# Patient Record
Sex: FEMALE | Race: WHITE | NOT HISPANIC OR LATINO | Employment: OTHER | URBAN - METROPOLITAN AREA
[De-identification: names, ages, dates, MRNs, and addresses within clinical notes are randomized per-mention and may not be internally consistent; named-entity substitution may affect disease eponyms.]

---

## 2017-01-05 ENCOUNTER — TELEPHONE (OUTPATIENT)
Dept: INTERNAL MEDICINE | Facility: CLINIC | Age: 37
End: 2017-01-05

## 2017-01-05 DIAGNOSIS — E55.9 VITAMIN D DEFICIENCY: Primary | ICD-10-CM

## 2017-01-05 DIAGNOSIS — E78.5 HYPERLIPIDEMIA: ICD-10-CM

## 2017-01-05 DIAGNOSIS — I63.30 CEREBRAL THROMBOSIS WITH CEREBRAL INFARCTION: ICD-10-CM

## 2017-01-05 RX ORDER — ROSUVASTATIN CALCIUM 5 MG
TABLET ORAL
Qty: 90 TABLET | Refills: 1 | Status: SHIPPED | OUTPATIENT
Start: 2017-01-05 | End: 2017-08-12 | Stop reason: SDUPTHER

## 2017-01-05 NOTE — TELEPHONE ENCOUNTER
Merry, please schedule pt a RTC Appt with 1 week prior fasting lab; I've placed lab orders.  Thanks

## 2017-01-06 NOTE — TELEPHONE ENCOUNTER
Merry, please schedule pt a RTC Appt with 1 week prior fasting lab; I've placed lab orders. Thanks                  Documentation              Patient for for Ep appt and prior labs...Appt's mail out to home address on file.

## 2017-03-30 ENCOUNTER — OFFICE VISIT (OUTPATIENT)
Dept: INTERNAL MEDICINE | Facility: CLINIC | Age: 37
End: 2017-03-30
Payer: COMMERCIAL

## 2017-03-30 ENCOUNTER — TELEPHONE (OUTPATIENT)
Dept: PHYSICAL MEDICINE AND REHAB | Facility: HOSPITAL | Age: 37
End: 2017-03-30

## 2017-03-30 VITALS
HEIGHT: 64 IN | OXYGEN SATURATION: 98 % | WEIGHT: 139.63 LBS | HEART RATE: 93 BPM | BODY MASS INDEX: 23.84 KG/M2 | DIASTOLIC BLOOD PRESSURE: 60 MMHG | SYSTOLIC BLOOD PRESSURE: 108 MMHG

## 2017-03-30 DIAGNOSIS — F32.A DEPRESSION, UNSPECIFIED DEPRESSION TYPE: ICD-10-CM

## 2017-03-30 DIAGNOSIS — Z15.89 MTHFR MUTATION: ICD-10-CM

## 2017-03-30 DIAGNOSIS — I69.319 COGNITIVE DEFICIT S/P CVA (CEREBROVASCULAR ACCIDENT): Primary | ICD-10-CM

## 2017-03-30 DIAGNOSIS — M54.31 SCIATICA OF RIGHT SIDE: ICD-10-CM

## 2017-03-30 DIAGNOSIS — R53.82 CHRONIC FATIGUE: ICD-10-CM

## 2017-03-30 PROCEDURE — 99214 OFFICE O/P EST MOD 30 MIN: CPT | Mod: 25,S$GLB,, | Performed by: INTERNAL MEDICINE

## 2017-03-30 PROCEDURE — 96372 THER/PROPH/DIAG INJ SC/IM: CPT | Mod: S$GLB,,, | Performed by: INTERNAL MEDICINE

## 2017-03-30 PROCEDURE — 99999 PR PBB SHADOW E&M-EST. PATIENT-LVL IV: CPT | Mod: PBBFAC,,, | Performed by: INTERNAL MEDICINE

## 2017-03-30 PROCEDURE — 1160F RVW MEDS BY RX/DR IN RCRD: CPT | Mod: S$GLB,,, | Performed by: INTERNAL MEDICINE

## 2017-03-30 RX ORDER — KETOROLAC TROMETHAMINE 30 MG/ML
60 INJECTION, SOLUTION INTRAMUSCULAR; INTRAVENOUS
Status: COMPLETED | OUTPATIENT
Start: 2017-03-30 | End: 2017-03-30

## 2017-03-30 RX ORDER — CITALOPRAM 20 MG/1
20 TABLET, FILM COATED ORAL DAILY
Qty: 30 TABLET | Refills: 11 | Status: SHIPPED | OUTPATIENT
Start: 2017-03-30 | End: 2018-02-20 | Stop reason: SDUPTHER

## 2017-03-30 RX ADMIN — KETOROLAC TROMETHAMINE 60 MG: 30 INJECTION, SOLUTION INTRAMUSCULAR; INTRAVENOUS at 11:03

## 2017-03-30 NOTE — PROGRESS NOTES
Verified patients name and date of birth, verified patient allergies, instructed patient to remain in building for 30 min, patient acknowledged understanding of instructions, patient tolerated injection well.

## 2017-03-30 NOTE — PATIENT INSTRUCTIONS
Medication Change:   #1-Zoloft: Decrease to 1 tab x 3 days then STOP zoloft  #2- Start Celexa 20 mg (1 tab) daily therafter(OK to start next day)    I-Phone Calender Daniel:  Juliet Perdomo(Physical Medicine/Rehabilitaion) to call Hui/Maame's mom to schedule Appt

## 2017-03-31 ENCOUNTER — HOSPITAL ENCOUNTER (OUTPATIENT)
Dept: RADIOLOGY | Facility: HOSPITAL | Age: 37
Discharge: HOME | End: 2017-03-31
Attending: INTERNAL MEDICINE
Payer: COMMERCIAL

## 2017-03-31 DIAGNOSIS — M54.31 SCIATICA OF RIGHT SIDE: ICD-10-CM

## 2017-03-31 PROCEDURE — 72100 X-RAY EXAM L-S SPINE 2/3 VWS: CPT | Mod: TC,PO

## 2017-03-31 PROCEDURE — 72100 X-RAY EXAM L-S SPINE 2/3 VWS: CPT | Mod: 26,,, | Performed by: RADIOLOGY

## 2017-04-10 NOTE — TELEPHONE ENCOUNTER
Left the patient a message letting her know that I have tried to setup an appt for her to see Dr Perdomo. She has not returned my call.

## 2017-04-13 ENCOUNTER — TELEPHONE (OUTPATIENT)
Dept: INTERNAL MEDICINE | Facility: CLINIC | Age: 37
End: 2017-04-13

## 2017-04-13 DIAGNOSIS — F41.8 MIXED ANXIETY AND DEPRESSIVE DISORDER: Primary | ICD-10-CM

## 2017-04-13 NOTE — TELEPHONE ENCOUNTER
Hi Dr Perdomo/staff,   Please call pt's mother(Hui Villanueva) to schedule pt an Appt with Dr Perdomo.  The pt has short term memory loss s/p her CVA.  Thanks, Dr Bruce

## 2017-04-13 NOTE — TELEPHONE ENCOUNTER
Spoke with pt's mother, Hui, mara her test results(3/31)-showed TSH,CMP,CBC all ok; Vitamin D improved to 27; Cholesterol/LDL was 139/71; L-spine films were normal. Pt's sciatica sx have resolved.  She is a little depressed and only on Celexa now x 1 week.  I will place Psychiatry referral and they will call to start some counseling. The depression sx should improve in next few weeks; if not, pt/mom to call.

## 2017-04-21 ENCOUNTER — OFFICE VISIT (OUTPATIENT)
Dept: PHYSICAL MEDICINE AND REHAB | Facility: CLINIC | Age: 37
End: 2017-04-21
Payer: COMMERCIAL

## 2017-04-21 VITALS
BODY MASS INDEX: 22.2 KG/M2 | HEIGHT: 64 IN | WEIGHT: 130 LBS | HEART RATE: 75 BPM | SYSTOLIC BLOOD PRESSURE: 119 MMHG | DIASTOLIC BLOOD PRESSURE: 75 MMHG

## 2017-04-21 DIAGNOSIS — G81.91 RIGHT HEMIPARESIS: Primary | ICD-10-CM

## 2017-04-21 DIAGNOSIS — I69.319 COGNITIVE DEFICIT FOLLOWING CEREBROVASCULAR ACCIDENT (CVA): ICD-10-CM

## 2017-04-21 PROCEDURE — 99999 PR PBB SHADOW E&M-EST. PATIENT-LVL III: CPT | Mod: PBBFAC,,, | Performed by: PHYSICAL MEDICINE & REHABILITATION

## 2017-04-21 PROCEDURE — 99214 OFFICE O/P EST MOD 30 MIN: CPT | Mod: S$GLB,,, | Performed by: PHYSICAL MEDICINE & REHABILITATION

## 2017-04-21 PROCEDURE — 1160F RVW MEDS BY RX/DR IN RCRD: CPT | Mod: S$GLB,,, | Performed by: PHYSICAL MEDICINE & REHABILITATION

## 2017-04-21 NOTE — MR AVS SNAPSHOT
[facility]  [address]  [facility] [address]  Phone: [phone]                  Maame Cortez   2017 10:40 AM   Office Visit    Description:  Female : 1980   Provider:  Tulio Perdomo MD   Department:  [facility]           Diagnoses this Visit        Comments    Right hemiparesis    -  Primary     Cognitive deficit following cerebrovascular accident (CVA)                To Do List           Future Appointments        Provider Department Dept Phone    2017 9:30 AM [facility] [address] [phone]      Goals (5 Years of Data)     None      Follow-Up and Disposition     Return in about 6 months (around 10/21/2017) for Dr. Escamilla -- 40 mins.      [facility]     [facility] Care Line -  Assistance  Unless otherwise directed by your provider, please contact [facility], our nurse care line that is available for  assistance.     Registered nurses in the [facility] On Call Center provide: appointment scheduling, clinical advisement, health education, and other advisory services.  Call: [phone] (toll free)               Medications           Message regarding Medications     Verify the changes and/or additions to your medication regime listed below are the same as discussed with your clinician today.  If any of these changes or additions are incorrect, please notify your healthcare provider.             Verify that the below list of medications is an accurate representation of the medications you are currently taking.  If none reported, the list may be blank. If incorrect, please contact your healthcare provider. Carry this list with you in case of emergency.           Current Medications     citalopram (CELEXA) 20 MG tablet Take 1 tablet (20 mg total) by mouth once daily.    CRESTOR 5 mg tablet TAKE 1 TABLET (5 MG TOTAL) BY MOUTH ONCE DAILY. 1 TAB DAILY FOR CHOLESTEROL     "dipyridamole-aspirin 200-25 mg (AGGRENOX)  mg CM12 TAKE 1 CAPSULE BY MOUTH 2 (TWO) TIMES DAILY.    ergocalciferol (ERGOCALCIFEROL) 50,000 unit Cap Take 1 capsule (50,000 Units total) by mouth every 7 days.    levocarnitine (CARNITOR) 100 mg/mL Soln Take 10 mLs (1,000 mg total) by mouth 2 (two) times daily.           Clinical Reference Information           Your Vitals Were     BP Pulse Height Weight Last Period BMI    119/75 75 5' 4" (1.626 m) 59 kg (130 lb) 02/03/2017 (Approximate) 22.31 kg/m2      Blood Pressure          Most Recent Value    BP  119/75      Allergies as of 4/21/2017     No Known Allergies      Immunizations Administered on Date of Encounter - 4/21/2017     None      Orders Placed During Today's Visit      Normal Orders This Visit    Ambulatory Referral to Physical/Occupational Therapy     Ambulatory Referral to Speech Therapy       Instructions    1.  Go to [employer] to view the [employer] H200   2.  Consider joining a gym to work on strengthening your quadriceps and hamstrings  3.  USE your right hand for normal tasks as much as possible.    4.  Add more memory exercises to and do them daily to improve your memory       Language Assistance Services     ATTENTION: Language assistance services are available, free of charge. Please call [phone].      ATENCIÓN: Si zen shukri, tiene a armstrong disposición servicios gratuitos de asistencia lingüística. Llame al [phone].     [facility] Ý: N?u b?n nói Ti?ng Vi?t, có các d?ch v? h? tr? ngôn ng? mi?n phí dành cho b?n. G?i s? [phone].         [facility] complies with applicable Federal civil rights laws and does not discriminate on the basis of race, color, national origin, age, disability, or sex.        "

## 2017-04-21 NOTE — PATIENT INSTRUCTIONS
1.  Go to [employer] to view the [employer] H200   2.  Consider joining a gym to work on strengthening your quadriceps and hamstrings  3.  USE your right hand for normal tasks as much as possible.    4.  Add more memory exercises to and do them daily to improve your memory

## 2017-04-21 NOTE — PROGRESS NOTES
"Subjective:       Patient ID: Maame Cortez is a 36 y.o. female.    Chief Complaint: No chief complaint on file.    HPI Comments: This pleasant 35yo female is followed for:    -- problems residual to a L CVA incurred in August, 2015.  She is familiar to me from an IPR stay from 8/14/15-9/4/15 after incurring a left CVA.  The pt did well and was walking 285' with CGA using an AFO at d/c but with some hyperextension of the right knee in stance phase.  She had cognitive deficits and expressive aphasia which have improved.  Her dysarthria has improved considerably and she feels her cognitive deficits have improved as well.        Today the pt's CC is "my memory".  Dr. Gupta (PCP) called me and asked me to see Elissa again.  She is frustrated that she is still having some problems with memory.  She is also c/o inability to use the right hand.  She is present with her Mother.  Upon questioning she is instinctively using the left hand for most tasks (opening doors, etc) and is not performing any planned exercise with the RUE.  She plays scrabble on her phone daily but is not doing any other mental exercises that might improve memory.  She is c/o her right knee still "popping back" (hyperextending) but stts it is improved.                  Review of Systems   Constitutional: Negative for fatigue.   Eyes: Negative for visual disturbance.   Respiratory: Negative for shortness of breath.    Cardiovascular: Negative for chest pain and leg swelling.   Gastrointestinal: Negative for constipation.   Genitourinary: Negative for difficulty urinating, dysuria, frequency and urgency.   Musculoskeletal: Positive for arthralgias and gait problem. Negative for back pain, joint swelling, myalgias, neck pain and neck stiffness.   Neurological: Positive for weakness. Negative for tremors, speech difficulty and numbness.   Psychiatric/Behavioral: Negative for dysphoric mood.       Objective:      Physical Exam   Constitutional: She " is oriented to person, place, and time. She appears well-nourished.   Neck: Normal range of motion. Neck supple.   Cardiovascular: Normal rate.    Pulmonary/Chest: Effort normal.   Abdominal: Soft.   Musculoskeletal:   LUE/LLE AROM WNL  RUE with diminished flexion/abduction at the shoulder  RLE with mildly diminished HF   Neurological: She is oriented to person, place, and time.   LUE/LLE 5/5  RUE 3+/5 FF, 3/5 FE, 4+/5 EF/EE  RLE:  4/5 HF, 4+/5 elsewhere    On 12/4/15 her exam was:  LUE/LLE 5/5  RUE 3/5FF, 0/5FE (tone), 4/5 EF/EE  RLE:  4/5 HF, 4+/5 elsewhere   Skin: No rash noted.   Psychiatric: She has a normal mood and affect.       Assessment:       1. Right hemiparesis -- improved.  The pt shows improvement in her RUE strength and can now both flex and extend the fingers.  Her gait is reciprocal but she does have some dysmetria of the RLE and the knee still hyperextends in stance phase.  She still has some quadriceps weakness.    I talked at length with the pt about the need for her to use the right hand as much as possible even though performing tasks with it is more difficult -- and explained why.  I asked her to use it as much as possible for routine tasks like opening doors, picking up things, etc.  I suggested she devote at least 40 mins per day to using the right hand for exercise tasks such as picking up things and picking up coins from a table.      I think she is a good candidate for the [employer]00 system and have ordered another course of OT to trial this.  I will try to get it approved for her if she is a good candidate.    I asked her to join a gym and use the weight machines to strengthen her right quadriceps and hamstrings to hopefully improve the right knee hyperextension issue.  I did not order PT today but will if she wishes after she starts OT.  I also suggested swimming.     2. Cognitive deficit following cerebrovascular accident (CVA) -- mostly involving memory.  Scrabble is a good  exercise for this but I have asked her to find other challenging memory and cognitive tasks to perform daily to improve her memory.  I also ordered another course of ST today.         Plan:       RTC 6 mos to see Dr. Escamilla (40 mins) as I will be retiring.   More than 25 mins was spent with the patient with more than half that time used to discuss the pt's diagnoses, interventions and treatment plan.

## 2017-05-04 ENCOUNTER — CLINICAL SUPPORT (OUTPATIENT)
Dept: REHABILITATION | Facility: HOSPITAL | Age: 37
End: 2017-05-04
Attending: PHYSICAL MEDICINE & REHABILITATION
Payer: COMMERCIAL

## 2017-05-04 DIAGNOSIS — I69.319 COGNITIVE DEFICIT FOLLOWING CEREBROVASCULAR ACCIDENT (CVA): Primary | ICD-10-CM

## 2017-05-04 PROCEDURE — 96125 COGNITIVE TEST BY HC PRO: CPT | Mod: PO

## 2017-05-04 NOTE — PROGRESS NOTES
"Date: 05/04/2017    Start Time:  950  Stop Time:  1030      OUTPATIENT NEUROLOGICAL REHABILITATION  SPEECH THERAPY EVALUATION    Subjective/History  Onset Date:  August 8, 2015  Primary Diagnosis:  L CVA  Treatment Diagnosis:  Cognitive Linguistic Impairment   Referring Provider:  Dr. Perdomo  Orders: ST for evaluation and treat  Current Medical History:  Maame Cortez  presents to the [facility] secondary to the diagnosis of L CVA. 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 Her primary complaint is her memory. Mrs. Cortez reports that she often will forget functional tasks such as registering her son for school. She reports forgetting specific information with such frequency that it interferes with her functional living abilities.   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 She reports that she has not worked since her stroke, but stays home with her son. From initial OP evaluation dated 9/10/15 "Mrs. Cortez lives with her  and her 3 children (ages 16 to 5 yrs old). 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 She and her  used to share responsibility for financial matters, but he now manages these."  Signs of Abuse: No  Functional Deficits Leading to Referral/Nature of Injury:  Functional memory deficit  Patient Therapy Goals:  "to remember important information"     Objective "   Scales of Cognitive and Communicative Ability for Neurorehabilitation (SCCAN) was administered to assess cognitive and communicative functioning.        Raw Scores  Percentage Score  Oral Expression (OE)  18/19   94  Orientation (OR)   12/12   100  Memory (ME)    15/19   78  Speech Comprehension (SP)  13/13   100  Reading Comprehension (RD)  11/12   92  Writing (WR)    7/7   100  Attention (AT)    15/16   94  Problem Solving (PS)   22/23   96    Total Raw Score  100%ile Rank  94 SCCAN Index  WNL Degree of Severity    Auditory Comprehension: WFL for the purpose of assessment and conversation.     Reading Comprehension: WFL for the purpose of assessment and conversation.     Verbal Expression: WFL for the purpose of assessment and conversation.     Written Expression: WFL for the purpose of assessment. However, pt reports that her handwriting is not as neat as she would like.     Cognition: Mrs. Danielss cognitive skills are considered mildly impaired with a moderate memory impairment. Mrs. Cortez immediately recalls information fairly easily; however, specific information to be completed over a series of hours or days is frequently forgotten. Mrs. Danielss memory impairment has negatively affected her daily life as she forgets functional information needed to complete her daily activities such as registering her son for school or picking up prescriptions needed for her family.     Motor Speech/Fluency/Voice: pt's motor speech, fluency, and voice were assessed informally and judged to be WFL.     Oral motor exam revealed:  Slight lingual and labial weakness but functional for speech and oral intake.     Swallowing: no concerns reported. Will assess at a future date if s/s of aspiration are observed or reported.     Hearing: WFL for the purpose of evaluation.     Assessment/Impressions    Mrs. Cortez presents to [facility] s/p CV. She presents with a cognitive linguistic impairment. She  presents with moderately impaired memory as she frequently forgets functional information in her daily life.  Patient will benefit from outpatient neurological rehabilitation speech therapy to review compensatory memory strategies.     Functional Communication Measure (FCM):   Severity Modifier for Medicare G-Code:  Memory  Current status: FCM: Level 4    - CK   Projected status:  FCM: Level 5   - CJ         Rehab Potential: fair    Short Term Goals (4 weeks):   1. Pt will recall daily activities via retellings/answering questions with 2 cues for 3 minutes.   2. Pt will recall information discussed during therapy session via retelling / answering questions with 90% accuracy min verbal cues.  3. Pt will describe/ demonstrate/ initiate use of memory strategies with min cues 5 minutes after review.  4. The patietn will use external memory aids and compensatory strategies to recall routine, personal information and recent events with 90% accuracy IND'ly.   5. The patient will demonstrate recall of functional information following a long term delay with min cues in order to increase functional integration into environment.   6. The patient will attest to using memory strategies in daily life each session.     Long Term Goals (8 weeks):   1. The patient will use appropriate memory strategies to schedule and recall weekly activities, express needs and recall names to maintain safety and participate socially in functional living environment.     Education: POC was discussed with pt. Patient and family members expressed understanding.     Plan  Certification Period: 5/4/17 to 12/31/17  Plan of Care Certification Period: 5/4/17 to 6/30/17    Recommended Treatment Plan:  Patient will participate in the [facility] program for speech therapy 2 times per week to address her  Cognition deficits, to educate patient and their family, and to participate in a home exercise program.    Other  Recommendations: none at this time      Therapist's Name: MYLENE Mix, CCC-SLP     Date: 05/04/2017    I certify the need for these services furnished under this plan of treatment and while under my care.  ____________________________________ Physician/Referring Practitioner   Date of Signature

## 2017-05-05 ENCOUNTER — CLINICAL SUPPORT (OUTPATIENT)
Dept: REHABILITATION | Facility: HOSPITAL | Age: 37
End: 2017-05-05
Attending: PHYSICAL MEDICINE & REHABILITATION
Payer: COMMERCIAL

## 2017-05-05 DIAGNOSIS — Z74.09 IMPAIRED MOBILITY AND ADLS: ICD-10-CM

## 2017-05-05 DIAGNOSIS — G81.91 RIGHT HEMIPARESIS: Primary | ICD-10-CM

## 2017-05-05 DIAGNOSIS — Z78.9 IMPAIRED MOBILITY AND ADLS: ICD-10-CM

## 2017-05-05 PROCEDURE — 97165 OT EVAL LOW COMPLEX 30 MIN: CPT | Mod: PO

## 2017-05-05 NOTE — PLAN OF CARE
Name: Maame Cortez  MRN: [id number]   Physician: Tulio Perdomo MD     Diagnosis:   CVA with continued spasticity     Onset date: 8/7/15    Date of service: 5/5/17  Start time: 0900   End time: 1000    Orders: Eval and Treat Bioness trial    Subjective:  Patient goals: write and eat with right hand.   Chief Complaint:   Impaired self care   Past Medical History:   Diagnosis Date    Heart palpitations     MTHFR mutation     Stroke       Current Outpatient Prescriptions   Medication Sig    citalopram (CELEXA) 20 MG tablet Take 1 tablet (20 mg total) by mouth once daily.    CRESTOR 5 mg tablet TAKE 1 TABLET (5 MG TOTAL) BY MOUTH ONCE DAILY. 1 TAB DAILY FOR CHOLESTEROL    dipyridamole-aspirin 200-25 mg (AGGRENOX)  mg CM12 TAKE 1 CAPSULE BY MOUTH 2 (TWO) TIMES DAILY.    ergocalciferol (ERGOCALCIFEROL) 50,000 unit Cap Take 1 capsule (50,000 Units total) by mouth every 7 days.    levocarnitine (CARNITOR) 100 mg/mL Soln Take 10 mLs (1,000 mg total) by mouth 2 (two) times daily.     No current facility-administered medications for this visit.      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 Surrounding edema within the left basal ganglia extending into the left cerebral peduncle and midbrain results in local mass effect with 0.4 cm rightward   midline shift. No hemorrhagic conversion of infarction. No hydrocephalus.    Small focus of signal abnormality in the inferolateral aspect of the right putamen/external capsule may reflect sequela of remote lacunar type infarct, noting the patient's young  chronological age makes this an unusual finding. A prominent perivascular   space is felt less likely given the high FLAIR signal within the lesion.    Past treatment includes: OT  IP d/c 9/4/15, OP 9/6/17 d/c.   Precautions:  Pain: 0/10 at rest. 0/10 with movement. Pain established using the Numbers pain scale.     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  Job description includes: caregiver  Driving status: active prior to stroke    Objective  Cognitive Exam  Oriented: Person, Place, Time and Situation  Behaviors: Pleasant and cooperative  Follows Commands/attention: Follows multistep  commands  Communication: expressive aphasia, dysarthria, mild R facial droop  Memory: Mild Deficits noted  Safety awareness/insight to disability: WNL  Coping skills/emotional control: Appropriate to situation    Physical Exam:    Postural examination/scapula alignment: R scapula elevated and abd  Joint integrity: none noted this date but family reported she had one in rehab.   Skin integrity: WNL  Edema: none    Palpation: no palpable tenderness    Sensation: Rainna reports normal sensation. Pt. Intact RUE for light touch, sharp/dull and proprioception.   RUE ROM WNL except the following: IR 40A/85P, wrist extension 55/WNL, wrist flexion 60/wnl. Middle finger flexes with supination. Pt. Can oppose fifth finger. Pt. Cannot extend thumb radially. Palmar extension ok. Uses tenodesis to open hand fully. Without tenodesis, index DIP lags 25 degress from extension.   Pt. LUE with normal strength with mild coordination deficits due to non-dominance.     : 21lbs RUE  Fine motor coordination:9 hole peg takes over 2 minutes with RUE.  Gross motor coordination: bradykinetic.     Tone:  Modified Brook Scale:   1+ Slight increases in muscle tone, manifested by a catch, followed by minimal resistance throughout the remainder (less than half) of  the ROM.  Pt. Able to activate trace strength in shoulder and elbow with facilitation.   Balance:   Static Sit WNL  Dynamic sit WNL    Functional Status:                                   Functional Mobility:  Bed mobility: Mod I  Transfers to bed: Mod I  Transfers to toilet: Mod I  Car transfers: Mod I   Wheelchair mobility: N/A  ADL's:using non dominant L  Feeding: Min A to cut food  Grooming: Mod I  Hygiene: Mod I  UB Dressing: Mod I  LB Dressing: Mod I  Toileting: Mod I  Bathing: Mod I   IADL's:  Homecare: Min A  Cooking: Min A  Laundry: Min a  Yard work: n/a  Use of telephone: Mod I phone and tablet  Money management: Mod I  Medication management: Mod I      TODAY'S TREATMENT  Began trial of B[employer]ess H200 with R Medium flexor panel A in open hand mode x 15 minutes.     ASSESSMENT:  OT diagnosis: R hemiparesis affecting use of R hand as dominant    Assessment  Maame Cortez is a 36 y.o. female with a medical diagnosis of   CVA  referred to occupational therapy for ADL training and restore function. .    Maame can benefit from outpatient Occupational therapy and a home program to address the stated goals to improve impairments and functional limitations. Treatment will be directed at improve the following impairments. Rehab potential is good. No cultural or educational barriers.     LTG GOALS:  Time frame: 5 weeks  Obtain H200 for home use with [place] in sj/doff by patient or family.  Normalize muscle tone to use R hand for feeding, oral hygiene and writing.   Pt. Will demonstrate ability to open hand with wrist extension for functional grasp.   PLAN:    Patient/caregiver understands and agrees with plan of care.    Outpatient occupational therapy 2  times weekly for 5 weeks  to include: pt ed, hep, therapeutic exercises, therapeutic activity, ADLs,  neuromuscular re-education, joint mobilizations, modalities prn, Certification 5/5/17-6/23/17.    History Examination Decision Making Complexity  Score   Occupational Profile:Brief review     Medical and Therapy History: CVA with R hemiplegia                  Performance Deficits     Physical: Cannot use R hand as dominant        Cognitive: Mild memory deficits        Psychosocial:  Great family support but has autistic son to care for.       Clinical decision making of low complexity, which includes an analysis of the occupational profile, analysis of date from problem focused assessments, and the consideration of limited treatment options. Patient presents with few comorbidities that affect occupational performance. No modification(s) of tasks or assistance with assessments is necessary to enable completion of evaluation component.  low

## 2017-05-05 NOTE — PROGRESS NOTES
Name: Maame Cortez  MRN: [id number]   Physician: Tulio Perdomo MD     Diagnosis:   CVA with continued spasticity     Onset date: 8/7/15    Date of service: 5/5/17  Start time: 0900   End time: 1000    Orders: Eval and Treat Bioness trial    Subjective:  Patient goals: write and eat with right hand.   Chief Complaint:   Impaired self care   Past Medical History:   Diagnosis Date    Heart palpitations     MTHFR mutation     Stroke       Current Outpatient Prescriptions   Medication Sig    citalopram (CELEXA) 20 MG tablet Take 1 tablet (20 mg total) by mouth once daily.    CRESTOR 5 mg tablet TAKE 1 TABLET (5 MG TOTAL) BY MOUTH ONCE DAILY. 1 TAB DAILY FOR CHOLESTEROL    dipyridamole-aspirin 200-25 mg (AGGRENOX)  mg CM12 TAKE 1 CAPSULE BY MOUTH 2 (TWO) TIMES DAILY.    ergocalciferol (ERGOCALCIFEROL) 50,000 unit Cap Take 1 capsule (50,000 Units total) by mouth every 7 days.    levocarnitine (CARNITOR) 100 mg/mL Soln Take 10 mLs (1,000 mg total) by mouth 2 (two) times daily.     No current facility-administered medications for this visit.      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 Surrounding edema within the left basal ganglia extending into the left cerebral peduncle and midbrain results in local mass effect with 0.4 cm rightward   midline shift. No hemorrhagic conversion of infarction. No hydrocephalus.    Small focus of signal abnormality in the inferolateral aspect of the right putamen/external capsule may reflect sequela of remote lacunar type infarct, noting the patient's young  chronological age makes this an unusual finding. A prominent perivascular   space is felt less likely given the high FLAIR signal within the lesion.    Past treatment includes: OT  IP d/c 9/4/15, OP 9/6/17 d/c.   Precautions:  Pain: 0/10 at rest. 0/10 with movement. Pain established using the Numbers pain scale.     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  Job description includes: caregiver  Driving status: active prior to stroke    Objective  Cognitive Exam  Oriented: Person, Place, Time and Situation  Behaviors: Pleasant and cooperative  Follows Commands/attention: Follows multistep  commands  Communication: expressive aphasia, dysarthria, mild R facial droop  Memory: Mild Deficits noted  Safety awareness/insight to disability: WNL  Coping skills/emotional control: Appropriate to situation    Physical Exam:    Postural examination/scapula alignment: R scapula elevated and abd  Joint integrity: none noted this date but family reported she had one in rehab.   Skin integrity: WNL  Edema: none    Palpation: no palpable tenderness    Sensation: Rainna reports normal sensation. Pt. Intact RUE for light touch, sharp/dull and proprioception.   RUE ROM WNL except the following: IR 40A/85P, wrist extension 55/WNL, wrist flexion 60/wnl. Middle finger flexes with supination. Pt. Can oppose fifth finger. Pt. Cannot extend thumb radially. Palmar extension ok. Uses tenodesis to open hand fully. Without tenodesis, index DIP lags 25 degress from extension.   Pt. LUE with normal strength with mild coordination deficits due to non-dominance.     : 21lbs RUE  Fine motor coordination:9 hole peg takes over 2 minutes with RUE.  Gross motor coordination: bradykinetic.     Tone:  Modified Brook Scale:   1+ Slight increases in muscle tone, manifested by a catch, followed by minimal resistance throughout the remainder (less than half) of  the ROM.  Pt. Able to activate trace strength in shoulder and elbow with facilitation.   Balance:   Static Sit WNL  Dynamic sit WNL    Functional Status:                                   Functional Mobility:  Bed mobility: Mod I  Transfers to bed: Mod I  Transfers to toilet: Mod I  Car transfers: Mod I   Wheelchair mobility: N/A  ADL's:using non dominant L  Feeding: Min A to cut food  Grooming: Mod I  Hygiene: Mod I  UB Dressing: Mod I  LB Dressing: Mod I  Toileting: Mod I  Bathing: Mod I   IADL's:  Homecare: Min A  Cooking: Min A  Laundry: Min a  Yard work: n/a  Use of telephone: Mod I phone and tablet  Money management: Mod I  Medication management: Mod I  CMS Impairment/Limitation/Restriction for FOTO Cerebrovascular Disorders Survey  Status Limitation G-Code CMS Severity Modifier  Intake 60% 40% Current Status CK - At least 40 percent but less than 60 percent  Predicted 70% 30% Goal Status+ CJ - At least 20 percent but less than 40 percent        TODAY'S TREATMENT  Began trial of Bioness H200 with R Medium flexor panel A in open hand mode x 15 minutes.     ASSESSMENT:  OT diagnosis: R hemiparesis affecting use of R hand as dominant    Assessment  Maame Cortez is a 36 y.o. female with a medical diagnosis of   CVA  referred to occupational therapy for ADL training and restore function. .    Maame can benefit from outpatient Occupational therapy and a home program to address the stated goals to improve impairments and functional limitations. Treatment will be directed at improve the following impairments. Rehab potential is good. No cultural or educational barriers.     LTG GOALS:  Time frame: 5 weeks  Obtain H200 for home use with [employer] in sj/doff by patient or family.  Normalize muscle tone to use R hand for feeding, oral hygiene and writing.   Pt. Will demonstrate ability to open hand with wrist extension for functional grasp.   PLAN:    Patient/caregiver understands and agrees with plan of  care.    Outpatient occupational therapy 2  times weekly for 5 weeks  to include: pt ed, hep, therapeutic exercises, therapeutic activity, ADLs,  neuromuscular re-education, joint mobilizations, modalities prn, Certification 5/5/17-6/23/17.    History Examination Decision Making Complexity Score   Occupational Profile:Brief review     Medical and Therapy History: CVA with R hemiplegia                  Performance Deficits     Physical: Cannot use R hand as dominant        Cognitive: Mild memory deficits        Psychosocial:  Great family support but has autistic son to care for.       Clinical decision making of low complexity, which includes an analysis of the occupational profile, analysis of date from problem focused assessments, and the consideration of limited treatment options. Patient presents with few comorbidities that affect occupational performance. No modification(s) of tasks or assistance with assessments is necessary to enable completion of evaluation component.  low

## 2017-05-09 RX ORDER — SERTRALINE HYDROCHLORIDE 50 MG/1
TABLET, FILM COATED ORAL
Qty: 90 TABLET | Refills: 3 | Status: SHIPPED | OUTPATIENT
Start: 2017-05-09 | End: 2017-07-07

## 2017-05-10 ENCOUNTER — CLINICAL SUPPORT (OUTPATIENT)
Dept: REHABILITATION | Facility: HOSPITAL | Age: 37
End: 2017-05-10
Attending: PHYSICAL MEDICINE & REHABILITATION
Payer: COMMERCIAL

## 2017-05-10 ENCOUNTER — OFFICE VISIT (OUTPATIENT)
Dept: OBSTETRICS AND GYNECOLOGY | Facility: CLINIC | Age: 37
End: 2017-05-10
Payer: COMMERCIAL

## 2017-05-10 VITALS
WEIGHT: 130.75 LBS | BODY MASS INDEX: 22.32 KG/M2 | HEIGHT: 64 IN | SYSTOLIC BLOOD PRESSURE: 110 MMHG | DIASTOLIC BLOOD PRESSURE: 64 MMHG

## 2017-05-10 DIAGNOSIS — I69.319 COGNITIVE DEFICIT FOLLOWING CEREBROVASCULAR ACCIDENT (CVA): ICD-10-CM

## 2017-05-10 DIAGNOSIS — Z12.4 PAP SMEAR FOR CERVICAL CANCER SCREENING: ICD-10-CM

## 2017-05-10 DIAGNOSIS — Z01.419 WOMEN'S ANNUAL ROUTINE GYNECOLOGICAL EXAMINATION: Primary | ICD-10-CM

## 2017-05-10 DIAGNOSIS — Z11.51 SCREENING FOR HPV (HUMAN PAPILLOMAVIRUS): ICD-10-CM

## 2017-05-10 PROCEDURE — 88175 CYTOPATH C/V AUTO FLUID REDO: CPT

## 2017-05-10 PROCEDURE — 97532 *HC SP COG SKL DEV EA 15 MIN: CPT | Mod: PO

## 2017-05-10 PROCEDURE — 99999 PR PBB SHADOW E&M-EST. PATIENT-LVL III: CPT | Mod: PBBFAC,,, | Performed by: NURSE PRACTITIONER

## 2017-05-10 PROCEDURE — 87624 HPV HI-RISK TYP POOLED RSLT: CPT

## 2017-05-10 PROCEDURE — 99395 PREV VISIT EST AGE 18-39: CPT | Mod: S$GLB,,, | Performed by: NURSE PRACTITIONER

## 2017-05-10 NOTE — PROGRESS NOTES
CC: Annual  HPI: Pt is a 36 y.o.  female who presents for routine annual exam. She uses condoms for contraception. She does not want STD screening.  Krystal any GYN complaints.  Pt with history of stroke in 2015.  Interested in  getting a vasectomy.  Reports + family history of breast cancer- maternal grandmother ( diagnosis).         ROS:  GENERAL: Feeling well overall. Denies fever or chills.   SKIN: Denies rash or lesions.   HEAD: Denies head injury or headache.   NODES: Denies enlarged lymph nodes.   CHEST: Denies chest pain or shortness of breath.   CARDIOVASCULAR: Denies palpitations or left sided chest pain.   ABDOMEN: No abdominal pain, constipation, diarrhea, nausea, vomiting or rectal bleeding.   URINARY: No dysuria, hematuria, or burning on urination.  REPRODUCTIVE: See HPI.   BREASTS: Denies pain, lumps, or nipple discharge.   HEMATOLOGIC: No easy bruisability or excessive bleeding.   MUSCULOSKELETAL: Denies joint pain or swelling.   NEUROLOGIC: Denies syncope or weakness.   PSYCHIATRIC: Denies depression, anxiety or mood swings.    PE:   APPEARANCE: Well nourished, well developed, White female in no acute distress.  NODES: no cervical, supraclavicular, or inguinal lymphadenopathy  BREASTS: Symmetrical, no skin changes or visible lesions. No palpable masses, nipple discharge or adenopathy bilaterally.  ABDOMEN: Soft. No tenderness or masses. No distention. No hernias palpated. No CVA tenderness.  VULVA: No lesions. Normal external female genitalia.  URETHRAL MEATUS: Normal size and location, no lesions, no prolapse.  URETHRA: No masses, tenderness, or prolapse.  VAGINA: Moist. No lesions or lacerations noted. No abnormal discharge present. No odor present.   CERVIX: No lesions or discharge. No cervical motion tenderness.   UTERUS: Normal size, regular shape, mobile, non-tender.  ADNEXA: No tenderness. No fullness or masses palpated in the adnexal regions.   ANUS PERINEUM:  Normal.      Diagnosis:  1. Women's annual routine gynecological examination    2. Pap smear for cervical cancer screening    3. Screening for HPV (human papillomavirus)        Plan:   Pap smear  HPV co-testing   referral to Dr. Dial for vasectomy  Orders Placed This Encounter    HPV High Risk Genotypes, PCR    Liquid-based pap smear, screening       Patient was counseled today on the new ACS guidelines for cervical cytology screening as well as the current recommendations for breast cancer screening. She was counseled to follow up with her PCP for other routine health maintenance. Counseling session lasted approximately 10 minutes, and all her questions were answered.    Follow-up with me in 1 year for routine exam

## 2017-05-10 NOTE — MR AVS SNAPSHOT
[facility] [address]  [address]  [place] [facility] [address]  Phone: [phone]  Fax: [phone]                  Maame Cortez   5/10/2017 9:40 AM   Office Visit    Description:  Female : 1980   Provider:  Diann Rivas NP   Department:  [facility] [address]           Reason for Visit     Gynecologic Exam           Diagnoses this Visit        Comments    Women's annual routine gynecological examination    -  Primary     Pap smear for cervical cancer screening                To Do List           Future Appointments        Provider Department Dept Phone    5/10/2017 11:15 AM MYLENE Benavides, CCC-SLP [facility] [phone]    5/15/2017 10:30 AM MYLENE Benavides, CCC-SLP [facility] [phone]    2017 9:00 AM MYLENE Benavides, CCC-SLP [facility] [phone]    2017 1:45 PM THERON Cannon [facility] [phone]    2017 1:00 PM Светлана Bee OT [facility] [phone]      Goals (5 Years of Data)     None      [facility]     [facility] Nurse Care Line -  Assistance  Unless otherwise directed by your provider, please contact [facility], our nurse care line that is available for  assistance.     Registered nurses in the [facility] provide: appointment scheduling, clinical advisement, health education, and other advisory services.  Call: [phone] (toll free)               Medications           Message regarding Medications     Verify the changes and/or additions to your medication regime listed below are the same as discussed with your clinician today.  If any of these changes or additions are incorrect, please notify your healthcare provider.             Verify that the below list of medications is an accurate representation of the medications you are currently taking.  If none  "reported, the list may be blank. If incorrect, please contact your healthcare provider. Carry this list with you in case of emergency.           Current Medications     citalopram (CELEXA) 20 MG tablet Take 1 tablet (20 mg total) by mouth once daily.    CRESTOR 5 mg tablet TAKE 1 TABLET (5 MG TOTAL) BY MOUTH ONCE DAILY. 1 TAB DAILY FOR CHOLESTEROL    dipyridamole-aspirin 200-25 mg (AGGRENOX)  mg CM12 TAKE 1 CAPSULE BY MOUTH 2 (TWO) TIMES DAILY.    ergocalciferol (ERGOCALCIFEROL) 50,000 unit Cap Take 1 capsule (50,000 Units total) by mouth every 7 days.    levocarnitine (CARNITOR) 100 mg/mL Soln Take 10 mLs (1,000 mg total) by mouth 2 (two) times daily.    sertraline (ZOLOFT) 50 MG tablet TAKE 2 TABLETS (100 MG TOTAL) BY MOUTH ONCE DAILY.           Clinical Reference Information           Your Vitals Were     BP Height Weight Last Period BMI    110/64 5' 4" (1.626 m) 59.3 kg (130 lb 11.7 oz) 04/12/2017 22.44 kg/m2      Blood Pressure          Most Recent Value    BP  110/64      Allergies as of 5/10/2017     No Known Allergies      Immunizations Administered on Date of Encounter - 5/10/2017     None      Smoking Cessation     If you would like to quit smoking:   You may be eligible for free services if you are a [place] resident and started smoking cigarettes before September 1, 1988.  Call the Smoking Cessation Trust ([facility]) toll free at ([phone] or ([phone].   Call 1-800-QUIT-NOW if you do not meet the above criteria.   Contact us via email: [email or web address]   View our website for more information: [email or web address]        Language Assistance Services     ATTENTION: Language assistance services are available, free of charge. Please call [phone].      ATENCIÓN: Si zen shukri, tiene a armstrong disposición servicios gratuitos de asistencia lingüística. Llame al [phone].     CHÚ Ý: N?u b?n nói Ti?ng Vi?t, có các d?ch v? h? tr? ngôn ng? mi?n phí dành cho b?n. G?i s? " [phone].         [facility] [address] complies with applicable Federal civil rights laws and does not discriminate on the basis of race, color, national origin, age, disability, or sex.

## 2017-05-10 NOTE — PROGRESS NOTES
OUTPATIENT NEUROLOGICAL REHABILITATION  SPEECH THERAPY PROGRESS NOTE    Date:  05/10/2017    Start Time:  1120  Stop Time:  1200    Subjective/History  Onset Date:  August 8, 2015  Primary Diagnosis:  L CVA  Treatment Diagnosis:  Cognitive Linguistic Impairment   Referring Provider:  Dr. Perdomo  Orders: ST for evaluation and treat  Current Medical History:  Maame Cortez  presents to the [facility] secondary to the diagnosis of L CVA. 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 Her primary complaint is her memory. Mrs. Cortez reports that she often will forget functional tasks such as registering her son for school. She reports forgetting specific information with such frequency that it interferes with her functional living abilities.   Past Medical History:   Past Medical History:   Diagnosis Date    Heart palpitations     MTHFR mutation     Stroke 08/2015     Precautions:  general        TIMED  Procedure Time Min.   Cognitive Therapy Start: 1120  Stop: 1200  40    Start:  Stop:     Start:  Stop:     Start:  Stop:          UNTIMED  Procedure Time Min.    Start:    Stop:       Start:  Stop:      Total Minutes: 40  Total Timed Units: 3  Total Untimed Units: 0  Charges Billed/# of units: 3    Visit #: 2  Date of Evaluation: 5/4/17  Plan of Care Expiration:  5/4/17 to 6/30/17   Extended POC:       Progress/Current Status    Subjective:     Pain: 0 /10  Pt reports that she forgot her previous session 2/2 losing her calendar.     Objective:       Short Term Goals (4 weeks):   1. Pt will recall daily activities via retellings/answering questions with 2 cues for 3 minutes.   2. Pt will recall information discussed during therapy session via retelling / answering questions with 90% accuracy min verbal cues.  3. Pt will describe/ demonstrate/ initiate use of memory strategies with min cues 5 minutes after review.  4. The patietn will  "use external memory aids and compensatory strategies to recall routine, personal information and recent events with 90% accuracy IND'ly.   5. The patient will demonstrate recall of functional information following a long term delay with min cues in order to increase functional integration into environment.   6. The patient will attest to using memory strategies in daily life each session.     Current Progress toward Short Term Goals:  1. Pt recalled daily activities with 4 cues.   2. Pt participated in fit test for home program. Pt scored the same or better than 90% of same age peers in memory, 50% of same age peers in speed, and 68% of same age peers in attention.   3. N/a  4. Pt established the application "cozi" that was recommended to her by her MD. It integrates her calendar, to do list, and shopping list in one place.   5. N/a  6. N/a   Long Term Goals (8 weeks):   1. The patient will use appropriate memory strategies to schedule and recall weekly activities, express needs and recall names to maintain safety and participate socially in functional living environment.     Assessment:     Mrs. Cortez presents to [facility] s/p CVA. She presents with a cognitive linguistic impairment. She presents with moderately impaired memory as she frequently forgets functional information in her daily life.  Patient will benefit from outpatient neurological rehabilitation speech therapy to review compensatory memory strategies.     Functional Communication Measure (FCM):   Severity Modifier for Medicare G-Code:  Memory  Current status: FCM: Level 4    - CK   Projected status:  FCM: Level 5   - CJ     Patient Education/Response:     Memory strategies were reviewed with pt. Pt verbalized understanding.    Plans and Goals:     Certification Period: 5/4/17 to 12/31/17  Plan of Care Certification Period: 5/4/17 to 6/30/17    Recommended Treatment Plan:  Patient will participate in the [facility] " neurological rehabilitation program for speech therapy 2 times per week to address her  Cognition deficits, to educate patient and their family, and to participate in a home exercise program.    Other Recommendations: none at this time      Therapist's Name: MYLENE Mix, CCC-SLP   5/10/2017

## 2017-05-15 ENCOUNTER — CLINICAL SUPPORT (OUTPATIENT)
Dept: REHABILITATION | Facility: HOSPITAL | Age: 37
End: 2017-05-15
Attending: PHYSICAL MEDICINE & REHABILITATION
Payer: COMMERCIAL

## 2017-05-15 DIAGNOSIS — I69.319 COGNITIVE DEFICIT FOLLOWING CEREBROVASCULAR ACCIDENT (CVA): ICD-10-CM

## 2017-05-15 PROCEDURE — 97532 *HC SP COG SKL DEV EA 15 MIN: CPT | Mod: PO

## 2017-05-15 NOTE — PROGRESS NOTES
OUTPATIENT NEUROLOGICAL REHABILITATION  SPEECH THERAPY PROGRESS NOTE    Date:  05/15/2017    Start Time:  1035  Stop Time:  1115    Subjective/History  Onset Date:  August 8, 2015  Primary Diagnosis:  L CVA  Treatment Diagnosis:  Cognitive Linguistic Impairment   Referring Provider:  Dr. Perdomo  Orders: ST for evaluation and treat  Current Medical History:  Maaem Cortez  presents to the [facility] secondary to the diagnosis of L CVA. 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 Her primary complaint is her memory. Mrs. Cortez reports that she often will forget functional tasks such as registering her son for school. She reports forgetting specific information with such frequency that it interferes with her functional living abilities  Past Medical History:   Past Medical History:   Diagnosis Date    Heart palpitations     MTHFR mutation     Stroke 08/2015     Precautions:  general        TIMED  Procedure Time Min.   Cognitive Therapy Start: 1035  Stop: 1115  40    Start:  Stop:     Start:  Stop:     Start:  Stop:          UNTIMED  Procedure Time Min.    Start:    Stop:       Start:  Stop:      Total Minutes: 40  Total Timed Units: 3  Total Untimed Units: 0  Charges Billed/# of units: 3    Visit #: 3  Date of Evaluation: 5/4/17  Plan of Care Expiration:  5/4/17 to 6/30/17   Extended POC:       Progress/Current Status    Subjective:     Pain: 0 /10  Pt reports effectively using external memory strategies with min A.     Objective:     Short Term Goals (4 weeks):   1. Pt will recall daily activities via retellings/answering questions with 2 cues for 3 minutes.   2. Pt will recall information discussed during therapy session via retelling / answering questions with 90% accuracy min verbal cues.  3. Pt will describe/ demonstrate/ initiate use of memory strategies with min cues 5 minutes after review.  4. The patient will use  external memory aids and compensatory strategies to recall routine, personal information and recent events with 90% accuracy IND'ly.   5. The patient will demonstrate recall of functional information following a long term delay with min cues in order to increase functional integration into environment.   6. The patient will attest to using memory strategies in daily life each session.   New Goal: 7. The Functional Assessment of Verbal reasoning and Executive Strategies will be completed to assess subtle cognitive communication difficulties.     Current Progress toward Short Term Goals:  1. Pt recalled Mother's day activities with 1 cue for 3 min. METx1   2. n/a  3. Pt described using external memory strategies (i.e. Cozi faraz) to remember what she needed to bring to Mother's day activities.   4. Pt used external memory strategies (i.e. Cozi faraz) to recall appointments, remember what she needed for outings, and set up reminders.   5. N/a  6. Pt attested to using external memory strategies 5/5 days since last session.  7. The first two subtests of the Functional Assessment of Verbal Reasoning and Executive Strategies (FAVRES) was administered to assess subtle cognitive-communication difficulties including complex communication, verbal reasoning, and executive functioning. The results of the assessment are combined to create a Reasoning Profile and are detailed in the tables below.     Task 1 - Planning an Event Task 2 - Scheduling    Accuracy Raw: 5  Percentile: 100%  Standard Score: 108 Raw: 2  Percentile: <1%  Standard Score: 24   Rationale  Raw: 5  Percentile: 100%  Standard Score: 106 Raw: 0  Percentile: 6%  Standard Score: 58   Time  Raw: 7m 49s  Percentile: 38%  Standard Score: 96 Raw: 15m 29s  Percentile: 64%  Standard Score: 105         Long Term Goals (8 weeks):   1. The patient will use appropriate memory strategies to schedule and recall weekly activities, express needs and recall names to maintain safety  and participate socially in functional living environment.     Assessment:     Mrs. Cortez presents to [facility] s/p CVA. She presents with a cognitive linguistic impairment. She presents with moderately impaired memory as she frequently forgets functional information in her daily life.  Patient will benefit from outpatient neurological rehabilitation speech therapy to review compensatory memory strategies.     Functional Communication Measure (FCM):   Severity Modifier for Medicare G-Code:  Memory  Current status: FCM: Level 4    - CK   Projected status:  FCM: Level 5   - CJ     Patient Education/Response:     External memory strategies reviewed with pt. Pt verbalized understanding.     Plans and Goals:   Continue POC.     Certification Period: 5/4/17 to 12/31/17  Plan of Care Certification Period: 5/4/17 to 6/30/17    Recommended Treatment Plan:  Patient will participate in the [facility] neurological rehabilitation program for speech therapy 2 times per week to address her  Cognition deficits, to educate patient and their family, and to participate in a home exercise program.    Other Recommendations: none at this time     MYLENE Mix, CCC-SLP   5/15/2017

## 2017-05-16 LAB
HPV16 DNA SPEC QL NAA+PROBE: NEGATIVE
HPV16+18+H RISK 12 DNA CVX-IMP: NEGATIVE
HPV18 DNA SPEC QL NAA+PROBE: NEGATIVE

## 2017-05-17 ENCOUNTER — CLINICAL SUPPORT (OUTPATIENT)
Dept: REHABILITATION | Facility: HOSPITAL | Age: 37
End: 2017-05-17
Attending: PHYSICAL MEDICINE & REHABILITATION
Payer: COMMERCIAL

## 2017-05-17 DIAGNOSIS — I69.319 COGNITIVE DEFICIT FOLLOWING CEREBROVASCULAR ACCIDENT (CVA): ICD-10-CM

## 2017-05-17 PROCEDURE — 97532 *HC SP COG SKL DEV EA 15 MIN: CPT | Mod: PO

## 2017-05-17 NOTE — PROGRESS NOTES
OUTPATIENT NEUROLOGICAL REHABILITATION  SPEECH THERAPY PROGRESS NOTE    Date:  05/17/2017    Start Time:  900  Stop Time:  945    Subjective/History  Onset Date:  August 8, 2015  Primary Diagnosis:  L CVA  Treatment Diagnosis:  Cognitive Linguistic Impairment   Referring Provider:  Dr. Perdomo  Orders: ST for evaluation and treat  Current Medical History:  Maame Cortez  presents to the [facility] secondary to the diagnosis of L CVA. 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 Her primary complaint is her memory. Mrs. Cortez reports that she often will forget functional tasks such as registering her son for school. She reports forgetting specific information with such frequency that it interferes with her functional living abilities  Past Medical History:   Past Medical History:   Diagnosis Date    Heart palpitations     MTHFR mutation     Stroke 08/2015     Precautions:  general        TIMED  Procedure Time Min.   Cognitive therapy Start: 900  Stop: 945  45    Start:  Stop:     Start:  Stop:     Start:  Stop:          UNTIMED  Procedure Time Min.    Start:    Stop:       Start:  Stop:      Total Minutes: 45  Total Timed Units: 3  Total Untimed Units: 0  Charges Billed/# of units: 3    Visit #: 4  Date of Evaluation: 5/4/17  Plan of Care Expiration:  5/4/17 to 6/30/17   Extended POC:      Progress/Current Status    Subjective:     Pain: 0 /10  Pt was alert and communicative in therapy.     Objective:     Short Term Goals (4 weeks):   1. Pt will recall daily activities via retellings/answering questions with 2 cues for 3 minutes. Goal Met 5/17/17  2. Pt will recall information discussed during therapy session via retelling / answering questions with 90% accuracy min verbal cues.  3. Pt will describe/ demonstrate/ initiate use of memory strategies with min cues 5 minutes after review.  4. The patient will use external memory  aids and compensatory strategies to recall routine, personal information and recent events with 90% accuracy IND'ly.   5. The patient will demonstrate recall of functional information following a long term delay with min cues in order to increase functional integration into environment.   6. The patient will attest to using memory strategies in daily life each session.   New Goal: 7. The Functional Assessment of Verbal reasoning and Executive Strategies will be completed to assess subtle cognitive communication difficulties.     Current Progress toward Short Term Goals:  1. Pt recalled day's activities 0 cue for 3 min. METx2 Goal Met 5/17/17   2. n/a  3. Pt described using external memory strategies (i.e. Cozi faraz) to remember therapy appointments. METx1  4. Pt used external memory strategies (i.e. Cozi faraz) to recall appointments, remember what she needed for outings, and set up reminders. MET x1  5. N/a  6. Pt attested to using external memory strategies 3/3 days since last session.  7. The Functional Assessment of Verbal Reasoning and Executive Strategies (FAVRES) was administered to assess subtle cognitive-communication difficulties including complex communication, verbal reasoning, and executive functioning. The results of the assessment are combined to create a Reasoning Profile and are detailed in the tables below.     Task 1 - Planning an Event Task 2 - Scheduling  Task 3 - Making a Decision  Task 4 - Building a Case    Accuracy Raw: 5  Percentile: 100%  Standard Score: 108 Raw: 2  Percentile: <1  Standard Score: 24 Raw: 5  Percentile: 100  Standard Score: 107 Raw: 5  Percentile: 100  Standard Score: 106   Rationale  Raw: 5  Percentile: 100  Standard Score: 106 Raw: 0  Percentile: 6  Standard Score: 58 Raw: 5  Percentile: 100  Standard Score: 103 Raw: 3  Percentile: 3  Standard Score: 71   Time  Raw: 7m 49s  Percentile: 38  Standard Score: 96 Raw: 15min 29s  Percentile: 64  Standard Score: 105 Raw: 10m  8s  Percentile: 19  Standard Score: 90 Raw: 12m 49s  Percentile: 26  Standard Score: 91     Reasoning Subskills    Planning an Event Scheduling Making a Decision Building a Case Total Percentile  Standard Score    Getting the Facts  3 / 5  3 / 5  5 / 5  4 / 5  15 / 20     Eliminating Irrelevant  0 / 1 1 / 1 0 / 1 1 / 1  2 /4      Weighing Facts  1 / 1 1 / 1 1 / 1   2 / 2   5 / 5     Flexibility  1 /1 0 / 1 1 / 1 1 / 1  3 / 4     Generating  5   6 5 6 22     Predicting Consequences  4 / 4  3 / 4 4 / 4 2 / 2 13 / 14     Total Reasoning Subskills  14  14  16  16   60  <3%   <76       Total Test    Raw Score  Percentile Standard Score   Accuracy 17 15 81   Rationale 13 3 60   Time  46 33 94     Mrs. Cortez presents with higher level cognitive communication deficits. She demonstrated difficulty with executive functioning skills such as planning and eliminating irrelevant information. She would benefit from functional intervention aimed at improving these executive functioning skills.     Long Term Goals (8 weeks):   1. The patient will use appropriate memory strategies to schedule and recall weekly activities, express needs and recall names to maintain safety and participate socially in functional living environment.     Assessment:     Mrs. Cortez presents to [facility] s/p CVA. She presents with a cognitive linguistic impairment. She presents with moderately impaired memory as she frequently forgets functional information in her daily life. Upon further assessment, Mrs. Cortez presents with higher level cognitive communication deficits. She demonstrated difficulty with executive functioning skills such as planning and eliminating irrelevant information. She would benefit from functional intervention aimed at improving these executive functioning skills. Patient will benefit from outpatient neurological rehabilitation speech therapy to review compensatory memory strategies.     Functional  Communication Measure (FCM):   Severity Modifier for Medicare G-Code:  Memory  Current status: FCM: Level 4    - CK   Projected status:  FCM: Level 5   - CJ     Patient Education/Response:     POC discussed with pt. Pt verbalized understanding.     Plans and Goals:     Continue POC.     Certification Period: 5/4/17 to 12/31/17  Plan of Care Certification Period: 5/4/17 to 6/30/17    Recommended Treatment Plan:  Patient will participate in the [facility] program for speech therapy 2 times per week to address her  Cognition deficits, to educate patient and their family, and to participate in a home exercise program.    Other Recommendations: none at this time     MYLENE Mix, CCC-SLP   5/17/2017

## 2017-05-24 ENCOUNTER — CLINICAL SUPPORT (OUTPATIENT)
Dept: REHABILITATION | Facility: HOSPITAL | Age: 37
End: 2017-05-24
Attending: PHYSICAL MEDICINE & REHABILITATION
Payer: COMMERCIAL

## 2017-05-24 DIAGNOSIS — G81.91 RIGHT HEMIPARESIS: ICD-10-CM

## 2017-05-24 DIAGNOSIS — I69.319 COGNITIVE DEFICIT FOLLOWING CEREBROVASCULAR ACCIDENT (CVA): ICD-10-CM

## 2017-05-24 DIAGNOSIS — Z78.9 IMPAIRED MOBILITY AND ADLS: ICD-10-CM

## 2017-05-24 DIAGNOSIS — Z74.09 IMPAIRED MOBILITY AND ADLS: ICD-10-CM

## 2017-05-24 PROCEDURE — 97112 NEUROMUSCULAR REEDUCATION: CPT | Mod: PO

## 2017-05-24 PROCEDURE — 97532 *HC SP COG SKL DEV EA 15 MIN: CPT | Mod: PO

## 2017-05-24 NOTE — PROGRESS NOTES
OUTPATIENT NEUROLOGICAL REHABILITATION  SPEECH THERAPY PROGRESS NOTE    Date:  05/24/2017    Start Time:  1345  Stop Time:  1430    Subjective/History  Onset Date:  August 8, 2015  Primary Diagnosis:  L CVA  Treatment Diagnosis:  Cognitive Linguistic Impairment   Referring Provider:  Dr. Perdomo  Orders: ST for evaluation and treat  Current Medical History:  Maame Cortez  presents to the [facility] secondary to the diagnosis of L CVA. 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 Her primary complaint is her memory. Mrs. Cortez reports that she often will forget functional tasks such as registering her son for school. She reports forgetting specific information with such frequency that it interferes with her functional living abilities  Past Medical History:   Past Medical History:   Diagnosis Date    Heart palpitations     MTHFR mutation     Stroke 08/2015     Precautions:  general        TIMED  Procedure Time Min.   Cognitive therapy  Start: 1345  Stop: 1430  45    Start:  Stop:     Start:  Stop:     Start:  Stop:          UNTIMED  Procedure Time Min.    Start:    Stop:       Start:  Stop:      Total Minutes: 45  Total Timed Units: 3  Total Untimed Units: 0  Charges Billed/# of units: 3    Visit #: 5  Date of Evaluation: 5/4/17  Plan of Care Expiration:  5/4/17 to 6/30/17   Extended POC:    Progress/Current Status    Subjective:     Pain: 0 /10  Pt reports improved memory for different appointments i.e. Therapy, but not information from day to day. Currently, she is only using her organization faraz for the calendar. Discussed adding to do lists and reminders to that application as pt does not want to write a list anymore.     Objective:   Short Term Goals (4 weeks):   1. Pt will recall daily activities via retellings/answering questions with 2 cues for 3 minutes. Goal Met 5/17/17  2. Pt will recall information discussed  during therapy session via retelling / answering questions with 90% accuracy min verbal cues. Goal Met 5/24/17  3. Pt will describe/ demonstrate/ initiate use of memory strategies with min cues 5 minutes after review.  4. The patient will use external memory aids and compensatory strategies to recall routine, personal information and recent events with 90% accuracy IND'ly.   5. The patient will demonstrate recall of functional information following a long term delay with min cues in order to increase functional integration into environment.   6. The patient will attest to using memory strategies in daily life each session.   7. The Functional Assessment of Verbal reasoning and Executive Strategies will be completed to assess subtle cognitive communication difficulties. Goal Met 5/17/17  8. New Goal:  The pt will complete planning, organization, and reasoning tasks with 90% accuracy supervision.     Current Progress toward Short Term Goals:  1.Goal Met 5/17/17   2. Pt recalled information discussed in OT and previous ST session INDly. Goal Met 5/24/17  3. Pt described using external memory strategies (i.e. Cozi faraz) to remember therapy appointments. However, she continues to forget day to day tasks. Pt needed direct instruction to create a list.   Pt utilized WRAP memory strategies (i.e. Write, repeat, associate, picture) during STM task with 96% accuracy (i.e. Constant Therapy Level 2 Picture N Back).     4. Pt used external memory strategies (i.e. Cozi faraz) to recall appointments, remember what she needed for outings, and set up reminders. MET x2 Goal Met 5/24/17  5. N/a  6. Pt attested to using external memory strategies 5/5 days since last session.  7. The Functional Assessment of Verbal Reasoning and Executive Strategies (FAVRES) was administered to assess subtle cognitive-communication difficulties including complex communication, verbal reasoning, and executive functioning. The results of the assessment are  combined to create a Reasoning Profile and are detailed in the note dated 5/17/17. Mrs. Cortez presents with higher level cognitive communication deficits. She demonstrated difficulty with executive functioning skills such as planning and eliminating irrelevant information. She would benefit from functional intervention aimed at improving these executive functioning skills.   8. Pt completed organization and planning task with 100% accuracy IND'ly. When less structure was presented in the task (i.e. Given a list to do with approximate times, but no definite times), pt was able to organize information into a coherent schedule with min A.      Long Term Goals (8 weeks):   1. The patient will use appropriate memory strategies to schedule and recall weekly activities, express needs and recall names to maintain safety and participate socially in functional living environment.       Assessment:     Mrs. Cortez presents to [facility] s/p CVA. She presents with a cognitive linguistic impairment. She presents with moderately impaired memory as she frequently forgets functional information in her daily life. Upon further assessment, Mrs. Cortez presents with higher level cognitive communication deficits. She demonstrated difficulty with executive functioning skills such as planning and eliminating irrelevant information. She would benefit from functional intervention aimed at improving these executive functioning skills. Patient will benefit from outpatient neurological rehabilitation speech therapy to review compensatory memory strategies.     Functional Communication Measure (FCM):   Severity Modifier for Medicare G-Code:  Memory  Current status: FCM: Level 4    - CK   Projected status:  FCM: Level 5   - CJ     Patient Education/Response:     Memory strategies and external memory cues were reviewed. Pt verbalized understanding.     Plans and Goals:     Continue POC.     Certification  Period: 5/4/17 to 12/31/17  Plan of Care Certification Period: 5/4/17 to 6/30/17    Recommended Treatment Plan:  Patient will participate in the [facility] program for speech therapy 2 times per week to address her  Cognition deficits, to educate patient and their family, and to participate in a home exercise program.    Other Recommendations: none at this time     MYLENE Mix, CCC-SLP   5/24/2017

## 2017-05-25 NOTE — PROGRESS NOTES
Patient:  Maame Cortez  [facility] #:  [id number]   Date of Note: 05/25/2017   Referring Physician:  Tulio Perdomo MD  Diagnosis:    Encounter Diagnoses   Name Primary?    Impaired mobility and ADLs     Right hemiparesis       Start Time: 1300  End Time: 1345  Total Time: 45 min  Group Time: 0    Subjective: Nothing significant    Pain: 0 out of 10     Objective:   Patient seen by OT this session. Treatment  consist of the following:  Neuromuscular re-education Pt. Used Bioness H200 to RUE with Extension A panel 10 minutes in neuromuscular mode with WB tasks to decrease tone and 30 minutes open hand with grasp/release tasks.     Assessment: Pt. Hand fatigued quickly. Hand opening declines post strong hand closing.   Pt will continue to benefit from skilled OT intervention to trial Bioness H200 to normalize tone and determine .  Patient continues to demonstrate limitation  with  Abnormal muscle tone in RUE affecting function.       LTG GOALS:  Time frame: 5 weeks  Obtain H200 for home use with [facility] in sj/doff by patient or family.  Normalize muscle tone to use R hand for feeding, oral hygiene and writing.   Pt. Will demonstrate ability to open hand with wrist extension for functional grasp.   PLAN:    Patient/caregiver understands and agrees with plan of care.     Outpatient occupational therapy 2  times weekly for 5 weeks  to include: pt ed, hep, therapeutic exercises, therapeutic activity, ADLs,  neuromuscular re-education, joint mobilizations, modalities prn, Certification 5/5/17-6/23/17.

## 2017-05-26 ENCOUNTER — CLINICAL SUPPORT (OUTPATIENT)
Dept: REHABILITATION | Facility: HOSPITAL | Age: 37
End: 2017-05-26
Attending: PHYSICAL MEDICINE & REHABILITATION
Payer: COMMERCIAL

## 2017-05-26 DIAGNOSIS — Z78.9 IMPAIRED MOBILITY AND ADLS: ICD-10-CM

## 2017-05-26 DIAGNOSIS — I69.319 COGNITIVE DEFICIT FOLLOWING CEREBROVASCULAR ACCIDENT (CVA): Primary | ICD-10-CM

## 2017-05-26 DIAGNOSIS — Z74.09 IMPAIRED MOBILITY AND ADLS: ICD-10-CM

## 2017-05-26 DIAGNOSIS — G81.91 RIGHT HEMIPARESIS: ICD-10-CM

## 2017-05-26 PROCEDURE — 97112 NEUROMUSCULAR REEDUCATION: CPT | Mod: PO

## 2017-05-26 PROCEDURE — 97532 *HC SP COG SKL DEV EA 15 MIN: CPT | Mod: PO

## 2017-05-26 NOTE — PROGRESS NOTES
Patient:  Maame Cortez  [facility] #:  [id number]   Date of Note: 05/26/2017   Referring Physician:  Tulio Perdomo MD  Diagnosis:    Encounter Diagnoses   Name Primary?    Impaired mobility and ADLs     Right hemiparesis       Start Time: 1045  End Time: 1115  Total Time: 30 min  Group Time: 0    Subjective: Nothing significant    Pain: 0 out of 10     Objective:   Patient seen by OT this session. Treatment  consist of the following:  Neuromuscular re-education Pt. Used Bioness H200 to RUE with Extension  C panel 10 minutes in neuromuscular mode with WB tasks in sit and with gait to decrease tone and 30 minutes open hand with grasp/release tasks with shoulder at various heights. .     Assessment: Pt. Hand fatigued quickly. Hand opening declines post strong hand closing.   Pt will continue to benefit from skilled OT intervention to trial Bioness H200 to normalize tone and determine .  Patient continues to demonstrate limitation  with  Abnormal muscle tone in RUE affecting function.       LTG GOALS:  Time frame: 5 weeks  Obtain H200 for home use with [facility] in sj/doff by patient or family.  Normalize muscle tone to use R hand for feeding, oral hygiene and writing.   Pt. Will demonstrate ability to open hand with wrist extension for functional grasp.   PLAN:    Patient/caregiver understands and agrees with plan of care.     Outpatient occupational therapy 2  times weekly for 5 weeks  to include: pt ed, hep, therapeutic exercises, therapeutic activity, ADLs,  neuromuscular re-education, joint mobilizations, modalities prn, Certification 5/5/17-6/23/17.

## 2017-05-26 NOTE — PROGRESS NOTES
OUTPATIENT NEUROLOGICAL REHABILITATION  SPEECH THERAPY PROGRESS NOTE    Date:  05/26/2017    Start Time:  1115  Stop Time:  1200    Subjective/History  Onset Date:  August 8, 2015  Primary Diagnosis:  L CVA  Treatment Diagnosis:  Cognitive Linguistic Impairment   Referring Provider:  Dr. Perdomo  Orders: ST for evaluation and treat  Current Medical History:  Maame Cortez  presents to the [facility] secondary to the diagnosis of L CVA. 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 Her primary complaint is her memory. Mrs. Cortez reports that she often will forget functional tasks such as registering her son for school. She reports forgetting specific information with such frequency that it interferes with her functional living abilities  Past Medical History:   Past Medical History:   Diagnosis Date    Heart palpitations     MTHFR mutation     Stroke 08/2015     Precautions:  general        TIMED  Procedure Time Min.   Cognitive therapy  Start: 1115  Stop: 1200  45    Start:  Stop:     Start:  Stop:     Start:  Stop:          UNTIMED  Procedure Time Min.    Start:    Stop:       Start:  Stop:      Total Minutes: 45  Total Timed Units: 3  Total Untimed Units: 0  Charges Billed/# of units: 3    Visit #: 6  Date of Evaluation: 5/4/17  Plan of Care Expiration:  5/4/17 to 6/30/17   Extended POC:    Progress/Current Status    Subjective:     Pain: 0 /10    Pt stated that she has trouble remembering things from the day before not necessarily from a few minutes before.      Objective:   Short Term Goals (4 weeks):   1. Pt will recall daily activities via retellings/answering questions with 2 cues for 3 minutes. Goal Met 5/17/17  2. Pt will recall information discussed during therapy session via retelling / answering questions with 90% accuracy min verbal cues. Goal Met 5/24/17  3. Pt will describe/ demonstrate/ initiate use of memory  strategies with min cues 5 minutes after review.  4. The patient will use external memory aids and compensatory strategies to recall routine, personal information and recent events with 90% accuracy IND'ly.   5. The patient will demonstrate recall of functional information following a long term delay with min cues in order to increase functional integration into environment.   6. The patient will attest to using memory strategies in daily life each session.   7. The Functional Assessment of Verbal reasoning and Executive Strategies will be completed to assess subtle cognitive communication difficulties. Goal Met 5/17/17  8. New Goal:  The pt will complete planning, organization, and reasoning tasks with 90% accuracy supervision.     Current Progress toward Short Term Goals: 5/26/17  1.Goal Met 5/17/17   2. Pt recalled information discussed in OT and previous ST session INDly. Goal Met 5/24/17  3. Pt described using external memory strategies (i.e. Cozi faraz) to remember therapy appointments. However, she continues to forget day to day tasks. Pt needed direct instruction to create a list. Also discussed getting her  and 18 y/o to add to the Cozi faraz schedule so she knows what activities are planned for family.   - Constant Therapy: Voice Mail - Level 1 - 90% acc, Level 2 - 100% acc.     4. Pt used external memory strategies (i.e. Cozi faraz) to recall appointments, remember what she needed for outings, and set up reminders. MET x2 Goal Met 5/24/17  5. N/a  6. Pt attested to using external memory strategies 5/5 days since last session. Met x 2   7. The Functional Assessment of Verbal Reasoning and Executive Strategies (FAVRES) was administered to assess subtle cognitive-communication difficulties including complex communication, verbal reasoning, and executive functioning. The results of the assessment are combined to create a Reasoning Profile and are detailed in the note dated 5/17/17. Mrs. Cortez presents with  higher level cognitive communication deficits. She demonstrated difficulty with executive functioning skills such as planning and eliminating irrelevant information. She would benefit from functional intervention aimed at improving these executive functioning skills.   8. Pt completed organization and planning task with 100% accuracy IND'ly. Discussed using a grocery list and things to do list to save time and be more productive.   - Constant Therapy: Word Ordering - Level 1 - 99% acc     Long Term Goals (8 weeks):   1. The patient will use appropriate memory strategies to schedule and recall weekly activities, express needs and recall names to maintain safety and participate socially in functional living environment.       Assessment:     Pt is making good progress towards her goals. She was encouraged to sit with her  and older son to get family schedule on one system so she can be a part of planning, organizing, and follow through of activities.     Mrs. Cortez presents to [facility] s/p CVA. She presents with a cognitive linguistic impairment. She presents with moderately impaired memory as she frequently forgets functional information in her daily life. Upon further assessment, Mrs. Cortez presents with higher level cognitive communication deficits. She demonstrated difficulty with executive functioning skills such as planning and eliminating irrelevant information. She would benefit from functional intervention aimed at improving these executive functioning skills. Patient will benefit from outpatient neurological rehabilitation speech therapy to review compensatory memory strategies.     Functional Communication Measure (FCM):   Severity Modifier for Medicare G-Code:  Memory  Current status: FCM: Level 4    - CK   Projected status:  FCM: Level 5   - CJ     Patient Education/Response:     Memory strategies and external memory cues were reviewed. Pt verbalized  understanding.     Plans and Goals:     Continue POC.     Certification Period: 5/4/17 to 12/31/17  Plan of Care Certification Period: 5/4/17 to 6/30/17    Recommended Treatment Plan:  Patient will participate in the [facility] program for speech therapy 2 times per week to address her  Cognition deficits, to educate patient and their family, and to participate in a home exercise program.    Other Recommendations: none at this time       5/26/2017

## 2017-05-30 ENCOUNTER — CLINICAL SUPPORT (OUTPATIENT)
Dept: REHABILITATION | Facility: HOSPITAL | Age: 37
End: 2017-05-30
Attending: PHYSICAL MEDICINE & REHABILITATION
Payer: COMMERCIAL

## 2017-05-30 DIAGNOSIS — Z78.9 IMPAIRED MOBILITY AND ADLS: ICD-10-CM

## 2017-05-30 DIAGNOSIS — I69.319 COGNITIVE DEFICIT FOLLOWING CEREBROVASCULAR ACCIDENT (CVA): ICD-10-CM

## 2017-05-30 DIAGNOSIS — Z74.09 IMPAIRED MOBILITY AND ADLS: ICD-10-CM

## 2017-05-30 DIAGNOSIS — G81.91 RIGHT HEMIPARESIS: ICD-10-CM

## 2017-05-30 PROCEDURE — 97532 *HC SP COG SKL DEV EA 15 MIN: CPT | Mod: PO

## 2017-05-30 PROCEDURE — 97112 NEUROMUSCULAR REEDUCATION: CPT | Mod: PO

## 2017-05-30 NOTE — PROGRESS NOTES
Patient:  Maame Cortez  [facility] #:  [id number]   Date of Note: 05/30/2017   Referring Physician:  Tulio Perdomo MD  Diagnosis:    Encounter Diagnoses   Name Primary?    Impaired mobility and ADLs     Right hemiparesis       Start Time: 945  End Time: 1030  Total Time: 45 min  Group Time: 0    Subjective: Pt. States right hand feels less tight.     Pain: 0 out of 10     Objective:   Patient seen by OT this session. Treatment  consist of the following:  Neuromuscular re-education Pt. Used Bioness H200 to RUE with Extension  D panel 10 minutes in neuromuscular mode with WB tasks in sit to decrease tone and 30 minutes open hand with grasp/release tasks with shoulder at various heights.     Assessment: Pt. Hand fatigued quickly. Hand opening declines post strong hand closing.   Pt will continue to benefit from skilled OT intervention to trial Bioness H200 to normalize tone and determine .  Patient continues to demonstrate limitation  with  Abnormal muscle tone in RUE affecting function.       LTG GOALS:  Time frame: 5 weeks  Obtain H200 for home use with [place] in sj/doff by patient or family.  Normalize muscle tone to use R hand for feeding, oral hygiene and writing.   Pt. Will demonstrate ability to open hand with wrist extension for functional grasp.   PLAN:    Patient/caregiver understands and agrees with plan of care.     Outpatient occupational therapy 2  times weekly for 5 weeks  to include: pt ed, hep, therapeutic exercises, therapeutic activity, ADLs,  neuromuscular re-education, joint mobilizations, modalities prn, Certification 5/5/17-6/23/17.

## 2017-05-30 NOTE — PROGRESS NOTES
OUTPATIENT NEUROLOGICAL REHABILITATION  SPEECH THERAPY DISCHARGE SUMMARY    Date:  05/30/2017    Start Time:  905  Stop Time:  945    Subjective/History  Onset Date:  August 8, 2015  Primary Diagnosis:  L CVA  Treatment Diagnosis:  Cognitive Linguistic Impairment   Referring Provider:  Dr. Perdomo  Orders: ST for evaluation and treat  Current Medical History:  Maame Cortez  presents to the [facility] secondary to the diagnosis of L CVA. 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 Her primary complaint is her memory. Mrs. Cortez reports that she often will forget functional tasks such as registering her son for school. She reports forgetting specific information with such frequency that it interferes with her functional living abilities  Past Medical History:   Past Medical History:   Diagnosis Date    Heart palpitations     MTHFR mutation     Stroke 08/2015     Precautions:  general        TIMED  Procedure Time Min.   Cognitive Therapy Start: 905  Stop: 940 40     Start:  Stop:     Start:  Stop:     Start:  Stop:          UNTIMED  Procedure Time Min.    Start:    Stop:       Start:  Stop:      Total Minutes: 40  Total Timed Units: 3  Total Untimed Units: 0  Charges Billed/# of units: 3    Visit #: 7  Total # Of Visits:  8  Cancelled:  0  No Shows:  1  Date of Evaluation: 5/4/17  Plan of Care Expiration:  5/4/17 to 6/30/17   Extended POC:    Progress/Current Status    Subjective:     Pain: 0 /10  Pt reports that she feels she has the tools she needs to be successful. She feels she has made significant improvements since beginning to utilize the memory strategies presented and external memory aids.     Objective:   Physical/Functional Status:  At time of discharge, Mrs. Cortez was able to utilize external memory aids, recall memory strategies, and organize and plan tasks with min DREW.   Short Term Goals (4 weeks):   1. Pt will  recall daily activities via retellings/answering questions with 2 cues for 3 minutes. Goal Met 5/17/17 / Discontinue  2. Pt will recall information discussed during therapy session via retelling / answering questions with 90% accuracy min verbal cues. Goal Met 5/24/17 / Discontinue  3. Pt will describe/ demonstrate/ initiate use of memory strategies with min cues 5 minutes after review. Goal Met 5/30/17 / Discontinue  4. The patient will use external memory aids and compensatory strategies to recall routine, personal information and recent events with 90% accuracy IND'ly. Goal Met 5/30/17 / Discontinue   5. The patient will demonstrate recall of functional information following a long term delay with min cues in order to increase functional integration into environment. Goal Met 5/30/17 / Discontinue  6. The patient will attest to using memory strategies in daily life each session. Goal Met 5/30/17 / Discontinue  7. The Functional Assessment of Verbal reasoning and Executive Strategies will be completed to assess subtle cognitive communication difficulties. Goal Met 5/17/17  8. The pt will complete planning, organization, and reasoning tasks with 90% accuracy supervision. Goal Met with min A 5/30/17 / Discontinue     Current Progress toward Short Term Goals:   1.Goal Met 5/17/17   2. Pt recalled information discussed in OT and previous ST session INDly. Goal Met 5/24/17  3. Pt described using external memory strategies (i.e. ComActivity faraz) to remember therapy appointments. Pt reports that she used the faraz to create her shopping lists, her to do lists, and track her son's therapies and camps. She reports that she continues to have difficulties remembering her oldest son's schedule as he often tells her information as she is doing something else. When cued, she recalled that she could repeat the information back to his son and have him input it into her calendar. She plans to utilize this strategy from this point on. Goal  Met 5/30/17  4. Pt used external memory strategies (i.e. Cozi faraz) to recall appointments, remember what she needed for outings, and set up reminders. Goal Met 5/24/17  5. Pt recalled her son's therapy schedule with use of her calendar in the Cozi application to recall specific times. She was able to recall what her family did for Memorial day. Goal Met 5/30/17  6. Pt attested to using external memory strategies 5/5 days since last session. Goal Met 5/30/17  7. The Functional Assessment of Verbal Reasoning and Executive Strategies (FAVRES) was administered to assess subtle cognitive-communication difficulties including complex communication, verbal reasoning, and executive functioning. The results of the assessment are combined to create a Reasoning Profile and are detailed in the note dated 5/17/17. Mrs. Cortez presents with higher level cognitive communication deficits. She demonstrated difficulty with executive functioning skills such as planning and eliminating irrelevant information. She would benefit from functional intervention aimed at improving these executive functioning skills. Goal Met 5/17/17  8. Pt completed organization and planning task with 100% accuracy with min A. Pt reports that she has trouble creating a framework to organize tasks but is successful if she takes her time and has as much information as possible. Goal Met with cues 5/30/17 / Discontinue    Long Term Goals (8 weeks):   1. The patient will use appropriate memory strategies to schedule and recall weekly activities, express needs and recall names to maintain safety and participate socially in functional living environment. Goal Met 5/30/17    Assessment:     Mrs. Cortez presents to [facility] s/p CV. She presents with a cognitive linguistic impairment. She presents with moderately impaired memory as she frequently forgets functional information in her daily life. Upon further assessment, Mrs. Cortez presents  with higher level cognitive communication deficits. She demonstrated difficulty with executive functioning skills such as planning and eliminating irrelevant information. She would benefit from functional intervention aimed at improving these executive functioning skills. Initially, Mrs. Cortez was not using any compensatory strategies to aid in her delayed and immediate recall of functional information. Currently, she is utilizing an application on her phone that allows her to track her calendar, her 's calendar, and her sons' calendars, utilize multiple lists (i.e. Grocery and to do) in one location, and complete recipe planning. She is able to complete organizational and planning tasks with min A. Mrs. Cortez no longer warrants skilled speech and language therapy as she has generalized these compensatory strategies to her functional daily life.      Functional Communication Measure (FCM):     Memory  Current status: FCM: Level 6  - CI CI at least 1% but less than 20% impaired, limited or restricted  Projected status:  FCM: Level 5  - CJ CJ at least 20% < 40% impaired, limited or restricted  Discharge status:  FCM: Level 6  - CI CI at least 1% but less than 20% impaired, limited or restricted        Patient Education/Response:     Memory strategies were reviewed. Pt verbalized understanding.     Plans and Goals:     D/c ST.     Discharge Plan:  Mrs. Cortez is being discharged from outpatient neuro rehab speech therapy for the following reason(s):  Patient has met all of his/her goals and Patient has reached the maximum rehab potential for the present time    MYLENE Mix, CCC-SLP  5/30/2017

## 2017-06-02 ENCOUNTER — CLINICAL SUPPORT (OUTPATIENT)
Dept: REHABILITATION | Facility: HOSPITAL | Age: 37
End: 2017-06-02
Attending: PHYSICAL MEDICINE & REHABILITATION
Payer: COMMERCIAL

## 2017-06-02 DIAGNOSIS — Z78.9 IMPAIRED MOBILITY AND ADLS: ICD-10-CM

## 2017-06-02 DIAGNOSIS — Z74.09 IMPAIRED MOBILITY AND ADLS: ICD-10-CM

## 2017-06-02 DIAGNOSIS — G81.91 RIGHT HEMIPARESIS: Primary | ICD-10-CM

## 2017-06-02 NOTE — PROGRESS NOTES
Patient:  Maame Cortez  [facility] #:  [id number]   Date of Note: 06/02/2017   Referring Physician:  Tulio Perdomo MD  Diagnosis:    Encounter Diagnoses   Name Primary?    Right hemiparesis Yes    Impaired mobility and ADLs       Start Time: 955  End Time: 1040  Total Time: 45 min  Group Time: 0    Subjective: Pt. States right hand feels less tight. She states it feels tight in morning.     Pain: 0 out of 10     Objective: Pt. Told to bring in splint for next visit.   Patient seen by OT this session. Treatment  consist of the following:  Neuromuscular re-education Pt. Used Bioness H200 to RUE with Extension  D panel 10 minutes in neuromuscular mode with WB tasks in sit to decrease tone and 30 minutes open hand with grasp/release tasks with shoulder at various heights. Insurance information sent for review for home unit.     Assessment: Pt. Hand fatigued quickly. Hand opening declines post strong hand closing and difficulty opening hand with wrist extension.   Pt will continue to benefit from skilled OT intervention to trial Bioness H200 to normalize tone and determine .  Patient continues to demonstrate limitation  with  Abnormal muscle tone in RUE affecting function.       LTG GOALS:  Time frame: 5 weeks  Obtain H200 for home use with [place] in sj/doff by patient or family.  Normalize muscle tone to use R hand for feeding, oral hygiene and writing.   Pt. Will demonstrate ability to open hand with wrist extension for functional grasp.   PLAN:    Patient/caregiver understands and agrees with plan of care.     Outpatient occupational therapy 2  times weekly for 5 weeks  to include: pt ed, hep, therapeutic exercises, therapeutic activity, ADLs,  neuromuscular re-education, joint mobilizations, modalities prn, Certification 5/5/17-6/23/17.

## 2017-06-08 ENCOUNTER — CLINICAL SUPPORT (OUTPATIENT)
Dept: REHABILITATION | Facility: HOSPITAL | Age: 37
End: 2017-06-08
Attending: PHYSICAL MEDICINE & REHABILITATION
Payer: COMMERCIAL

## 2017-06-08 DIAGNOSIS — Z78.9 IMPAIRED MOBILITY AND ADLS: ICD-10-CM

## 2017-06-08 DIAGNOSIS — Z74.09 IMPAIRED MOBILITY AND ADLS: ICD-10-CM

## 2017-06-08 DIAGNOSIS — G81.91 RIGHT HEMIPARESIS: ICD-10-CM

## 2017-06-08 PROCEDURE — 97112 NEUROMUSCULAR REEDUCATION: CPT | Mod: PO

## 2017-06-08 NOTE — PROGRESS NOTES
Patient:  Maame Cortez  [facility] #:  [id number]   Date of Note: 06/08/2017   Referring Physician:  Tulio Perdomo MD  Diagnosis:    Encounter Diagnoses   Name Primary?    Impaired mobility and ADLs     Right hemiparesis       Start Time: 900  End Time: 945  Total Time: 45 min  Group Time: 0    Subjective: Pt. States [employer] called her stating they were working on insurance clearance for home unit.     Pain: 0 out of 10     Objective:   Patient seen by OT this session. Treatment  consist of the following:  Neuromuscular re-education Pt. Used Bioness H200 to RUE with Extension  D panel 15 minutes in neuromuscular mode with WB tasks in sit, quadraped and plank and reverse planks to decrease tone and 30 minutes open hand with grasp/release tasks with shoulder at various heights.  Assessment: Pt. Hand fatigued quickly. Hand opening declines post strong hand closing and difficulty opening hand with wrist extension.   Pt will continue to benefit from skilled OT intervention to trial Bioness H200 to normalize tone and determine .  Patient continues to demonstrate limitation  with  Abnormal muscle tone in RUE affecting function.       LTG GOALS:  Time frame: 5 weeks  Obtain H200 for home use with [place] in sj/doff by patient or family.  Normalize muscle tone to use R hand for feeding, oral hygiene and writing.   Pt. Will demonstrate ability to open hand with wrist extension for functional grasp.   PLAN:    Patient/caregiver understands and agrees with plan of care.     Outpatient occupational therapy 2  times weekly for 5 weeks  to include: pt ed, hep, therapeutic exercises, therapeutic activity, ADLs,  neuromuscular re-education, joint mobilizations, modalities prn, Certification 5/5/17-6/23/17.

## 2017-06-12 ENCOUNTER — CLINICAL SUPPORT (OUTPATIENT)
Dept: REHABILITATION | Facility: HOSPITAL | Age: 37
End: 2017-06-12
Attending: PHYSICAL MEDICINE & REHABILITATION
Payer: COMMERCIAL

## 2017-06-12 DIAGNOSIS — G81.91 RIGHT HEMIPARESIS: ICD-10-CM

## 2017-06-12 DIAGNOSIS — Z78.9 IMPAIRED MOBILITY AND ADLS: ICD-10-CM

## 2017-06-12 DIAGNOSIS — Z74.09 IMPAIRED MOBILITY AND ADLS: ICD-10-CM

## 2017-06-12 PROCEDURE — 97112 NEUROMUSCULAR REEDUCATION: CPT | Mod: PO

## 2017-06-12 NOTE — PROGRESS NOTES
Patient:  Maame Cortez  [facility] #:  [id number]   Date of Note: 06/12/2017   Referring Physician:  Tulio Perdomo MD  Diagnosis:    Encounter Diagnoses   Name Primary?    Impaired mobility and ADLs     Right hemiparesis       Start Time: 900  End Time: 945  Total Time: 45 min  Group Time: 0    Subjective: Nothing significant    Pain: 0 out of 10     Objective:   Patient seen by OT this session. Treatment  consist of the following:  Neuromuscular re-education Pt. Used Bioness H200 to RUE with Extension  D panel 15 minutes in neuromuscular mode with WB tasks in sit, and 30 minutes open hand with grasp/release tasks  On varied size cylinders with shoulder at various heights. Reviewed set up and don/doff with patient.   Assessment: Pt. Hand fatigued quickly. Hand opening declines post strong hand closing and difficulty opening hand with wrist extension.   Pt will continue to benefit from skilled OT intervention to trial Bioness H200 to normalize tone and determine .  Patient continues to demonstrate limitation  with  Abnormal muscle tone in RUE affecting function.       LTG GOALS:  Time frame: 5 weeks  Obtain H200 for home use with [place] in sj/doff by patient or family.  Normalize muscle tone to use R hand for feeding, oral hygiene and writing.   Pt. Will demonstrate ability to open hand with wrist extension for functional grasp.   PLAN:    Patient/caregiver understands and agrees with plan of care.     Outpatient occupational therapy 2  times weekly for 5 weeks  to include: pt ed, hep, therapeutic exercises, therapeutic activity, ADLs,  neuromuscular re-education, joint mobilizations, modalities prn, Certification 5/5/17-6/23/17. Pt. On hold until she obtains a home unit. Then will bring her back to OT to teach how to use at home.

## 2017-07-07 ENCOUNTER — OFFICE VISIT (OUTPATIENT)
Dept: INTERNAL MEDICINE | Facility: CLINIC | Age: 37
End: 2017-07-07
Payer: COMMERCIAL

## 2017-07-07 VITALS
BODY MASS INDEX: 22.06 KG/M2 | SYSTOLIC BLOOD PRESSURE: 102 MMHG | OXYGEN SATURATION: 98 % | WEIGHT: 129.19 LBS | HEART RATE: 81 BPM | HEIGHT: 64 IN | DIASTOLIC BLOOD PRESSURE: 84 MMHG

## 2017-07-07 DIAGNOSIS — I63.9 CEREBROVASCULAR ACCIDENT (CVA), UNSPECIFIED MECHANISM: Primary | ICD-10-CM

## 2017-07-07 DIAGNOSIS — E01.0 THYROMEGALY: ICD-10-CM

## 2017-07-07 DIAGNOSIS — Z29.9 PREVENTIVE MEASURE: ICD-10-CM

## 2017-07-07 DIAGNOSIS — E55.9 VITAMIN D DEFICIENCY: ICD-10-CM

## 2017-07-07 DIAGNOSIS — Z72.0 TOBACCO ABUSE: ICD-10-CM

## 2017-07-07 PROCEDURE — 99999 PR PBB SHADOW E&M-EST. PATIENT-LVL IV: CPT | Mod: PBBFAC,,, | Performed by: INTERNAL MEDICINE

## 2017-07-07 PROCEDURE — 99214 OFFICE O/P EST MOD 30 MIN: CPT | Mod: S$GLB,,, | Performed by: INTERNAL MEDICINE

## 2017-07-07 RX ORDER — ACETAMINOPHEN 500 MG
1 TABLET ORAL DAILY
Qty: 30 CAPSULE
Start: 2017-07-07 | End: 2018-10-24 | Stop reason: SDDI

## 2017-07-07 RX ORDER — EPINEPHRINE 0.22MG
100 AEROSOL WITH ADAPTER (ML) INHALATION DAILY
Qty: 30 CAPSULE
Start: 2017-07-07 | End: 2018-10-24 | Stop reason: SDDI

## 2017-07-10 NOTE — PROGRESS NOTES
Subjective:       Patient ID: Maame Cortez is a 36 y.o. female.    Chief Complaint: Follow-up (Cognitive deficit S/P CVA)    HPI Maame presents for f/u the above.  She is present with her mother. She continues the Crestor and   Aggrenox daily. She has been following in PT/OT per Dr Perdomo and using a new a neuromuscular  Stimulatory device which is helping a little. She only exercises about 1x/week.  She does smoke  On more of a social scale-about a pack/week. 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    Current Outpatient Prescriptions   Medication Sig Dispense Refill    citalopram (CELEXA) 20 MG tablet Take 1 tablet (20 mg total) by mouth once daily. 30 tablet 11    dipyridamole-aspirin 200-25 mg (AGGRENOX)  mg CM12 TAKE 1 CAPSULE BY MOUTH 2 (TWO) TIMES DAILY. 60 capsule 11    cholecalciferol, vitamin D3, (VITAMIN D3) 2,000 unit Cap Take 1 capsule (2,000 Units total) by mouth once daily. 30 capsule prn    coenzyme Q10 (COQ-10) 100 mg capsule Take 1 capsule (100 mg total) by mouth once daily. 30 capsule prn    CRESTOR 5 mg tablet TAKE 1 TABLET (5 MG TOTAL) BY MOUTH ONCE DAILY. 1 TAB DAILY FOR CHOLESTEROL 90 tablet 1     No current facility-administered medications for this visit.        Review of Systems   Respiratory: Negative.    Cardiovascular: Negative.    Neurological: Positive for weakness.        + Right sided Hemiparesis s/p CVA   Psychiatric/Behavioral: Positive for confusion.        + Mild Short term memory issues s/p CVA       Objective:      Lab Results   Component Value Date    WBC 9.05 03/31/2017    HGB 11.6 (L) 03/31/2017    HCT 37.4 03/31/2017     03/31/2017    CHOL 139 03/31/2017    TRIG 87 03/31/2017    HDL 50 03/31/2017    ALT 27 03/31/2017    AST 27 03/31/2017     03/31/2017    K 4.0 03/31/2017     03/31/2017    CREATININE 0.7 03/31/2017    BUN 9 03/31/2017    CO2 26 03/31/2017    TSH 0.571 03/31/2017    INR 1.0 11/01/2015    HGBA1C  5.1 08/08/2015     Physical Exam   Constitutional: She is oriented to person, place, and time. She appears well-developed and well-nourished.   HENT:   Head: Normocephalic and atraumatic.   Mouth/Throat: Oropharynx is clear and moist.   Neck: Normal range of motion. Neck supple. Thyromegaly present.   Cardiovascular: Normal rate, regular rhythm and normal heart sounds.    Pulmonary/Chest: Effort normal and breath sounds normal.   Abdominal: Soft. Bowel sounds are normal.   Lymphadenopathy:     She has no cervical adenopathy.   Neurological: She is alert and oriented to person, place, and time. She exhibits abnormal muscle tone. Coordination abnormal.   + Right sided arm/leg hemiparesis>distally but mild  Pt now becoming left handed through use.   Skin: Skin is warm and dry.       Assessment:       1. Cerebrovascular accident (CVA), unspecified mechanism    2. Thyromegaly    3. Preventive measure    4. Vitamin D deficiency    5. Tobacco abuse        Plan:     Health Maintenance       Date Due Completion Date    TETANUS VACCINE 11/06/1998 ---    Pneumococcal PPSV23 (Medium Risk) (1) 11/06/1998 ---    Influenza Vaccine 08/01/2017 ---    Pap Smear with HPV Cotest 05/10/2020 5/10/2017        Mamae was seen today for follow-up.    Diagnoses and all orders for this visit:    Cerebrovascular accident (CVA), unspecified mechanism  -     Ambulatory referral to Neurology/2nd opinion with Dr Sapna Vogel  -     Continue daily Aggrenox BID and Crestor 5mg QD  -     Encouraged daily physical and cognitive stimulation: exercise/reading  -     Comprehensive metabolic panel; Future  -     Lipid panel; Future    Thyromegaly  -     US Soft Tissue Head Neck Thyroid; Future    Preventive measure  -     coenzyme Q10 (COQ-10) 100 mg capsule; Take 1 capsule (100 mg total) by mouth once daily.  -     Encourage regular daily cardiovascular exercise  -     RTC x 4 months with 1 week prior fasting lab    Vitamin D deficiency/much improved         -     D/C Ergocalciferol weekly  -     Start cholecalciferol, vitamin D3, (VITAMIN D3) 2,000 unit Cap; Take 1 capsule (2,000 Units total) by mouth once daily.    Tobacco abuse       -      Strongly encourage D/C Tobacco

## 2017-07-14 ENCOUNTER — HOSPITAL ENCOUNTER (OUTPATIENT)
Dept: RADIOLOGY | Facility: HOSPITAL | Age: 37
Discharge: HOME | End: 2017-07-14
Attending: INTERNAL MEDICINE
Payer: COMMERCIAL

## 2017-07-14 DIAGNOSIS — E01.0 THYROMEGALY: ICD-10-CM

## 2017-07-14 PROCEDURE — 76536 US EXAM OF HEAD AND NECK: CPT | Mod: TC

## 2017-07-14 PROCEDURE — 76536 US EXAM OF HEAD AND NECK: CPT | Mod: 26,,, | Performed by: RADIOLOGY

## 2017-07-28 ENCOUNTER — OFFICE VISIT (OUTPATIENT)
Dept: NEUROLOGY | Facility: CLINIC | Age: 37
End: 2017-07-28
Payer: COMMERCIAL

## 2017-07-28 VITALS
HEART RATE: 71 BPM | SYSTOLIC BLOOD PRESSURE: 108 MMHG | DIASTOLIC BLOOD PRESSURE: 73 MMHG | BODY MASS INDEX: 22.17 KG/M2 | WEIGHT: 129.88 LBS | HEIGHT: 64 IN

## 2017-07-28 DIAGNOSIS — I63.30 CEREBRAL THROMBOSIS WITH CEREBRAL INFARCTION: ICD-10-CM

## 2017-07-28 PROCEDURE — 99999 PR PBB SHADOW E&M-EST. PATIENT-LVL III: CPT | Mod: PBBFAC,,, | Performed by: PSYCHIATRY & NEUROLOGY

## 2017-07-28 PROCEDURE — 99214 OFFICE O/P EST MOD 30 MIN: CPT | Mod: S$GLB,,, | Performed by: PSYCHIATRY & NEUROLOGY

## 2017-07-28 NOTE — PROGRESS NOTES
Subjective:       Patient ID: Maame Cortez is a 36 y.o. female.    Chief Complaint:  Stroke      History of Present Illness  HPI  Stroke Follow-up  She presents for follow-up of a stroke. Event occurred 2 years ago. She has residual symptoms of paralysis of right face, right arm(s), right hand(s) or right upper leg(s), right facial droop, gait disturbance, cognitive impairment, loss of vision right. She denies seizures, balance disturbance, inability to speak, slurred speech, swallowing difficulty, involuntary movements of generalized, tremor of generalized. Overall she feels the condition is improved. Stroke risk factors include smoking and BCP. She also complains of none. She denies chest pain, palpitations, seizures and shortness of breath.         Review of SystemsReview of Systems   Constitutional: Positive for fatigue.   HENT: Negative.    Eyes: Positive for visual disturbance.   Respiratory: Negative.    Cardiovascular: Negative.    Gastrointestinal: Negative.    Endocrine: Negative.    Genitourinary: Negative.    Skin: Negative.    Allergic/Immunologic: Negative.    Neurological: Positive for speech difficulty and weakness.   Hematological: Bruises/bleeds easily.   Psychiatric/Behavioral: Positive for decreased concentration and dysphoric mood.       Objective:      Neurologic Exam     Mental Status   Oriented to person, place, and time.   Oriented to person.   Oriented to place.   Oriented to time. Oriented to year, month, date, day and season.   Registration: recalls 3 of 3 objects. Recall at 5 minutes: recalls 2 of 3 objects. Follows 3 step commands.   Attention: normal. Concentration: normal.   Speech: speech is normal   Knowledge: good.   Able to name object. Able to read. Able to repeat. Able to write.     Cranial Nerves     CN II   Visual acuity: normal    CN III, IV, VI   Pupils are equal, round, and reactive to light.  Extraocular motions are normal.     CN VII   Right facial weakness:  central    Motor Exam   Muscle bulk: normal  Right arm tone: increased  Right leg tone: increased    Strength   Strength 5/5 except as noted.   Right deltoid: 4/5  Right biceps: 3/5  Right triceps: 3/5  Right wrist flexion: 2/5  Right wrist extension: 1/5  Right interossei: 0/5  Right iliopsoas: 3/5  Right quadriceps: 3/5  Right hamstrin/5  Right anterior tibial: 4/5  Right posterior tibial: 4/5  Right peroneal: 4/5  Right gastroc: 4/5    Sensory Exam   Light touch normal.   Vibration normal.   Proprioception normal.   Pinprick normal.     Gait, Coordination, and Reflexes     Gait  Gait: normal    Coordination   Romberg: negative  Finger to nose coordination: normal  Heel to shin coordination: normal  Tandem walking coordination: normal    Tremor   Resting tremor: absent  Intention tremor: absent  Action tremor: absent    Reflexes   Right brachioradialis: 3+  Right triceps: 3+  Right patellar: 3+  Right achilles: 3+  Right : 3+  Right plantar: upgoing  Left plantar: normal  Right Kennedy: present  Left Kennedy: absent  Right ankle clonus: present  Left ankle clonus: absent  Right pendular knee jerk: present  Left pendular knee jerk: absent      Physical Exam   Constitutional: She is oriented to person, place, and time. She appears well-developed and well-nourished.   HENT:   Head: Normocephalic and atraumatic.   Eyes: EOM are normal. Pupils are equal, round, and reactive to light.   Neck: Normal range of motion. Neck supple.   Cardiovascular: Normal rate and regular rhythm.    Pulmonary/Chest: Effort normal.   Musculoskeletal: Normal range of motion.   Neurological: She is alert and oriented to person, place, and time. She displays abnormal reflex. A cranial nerve deficit is present. She exhibits abnormal muscle tone. She has a normal Finger-Nose-Finger Test, a normal Heel to Shin Test, a normal Romberg Test and a normal Tandem Gait Test. Gait normal.   Reflex Scores:       Tricep reflexes are 3+ on the  right side.       Brachioradialis reflexes are 3+ on the right side.       Patellar reflexes are 3+ on the right side.       Achilles reflexes are 3+ on the right side.  Skin: Skin is warm and dry.   Psychiatric: She has a normal mood and affect. Her speech is normal and behavior is normal. Judgment and thought content normal.   Vitals reviewed.        Assessment:          Plan:     Problem List Items Addressed This Visit        Neuro    Cerebral thrombosis with cerebral infarction     TCD with bubble.         Relevant Orders    TCD Emboli Detection w/ Intravenous    Ambulatory Referral to Physical/Occupational Therapy      Other Visit Diagnoses    None.         RTC 1 year

## 2017-07-28 NOTE — LETTER
July 28, 2017      Woodrow Bruce MD  [address]  [place] [address]           [facility]  [address]  [place] [address]  Phone: [phone]          Patient: Maame Cortez   MR Number: [id number]   YOB: 1980   Date of Visit: 7/28/2017       Dear Dr. Woodrow Bruce:    Thank you for referring Maame Cortez to me for evaluation. Attached you will find relevant portions of my assessment and plan of care.    If you have questions, please do not hesitate to call me. I look forward to following Maame Cortez along with you.    Sincerely,    Angelia Vogel MD    Enclosure  CC:  No Recipients    If you would like to receive this communication electronically, please contact [email or web address] or [phone] to request more information on [employer] Link access.    For providers and/or their staff who would like to refer a patient to [facility], please contact us through our one-stop-shop provider referral line, [facility] , at [phone].    If you feel you have received this communication in error or would no longer like to receive these types of communications, please e-mail [email or web address]

## 2017-08-03 ENCOUNTER — HOSPITAL ENCOUNTER (EMERGENCY)
Facility: HOSPITAL | Age: 37
Discharge: HOME | End: 2017-08-03
Attending: EMERGENCY MEDICINE
Payer: COMMERCIAL

## 2017-08-03 VITALS
BODY MASS INDEX: 22.31 KG/M2 | WEIGHT: 130 LBS | TEMPERATURE: 99 F | OXYGEN SATURATION: 96 % | SYSTOLIC BLOOD PRESSURE: 124 MMHG | RESPIRATION RATE: 18 BRPM | HEART RATE: 104 BPM | DIASTOLIC BLOOD PRESSURE: 61 MMHG

## 2017-08-03 DIAGNOSIS — R51.9 NONINTRACTABLE HEADACHE, UNSPECIFIED CHRONICITY PATTERN, UNSPECIFIED HEADACHE TYPE: Primary | ICD-10-CM

## 2017-08-03 PROCEDURE — 99283 EMERGENCY DEPT VISIT LOW MDM: CPT

## 2017-08-03 PROCEDURE — 99282 EMERGENCY DEPT VISIT SF MDM: CPT | Mod: ,,, | Performed by: EMERGENCY MEDICINE

## 2017-08-03 NOTE — ED TRIAGE NOTES
Pain level 5 Headache since last pm with nausea and chills.  Patient with hx of CVA.    GENERAL: The patient is a well-developed, well-nourished female in no apparent distress. She is alert and oriented x3.    HEENT: Head is normocephalic and atraumatic. Extraocular muscles are intact. Pupils are equal, round, and reactive to light and accommodation. Nares appeared normal. Mouth is well hydrated and without lesions. Mucous membranes are moist. Posterior pharynx clear of any exudate or lesions.    NECK: Supple. No carotid bruits. No lymphadenopathy or thyromegaly.    LUNGS: Clear to auscultation.    HEART: Regular rate and rhythm without murmur.     ABDOMEN: Soft, nontender, and nondistended. Positive bowel sounds. No hepatosplenomegaly was noted.     EXTREMITIES: Without any cyanosis, clubbing, rash, lesions or edema.     NEUROLOGIC: Cranial nerves II through XII are grossly intact.     PSYCHIATRIC: Flat affect, but denies suicidal or homicidal ideations.    SKIN: No ulceration or induration present.

## 2017-08-03 NOTE — ED PROVIDER NOTES
Encounter Date: 8/3/2017       History     Chief Complaint   Patient presents with    Headache     Since yesterday.       36-year-old female with medical history of CVA  with right sided residual weakness presents to the ED for headache.  Patient states headache began yesterday around 5 PM while she was laying down.  She took a Tylenol right before bed which did not improve pain.  States when she woke up this morning, headache is improving.  Patient concerned for possible stroke. Patient denies unilateral weakness, fever, chills, nausea, vomiting, chest pain, shortness of breath, changes in urination, changes in bowel, slurred speech, altered mental status, fall/trauma, worst headache of life.        Review of patient's allergies indicates:  No Known Allergies  Past Medical History:   Diagnosis Date    Heart palpitations     MTHFR mutation     Stroke 2015     Past Surgical History:   Procedure Laterality Date    BELT ABDOMINOPLASTY          UT TRANSCRAN DOPP INTRACRAN, EMBOLI W/INJ  2015         WISDOM TOOTH EXTRACTION       Family History   Problem Relation Age of Onset    Breast cancer Maternal Grandmother 70    Stroke Maternal Grandmother     Birth defects Maternal Grandmother      Breast Cancer    Birth defects Paternal Grandfather 80     Colon/Prostate Ca?    Cancer Neg Hx     Colon cancer Neg Hx     Diabetes Neg Hx     Eclampsia Neg Hx     Hypertension Neg Hx     Miscarriages / Stillbirths Neg Hx     Ovarian cancer Neg Hx      labor Neg Hx      Social History   Substance Use Topics    Smoking status: Light Tobacco Smoker     Packs/day: 0.50     Years: 8.00     Last attempt to quit: 2015    Smokeless tobacco: Never Used      Comment: quit 2015    Alcohol use 0.0 oz/week      Comment: Social     Review of Systems   Constitutional: Negative for fever.   HENT: Negative for congestion, postnasal drip, sinus pressure, sore throat and trouble swallowing.     Respiratory: Negative for shortness of breath.    Cardiovascular: Negative for chest pain.   Gastrointestinal: Negative for abdominal pain, diarrhea, nausea and vomiting.   Genitourinary: Negative for dysuria.   Musculoskeletal: Negative for back pain.   Skin: Negative for rash.   Neurological: Positive for headaches. Negative for seizures, syncope, weakness and light-headedness.   Hematological: Does not bruise/bleed easily.   Psychiatric/Behavioral: The patient is not nervous/anxious.        Physical Exam     Initial Vitals [08/03/17 0917]   BP Pulse Resp Temp SpO2   124/61 104 18 99.4 °F (37.4 °C) 96 %      MAP       82         Physical Exam    Vitals reviewed.  Constitutional: Vital signs are normal. She appears well-developed and well-nourished. She is not diaphoretic. No distress.   HENT:   Head: Normocephalic and atraumatic.   Nose: Nose normal.   Mouth/Throat: Oropharynx is clear and moist.   Eyes: Conjunctivae, EOM and lids are normal. Pupils are equal, round, and reactive to light. Lids are everted and swept, no foreign bodies found. Right eye exhibits no discharge. Left eye exhibits no discharge.   Neck: Trachea normal and normal range of motion. Neck supple.   Cardiovascular: Normal rate, regular rhythm, normal heart sounds, intact distal pulses and normal pulses.   No murmur heard.  Pulmonary/Chest: Breath sounds normal. She has no wheezes. She has no rhonchi. She has no rales. She exhibits no tenderness.   Abdominal: Soft. Normal appearance and bowel sounds are normal. There is no tenderness. There is no rebound and no guarding.   Musculoskeletal: Normal range of motion. She exhibits no edema or tenderness.   Lymphadenopathy:     She has no cervical adenopathy.   Neurological: She is alert and oriented to person, place, and time. She has normal strength. No cranial nerve deficit or sensory deficit.   Mild right facial droop. Patient states this is her baseline since CVA in 2015.    Skin: Skin is  warm. Capillary refill takes less than 2 seconds. No rash noted. No cyanosis.   Psychiatric: She has a normal mood and affect.         ED Course   Procedures  Labs Reviewed - No data to display          Medical Decision Making:   History:   Old Medical Records: I decided to obtain old medical records.  Old Records Summarized: records from clinic visits.       APC / Resident Notes:    36-year-old female with medical history of CVA  with right sided residual weakness presents to the ED for headache.  Cardiac exam reveals regular rate and rhythm.  Lungs clear bilateral auscultation no decreased breath sounds.  Abdomen is soft, nontender, nondistended with normal bowel sounds ×4.  Cranial nerves II through XII intact.  Strength intact.  Sensation intact.  Mild right facial droop (baseline).  Distal pulses intact.  Romberg negative.  no limb ataxia.  No sinus tenderness.  Vital stable.  Patient is in no acute distress.    DDX includes is not limited to migraine headache, sinus headache, sinusitis.  I consider but do not suspect CVA or ICH.    Discharged to home in stable condition, return to ED warnings given, follow up and patient care instructions given.      Discussed and reviewed with my supervising physician.         Attending Attestation:     Physician Attestation Statement for NP/PA:   I discussed this assessment and plan of this patient with the NP/PA, but I did not personally examine the patient. The face to face encounter was performed by the NP/PA.    Other NP/PA Attestation Additions:      Medical Decision Makin-year-old female presenting with mild headache that is now improving.   has a history of stroke but denies new neurologic symptoms.  Exam unremarkable except for residual facial droop.  Patient stable for discharge                 ED Course     Clinical Impression:   The encounter diagnosis was Nonintractable headache, unspecified chronicity pattern, unspecified headache  type.    Disposition:   Disposition: Discharged  Condition: Stable                        Shelby Dillon PA-C  08/03/17 1135       Carito Sandhu MD  08/03/17 1141

## 2017-08-04 ENCOUNTER — CLINICAL SUPPORT (OUTPATIENT)
Dept: REHABILITATION | Facility: HOSPITAL | Age: 37
End: 2017-08-04
Attending: PSYCHIATRY & NEUROLOGY

## 2017-08-04 DIAGNOSIS — I69.398 ABNORMALITY OF GAIT AS LATE EFFECT OF CEREBROVASCULAR ACCIDENT (CVA): ICD-10-CM

## 2017-08-04 DIAGNOSIS — R26.9 ABNORMALITY OF GAIT AS LATE EFFECT OF CEREBROVASCULAR ACCIDENT (CVA): ICD-10-CM

## 2017-08-04 DIAGNOSIS — R27.8 COORDINATION IMPAIRMENT: ICD-10-CM

## 2017-08-04 PROCEDURE — 97161 PT EVAL LOW COMPLEX 20 MIN: CPT | Mod: PO

## 2017-08-04 NOTE — PLAN OF CARE
OUTPATIENT NEUROLOGICAL REHABILITATION  PHYSICAL THERAPY EVALUATION    Name: Maame Cortez  Clinic Number: [id number]    Diagnosis:   Encounter Diagnoses   Name Primary?    Coordination impairment     Abnormality of gait as late effect of cerebrovascular accident (CVA)      Physician: Angelia Vogel MD  Treatment Orders: PT Eval and Treat  Past Medical History:   Diagnosis Date    Heart palpitations     MTHFR mutation     Stroke 08/2015       Evaluation Date: 8/4/17  Visit #: 1  Plan of care expiration: 9/28/17  Precautions: standard    History     Medical Diagnosis: CVA  PT Diagnosis: impaired coordination, gait impairment     History of Present Illness: Maame is a 36 y.o. female that presents to [facility] secondary to continued residual affects from her CVA, 2 years prior.      Chief complaints:  1. R side residual weakness UE and LE  2. Limp with walking  3. Can't wear desired footwear  4. Impaired fine motor R UE  5. Impaired endurance  6. Difficulty keeping up with children - youngest child has Autism and she has difficulty keeping up with him walking/running around.  7. Stairs increased difficulty    Falls in the past year: Occasional trips - rare falls but they do occur  Prior Therapy: Recent OT, no recent PT.   Nutrition:  Normal  Social History/Support systems: Lives with her 3 children  Place of Residence (Steps/Adaptations/Levels): Has stairs, uses railing.   Previous functional status includes: Indep.  Current functional status:  Josefina  Exercise routine prior to onset: was going to a boxing gym, but on hold in summer with children out of school.  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   Pain: 0/10    Objective   - Follows commands: 100% of time   - Speech: no deficits     Mental status: alert,  oriented to person, place, and time  Appearance: Well groomed  Behavior:  calm and cooperative  Attention Span and Concentration:  Normal    Dominant hand:  right prior to CVA - adapting to L hand    Posture Alignment in sitting: good/normal  Posture Alignment in standing: good/normal    Sensation: Light Touch: Intact         Tone: slight increase at R UE into flexion synergy - increased at night    Visual/Auditory: denies changes     Coordination:   - fine motor: impaired R  - UE coordination: impaired R    - LE coordination:  impaired R    ROM:   UPPER EXTREMITY--AROM/PROM  (R) UE: WFLs  (L) UE: WNLs           RANGE OF MOTION--LOWER EXTREMITIES  (R) LE Hip: normal   Knee: normal   Ankle: normal    (L) LE: Hip: normal   Knee: normal   Ankle: normal    Strength: manual muscle test grades below   Upper Extremity Strength  (R) UE  (L) UE    Shoulder Flexion: 4/5 Shoulder Flexion: 5/5   Shoulder Abduction: 4/5 Shoulder Abduction: 5/5   Shoulder Extension: 4/5 Shoulder Extension:   5/5   Elbow Flexion: 4+/5 Elbow Flexion: 5/5   Elbow Extension: 4+/5 Elbow Extension: 5/5   Wrist Flexion: 4/5 Wrist Flexion: 5/5   Wrist Extension: 5/5 Wrist Extension: 5/5     Lower Extremity Strength  Right LE  Left LE    Hip Flexion: 5/5 Hip Flexion: 5/5   Hip Abduction: 5/5 Hip Abduction: 5/5   Hip Adduction: 5/5 Hip Adduction 5/5   Knee Extension: 5/5 Knee Extension: 5/5   Knee Flexion: 5/5 Knee Flexion: 5/5   Ankle Dorsiflexion: 4/5 Ankle Dorsiflexion: 5/5     Flexibility: WNL     Evaluation   Single Limb Stance R LE 16s  (<10 sec = HIGH FALL RISK)   Single Limb Stance L LE 30s  (<10 sec = HIGH FALL RISK)   30 second Chair Rise 11 completed with no arms     Postural control:  MCTSIB:  1. Eyes Open/feet together/Firm: 30 seconds  2. Eyes Closed/feet together/Firm: 30 seconds  3. Eyes Open/feet together/Foam: 30 seconds  4. Eyes Closed/feet together/Foam: 30 seconds    Gait Assessment:   - AD used: None  - Assistance: Josefina  - Distance:  Community distances    GAIT DEVIATIONS:  Maame displays the following deviations with ambulation:  steppage on R side for swing phase, R knee hyperextension thrust with stand phase, R UE not swinging equally  Impairments contributing to deviations: impaired motor control    Endurance Deficit: mildly limited  - R LE most limited with endurance     Evaluation   Activities Balance Confidence (ABC) scale 47.5%   Self Selected Walking Speed 1.2 m/sec (6m/5s)     Functional Gait Assessment:   1. Gait on level surface =  2  2. Change in Gait Speed = 3  3. Gait with horizontal head turns  = 3  4. Gait with vertical head turns = 3  5. Gait with pivot turns = 3  6. Step over obstacle = 2  7. Gait with Narrow ELLY = 2  8. Gait with eyes closed = 0  9. Ambulating Backwards = 2  10. Steps = 2   Score 22/30     Functional Mobility (Bed mobility, transfers)  Bed mobility: I  Supine to sit: I  Sit to supine: I  Transfers to bed: I  Transfers to toilet: I  Sit to stand:  I  Stand pivot:  I  Car transfers: Mod I  Wheelchair mobility: n/a    ADL's:  Feeding: Mod I  Grooming: Mod I  Hygiene: Mod I  UB Dressing: Mod I  LB Dressing: Mod I  Toileting: Mod I  Bathing: Mod I    CMS Impairment/Limitation/Restriction for FOTO Cerebrovascular Disorders Survey  Status Limitation    Intake 48% 52%    Predicted 62% 38%       Education provided re:role of PT, goals for PT, scheduling - pt verbalized understanding. Discussed insurance limitations with pt.     Maame verbalized good understanding of education provided.   Pt has no cultural, educational or language barriers to learning provided.      Assessment   This is a 36 y.o. female referred to outpatient physical therapy and presents with a medical diagnosis of CVA.  Patient presents with residual impairments in endurance and coordination at her R side UE and LE which are affecting her gait pattern and her daily activities. She has good facilitation/strength at the R leg but it fatigues quickly  and with gait has severe coordination impairments, creating a foot drop effect with steppage compensation. Her balance is also impaired due to impaired R LE control. PT is warranted to address UE and LE coordination, gait pattern and high level balance in order to return her closer to her PLOF.       PT/PTA met face to face to discuss pt's treatment plan and established goals. Pt will be seen by physical therapy every 6th visit and minimally once per month.     Pt rehab potential is Excellent. Pt will benefit from continuing skilled outpatient physical therapy to address the deficits listed below in the problem list, provide pt/family education and to maximize pt's level of independence in the home and community environment.     History  Co-morbidities and personal factors that may impact the plan of care Examination  Body Structures and Functions, activity limitations and participation restrictions that may impact the plan of care. Medical necessity is demonstrated by the following impairments. Clinical Presentation Decision Making/ Complexity Score   none 1. Impaired coordination  2. Impaired endurance  3. Impaired R UE strength  4. Gait abnormality  5. Balance impairment  6. Difficulty with higher level functional mobility  7. Difficulty with daily tasks for childcare stable    low high low low       Pt's spiritual, cultural and educational needs considered and pt agreeable to plan of care and goals as stated below:     GOALS:   Short term goals: 4 weeks, pt agrees to goals set.  1. Pt will have improved R UE AROM to full and equal with L side with forward shoulder flexion maintaining full elbow extension for functional reaching tasks.   2. Pt will improve R UE MMT to 5/5 for all motions for improved participation with daily ADLs such as carrying groceries.  3. Pt will improve SLS on R side to at least 20 seconds.  4. Pt will improve ABC score to at least 60% for improved safety and confidence with community  mobility.    Long term goals: 8 weeks, pt agrees to goals set  5. Pt will improve SLS on R side to at least 30 seconds.  6. Pt will improve ABC score to at least 80% for improved safety and confidence with community mobility.  7. Pt will improve FGA score to at least 25/30 for decreased fall risk.  8. Pt will ambulate with R ankle DF clearance in swing phase and no R knee hyperextension in stance phase at least 90% of steps with no AD.   9. Pt will improve FOTO score to at least 62% for improved self perception of functional mobility.    Plan   Outpatient physical therapy 2 times weekly to include: Pt Education, HEP, therapeutic exercises, neuromuscular re-education, therapeutic activities, gait training, manual therapy, joint mobilizations, aquatic therapy and modalities PRN to achieve established goals. Pt may be seen by PTA as part of the rehabilitation team.     Cont PT for  8 weeks.     Tashia Ramirez, PT  08/04/2017

## 2017-08-11 ENCOUNTER — TELEPHONE (OUTPATIENT)
Dept: INTERNAL MEDICINE | Facility: CLINIC | Age: 37
End: 2017-08-11

## 2017-08-11 DIAGNOSIS — I63.9 CEREBROVASCULAR ACCIDENT (CVA), UNSPECIFIED MECHANISM: Primary | ICD-10-CM

## 2017-08-11 DIAGNOSIS — E04.2 MULTIPLE THYROID NODULES: ICD-10-CM

## 2017-08-11 NOTE — TELEPHONE ENCOUNTER
Spoke with Maame terry her recent ER visit with H/A(8/3)-she is much better with H/A resolved. She is s/p Thyroid U/S(7/14)-showing 3 right sided nodules with recommended f/u x 6 months. I have recommended Thyroid U/S and fasting lab 1 week prior RTC 1/2018.  Merry, please place a pop-up reminder for pt to have a RTC and 1 week prior lab/thyroid U/S in Jan or Feb/2018.  Thanks

## 2017-08-11 NOTE — TELEPHONE ENCOUNTER
Merry, please place a pop-up reminder for pt to have a RTC and 1 week prior lab/thyroid U/S in Jan or Feb/2018.  Thanks    Called patient no answer L/M for return phone.

## 2017-08-12 DIAGNOSIS — E78.5 HYPERLIPIDEMIA: ICD-10-CM

## 2017-08-12 DIAGNOSIS — I63.30 CEREBRAL THROMBOSIS WITH CEREBRAL INFARCTION: ICD-10-CM

## 2017-08-13 RX ORDER — ROSUVASTATIN CALCIUM 5 MG/1
TABLET, COATED ORAL
Qty: 90 TABLET | Refills: 1 | Status: SHIPPED | OUTPATIENT
Start: 2017-08-13 | End: 2018-02-20 | Stop reason: SDUPTHER

## 2017-08-15 ENCOUNTER — TELEPHONE (OUTPATIENT)
Dept: INTERNAL MEDICINE | Facility: CLINIC | Age: 37
End: 2017-08-15

## 2017-08-15 NOTE — TELEPHONE ENCOUNTER
Merry, please place a pop-up reminder for pt to have a RTC and 1 week prior lab/thyroid U/S in Jan or Feb/2018.  Thanks     Called patient no answer L/M for return phone.     Documentation      Patient schedule for U/S and labs.  Return visit place on hold list for Jan/2018.

## 2017-08-16 ENCOUNTER — CLINICAL SUPPORT (OUTPATIENT)
Dept: REHABILITATION | Facility: HOSPITAL | Age: 37
End: 2017-08-16
Attending: PSYCHIATRY & NEUROLOGY

## 2017-08-16 DIAGNOSIS — I69.398 ABNORMALITY OF GAIT AS LATE EFFECT OF CEREBROVASCULAR ACCIDENT (CVA): ICD-10-CM

## 2017-08-16 DIAGNOSIS — R27.8 COORDINATION IMPAIRMENT: Primary | ICD-10-CM

## 2017-08-16 DIAGNOSIS — R26.9 ABNORMALITY OF GAIT AS LATE EFFECT OF CEREBROVASCULAR ACCIDENT (CVA): ICD-10-CM

## 2017-08-16 PROCEDURE — 97112 NEUROMUSCULAR REEDUCATION: CPT | Mod: PO

## 2017-08-16 PROCEDURE — 97110 THERAPEUTIC EXERCISES: CPT | Mod: PO

## 2017-08-16 NOTE — PROGRESS NOTES
"                                                    Physical Therapy Progress Note     Name: Maame Cortez  Clinic Number: [id number]  Diagnosis:   Encounter Diagnoses   Name Primary?    Coordination impairment Yes    Abnormality of gait as late effect of cerebrovascular accident (CVA)      Physician: Tulio Perdomo MD  Treatment Orders: PT Eval and Treat  Past Medical History:   Diagnosis Date    Heart palpitations     MTHFR mutation     Stroke 08/2015     Evaluation Date: 8/4/17  Visit #: 2  Plan of care expiration: 9/28/17  Precautions: standard    G codes 2/10              Subjective   Pt reports: " I'm having trouble coordinating stuff."   Pain Scale:  denies    Objective   Pt 10 min late to tx session, unable to accommodate.   Patient received individual therapy with activities as follows:     Therapeutic exercises to increase strength and endurance x 15 min including:  X 8 min on stationary bike.  Level 7.  Seat 5  2 x 30 sec of B LE standing HC stretches on incline with  2 HR    Neuromuscular re-education x 23 min to improve balance and coordination including:  Practicing scooting forward/backward on EOM with R UE on 1inch block to promote weight bearing  X 10 reps of forward ambulation with green sports band with SBA  X 10 reps of backward ambulation with green sports band with sBA  X 10 reps of side stepping to each direction with green sports band and SBA for safety    Written Home Exercises:   Pt demo good understanding of the education provided. Maame demonstrated good return demonstration of activities.     Education provided re: POC, HEP  No spiritual or educational barriers to learning provided    Pt has no cultural, educational or language barriers to learning provided.    Assessment   Maame tolerated her first tx session following initial evaluation well and did not have any problems.  Pt was somewhat limited in therapy 2* time constraint.  Pt began ambulation with sports cord and " was able to control gait in all directions and emphasize heel/toe gait pattern.  Cont with POC.     Pt rehab potential is Excellent. Pt will benefit from continuing skilled outpatient physical therapy to address the deficits listed below in the problem list, provide pt/family education and to maximize pt's level of independence in the home and community environment.      History  Co-morbidities and personal factors that may impact the plan of care Examination  Body Structures and Functions, activity limitations and participation restrictions that may impact the plan of care. Medical necessity is demonstrated by the following impairments. Clinical Presentation Decision Making/ Complexity Score   none 1. Impaired coordination  2. Impaired endurance  3. Impaired R UE strength  4. Gait abnormality  5. Balance impairment  6. Difficulty with higher level functional mobility  7. Difficulty with daily tasks for childcare stable     low high low low         Pt's spiritual, cultural and educational needs considered and pt agreeable to plan of care and goals as stated below:      GOALS:   Short term goals: 4 weeks, pt agrees to goals set.  1. Pt will have improved R UE AROM to full and equal with L side with forward shoulder flexion maintaining full elbow extension for functional reaching tasks.   2. Pt will improve R UE MMT to 5/5 for all motions for improved participation with daily ADLs such as carrying groceries.  3. Pt will improve SLS on R side to at least 20 seconds.  4. Pt will improve ABC score to at least 60% for improved safety and confidence with community mobility.     Long term goals: 8 weeks, pt agrees to goals set  5. Pt will improve SLS on R side to at least 30 seconds.  6. Pt will improve ABC score to at least 80% for improved safety and confidence with community mobility.  7. Pt will improve FGA score to at least 25/30 for decreased fall risk.  8. Pt will ambulate with R ankle DF clearance in swing phase and  no R knee hyperextension in stance phase at least 90% of steps with no AD.   9. Pt will improve FOTO score to at least 62% for improved self perception of functional mobility.       Plan   Continue PT 2x weekly under established Plan of Care, with treatment to include: pt education, HEP, therapeutic exercises, neuromuscular re-education/balance exercises, therapeutic activities, joint mobilizations, and modalities PRN, to work towards established goals. Pt may be seen by PTA to carry out plan of care.     Daniela Sanchez, PTA   08/16/2017

## 2017-08-18 ENCOUNTER — CLINICAL SUPPORT (OUTPATIENT)
Dept: REHABILITATION | Facility: HOSPITAL | Age: 37
End: 2017-08-18
Attending: PSYCHIATRY & NEUROLOGY

## 2017-08-18 DIAGNOSIS — I69.398 ABNORMALITY OF GAIT AS LATE EFFECT OF CEREBROVASCULAR ACCIDENT (CVA): ICD-10-CM

## 2017-08-18 DIAGNOSIS — R26.9 ABNORMALITY OF GAIT AS LATE EFFECT OF CEREBROVASCULAR ACCIDENT (CVA): ICD-10-CM

## 2017-08-18 DIAGNOSIS — R27.8 COORDINATION IMPAIRMENT: ICD-10-CM

## 2017-08-18 PROCEDURE — 97112 NEUROMUSCULAR REEDUCATION: CPT | Mod: PO

## 2017-08-18 PROCEDURE — 97110 THERAPEUTIC EXERCISES: CPT | Mod: PO

## 2017-08-18 NOTE — PROGRESS NOTES
"                                                    Physical Therapy Progress Note     Name: Maame Cortez  Clinic Number: [id number]  Diagnosis:   Encounter Diagnoses   Name Primary?    Coordination impairment     Abnormality of gait as late effect of cerebrovascular accident (CVA)      Physician: Tulio Perdomo MD  Treatment Orders: PT Eval and Treat  Past Medical History:   Diagnosis Date    Heart palpitations     MTHFR mutation     Stroke 2015     Evaluation Date: 17  Visit #: 3  Plan of care expiration: 17  Precautions: standard    G codes 3/10              Subjective   Pt reports: pt states she really wants to strengthen her R leg and that she has a hard time walking fast and going up and down the stairs.  Pain Scale:  denies    Objective   Pt 8 min late to tx session,  patient received individual therapy with activities as follows:     Therapeutic exercises to increase strength and endurance x 35 min includin min on stationary bike.  Level 4.  Seat 5  2 x 30 sec of B LE standing HC stretches on incline with  2 HR  R Step-ups on bottom step, 4", 10 reps forward, lateral and 2 x 10 reps stepping down. 10 reps of step-ups forward and lateral on 6" step in // bars  Lateral walking c/ GTB, 2 x length of ballet bar in both directions.  2 x 15 reps of bridges c/ OTB, 3" holds  Standing Hip EXT, 2 x 10 reps, B, YTB, UE support on // bar    Neuromuscular re-education x 10 min to improve balance and coordination including:  Tandem gait- 2 x length of ballet bar, high guard demonstrated.  Retro tandem gait, 2 x length of ballet bar, high guard demonstrated  2 x 10 reps of squats on BOSU, rounded side. Cues for technique. No UE support on // bar required  Pt performs forward and lateral stepping over cones on their sides- 2 reps forward and 2 reps laterally. Pt with decreased R stance time. No loss of balance      Written Home Exercises:   Pt demo good understanding of the education " provided. Maame demonstrated good return demonstration of activities.     Education provided re: POC, HEP  No spiritual or educational barriers to learning provided    Pt has no cultural, educational or language barriers to learning provided.    Assessment   Maame tolerated session well and was able to progress balance and strength challenges with exercises. Pt needs intermittent cues for controlling R knee hyperextension, as well as for motor control of R foot placement on step. Pt with fair to good carryover.  Will continue to progress balance, coordination and strengthening exercises as tolerated. Cont with POC.     Pt rehab potential is Excellent. Pt will benefit from continuing skilled outpatient physical therapy to address the deficits listed below in the problem list, provide pt/family education and to maximize pt's level of independence in the home and community environment.          Pt's spiritual, cultural and educational needs considered and pt agreeable to plan of care and goals as stated below:      GOALS:   Short term goals: 4 weeks, pt agrees to goals set.  1. Pt will have improved R UE AROM to full and equal with L side with forward shoulder flexion maintaining full elbow extension for functional reaching tasks.   2. Pt will improve R UE MMT to 5/5 for all motions for improved participation with daily ADLs such as carrying groceries.  3. Pt will improve SLS on R side to at least 20 seconds.  4. Pt will improve ABC score to at least 60% for improved safety and confidence with community mobility.     Long term goals: 8 weeks, pt agrees to goals set  5. Pt will improve SLS on R side to at least 30 seconds.  6. Pt will improve ABC score to at least 80% for improved safety and confidence with community mobility.  7. Pt will improve FGA score to at least 25/30 for decreased fall risk.  8. Pt will ambulate with R ankle DF clearance in swing phase and no R knee hyperextension in stance phase at least 90%  of steps with no AD.   9. Pt will improve FOTO score to at least 62% for improved self perception of functional mobility.       Plan   Continue PT 2x weekly under established Plan of Care, with treatment to include: pt education, HEP, therapeutic exercises, neuromuscular re-education/balance exercises, therapeutic activities, joint mobilizations, and modalities PRN, to work towards established goals. Pt may be seen by PTA to carry out plan of care.     Antonia Carvalho, PT   08/18/2017

## 2017-08-24 ENCOUNTER — CLINICAL SUPPORT (OUTPATIENT)
Dept: REHABILITATION | Facility: HOSPITAL | Age: 37
End: 2017-08-24
Attending: PSYCHIATRY & NEUROLOGY

## 2017-08-24 DIAGNOSIS — I69.398 ABNORMALITY OF GAIT AS LATE EFFECT OF CEREBROVASCULAR ACCIDENT (CVA): ICD-10-CM

## 2017-08-24 DIAGNOSIS — R27.8 COORDINATION IMPAIRMENT: Primary | ICD-10-CM

## 2017-08-24 DIAGNOSIS — R26.9 ABNORMALITY OF GAIT AS LATE EFFECT OF CEREBROVASCULAR ACCIDENT (CVA): ICD-10-CM

## 2017-08-24 PROCEDURE — 97112 NEUROMUSCULAR REEDUCATION: CPT | Mod: PO

## 2017-08-24 PROCEDURE — 97110 THERAPEUTIC EXERCISES: CPT | Mod: PO

## 2017-08-24 NOTE — PROGRESS NOTES
"                                                    Physical Therapy Progress Note     Name: Maame Cortez  Clinic Number: [id number]  Diagnosis:   Encounter Diagnoses   Name Primary?    Coordination impairment Yes    Abnormality of gait as late effect of cerebrovascular accident (CVA)      Physician: Tulio Perdomo MD  Treatment Orders: PT Eval and Treat  Past Medical History:   Diagnosis Date    Heart palpitations     MTHFR mutation     Stroke 08/2015     Evaluation Date: 8/4/17  Visit #: 4  Plan of care expiration: 9/28/17  Precautions: standard    G codes 4/10              Subjective   Pt reports: " I'm doing pretty good."   Pain Scale:  denies    Objective   patient received individual therapy with activities as follows:     Therapeutic exercises to increase strength and endurance x 35 min including:  10 min on stationary bike.  Level 4.  Seat 5  2 x 30 sec of B LE standing HC stretches on incline with  2 HR  2 x 30 sec of B LE standing HS stretches on steps with 2 HR  X 30 reps of B LE alternating step ups onto 8 inch box with hip flexion on top of box and SBA  X 30 reps of B LE side stepping onto 8 inch box with 1 UE support  Lateral walking with squats c/ GTB 4 laps of about 30 ft each way  Standing Hip EXT, 3 x 10 reps, B, YTB, UE support on // bar    Neuromuscular re-education x 23 min to improve balance and coordination including:  Standing on Airex foam pad and tossing soccer ball against the trampoline with close SBA x 30 reps  X 20 reps of B LE single leg stance while tossing soccer ball against the trampoline.  CGA/Close SBA for safety  Side stepping with B LE single leg cone taps x 4 trials  Forward tandem ambulation on therapads x 6 trials with close SBA for safety  Side stepping on therapads x 6 trials with close SBA for safety.        Written Home Exercises:   Pt demo good understanding of the education provided. Maame demonstrated good return demonstration of activities. "     Education provided re: POC, HEP  No spiritual or educational barriers to learning provided    Pt has no cultural, educational or language barriers to learning provided.    Assessment   Maame tolerated session well and did not have any complaints.  Pt required VCs during side stepping with squats to prevent R LE hip adduction.  Pt required close sBA for all exercises and was able to progress to 8 inch step ups and increase reps on standing hip ext with YTB.  Will continue to progress balance, coordination and strengthening exercises as tolerated. Cont with POC.     Pt rehab potential is Excellent. Pt will benefit from continuing skilled outpatient physical therapy to address the deficits listed below in the problem list, provide pt/family education and to maximize pt's level of independence in the home and community environment.          Pt's spiritual, cultural and educational needs considered and pt agreeable to plan of care and goals as stated below:      GOALS:   Short term goals: 4 weeks, pt agrees to goals set.  1. Pt will have improved R UE AROM to full and equal with L side with forward shoulder flexion maintaining full elbow extension for functional reaching tasks.   2. Pt will improve R UE MMT to 5/5 for all motions for improved participation with daily ADLs such as carrying groceries.  3. Pt will improve SLS on R side to at least 20 seconds.  4. Pt will improve ABC score to at least 60% for improved safety and confidence with community mobility.     Long term goals: 8 weeks, pt agrees to goals set  5. Pt will improve SLS on R side to at least 30 seconds.  6. Pt will improve ABC score to at least 80% for improved safety and confidence with community mobility.  7. Pt will improve FGA score to at least 25/30 for decreased fall risk.  8. Pt will ambulate with R ankle DF clearance in swing phase and no R knee hyperextension in stance phase at least 90% of steps with no AD.   9. Pt will improve FOTO score  to at least 62% for improved self perception of functional mobility.       Plan   Continue PT 2x weekly under established Plan of Care, with treatment to include: pt education, HEP, therapeutic exercises, neuromuscular re-education/balance exercises, therapeutic activities, joint mobilizations, and modalities PRN, to work towards established goals. Pt may be seen by PTA to carry out plan of care.     Daniela Sanchez, PTA   08/24/2017

## 2017-08-30 ENCOUNTER — TELEPHONE (OUTPATIENT)
Dept: INTERNAL MEDICINE | Facility: CLINIC | Age: 37
End: 2017-08-30

## 2017-08-30 NOTE — TELEPHONE ENCOUNTER
Spoke with pt's Mom, mara Ames's Thyroid U/S and recommended recheck Thyroid U/S to f/u nodules at 6 months.

## 2017-08-30 NOTE — TELEPHONE ENCOUNTER
----- Message from Philip Mackey sent at 8/30/2017  9:16 AM CDT -----  Contact: Hui Yan [phone]  Type: Test Results    What test was performed? Thyroid test US    Who ordered the test? Dr Bruce    When and where were the test performed? 07/14/2017 [facility] [place]    Comments:Advice    Thanks

## 2017-08-31 ENCOUNTER — CLINICAL SUPPORT (OUTPATIENT)
Dept: REHABILITATION | Facility: HOSPITAL | Age: 37
End: 2017-08-31
Attending: PSYCHIATRY & NEUROLOGY

## 2017-08-31 DIAGNOSIS — R26.9 ABNORMALITY OF GAIT AS LATE EFFECT OF CEREBROVASCULAR ACCIDENT (CVA): ICD-10-CM

## 2017-08-31 DIAGNOSIS — Z78.9 IMPAIRED MOTOR CONTROL: ICD-10-CM

## 2017-08-31 DIAGNOSIS — R27.8 COORDINATION IMPAIRMENT: ICD-10-CM

## 2017-08-31 DIAGNOSIS — G81.91 RIGHT HEMIPARESIS: Primary | ICD-10-CM

## 2017-08-31 DIAGNOSIS — I69.398 ABNORMALITY OF GAIT AS LATE EFFECT OF CEREBROVASCULAR ACCIDENT (CVA): ICD-10-CM

## 2017-08-31 PROCEDURE — 97110 THERAPEUTIC EXERCISES: CPT | Mod: PO | Performed by: PHYSICAL THERAPIST

## 2017-08-31 PROCEDURE — 97112 NEUROMUSCULAR REEDUCATION: CPT | Mod: PO | Performed by: PHYSICAL THERAPIST

## 2017-08-31 NOTE — PLAN OF CARE
"                                                    Physical Therapy Progress Note     Name: Maame Cortez  Clinic Number: [id number]  Diagnosis:   G81.91 (ICD-10-CM) - Right hemiparesis     Physician: Tulio Perdomo MD  Treatment Orders: PT Eval and Treat  Past Medical History:   Diagnosis Date    Heart palpitations     MTHFR mutation     Stroke 08/2015     Evaluation Date: 8/4/17  Visit #: 4/20  Plan of care expiration: 9/28/17  Precautions: standard    G codes 4/10              Subjective   Pt reports: " I'm doing pretty good."   Pain Scale:  denies    Objective   patient received individual therapy with activities as follows:     Therapeutic exercises to increase strength and endurance x 12 min including:  Upper Extremity Strength   RUE eval RUE 8/31/17 LUE eval   Shoulder Flexion: 4/5 4+/5 5/5   Shoulder Abduction: 4/5 4+/5 5/5   Shoulder Extension: 4/5 4/5 5/5   Elbow Flexion: 4+/5 4/5 5/5   Elbow Extension: 4+/5 4/5 5/5   IR:  NT 4/5 NT   ER: NT 4/5 NT   Wrist Flexion: 4/5 NT 5/5   Wrist Extension: 5/5 NT 5/5   Lats:  NT 4-/5 NT   Mid traps:  NT 3+/5 NT   Rhomboids:  NT 3+/5 NT   Lower traps: NT 3+/5 NT     Lat pulls GTB 20x  Rows GTB 20x    Sitting right hip ER stretch    NP today:   X 30 reps of B LE alternating step ups onto 8 inch box with hip flexion on top of box and SBA  X 30 reps of B LE side stepping onto 8 inch box with 1 UE support  Lateral walking with squats c/ GTB 4 laps of about 30 ft each way  Standing Hip EXT, 3 x 10 reps, B, YTB, UE support on // bar    Neuromuscular re-education x 28 min to improve balance and coordination including:  SLS right= 26 sec    scap stars with OTB, TC for right scap depression  RUE support on door (weight shift from BUE>RUE only) with trunk movement on limb- min A for elbow ext and maintaining scapular stability (wants to elevate)  Door push ups, min A to decrease right scapular elevation with elbow flexion, 10x    restoration of arches of right foot " "with mid and hindfoot realignment- stretching on wedge  Bilateral>single leg weight bearing on right (pregait) with supervision- IR of femur with pronation/EV noted with full weight bearing RLE    LLE cone taps with large cones- mod A at right knee to decreased hip IR and knee hyperextension- progressed to no assistance with fair to good control.     NP today:   Standing on Airex foam pad and tossing soccer ball against the trampoline with close SBA x 30 reps  X 20 reps of B LE single leg stance while tossing soccer ball against the trampoline.  CGA/Close SBA for safety  Side stepping with B LE single leg cone taps x 4 trials  Forward tandem ambulation on therapads x 6 trials with close SBA for safety  Side stepping on therapads x 6 trials with close SBA for safety.    Written Home Exercises: 8/31/17- right hip ER stretch, lat pulls/ rows, GTB issued with door set up reviewed.   Pt demo good understanding of the education provided. Maame demonstrated good return demonstration of activities.     Education provided re: POC, HEP  No spiritual or educational barriers to learning provided    Pt has no cultural, educational or language barriers to learning provided.    Assessment   Assessment period: 8/4/17 to 8/31/17. Maame tolerates sessions well.  Pt's primary complaints are poor functional use of RUE for tasks such as carrying the groceries, decreased strength and slow motor control of RUE, and a "limp" when she walks. Pt demonstrates significant motor control impairments in RUE, RLE, and trunk that impairs all mobility and functional use of RUE. Pt demonstrates an improvement in right shoulder strength, but motor control for forward and overhead reaching and turning a doorknob is slow. Pt demonstrates poor right scapular motor control and stability. Moderate scapular winging is noted on the right side. Strength of this area was assessed, with pt demonstrating fairly significant impairments. Pt is not able to fully " prevent scapular winging, but can minimally correct with cueing from PT.  PT initiated scapular strengthening and motor control activities to address. Pt has tendency to elevate right shoulder with increased demand placed on RUE for supporting the body. Pt has difficulty maintaining right shoulder flexion with elbow and wrist extension (needed for reaching and supporting self). Pt demonstrates increased tone of right wrist and finger flexors and required frequent repositioning of hand during session (especially when used for support). Pt's thumb often adducted with flex with increased challenge placed on extremity. Pt may benefit from obtaing a Wrist- Hand Orthosis to bias extremity into extension to improve fine motor control and functional use. When attempting to attain right full weight bearing from bilateral leg weight bearing, pt demonstrated the following impairments with RLE: femur IR, left pelvic rotation, right lateral trunk lean, knee hyperextension, foot pronation. Pt was able to improve technique of RLE full weight bearing with moderate facilitation after stretching. With continued practice, pt was able to perform unassisted with good right knee control by end of today's session. Resting position of right foot is also leading to impairments in gait. At rest right calcaneuos is positioned in valgus and mid foot is in a pronated position. This encourages malalignment of LE up the kinetic chain. Pt demonstrated good motor learning during today's session. Pt can benefit from continued skilled PT to address impairments listed to improve/ normalize mobility to improve functional use of RUE and improve mobility needed to perform parenting duties for her children.     Pt rehab potential is Excellent. Pt will benefit from continuing skilled outpatient physical therapy to address the deficits listed below in the problem list, provide pt/family education and to maximize pt's level of independence in the home and  community environment.          Pt's spiritual, cultural and educational needs considered and pt agreeable to plan of care and goals as stated below:      GOALS:   Status as of 8/31/17:   Short term goals: 4 weeks, pt agrees to goals set.  1. Pt will have improved R UE AROM to full and equal with L side with forward shoulder flexion maintaining full elbow extension for functional reaching tasks. Met with VC  2. Pt will improve R UE MMT to 5/5 for all motions for improved participation with daily ADLs such as carrying groceries. Improved- refer to chart in objective info  3. Pt will improve SLS on R side to at least 20 seconds. Met- 26 sec- femur IR, knee hyperextension, foot pronation all noted with SLS  4. Pt will improve ABC score to at least 60% for improved safety and confidence with community mobility. NT     Long term goals: 8 weeks, pt agrees to goals set  5. Pt will improve SLS on R side to at least 30 seconds.  6. Pt will improve ABC score to at least 80% for improved safety and confidence with community mobility.  7. Pt will improve FGA score to at least 25/30 for decreased fall risk.  8. Pt will ambulate with R ankle DF clearance in swing phase and no R knee hyperextension in stance phase at least 90% of steps with no AD.   9. Pt will improve FOTO score to at least 62% for improved self perception of functional mobility.       Plan   Continue PT 2x weekly under established Plan of Care, with treatment to include: pt education, HEP, therapeutic exercises, neuromuscular re-education/balance exercises, therapeutic activities, joint mobilizations, and modalities PRN, to work towards established goals. Pt may be seen by PTA to carry out plan of care.     Caity Watts, PT   08/31/2017

## 2017-08-31 NOTE — PROGRESS NOTES
"                                                    Physical Therapy Progress Note     Name: Maame Cortez  Clinic Number: [id number]  Diagnosis:   G81.91 (ICD-10-CM) - Right hemiparesis     Physician: Tulio Perdomo MD  Treatment Orders: PT Eval and Treat  Past Medical History:   Diagnosis Date    Heart palpitations     MTHFR mutation     Stroke 08/2015     Evaluation Date: 8/4/17  Visit #: 4/20  Plan of care expiration: 9/28/17  Precautions: standard    G codes 4/10              Subjective   Pt reports: " I'm doing pretty good."   Pain Scale:  denies    Objective   patient received individual therapy with activities as follows:     Therapeutic exercises to increase strength and endurance x 12 min including:  Upper Extremity Strength   RUE eval RUE 8/31/17 LUE eval   Shoulder Flexion: 4/5 4+/5 5/5   Shoulder Abduction: 4/5 4+/5 5/5   Shoulder Extension: 4/5 4/5 5/5   Elbow Flexion: 4+/5 4/5 5/5   Elbow Extension: 4+/5 4/5 5/5   IR:  NT 4/5 NT   ER: NT 4/5 NT   Wrist Flexion: 4/5 NT 5/5   Wrist Extension: 5/5 NT 5/5   Lats:  NT 4-/5 NT   Mid traps:  NT 3+/5 NT   Rhomboids:  NT 3+/5 NT   Lower traps: NT 3+/5 NT     Lat pulls GTB 20x  Rows GTB 20x    Sitting right hip ER stretch    NP today:   X 30 reps of B LE alternating step ups onto 8 inch box with hip flexion on top of box and SBA  X 30 reps of B LE side stepping onto 8 inch box with 1 UE support  Lateral walking with squats c/ GTB 4 laps of about 30 ft each way  Standing Hip EXT, 3 x 10 reps, B, YTB, UE support on // bar    Neuromuscular re-education x 28 min to improve balance and coordination including:  SLS right= 26 sec    scap stars with OTB, TC for right scap depression  RUE support on door (weight shift from BUE>RUE only) with trunk movement on limb- min A for elbow ext and maintaining scapular stability (wants to elevate)  Door push ups, min A to decrease right scapular elevation with elbow flexion, 10x    restoration of arches of right foot " "with mid and hindfoot realignment- stretching on wedge  Bilateral>single leg weight bearing on right (pregait) with supervision- IR of femur with pronation/EV noted with full weight bearing RLE    LLE cone taps with large cones- mod A at right knee to decreased hip IR and knee hyperextension- progressed to no assistance with fair to good control.     NP today:   Standing on Airex foam pad and tossing soccer ball against the trampoline with close SBA x 30 reps  X 20 reps of B LE single leg stance while tossing soccer ball against the trampoline.  CGA/Close SBA for safety  Side stepping with B LE single leg cone taps x 4 trials  Forward tandem ambulation on therapads x 6 trials with close SBA for safety  Side stepping on therapads x 6 trials with close SBA for safety.    Written Home Exercises: 8/31/17- right hip ER stretch, lat pulls/ rows, GTB issued with door set up reviewed.   Pt demo good understanding of the education provided. Maame demonstrated good return demonstration of activities.     Education provided re: POC, HEP  No spiritual or educational barriers to learning provided    Pt has no cultural, educational or language barriers to learning provided.    Assessment   Assessment period: 8/4/17 to 8/31/17. Maame tolerates sessions well.  Pt's primary complaints are poor functional use of RUE for tasks such as carrying the groceries, decreased strength and slow motor control of RUE, and a "limp" when she walks. Pt demonstrates significant motor control impairments in RUE, RLE, and trunk that impairs all mobility and functional use of RUE. Pt demonstrates an improvement in right shoulder strength, but motor control for forward and overhead reaching and turning a doorknob is slow. Pt demonstrates poor right scapular motor control and stability. Moderate scapular winging is noted on the right side. Strength of this area was assessed, with pt demonstrating fairly significant impairments. Pt is not able to fully " prevent scapular winging, but can minimally correct with cueing from PT.  PT initiated scapular strengthening and motor control activities to address. Pt has tendency to elevate right shoulder with increased demand placed on RUE for supporting the body. Pt has difficulty maintaining right shoulder flexion with elbow and wrist extension (needed for reaching and supporting self). Pt demonstrates increased tone of right wrist and finger flexors and required frequent repositioning of hand during session (especially when used for support). Pt's thumb often adducted with flex with increased challenge placed on extremity. Pt may benefit from obtaing a Wrist- Hand Orthosis to bias extremity into extension to improve fine motor control and functional use. When attempting to attain right full weight bearing from bilateral leg weight bearing, pt demonstrated the following impairments with RLE: femur IR, left pelvic rotation, right lateral trunk lean, knee hyperextension, foot pronation. Pt was able to improve technique of RLE full weight bearing with moderate facilitation after stretching. With continued practice, pt was able to perform unassisted with good right knee control by end of today's session. Resting position of right foot is also leading to impairments in gait. At rest right calcaneuos is positioned in valgus and mid foot is in a pronated position. This encourages malalignment of LE up the kinetic chain. Pt demonstrated good motor learning during today's session. Pt can benefit from continued skilled PT to address impairments listed to improve/ normalize mobility to improve functional use of RUE and improve mobility needed to perform parenting duties for her children.     Pt rehab potential is Excellent. Pt will benefit from continuing skilled outpatient physical therapy to address the deficits listed below in the problem list, provide pt/family education and to maximize pt's level of independence in the home and  community environment.          Pt's spiritual, cultural and educational needs considered and pt agreeable to plan of care and goals as stated below:      GOALS:   Status as of 8/31/17:   Short term goals: 4 weeks, pt agrees to goals set.  1. Pt will have improved R UE AROM to full and equal with L side with forward shoulder flexion maintaining full elbow extension for functional reaching tasks. Met with VC  2. Pt will improve R UE MMT to 5/5 for all motions for improved participation with daily ADLs such as carrying groceries. Improved- refer to chart in objective info  3. Pt will improve SLS on R side to at least 20 seconds. Met- 26 sec- femur IR, knee hyperextension, foot pronation all noted with SLS  4. Pt will improve ABC score to at least 60% for improved safety and confidence with community mobility. NT     Long term goals: 8 weeks, pt agrees to goals set  5. Pt will improve SLS on R side to at least 30 seconds.  6. Pt will improve ABC score to at least 80% for improved safety and confidence with community mobility.  7. Pt will improve FGA score to at least 25/30 for decreased fall risk.  8. Pt will ambulate with R ankle DF clearance in swing phase and no R knee hyperextension in stance phase at least 90% of steps with no AD.   9. Pt will improve FOTO score to at least 62% for improved self perception of functional mobility.       Plan   Continue PT 2x weekly under established Plan of Care, with treatment to include: pt education, HEP, therapeutic exercises, neuromuscular re-education/balance exercises, therapeutic activities, joint mobilizations, and modalities PRN, to work towards established goals. Pt may be seen by PTA to carry out plan of care.     Caity Watts, PT   08/31/2017

## 2017-09-29 ENCOUNTER — NURSE TRIAGE (OUTPATIENT)
Dept: ADMINISTRATIVE | Facility: CLINIC | Age: 37
End: 2017-09-29

## 2017-09-29 ENCOUNTER — HOSPITAL ENCOUNTER (EMERGENCY)
Facility: HOSPITAL | Age: 37
Discharge: HOME | End: 2017-09-29
Attending: EMERGENCY MEDICINE
Payer: COMMERCIAL

## 2017-09-29 VITALS
HEIGHT: 64 IN | OXYGEN SATURATION: 100 % | HEART RATE: 93 BPM | BODY MASS INDEX: 22.02 KG/M2 | DIASTOLIC BLOOD PRESSURE: 62 MMHG | SYSTOLIC BLOOD PRESSURE: 117 MMHG | TEMPERATURE: 99 F | RESPIRATION RATE: 16 BRPM | WEIGHT: 129 LBS

## 2017-09-29 DIAGNOSIS — G43.909 MIGRAINE WITHOUT STATUS MIGRAINOSUS, NOT INTRACTABLE, UNSPECIFIED MIGRAINE TYPE: Primary | ICD-10-CM

## 2017-09-29 PROBLEM — R51.9 HEADACHE: Status: ACTIVE | Noted: 2017-09-29

## 2017-09-29 LAB
ALBUMIN SERPL BCP-MCNC: 3.7 G/DL
ALP SERPL-CCNC: 77 U/L
ALT SERPL W/O P-5'-P-CCNC: 15 U/L
ANION GAP SERPL CALC-SCNC: 9 MMOL/L
AST SERPL-CCNC: 17 U/L
B-HCG UR QL: NEGATIVE
BASOPHILS # BLD AUTO: 0.03 K/UL
BASOPHILS NFR BLD: 0.3 %
BILIRUB SERPL-MCNC: 0.4 MG/DL
BUN SERPL-MCNC: 10 MG/DL
CALCIUM SERPL-MCNC: 9.4 MG/DL
CHLORIDE SERPL-SCNC: 104 MMOL/L
CO2 SERPL-SCNC: 26 MMOL/L
CREAT SERPL-MCNC: 0.7 MG/DL
CTP QC/QA: YES
DIFFERENTIAL METHOD: ABNORMAL
EOSINOPHIL # BLD AUTO: 0 K/UL
EOSINOPHIL NFR BLD: 0 %
ERYTHROCYTE [DISTWIDTH] IN BLOOD BY AUTOMATED COUNT: 13.2 %
EST. GFR  (AFRICAN AMERICAN): >60 ML/MIN/1.73 M^2
EST. GFR  (NON AFRICAN AMERICAN): >60 ML/MIN/1.73 M^2
GLUCOSE SERPL-MCNC: 81 MG/DL
HCT VFR BLD AUTO: 38 %
HGB BLD-MCNC: 12.6 G/DL
LYMPHOCYTES # BLD AUTO: 1.1 K/UL
LYMPHOCYTES NFR BLD: 11.2 %
MCH RBC QN AUTO: 31.7 PG
MCHC RBC AUTO-ENTMCNC: 33.2 G/DL
MCV RBC AUTO: 96 FL
MONOCYTES # BLD AUTO: 0.7 K/UL
MONOCYTES NFR BLD: 7.3 %
NEUTROPHILS # BLD AUTO: 8.1 K/UL
NEUTROPHILS NFR BLD: 80.7 %
PLATELET # BLD AUTO: 343 K/UL
PMV BLD AUTO: 9 FL
POCT GLUCOSE: 85 MG/DL (ref 70–110)
POTASSIUM SERPL-SCNC: 3.6 MMOL/L
PROT SERPL-MCNC: 7.4 G/DL
RBC # BLD AUTO: 3.97 M/UL
SODIUM SERPL-SCNC: 139 MMOL/L
T4 FREE SERPL-MCNC: 0.88 NG/DL
TSH SERPL DL<=0.005 MIU/L-ACNC: 0.28 UIU/ML
WBC # BLD AUTO: 10.01 K/UL

## 2017-09-29 PROCEDURE — 82962 GLUCOSE BLOOD TEST: CPT

## 2017-09-29 PROCEDURE — 85025 COMPLETE CBC W/AUTO DIFF WBC: CPT

## 2017-09-29 PROCEDURE — 99283 EMERGENCY DEPT VISIT LOW MDM: CPT | Mod: ,,, | Performed by: PSYCHIATRY & NEUROLOGY

## 2017-09-29 PROCEDURE — 81025 URINE PREGNANCY TEST: CPT | Performed by: EMERGENCY MEDICINE

## 2017-09-29 PROCEDURE — 80053 COMPREHEN METABOLIC PANEL: CPT

## 2017-09-29 PROCEDURE — 99285 EMERGENCY DEPT VISIT HI MDM: CPT | Mod: ,,, | Performed by: EMERGENCY MEDICINE

## 2017-09-29 PROCEDURE — 25500020 PHARM REV CODE 255: Performed by: EMERGENCY MEDICINE

## 2017-09-29 PROCEDURE — 84439 ASSAY OF FREE THYROXINE: CPT

## 2017-09-29 PROCEDURE — 84443 ASSAY THYROID STIM HORMONE: CPT

## 2017-09-29 PROCEDURE — 63600175 PHARM REV CODE 636 W HCPCS: Performed by: PHYSICIAN ASSISTANT

## 2017-09-29 PROCEDURE — 93010 ELECTROCARDIOGRAM REPORT: CPT | Mod: ,,, | Performed by: INTERNAL MEDICINE

## 2017-09-29 PROCEDURE — 96375 TX/PRO/DX INJ NEW DRUG ADDON: CPT

## 2017-09-29 PROCEDURE — 96374 THER/PROPH/DIAG INJ IV PUSH: CPT

## 2017-09-29 PROCEDURE — 99285 EMERGENCY DEPT VISIT HI MDM: CPT | Mod: 25

## 2017-09-29 RX ORDER — METOCLOPRAMIDE HYDROCHLORIDE 5 MG/ML
10 INJECTION INTRAMUSCULAR; INTRAVENOUS
Status: COMPLETED | OUTPATIENT
Start: 2017-09-29 | End: 2017-09-29

## 2017-09-29 RX ORDER — DIPHENHYDRAMINE HYDROCHLORIDE 50 MG/ML
12.5 INJECTION INTRAMUSCULAR; INTRAVENOUS
Status: COMPLETED | OUTPATIENT
Start: 2017-09-29 | End: 2017-09-29

## 2017-09-29 RX ORDER — BUTALBITAL, ACETAMINOPHEN AND CAFFEINE 50; 325; 40 MG/1; MG/1; MG/1
1 TABLET ORAL EVERY 4 HOURS PRN
Qty: 12 TABLET | Refills: 0 | Status: SHIPPED | OUTPATIENT
Start: 2017-09-29 | End: 2017-10-05 | Stop reason: SDUPTHER

## 2017-09-29 RX ADMIN — DIPHENHYDRAMINE HYDROCHLORIDE 12.5 MG: 50 INJECTION, SOLUTION INTRAMUSCULAR; INTRAVENOUS at 04:09

## 2017-09-29 RX ADMIN — IOHEXOL 100 ML: 350 INJECTION, SOLUTION INTRAVENOUS at 05:09

## 2017-09-29 RX ADMIN — METOCLOPRAMIDE 10 MG: 5 INJECTION, SOLUTION INTRAMUSCULAR; INTRAVENOUS at 04:09

## 2017-09-29 NOTE — TELEPHONE ENCOUNTER
Reason for Disposition   Numbness of the face, arm or leg on one side of the body and new onset    Protocols used: ST HEADACHE-A-OH    Pt mom reports pt has severe headache, dizziness and numbness to lips. Care advice given.

## 2017-09-29 NOTE — ED TRIAGE NOTES
Presents to ER with headache since last pm with numbness and tingling to her lips and dizziness with ambulation.    GENERAL: The patient is well-developed and well-nourished in no apparent distress. Alert and oriented x4.                                                HEENT: Head is normocephalic and atraumatic. Extraocular muscles are intact. Pupils are equal, round, and reactive to light and accommodation. Nares appeared normal. Mouth is well hydrated and without lesions. Mucous membranes are moist. Posterior pharynx clear of any exudate or lesions.    NECK: Supple. No carotid bruits. No lymphadenopathy or thyromegaly.    LUNGS: Clear to auscultation.    HEART: Regular rate and rhythm without murmur.     ABDOMEN: Soft, nontender, and nondistended. Positive bowel sounds. No hepatosplenomegaly was noted.     EXTREMITIES: Without any cyanosis, clubbing, rash, lesions or edema.     NEUROLOGIC: Cranial nerves II through XII are grossly intact.     PSYCHIATRIC: Flat affect, but denies suicidal or homicidal ideations.    SKIN: No ulceration or induration present.

## 2017-09-29 NOTE — ASSESSMENT & PLAN NOTE
"Maame Cortez is a 36 y.o. female who presented to ED with headache, dizziness, unsteady gait, visual disturbances, and photophobia. She has baseline R sided deficits from past stroke. Patient has been experiencing symptoms since yesterday when her headache started. She describes her visual disturbances as "difficulty focusing" and believes this is due to her dizziness. Patient states this headache is different from her typical headaches because she is experiencing dizziness and tingling of her lips. CT negative for acute changes and CTA negative for vascular abnormalities. Etiology likely due to vascular migraine. Patient without new focal neurologic deficits. Recommend continuing secondary stroke prevention with home crestor and aggrenox and managing stroke risk factors. Please avoid using triptans since patient has history of stroke.       "

## 2017-09-29 NOTE — ED PROVIDER NOTES
"Encounter Date: 9/29/2017    SCRIBE #1 NOTE: I, Lelo Chinchilla, am scribing for, and in the presence of,  Dr. Sigala . I have scribed the following portions of the note - the EKG reading.       History     Chief Complaint   Patient presents with    Headache     posterior h/a  that began last night. Worse this am.  top and bottom lips tingling. Photophobic and dizzy. Took tylenol this am and aggrenox  at 8am. PT has a CVA hx. No slurred speech , one side extremity weakness, no confusion , no facial droop.      Patient is 36 year old female with PMHx of CVA with right sided residual weakness (2015) on Aggrenox, HLD, and depression. She presents to the ED for headache. She reports headache began last night and has progressively worsened this morning. Her pain is localized to the back of her head and associated with numbness and tingling of her lips, nausea, visual disturbances, photophobia, phonophobia, and dizziness feeling off balanced". She describes the pain as constant and throbbing. Rates pain as 6/10. Denies relief with Tylenol. Recently seen in 08/03 for a headache, which resolved on its own. She denies fever, chills, vomiting, SOB, chest pain, abd pain, dysuria, or diarrhea/constipation. She is an every day smoker, 0.5ppd and reports social alcohol use.           Review of patient's allergies indicates:  No Known Allergies  Past Medical History:   Diagnosis Date    Heart palpitations     MTHFR mutation     Stroke 08/2015     Past Surgical History:   Procedure Laterality Date    BELT ABDOMINOPLASTY      2001    AK TRANSCRAN DOPP INTRACRAN, EMBOLI W/INJ  8/11/2015         WISDOM TOOTH EXTRACTION       Family History   Problem Relation Age of Onset    Breast cancer Maternal Grandmother 70    Stroke Maternal Grandmother     Birth defects Maternal Grandmother      Breast Cancer    Birth defects Paternal Grandfather 80     Colon/Prostate Ca?    Cancer Neg Hx     Colon cancer Neg Hx     Diabetes Neg " Hx     Eclampsia Neg Hx     Hypertension Neg Hx     Miscarriages / Stillbirths Neg Hx     Ovarian cancer Neg Hx      labor Neg Hx      Social History   Substance Use Topics    Smoking status: Light Tobacco Smoker     Packs/day: 0.50     Years: 8.00     Last attempt to quit: 2015    Smokeless tobacco: Never Used      Comment: quit 2015    Alcohol use 0.0 oz/week      Comment: Social     Review of Systems   Constitutional: Negative for fever.   HENT: Negative for sore throat.    Eyes: Positive for visual disturbance.   Respiratory: Negative for shortness of breath.    Cardiovascular: Negative for chest pain.   Gastrointestinal: Positive for nausea. Negative for abdominal pain and vomiting.   Genitourinary: Negative for dysuria.   Musculoskeletal: Negative for back pain.   Skin: Negative for rash.   Neurological: Positive for dizziness, numbness and headaches. Negative for seizures, speech difficulty and weakness.        Photophobia and phonophobia.   Hematological: Does not bruise/bleed easily.       Physical Exam     Initial Vitals [17 1338]   BP Pulse Resp Temp SpO2   127/77 98 16 99.6 °F (37.6 °C) 99 %      MAP       93.67         Physical Exam    Vitals reviewed.  Constitutional: She appears well-developed and well-nourished. She is not diaphoretic. No distress.   Patient resting comfortably in NAD on RA.   HENT:   Head: Normocephalic and atraumatic.   Nose: Nose normal.   Eyes: Conjunctivae and EOM are normal. Pupils are equal, round, and reactive to light.   Neck: Normal range of motion.   Cardiovascular: Normal rate and regular rhythm. Exam reveals no friction rub.    No murmur heard.  Pulmonary/Chest: Breath sounds normal. No respiratory distress. She has no wheezes. She has no rales.   Abdominal: Soft. Bowel sounds are normal. She exhibits no distension. There is no tenderness. There is no rebound.   Musculoskeletal: Normal range of motion.   Neurological: She is alert and  oriented to person, place, and time. She has normal strength. No sensory deficit. She displays a negative Romberg sign. Gait abnormal.   Right sided residual weakness with right side facial droop- baseline from CVA 2015    Finger to nose coordination: dysmetria on right, normal on left  Heel to shin coordination: dysmetria on right, normal on left    Motor strength: 5/5 Upper and lower extremities on left  4/5 upper and lower extremities on right    Ataxic gait noted with widened stance.    Skin: Skin is warm and dry. No erythema.   Psychiatric: She has a normal mood and affect. Thought content normal.         ED Course   Procedures  Labs Reviewed   CBC W/ AUTO DIFFERENTIAL - Abnormal; Notable for the following:        Result Value    RBC 3.97 (*)     MCH 31.7 (*)     MPV 9.0 (*)     Gran # 8.1 (*)     Gran% 80.7 (*)     Lymph% 11.2 (*)     All other components within normal limits   TSH - Abnormal; Notable for the following:     TSH 0.277 (*)     All other components within normal limits   COMPREHENSIVE METABOLIC PANEL   T4, FREE   POCT URINE PREGNANCY   POCT GLUCOSE   POCT GLUCOSE     Imaging Results          CTA Head and Neck (xpd) (Final result)  Result time 09/29/17 18:01:55    Final result by Mell Obregon MD (09/29/17 18:01:55)                 Impression:        CT angiogram of the head and neck demonstrates no significant vascular abnormality.    Noncontrast and contrast-enhanced CT head demonstrated no acute intracranial abnormalities.    Remote left basal ganglia infarct/hemorrhage.      ______________________________________     Electronically signed by resident: NANCI RESENDEZ MD  Date:     09/29/17  Time:    17:42            As the supervising and teaching physician, I personally reviewed the images and resident's interpretation and I agree with the findings.          Electronically signed by: MELL OBREGON MD, MD  Date:     09/29/17  Time:    18:01              Narrative:    CTA   09/29/17  17:00:24    Accession# [id number]    CLINICAL INDICATION: 36 year old F with posterior HA and ataxia      TECHNIQUE:     Axial images obtained throughout the region of the head without the use of intravenous contrast.    Axial images obtained throughout the region of the head and neck during intravenous bolus injection of 100 mL iodinated contrast via CT angiogram protocol. Axial, sagittal, and coronal MIP reconstructions were performed.       COMPARISON: CTA head and neck 04/11/2016; CT head without contrast 03/28/2016; MRI brain with/without contrast 02/12/2016    FINDINGS:     CT Brain:    The ventricles are midline and normal in size without evidence of hydrocephalus.    The brain demonstrates left basal ganglia encephalomalacia with surrounding high attenuation consistent with remote infarction/hemorrhage with dystrophic calcification. No parenchymal mass, new hemorrhage, edema or new major vascular distribution infarct.    No extra-axial blood or fluid collections.    The cranium appears intact. Mastoid air cells and paranasal sinuses are clear.    CTA:  The aortic arch maintains a normal branching pattern. The common and internal carotid arteries are normal in course and caliber. No significant stenosis in either carotid bifurcation by NASCET criteria.     The vertebral origins are patent. Right vertebral artery is slightly dominant. The cervical vertebral arteries are normal in course and caliber. Vertebrobasilar system is within normal limits without focal abnormality.     The anterior, middle, and posterior cerebral arteries are within normal limits, without evidence of significant stenosis, focal occlusion, or intracranial aneurysm formation. Anterior communicating artery is patent. The bilateral posterior communicating arteries are not identified and may be hypoplastic or absent.    Visualized portions of the lungs demonstrate no significant abnormalities.  Mild biapical pleural-parenchymal scarring. No  consolidation, mass, or pneumothorax.  Limited evaluation of pulmonary parenchyma secondary to motion artifact.    Soft tissue of the head and neck demonstrate no significant abnormalities.  There is no cervical adenopathy.  Parotid glands are unremarkable.    Cervical spine demonstrates no significant spinal canal or neural foraminal narrowing.  Osseous structures demonstrate no acute fracture or aggressive lesion.                              EKG Readings: (Independently Interpreted)   NSR rate of 87, no ST segments or T wave changes           Medical Decision Making:   History:   Old Medical Records: I decided to obtain old medical records.  Independently Interpreted Test(s):   I have ordered and independently interpreted EKG Reading(s) - see prior notes  Clinical Tests:   Lab Tests: Ordered and Reviewed  Radiological Study: Ordered and Reviewed  Medical Tests: Ordered and Reviewed       APC / Resident Notes:   Patient is 36 year old female with PMHx of CVA with right sided residual weakness (2015) on Aggrenox, HLD, and depression. She presents to the ED for headache. Patient is in no acute distress. Vitals stable. AAOx3. RRR. Lungs CTA. Abdomen is soft, non tender, non distended. Skin is normal appearance without rashes. Right sided residual weakness with right side facial droop- baseline from CVA 2015. Dysmetria. Motor strength: 5/5 Upper and lower extremities on left and 4/5 upper and lower extremities on right. Ataxic gait noted with widened stance.     Will order labs and imaging.   Consulted vascular neurosurgery who recommend CTA head and neck.     Differential diagnoses include, but are not limited to: SAH, TIA, migraine, hypoglycemia, and dehydration.     I have discussed and reviewed with my supervising physician.    Patient reassessed, pain improved.   Will discharge home with f/u with neurologist.          Scribe Attestation:   Scribe #1: I performed the above scribed service and the documentation  accurately describes the services I performed. I attest to the accuracy of the note.    Attending Attestation:           Physician Attestation for Scribe:  Physician Attestation Statement for Scribe #1: I, Dr. Sigala , reviewed documentation, as scribed by Lelo Chinchilla  in my presence, and it is both accurate and complete.                 ED Course      Clinical Impression:   The encounter diagnosis was Vascular Migraine without status migrainosus, not intractable, unspecified migraine type.                           Julissa Philip PA-C  09/29/17 2037

## 2017-09-29 NOTE — CONSULTS
"[facility]  Vascular Neurology  Comprehensive Stroke Center  Consult Note    Inpatient consult to Vascular (Stroke) Neurology  Consult performed by: BRANDI DIEGO  Consult ordered by: ANA DIAZ        Assessment/Plan:     Patient is a 36 y.o. year old female with:    Headache    Maame Cortez is a 36 y.o. female who presented to ED with headache, dizziness, unsteady gait, visual disturbances, and photophobia. She has baseline R sided deficits from past stroke. Patient has been experiencing symptoms since yesterday when her headache started. She describes her visual disturbances as "difficulty focusing" and believes this is due to her dizziness. Patient states this headache is different from her typical headaches because she is experiencing dizziness and tingling of her lips. CT negative for acute changes and CTA negative for vascular abnormalities. Etiology likely due to vascular migraine. Patient without new focal neurologic deficits. Recommend continuing secondary stroke prevention with home crestor and aggrenox and managing stroke risk factors. Please avoid using triptans since patient has history of stroke.             Tobacco abuse    Stroke risk factor  Encourage smoking cessation        Cerebral thrombosis with cerebral infarction    Patient has baseline R sided deficits from previous stroke                                        Thrombolysis Candidate? No  1. Contraindications: Outside of treament window   2.   Interventional Revascularization Candidate?  No; No large vessel occlusion and No; No significant neurological deficit    Subjective:     History of Present Illness:  Maame Cortez is a 36 y.o. female with PMHx of stroke who presented to the ED with HA, intermittent nausea, dizziness, and photophobia. Patient reports having difficulty ambulating with changes in gait. Her headache began last night and has been worsening. She also noted tingling of her " lips and visual disturbances. Patient is having trouble focusing her vision due to her dizziness. Patient denies new weakness and new speech difficulties.                    Past Medical History:   Diagnosis Date    Heart palpitations     MTHFR mutation     Stroke 2015     Past Surgical History:   Procedure Laterality Date    BELT ABDOMINOPLASTY          DE TRANSCRAN DOPP INTRACRAN, EMBOLI W/INJ  2015         WISDOM TOOTH EXTRACTION       Family History   Problem Relation Age of Onset    Breast cancer Maternal Grandmother 70    Stroke Maternal Grandmother     Birth defects Maternal Grandmother      Breast Cancer    Birth defects Paternal Grandfather 80     Colon/Prostate Ca?    Cancer Neg Hx     Colon cancer Neg Hx     Diabetes Neg Hx     Eclampsia Neg Hx     Hypertension Neg Hx     Miscarriages / Stillbirths Neg Hx     Ovarian cancer Neg Hx      labor Neg Hx      Social History   Substance Use Topics    Smoking status: Light Tobacco Smoker     Packs/day: 0.50     Years: 8.00     Last attempt to quit: 2015    Smokeless tobacco: Never Used      Comment: quit 2015    Alcohol use 0.0 oz/week      Comment: Social     Review of patient's allergies indicates:  No Known Allergies  Medications: I have reviewed the current medication administration record.      (Not in a hospital admission)    Review of Systems   Constitutional: Negative for chills and fever.   HENT: Negative for drooling.    Eyes: Positive for photophobia and visual disturbance.   Respiratory: Negative for cough and shortness of breath.    Cardiovascular: Negative for chest pain and palpitations.   Gastrointestinal: Negative for nausea and vomiting.   Skin: Negative for rash.   Neurological: Positive for dizziness, facial asymmetry (baseline), weakness (baseline R sided deficits), numbness (lips) and headaches. Negative for speech difficulty.   Psychiatric/Behavioral: Negative for agitation.     Objective:      Vital Signs (Most Recent):  Temp: 98.7 °F (37.1 °C) (17)  Pulse: 93 (17)  Resp: 16 (17)  BP: 117/62 (17)  SpO2: 100 % (17)    Vital Signs Range (Last 24H):  Temp:  [98.7 °F (37.1 °C)-99.6 °F (37.6 °C)]   Pulse:  [93-98]   Resp:  [16]   BP: (117-127)/(62-77)   SpO2:  [99 %-100 %]     Physical Exam   Constitutional: She is oriented to person, place, and time. She appears well-developed and well-nourished. No distress.   HENT:   Head: Normocephalic and atraumatic.   Eyes: EOM are normal. Pupils are equal, round, and reactive to light.   Cardiovascular: Normal rate.    Pulmonary/Chest: Effort normal. No respiratory distress.   Abdominal: Soft.   Neurological: She is alert and oriented to person, place, and time.   Skin: Skin is warm and dry.   Vitals reviewed.      Neurological Exam:   LOC: alert and follows requests  Language: No aphasia  Speech: No dysarthria  Orientation: Person, Place, Time  Memory: Recent memory intact, Remote memory intact, Age correct, Month correct  Visual Fields (CN II): Full  EOM (CN III, IV, VI): Full/intact  Pupils (CN III, IV, VI): PERRL  Facial Sensation (CN V): Symmetric  Facial Movement (CN VII): lower weakness right lower  Motor*: Arm Left:  Normal (5/5), Leg Left:   Normal (5/5), Arm Right:   Paretic:  3/5, Leg Right:   Normal (5/5)  Sensation: intact to light touch, temperature and vibration  Tone: Arm-Left: normal; Leg-Left: normal; Arm-Right: spastic; Leg-Right: normal    NIH Stroke Scale:  Interval: baseline (upon arrival/admit)  Level of Consciousness: 0 - alert  LOC Questions: 0 - answers both correctly  LOC Commands: 0 - performs both correctly  Best Gaze: 0 - normal  Visual: 0 - no visual loss  Facial Palsy: 2 - partial (baseline)  Motor Left Arm: 0 - no drift  Motor Right Arm: 1 - drift (baseline)  Motor Left Le - no drift  Motor Right Le - no drift  Limb Ataxia: 0 - absent  Sensory: 0 - normal  Best Language:  0 - no aphasia  Dysarthria: 0 - normal articulation  Extinction and Inattention: 0 - no neglect  NIH Stroke Scale Total: 3  Modified Houston Scale:   Timeline: Prior to symptoms onset  Modified Houston Score: 2 - slight disability        Laboratory:  CMP:   Recent Labs  Lab 09/29/17  1546   CALCIUM 9.4   ALBUMIN 3.7   PROT 7.4      K 3.6   CO2 26      BUN 10   CREATININE 0.7   ALKPHOS 77   ALT 15   AST 17   BILITOT 0.4     CBC:   Recent Labs  Lab 09/29/17  1546   WBC 10.01   RBC 3.97*   HGB 12.6   HCT 38.0      MCV 96   MCH 31.7*   MCHC 33.2     Lipid Panel: No results for input(s): CHOL, LDLCALC, HDL, TRIG in the last 168 hours.  Coagulation: No results for input(s): INR, APTT in the last 168 hours.    Invalid input(s): PT  Platelet Aggregation Study: No results for input(s): PLTAGG, PLTAGINTERP, PLTAGREGLACO, ADPPLTAGGREG in the last 168 hours.  Hgb A1C: No results for input(s): HGBA1C in the last 168 hours.  TSH:   Recent Labs  Lab 09/29/17  1546   TSH 0.277*       Diagnostic Results:      CTA Head and Neck. Date: 09/29/17  CT angiogram of the head and neck demonstrates no significant vascular abnormality.    Noncontrast and contrast-enhanced CT head demonstrated no acute intracranial abnormalities.    Remote left basal ganglia infarct/hemorrhage          Roro Kelley PA-C  [facility] Stroke [facility]  Department of Vascular Neurology   [facility]

## 2017-10-03 ENCOUNTER — NURSE TRIAGE (OUTPATIENT)
Dept: ADMINISTRATIVE | Facility: CLINIC | Age: 37
End: 2017-10-03

## 2017-10-03 NOTE — TELEPHONE ENCOUNTER
Reason for Disposition   Patient wants to be seen     Pt states she would like to be seen this week.    Protocols used: ST DIZZINESS-A-OH        Pt c/o continued dizziness and numbness to mouth since Friday.  Pt states she was seen in ED on Friday for complaint.  Pt denies any new symptoms. Pt states that she would like to speak to her PCP regarding symptoms.  Message sent to PCP and pt transferred to scheduling for appointment.

## 2017-10-05 ENCOUNTER — TELEPHONE (OUTPATIENT)
Dept: ENDOCRINOLOGY | Facility: CLINIC | Age: 37
End: 2017-10-05

## 2017-10-05 ENCOUNTER — OFFICE VISIT (OUTPATIENT)
Dept: INTERNAL MEDICINE | Facility: CLINIC | Age: 37
End: 2017-10-05
Payer: COMMERCIAL

## 2017-10-05 VITALS
OXYGEN SATURATION: 99 % | DIASTOLIC BLOOD PRESSURE: 68 MMHG | HEIGHT: 64 IN | HEART RATE: 85 BPM | SYSTOLIC BLOOD PRESSURE: 98 MMHG | WEIGHT: 125.69 LBS | TEMPERATURE: 98 F | BODY MASS INDEX: 21.46 KG/M2

## 2017-10-05 DIAGNOSIS — I69.398 ABNORMALITY OF GAIT AS LATE EFFECT OF CEREBROVASCULAR ACCIDENT (CVA): Primary | ICD-10-CM

## 2017-10-05 DIAGNOSIS — R26.9 ABNORMALITY OF GAIT AS LATE EFFECT OF CEREBROVASCULAR ACCIDENT (CVA): Primary | ICD-10-CM

## 2017-10-05 DIAGNOSIS — G43.909 MIGRAINE WITHOUT STATUS MIGRAINOSUS, NOT INTRACTABLE, UNSPECIFIED MIGRAINE TYPE: ICD-10-CM

## 2017-10-05 DIAGNOSIS — I63.9 COMPLETED STROKE: ICD-10-CM

## 2017-10-05 PROCEDURE — 99999 PR PBB SHADOW E&M-EST. PATIENT-LVL IV: CPT | Mod: PBBFAC,,, | Performed by: INTERNAL MEDICINE

## 2017-10-05 PROCEDURE — 99214 OFFICE O/P EST MOD 30 MIN: CPT | Mod: S$GLB,,, | Performed by: INTERNAL MEDICINE

## 2017-10-05 RX ORDER — ASPIRIN AND DIPYRIDAMOLE 25; 200 MG/1; MG/1
CAPSULE, EXTENDED RELEASE ORAL
Qty: 60 CAPSULE | Refills: 11 | Status: SHIPPED | OUTPATIENT
Start: 2017-10-05 | End: 2018-05-02 | Stop reason: SDUPTHER

## 2017-10-05 RX ORDER — BUTALBITAL, ACETAMINOPHEN AND CAFFEINE 50; 325; 40 MG/1; MG/1; MG/1
1 TABLET ORAL EVERY 6 HOURS PRN
Qty: 30 TABLET | Refills: 0 | Status: SHIPPED | OUTPATIENT
Start: 2017-10-05 | End: 2017-11-04

## 2017-10-05 NOTE — PROGRESS NOTES
Maame is a 36-year-old female, known to myself, who presents today status post   Emergency Room visit September 29th for migraine, rule out CVA.    HISTORY OF PRESENT ILLNESS:  Maame presents.  She is actually status post   having gone to the Emergency Room August 3rd with a headache, some secondary   nausea, was diagnosed with a migraine.  She had recurrence of another migraine,   which started on Thursday, September 28th.  The headache continued even after   awakening in the morning of September 29th.  The patient notes her headache was   associated with dizziness.  She felt off balance.  The headache was a throbbing   type quality at the caput of her head.  She did note that range of motion of the   head seemed to make the dizziness worse.  She notes that she did in fact go to   the Emergency Room.  She had lab work done and a CT angiogram of the head and   neck, which showed no acute neurological abnormalities.  The patient was   diagnosed with a vascular migraine and sent home with Fioricet, which did help.    She notes the headache has gone, but she continues to feel off balance.  She   also feels that her right-sided leg weakness seems to be more prominent than it   was prior to the stroke.  She has resolution of the headache and nausea.  She   still has the off balance sensation and still has numbness of the lips.  No   other neurological abnormalities other than stated above.    PAST MEDICAL, SURGICAL, SOCIAL HISTORY:  Please see as thoroughly stated in EPIC   chart, which has been reviewed.    PHYSICAL EXAMINATION:  VITAL SIGNS:  With weight 125 pounds, blood pressure 98/68, pulse 85,   temperature 97.9.  GENERAL:  The patient looks comfortable.  HEENT:  Shows cranial nerves normal with the exception of left-sided facial   weakness, which is old.  As above stated, cranial nerves II through XII are   unremarkable.  NECK:  Shows thyroid to be prominent with thyromegaly as palpated prior.  LUNGS:   Clear.  HEART:  Regular rate and rhythm without arrhythmias.  ABDOMEN:  Soft, nontender.  EXTREMITIES:  Negative edema.  NEUROLOGIC:  Shows continued right-sided distal hemiparesis in the right upper   extremity and foot/leg.  DTRs are hyperreflexive in all four extremities except   the left upper extremity.  The patient does have continued right hemiparesis and   weakness in the right arm distally greater than the right leg.  Elgin-Hallpike   maneuver is negative for vertigo.  Gait is slightly unsteady with the patient   having definite worsening weakness in the right leg below the knee with flaying   at the ankle/leg with ambulation.    ASSESSMENT AND PLAN:  1.  Vascular migraine.  A.  I have given a refill on Fioricet prescription, #30 tabs, no refills.  B.  I have called Dr. Elva Vogel's office to ensure the patient is scheduled   for sooner followup to her with the patient to be scheduled for prior ordered   transcranial Doppler with bubble study, which was not scheduled to date.  I have   left a message and spoken to staff there.  They are going to contact the   patient's mother, Hui, to schedule this in light of the patient having some   memory worsening status post her stroke.  2.  Thyroid nodules with abnormal TFTs in the Emergency Room TSH being on the   low side at 0.277 with T4 also on the low side of normal at 0.88.  A.  Endocrinology appointment ASAP and I have actually scheduled this for next   week, next Wednesday, October 11th at 8:00 a.m. with Dr. Sheehan.  3.  Status post cerebrovascular accident with right-sided hemiparesis and some   slight worsening in lower extremity hemiparesis and now numbness of lips.  A.  Transcranial Doppler with bubble study to be scheduled by Neurology.  I have   actually spoken to Dr. Vogel's medical assistant.  B.  I have requested sooner followup with Dr. Vogel than next available.  C.  Continue with daily Aggrenox 200/25 mg b.i.d. and Crestor 5 mg daily.  D.  If any  recurrence of symptoms, the patient to present to the Emergency Room.      FMS/HN  dd: 10/05/2017 10:52:15 (CDT)  td: 10/05/2017 23:04:30 (CDT)  Doc ID   #[id number]  Job ID #[id number]    CC:     This office note has been dictated.

## 2017-10-05 NOTE — TELEPHONE ENCOUNTER
Received a call from Dr. Gupta regarding needing an appointment for patient.  Explains that, patient had a stroke in the past.  Had a thyroid ultrasound, and showed thyroid nodules.  Patient has been to ED X2 already for Vascular Migraines.  I scheduled patient on Monday 10/11/17 at 1 pm.

## 2017-10-10 NOTE — PROGRESS NOTES
Ms. Maame Cortez is a 36 y.o. F with history of stroke of left basal ganglia 2015, migraines, here for initial visit for thyroid abnormalities.    2017 thyroid US shows a 1.2cm R sided mid-posterior hypoechoic nodule w mild inc peripheral vascularity and 2 subcm thyroid nodules including a R mid lateral 0.5cm nodule with microcalcifications and poorly defined borders.     Her TSH has been intermittently low with normal FT4 levels.      No family hx of thyroid disorders.      Has been having headaches recently which is new for pt, periods monthly but usually off by a few days, no hair changes, no constipation or diarrhea, no heat/cold interolances.  Since ED visit for headache recently is having tingling of lips.       Component      Latest Ref Rng & Units 2017 3/31/2017 2016 3/28/2016   TSH      0.400 - 4.000 uIU/mL 0.277 (L) 0.571 0.592 0.293 (L)   Free T4      0.71 - 1.51 ng/dL 0.88   0.76     Component      Latest Ref Rng & Units 10/29/2015 2015   TSH      0.400 - 4.000 uIU/mL 0.591 0.392 (L)   Free T4      0.71 - 1.51 ng/dL 0.87 0.93       Past Medical History:   Diagnosis Date    Heart palpitations     MTHFR mutation     Stroke 2015        reports that she quit smoking about 2 years ago. She has a 4.00 pack-year smoking history. She has never used smokeless tobacco. She reports that she drinks alcohol. She reports that she does not use drugs.    Family History   Problem Relation Age of Onset    Breast cancer Maternal Grandmother 70    Stroke Maternal Grandmother     Birth defects Maternal Grandmother      Breast Cancer    Birth defects Paternal Grandfather 80     Colon/Prostate Ca?    Cancer Neg Hx     Colon cancer Neg Hx     Diabetes Neg Hx     Eclampsia Neg Hx     Hypertension Neg Hx     Miscarriages / Stillbirths Neg Hx     Ovarian cancer Neg Hx      labor Neg Hx        Past Surgical History:   Procedure Laterality Date    BELT ABDOMINOPLASTY          [place]  "TRANSCRAN DOPP INTRACRAN, EMBOLI W/INJ  8/11/2015         WISDOM TOOTH EXTRACTION         Patient's Medications   New Prescriptions    No medications on file   Previous Medications    BUTALBITAL-ACETAMINOPHEN-CAFFEINE -40 MG (FIORICET, ESGIC) -40 MG PER TABLET    Take 1 tablet by mouth every 6 (six) hours as needed for Pain. 1 tab Q6-8 hours as needed for Migraine Headache    CHOLECALCIFEROL, VITAMIN D3, (VITAMIN D3) 2,000 UNIT CAP    Take 1 capsule (2,000 Units total) by mouth once daily.    CITALOPRAM (CELEXA) 20 MG TABLET    Take 1 tablet (20 mg total) by mouth once daily.    COENZYME Q10 (COQ-10) 100 MG CAPSULE    Take 1 capsule (100 mg total) by mouth once daily.    DIPYRIDAMOLE-ASPIRIN 200-25 MG (AGGRENOX)  MG CM12    TAKE 1 CAPSULE BY MOUTH 2 (TWO) TIMES DAILY.    ROSUVASTATIN (CRESTOR) 5 MG TABLET    TAKE 1 TABLET (5 MG TOTAL) BY MOUTH ONCE DAILY FOR CHOLESTEROL   Modified Medications    No medications on file   Discontinued Medications    No medications on file         Review of Systems:  General: no fevers  Eyes: no vision changes  ENT: no sore throat  Lung: no sob  CV: no palpitations  GI: no nausea   : no dysuria   Endo: no heat interolance  Heme: no easy bruising   MSK: no joint swelling        /69   Pulse 73   Ht 5' 4" (1.626 m)   Wt 57.3 kg (126 lb 5.2 oz)   LMP 09/22/2017   BMI 21.68 kg/m²   Physical Exam:  General: normal body habitus, not in acute distress   Eyes: anicteric, no proptosis   ENT: no facial lesions, no oral lesions   Neck: no masses, no LAD, thyroid 20g   Lung; ctabl, no wheezing or stridor   GI: normal bowel sounds, nondistended   CV: RRR, no rubs or murmurs   Skin: exposed skin without bruising or bleeding   Ext: no peripheral edema or erythema, feet without lesions, knees 3+ bl reflexes, limitation if plantarflexion or R forearm - per pt chronic since stroke  Neuro: AOx3, moving all extremities, normal gait     Labs:    Chemistry        Component " Value Date/Time     09/29/2017 1546    K 3.6 09/29/2017 1546     09/29/2017 1546    CO2 26 09/29/2017 1546    BUN 10 09/29/2017 1546    CREATININE 0.7 09/29/2017 1546    GLU 81 09/29/2017 1546        Component Value Date/Time    CALCIUM 9.4 09/29/2017 1546    ALKPHOS 77 09/29/2017 1546    AST 17 09/29/2017 1546    ALT 15 09/29/2017 1546    BILITOT 0.4 09/29/2017 1546    ESTGFRAFRICA >60.0 09/29/2017 1546    EGFRNONAA >60.0 09/29/2017 1546          Lab Results   Component Value Date    WBC 10.01 09/29/2017    HGB 12.6 09/29/2017    HCT 38.0 09/29/2017    MCV 96 09/29/2017     09/29/2017        Lab Results   Component Value Date    HDL 50 03/31/2017    HDL 52 07/22/2016    HDL 41 09/18/2015     Lab Results   Component Value Date    LDLCALC 71.6 03/31/2017    LDLCALC 127.6 07/22/2016    LDLCALC 60.4 (L) 09/18/2015     Lab Results   Component Value Date    TRIG 87 03/31/2017    TRIG 57 07/22/2016    TRIG 63 09/18/2015     Lab Results   Component Value Date    CHOLHDL 36.0 03/31/2017    CHOLHDL 27.2 07/22/2016    CHOLHDL 36.0 09/18/2015       Hemoglobin A1C   Date Value Ref Range Status   08/08/2015 5.1 4.5 - 6.2 % Final       Lab Results   Component Value Date    TSH 0.277 (L) 09/29/2017       Assessment and Plan:  Ms. Maame Cortez is a 36 y.o. F with history of stroke of left basal ganglia 2015, migraines, here for initial visit for thyroid abnormalities.    1. Suppressed TSH: unclear if subclinical hyperthyroidism given that T4 is well within normal range, recommend TSI, Ft4, Ft3, TSH - likely thyroid uptake scan for now would not be high yield given recent CTA; alterative is central hypothyroidism although this seems less likely - will d/w radiology proximity of her previous stroke to pituitary location and possible effects - also send for pituitary panel    2. Thyroid nodules: based on possible hyperthyroid workup, may consider bx of her 1.2cm hypoechoic R sided nodule and of 0.5cm R lateral  nodule w irregular margins and microcalcifications - as her uptake scan is likely not going to be revealing for several months given CTA in sept 2017    Asked pt to revisit with neurology for headaches and numbness symptoms as thyroid abnormalities are not likely the cause    RTC 3 weeks    Pt prefers calling    Edvin Sheehan M.D.  Endocrinology

## 2017-10-11 ENCOUNTER — OFFICE VISIT (OUTPATIENT)
Dept: ENDOCRINOLOGY | Facility: CLINIC | Age: 37
End: 2017-10-11
Payer: COMMERCIAL

## 2017-10-11 ENCOUNTER — LAB VISIT (OUTPATIENT)
Dept: LAB | Facility: HOSPITAL | Age: 37
End: 2017-10-11
Attending: INTERNAL MEDICINE
Payer: COMMERCIAL

## 2017-10-11 VITALS
BODY MASS INDEX: 21.56 KG/M2 | WEIGHT: 126.31 LBS | HEIGHT: 64 IN | DIASTOLIC BLOOD PRESSURE: 69 MMHG | HEART RATE: 73 BPM | SYSTOLIC BLOOD PRESSURE: 102 MMHG

## 2017-10-11 DIAGNOSIS — R94.6 THYROID FUNCTION TEST ABNORMAL: ICD-10-CM

## 2017-10-11 DIAGNOSIS — R94.6 THYROID FUNCTION TEST ABNORMAL: Primary | ICD-10-CM

## 2017-10-11 DIAGNOSIS — E04.2 MULTIPLE THYROID NODULES: ICD-10-CM

## 2017-10-11 LAB
CORTIS SERPL-MCNC: 11.3 UG/DL
ESTRADIOL SERPL-MCNC: 188 PG/ML
FSH SERPL-ACNC: 9.8 MIU/ML
LH SERPL-ACNC: 40.6 MIU/ML
PROLACTIN SERPL IA-MCNC: 13 NG/ML
T3FREE SERPL-MCNC: 2.6 PG/ML
T4 FREE SERPL-MCNC: 0.75 NG/DL
THYROPEROXIDASE IGG SERPL-ACNC: <6 IU/ML
TSH SERPL DL<=0.005 MIU/L-ACNC: 0.65 UIU/ML

## 2017-10-11 PROCEDURE — 82670 ASSAY OF TOTAL ESTRADIOL: CPT

## 2017-10-11 PROCEDURE — 82024 ASSAY OF ACTH: CPT

## 2017-10-11 PROCEDURE — 86376 MICROSOMAL ANTIBODY EACH: CPT

## 2017-10-11 PROCEDURE — 84146 ASSAY OF PROLACTIN: CPT

## 2017-10-11 PROCEDURE — 84445 ASSAY OF TSI GLOBULIN: CPT

## 2017-10-11 PROCEDURE — 99204 OFFICE O/P NEW MOD 45 MIN: CPT | Mod: S$GLB,,, | Performed by: INTERNAL MEDICINE

## 2017-10-11 PROCEDURE — 84439 ASSAY OF FREE THYROXINE: CPT

## 2017-10-11 PROCEDURE — 99999 PR PBB SHADOW E&M-EST. PATIENT-LVL III: CPT | Mod: PBBFAC,,, | Performed by: INTERNAL MEDICINE

## 2017-10-11 PROCEDURE — 83001 ASSAY OF GONADOTROPIN (FSH): CPT

## 2017-10-11 PROCEDURE — 84439 ASSAY OF FREE THYROXINE: CPT | Mod: 91

## 2017-10-11 PROCEDURE — 84481 FREE ASSAY (FT-3): CPT

## 2017-10-11 PROCEDURE — 82533 TOTAL CORTISOL: CPT

## 2017-10-11 PROCEDURE — 84443 ASSAY THYROID STIM HORMONE: CPT

## 2017-10-11 PROCEDURE — 36415 COLL VENOUS BLD VENIPUNCTURE: CPT

## 2017-10-11 PROCEDURE — 83002 ASSAY OF GONADOTROPIN (LH): CPT

## 2017-10-11 PROCEDURE — 84305 ASSAY OF SOMATOMEDIN: CPT

## 2017-10-11 NOTE — LETTER
October 11, 2017      Woodrow Bruce MD  [address]  [address]  [facility] [address]           [facility]  [address]  [facility] [address]  Phone: [phone]  Fax: [phone]          Patient: Maame Cortez   MR Number: [id number]   YOB: 1980   Date of Visit: 10/11/2017       Dear Dr. Woodrow Bruce:    Thank you for referring Maame Cortez to me for evaluation. Attached you will find relevant portions of my assessment and plan of care.    If you have questions, please do not hesitate to call me. I look forward to following Maame Cortez along with you.    Sincerely,    Edvin Sheehan MD    Enclosure  CC:  No Recipients    If you would like to receive this communication electronically, please contact [email or web address] or [phone] to request more information on [employer] access.    For providers and/or their staff who would like to refer a patient to [facility], please contact us through our one-stop-shop provider referral line, [facility], at [phone].    If you feel you have received this communication in error or would no longer like to receive these types of communications, please e-mail [email or web address]

## 2017-10-12 LAB — TSI SER-ACNC: <0.1 IU/L

## 2017-10-13 ENCOUNTER — DOCUMENTATION ONLY (OUTPATIENT)
Dept: ENDOCRINOLOGY | Facility: CLINIC | Age: 37
End: 2017-10-13

## 2017-10-13 ENCOUNTER — TELEPHONE (OUTPATIENT)
Dept: ENDOCRINOLOGY | Facility: CLINIC | Age: 37
End: 2017-10-13

## 2017-10-13 LAB
ACTH PLAS-MCNC: 11 PG/ML
IGF-I SERPL-MCNC: 135 NG/ML (ref 54–258)
IGF-I Z-SCORE SERPL: 0.13 SD

## 2017-10-13 NOTE — TELEPHONE ENCOUNTER
Discussed w Dr. Joy in radiology, although techniques are limited, her most recent CTA in Sept 2017 and her MRI in 2016 does not seem to suggest pituitary involvement by her stroke.

## 2017-10-18 LAB — T4 FREE SERPL DIALY-MCNC: 1.2 NG/DL (ref 0.8–2)

## 2017-10-19 ENCOUNTER — PATIENT MESSAGE (OUTPATIENT)
Dept: ENDOCRINOLOGY | Facility: CLINIC | Age: 37
End: 2017-10-19

## 2017-10-19 NOTE — TELEPHONE ENCOUNTER
Hi Ms. Cortez, I reviewed your results (also left a VM on your cell).  Your thyroid and pituitary lab results are normal.  I also spoke w radiology and they do not believe your stroke involved the pituitary part of your brain.      Thus, I am not sure if I have a hormonal explanation for your headaches and numbness.       I would like to discuss possible biopsy of thyroid nodules found on your ultrasound.  Although risk of cancer is low in thyroid nodules in generally, there are 2 that potentially qualify for biopsy based on size and appearance to be safe.    Please let me know by either portal or calling.     Best,  Edvin Sheehan

## 2017-11-13 ENCOUNTER — DOCUMENTATION ONLY (OUTPATIENT)
Dept: REHABILITATION | Facility: HOSPITAL | Age: 37
End: 2017-11-13

## 2017-11-13 NOTE — PROGRESS NOTES
PHYSICAL THERAPY DISCHARGE SUMMARY     Name: Maame Cortez  Clinic Number: [id number]    Diagnosis:   G81.91 (ICD-10-CM) - Right hemiparesis        Physician: Tulio Perdomo MD  Treatment Orders: PT Eval and Treat  Past Medical History:   Diagnosis Date    Heart palpitations     MTHFR mutation     Stroke 08/2015       Initial visit: 8/4/2017  Date of Last visit: 8/31/2017  Total Visits Received: 4    DME recommended: pt inquired about a wrist/ hand orthosis, but did not return to follow up appointments  HEP provided:right hip ER stretch, lat pulls/ rows, GTB issued with door set up reviewed.  Pain: denied at last visit    OBJECTIVE     ROM:   UPPER EXTREMITY--AROM/PROM  Not tested due to unexpected d/c           RANGE OF MOTION--LOWER EXTREMITIES  Not tested due to unexpected d/c    Strength: manual muscle test grades below   Upper Extremity Strength  Not tested due to unexpected d/c    Lower Extremity Strength  Not tested due to unexpected d/c       Evaluation   Single Limb Stance R LE Not tested due to unexpected d/c  (<10 sec = HIGH FALL RISK)   Single Limb Stance L LE Not tested due to unexpected d/c  (<10 sec = HIGH FALL RISK)   30 second Chair Rise Not tested due to unexpected d/c   5 times sit-stand Not tested due to unexpected d/c     Gait Assessment:   - AD used: none  - Assistance: I  - Distance: community  - Curb: Mod I- I  - Ramp:  Mod I- I     Evaluation   Timed Up and Go Not tested due to unexpected d/c   Self Selected Walking Speed Not tested due to unexpected d/c   Fast Walking Speed Not tested due to unexpected d/c     Functional Mobility (Bed mobility, transfers)  Bed mobility: I  Supine to sit: I  Sit to supine: I  Rolling: I  Transfers to bed: I  Transfers to toilet: I  Sit to stand:  I  Stand pivot:  I  Car transfers: I  Wheelchair mobility: NA  Floor transfers: NT    ASSESSMENT   37 year old female with diagnosis of right hemiparesis referred to [facility]  received skilled outpatient PT 8/4/17 to 8/31/17. Status towards goals assessed at last visit on 8/31/17. Pt did demonstrate improved right shoulder flexion with elbow extension (for reaching), improved UE strength, and improved single leg stance. Pt cancelled follow up session and did not reschedule. Pt d/c.        Status Towards Goals Met:     Short term goals:   1. Pt will have improved R UE AROM to full and equal with L side with forward shoulder flexion maintaining full elbow extension for functional reaching tasks. Met with VC  2. Pt will improve R UE MMT to 5/5 for all motions for improved participation with daily ADLs such as carrying groceries. Improved- refer to chart in objective info  3. Pt will improve SLS on R side to at least 20 seconds. Met- 26 sec- femur IR, knee hyperextension, foot pronation all noted with SLS  4. Pt will improve ABC score to at least 60% for improved safety and confidence with community mobility. NT     Long term goals:   5. Pt will improve SLS on R side to at least 30 seconds.  6. Pt will improve ABC score to at least 80% for improved safety and confidence with community mobility.  7. Pt will improve FGA score to at least 25/30 for decreased fall risk.  8. Pt will ambulate with R ankle DF clearance in swing phase and no R knee hyperextension in stance phase at least 90% of steps with no AD.   9. Pt will improve FOTO score to at least 62% for improved self perception of functional mobility.     Goals Not achieved and why: unanticipated d/c    Discharge reason : Non-Compliance with attendance and Pt has not re-scheduled further follow-up sessions    PLAN   This patient is discharged from Physical Therapy Services.         Caity Watts, PT  11/13/2017

## 2017-11-15 ENCOUNTER — TELEPHONE (OUTPATIENT)
Dept: NEUROLOGY | Facility: CLINIC | Age: 37
End: 2017-11-15

## 2017-11-15 NOTE — TELEPHONE ENCOUNTER
Randomly called to do a check up on patient to see how she's doing, she stated that she was recently in the hospital due to serious migraines. She mentioned that Dr. Vogel came to see her and that she's doing much better now.  -Random check-up-

## 2017-12-21 ENCOUNTER — TELEPHONE (OUTPATIENT)
Dept: EMERGENCY MEDICINE | Facility: HOSPITAL | Age: 37
End: 2017-12-21

## 2018-02-20 ENCOUNTER — OFFICE VISIT (OUTPATIENT)
Dept: INTERNAL MEDICINE | Facility: CLINIC | Age: 38
End: 2018-02-20
Payer: MEDICARE

## 2018-02-20 ENCOUNTER — TELEPHONE (OUTPATIENT)
Dept: INTERNAL MEDICINE | Facility: CLINIC | Age: 38
End: 2018-02-20

## 2018-02-20 VITALS
HEART RATE: 67 BPM | SYSTOLIC BLOOD PRESSURE: 115 MMHG | OXYGEN SATURATION: 98 % | BODY MASS INDEX: 22.25 KG/M2 | HEIGHT: 64 IN | DIASTOLIC BLOOD PRESSURE: 70 MMHG | WEIGHT: 130.31 LBS

## 2018-02-20 DIAGNOSIS — Z72.0 TOBACCO ABUSE: Primary | ICD-10-CM

## 2018-02-20 DIAGNOSIS — E04.2 MULTIPLE THYROID NODULES: ICD-10-CM

## 2018-02-20 DIAGNOSIS — F32.A DEPRESSION, UNSPECIFIED DEPRESSION TYPE: ICD-10-CM

## 2018-02-20 DIAGNOSIS — E78.5 HYPERLIPIDEMIA, UNSPECIFIED HYPERLIPIDEMIA TYPE: ICD-10-CM

## 2018-02-20 DIAGNOSIS — G81.91 RIGHT HEMIPARESIS: ICD-10-CM

## 2018-02-20 DIAGNOSIS — I63.30 CEREBRAL THROMBOSIS WITH CEREBRAL INFARCTION: ICD-10-CM

## 2018-02-20 PROCEDURE — 99999 PR PBB SHADOW E&M-EST. PATIENT-LVL IV: CPT | Mod: PBBFAC,,, | Performed by: INTERNAL MEDICINE

## 2018-02-20 PROCEDURE — 99214 OFFICE O/P EST MOD 30 MIN: CPT | Mod: S$GLB,,, | Performed by: INTERNAL MEDICINE

## 2018-02-20 PROCEDURE — 3008F BODY MASS INDEX DOCD: CPT | Mod: S$GLB,,, | Performed by: INTERNAL MEDICINE

## 2018-02-20 RX ORDER — CITALOPRAM 20 MG/1
20 TABLET, FILM COATED ORAL DAILY
Qty: 90 TABLET | Refills: 2 | Status: SHIPPED | OUTPATIENT
Start: 2018-02-20 | End: 2018-11-06 | Stop reason: SDUPTHER

## 2018-02-20 RX ORDER — ROSUVASTATIN CALCIUM 5 MG/1
TABLET, COATED ORAL
Qty: 90 TABLET | Refills: 1 | Status: SHIPPED | OUTPATIENT
Start: 2018-02-20 | End: 2018-10-29 | Stop reason: SDUPTHER

## 2018-02-20 RX ORDER — VARENICLINE TARTRATE 1 MG/1
1 TABLET, FILM COATED ORAL 2 TIMES DAILY
Qty: 60 TABLET | Refills: 2 | Status: SHIPPED | OUTPATIENT
Start: 2018-02-20 | End: 2018-10-24 | Stop reason: SDDI

## 2018-02-20 RX ORDER — VARENICLINE TARTRATE 0.5 (11)-1
KIT ORAL
Qty: 1 PACKAGE | Refills: 0 | Status: SHIPPED | OUTPATIENT
Start: 2018-02-20 | End: 2018-10-24 | Stop reason: SDDI

## 2018-02-20 NOTE — TELEPHONE ENCOUNTER
Maame Clark was never contacted or scheduled for her TCD with Bubble study. We tried to get assistance from Neurology to schedule such but didn't hear back.  Could you please assist with getting this scheduled or have your MA call pt(or her mother)to do such.   Thanks so much, Woodrow Snowden

## 2018-02-21 NOTE — PROGRESS NOTES
SUBJECTIVE:  Maame is a 37-year-old female, known to myself, who presented to   clinic yesterday, February 20th, at which time clinic billing was performed.    Her note is being dictated today, February 21st.    CHIEF COMPLAINT:  Followup hyperlipidemia and thyroid abnormalities.    HISTORY OF PRESENT ILLNESS:  Maame presents for followup of the above.  She   notes that she had further lab work done in Endocrinology, but all this seemed   to return normal and no further recommendations were made in reference to her   thyroid lab abnormality.  Not sure what to do at this point.  She is feeling   fine.  She is having no palpitations or new fatigue or thyroid type   symptomatology.  She does note she is trying to quit smoking.  She has not had a   cigarette in two days.  The most she has ever smoked is five cigarettes in a   day.  She has had no recurrent headaches since the migraine that she had back in   September 2017.  Still has not heard from Neurology about having her   transcranial Doppler scheduled.    PAST MEDICAL, SURGICAL, SOCIAL HISTORY:  Please see as thoroughly stated in EPIC   chart, which has been reviewed.    PHYSICAL EXAMINATION:  VITAL SIGNS:  Weight 130 pounds, blood pressure 115/70, pulse 67.  GENERAL:  The patient looks comfortable.  She continues with right-sided   hemiparesis, more pronounced in the upper extremity than the lower.  HEENT:  Grossly normal.  NECK:  Supple.  Negative for masses.  There is evidence of thyromegaly, seems to   be mildly diffuse.  No focal nodule palpated on my exam.  LUNGS:  Clear.  HEART:  Regular rate and rhythm without arrhythmias, murmurs, or gallops.  ABDOMEN:  Soft and nontender.  EXTREMITIES:  Negative for edema.    ASSESSMENT AND PLAN:  1.  History of thyroid nodules by thyroid ultrasound in July 2017 with abnormal   thyroid functions actually normalized on last blood work.  a.  We will repeat thyroid ultrasound in July 2018 and follow up at that point   to  determine if biopsy or re-referral to Endocrinology is necessary.  2.  Positive tobacco.  a.  I have discussed at length the options for discontinuing such.  b.  We will start Chantix, to initiate with starter pack and then go to 1 mg   b.i.d. thereafter.  c.  Return to clinic by 3 or 4 months for followup or see sooner if needed.  3.  Status post CVA, with the patient on statin therapy.  a.  We will check fasting lab work on Crestor 5 mg daily one week prior to   return to clinic.  b.  Continue with Aggrenox b.i.d.  c.  I have messaged Dr. Vogel in Neurology about trying to assist with   scheduling transcranial Doppler with bubble study, which she had ordered.  4.  Health maintenance.  a.  Return to clinic in four months with one week prior with the ____ lab and   ultrasound.  Lab work to include comprehensive chemistry, CBC, TSH, go from   there.      FMS/HN  dd: 02/21/2018 07:04:06 (CST)  td: 02/21/2018 22:50:31 (CST)  Doc ID   #[id number]  Job ID #[id number]    CC:     This office note has been dictated.

## 2018-02-23 ENCOUNTER — HOSPITAL ENCOUNTER (OUTPATIENT)
Dept: RADIOLOGY | Facility: HOSPITAL | Age: 38
Discharge: HOME | End: 2018-02-23
Attending: INTERNAL MEDICINE
Payer: MEDICARE

## 2018-02-23 DIAGNOSIS — E04.2 MULTIPLE THYROID NODULES: ICD-10-CM

## 2018-02-23 PROCEDURE — 76536 US EXAM OF HEAD AND NECK: CPT | Mod: 26,,, | Performed by: RADIOLOGY

## 2018-02-23 PROCEDURE — 76536 US EXAM OF HEAD AND NECK: CPT | Mod: TC

## 2018-03-08 ENCOUNTER — TELEPHONE (OUTPATIENT)
Dept: INTERNAL MEDICINE | Facility: CLINIC | Age: 38
End: 2018-03-08

## 2018-03-09 NOTE — TELEPHONE ENCOUNTER
"Called listed number, states "the person you are trying to reach has a mailbox that has not be set up".  "

## 2018-03-09 NOTE — TELEPHONE ENCOUNTER
Ynes, please call Maame and let her know that her Thyroid U/S(2/23) showed a stable multinodular goiter.  We should repeat it in 1 year.  Thanks

## 2018-03-12 ENCOUNTER — TELEPHONE (OUTPATIENT)
Dept: INTERNAL MEDICINE | Facility: CLINIC | Age: 38
End: 2018-03-12

## 2018-03-12 NOTE — TELEPHONE ENCOUNTER
Ynes, please call pt's mother, Hui, and let her know that pt's Thyroid U/S was stable and we will repeat it x 1 year. Pt has had a stroke and has 2ndary memory issues.  Thanks

## 2018-05-02 ENCOUNTER — TELEPHONE (OUTPATIENT)
Dept: INTERNAL MEDICINE | Facility: CLINIC | Age: 38
End: 2018-05-02

## 2018-05-02 DIAGNOSIS — I63.9 CEREBROVASCULAR ACCIDENT (CVA), UNSPECIFIED MECHANISM: Primary | ICD-10-CM

## 2018-05-02 DIAGNOSIS — I63.9 COMPLETED STROKE: ICD-10-CM

## 2018-05-02 RX ORDER — CLOPIDOGREL BISULFATE 75 MG/1
75 TABLET ORAL DAILY
Qty: 30 TABLET | Refills: 0 | Status: SHIPPED | OUTPATIENT
Start: 2018-05-02 | End: 2018-10-24 | Stop reason: ALTCHOICE

## 2018-05-02 RX ORDER — ASPIRIN AND DIPYRIDAMOLE 25; 200 MG/1; MG/1
CAPSULE, EXTENDED RELEASE ORAL
Qty: 60 CAPSULE | Refills: 3 | Status: SHIPPED | OUTPATIENT
Start: 2018-05-02 | End: 2018-05-08 | Stop reason: SDUPTHER

## 2018-05-02 NOTE — TELEPHONE ENCOUNTER
Spoke with pt and have erx'd in:Plavix 75mg daily for now, but will try to get the Aggrenox authorized.  Ynes, please obtain a pre-auth form for pt's aggrenox to try to get it approved.  She is on it per Neurology recommendation s/p a Major CVA 8/2015 with residual right hemiparesis. Thanks

## 2018-05-02 NOTE — TELEPHONE ENCOUNTER
Ynes Huizar Staff   Caller: self/[phone] or [phone] (Today,  2:55 PM)             Patient called in regards needing to talk with Dr Bruce about insurance stated that they wont' pay for patient medication dipyridamole-aspirin 200-25 mg (AGGRENOX)  mg CM12, but they will pay for the generic for plavix. Patient is concern because has not have the medicine since 04/26/18. please call and advise. Thank you!!!

## 2018-05-03 NOTE — TELEPHONE ENCOUNTER
Prior authorization was required for [employer].  PA was initiated on 5-3-18 @ 10:22 am through cover my meds.

## 2018-05-08 ENCOUNTER — TELEPHONE (OUTPATIENT)
Dept: INTERNAL MEDICINE | Facility: CLINIC | Age: 38
End: 2018-05-08

## 2018-05-08 DIAGNOSIS — I63.9 COMPLETED STROKE: ICD-10-CM

## 2018-05-08 RX ORDER — ASPIRIN AND DIPYRIDAMOLE 25; 200 MG/1; MG/1
CAPSULE, EXTENDED RELEASE ORAL
Qty: 60 CAPSULE | Refills: 12 | Status: SHIPPED | OUTPATIENT
Start: 2018-05-08 | End: 2018-10-30

## 2018-05-08 NOTE — TELEPHONE ENCOUNTER
Ynes, please call Maame's mother, Hui, and let her know that Maame's aggrenox was approved. I have sent in the refill to her pharmacy.  Thanks

## 2018-10-24 ENCOUNTER — HOSPITAL ENCOUNTER (INPATIENT)
Facility: HOSPITAL | Age: 38
LOS: 4 days | Discharge: HOME | DRG: 814 | End: 2018-10-28
Attending: EMERGENCY MEDICINE | Admitting: EMERGENCY MEDICINE
Payer: MEDICARE

## 2018-10-24 ENCOUNTER — TELEPHONE (OUTPATIENT)
Dept: INTERNAL MEDICINE | Facility: CLINIC | Age: 38
End: 2018-10-24

## 2018-10-24 DIAGNOSIS — G93.9 PONTINE LESION: ICD-10-CM

## 2018-10-24 DIAGNOSIS — E55.9 VITAMIN D DEFICIENCY: ICD-10-CM

## 2018-10-24 DIAGNOSIS — I63.9 STROKE: ICD-10-CM

## 2018-10-24 DIAGNOSIS — R51.9 HEADACHE: ICD-10-CM

## 2018-10-24 DIAGNOSIS — G81.90 HEMIPARESIS, UNSPECIFIED HEMIPARESIS ETIOLOGY, UNSPECIFIED LATERALITY: ICD-10-CM

## 2018-10-24 DIAGNOSIS — Z72.0 TOBACCO ABUSE: ICD-10-CM

## 2018-10-24 DIAGNOSIS — I63.9 CEREBROVASCULAR ACCIDENT (CVA), UNSPECIFIED MECHANISM: ICD-10-CM

## 2018-10-24 DIAGNOSIS — I63.50 RIGHT PONTINE STROKE: Primary | ICD-10-CM

## 2018-10-24 DIAGNOSIS — G43.909 MIGRAINE WITHOUT STATUS MIGRAINOSUS, NOT INTRACTABLE, UNSPECIFIED MIGRAINE TYPE: Primary | ICD-10-CM

## 2018-10-24 DIAGNOSIS — Z78.9 ALCOHOL USE: ICD-10-CM

## 2018-10-24 DIAGNOSIS — Z78.9 IMPAIRED MOTOR CONTROL: ICD-10-CM

## 2018-10-24 DIAGNOSIS — F32.A DEPRESSION, UNSPECIFIED DEPRESSION TYPE: ICD-10-CM

## 2018-10-24 DIAGNOSIS — D89.89 AUTOIMMUNE DISORDER: ICD-10-CM

## 2018-10-24 DIAGNOSIS — R83.9 CSF ABNORMAL: ICD-10-CM

## 2018-10-24 DIAGNOSIS — R47.81 SLURRED SPEECH: ICD-10-CM

## 2018-10-24 LAB
ALBUMIN SERPL BCP-MCNC: 3.6 G/DL
ALP SERPL-CCNC: 70 U/L
ALT SERPL W/O P-5'-P-CCNC: 21 U/L
ANION GAP SERPL CALC-SCNC: 4 MMOL/L
APTT BLDCRRT: 26.1 SEC
AST SERPL-CCNC: 19 U/L
B-HCG UR QL: NEGATIVE
BASOPHILS # BLD AUTO: 0.04 K/UL
BASOPHILS NFR BLD: 0.5 %
BILIRUB SERPL-MCNC: 0.2 MG/DL
BUN SERPL-MCNC: 12 MG/DL
CALCIUM SERPL-MCNC: 9.6 MG/DL
CHLORIDE SERPL-SCNC: 102 MMOL/L
CO2 SERPL-SCNC: 32 MMOL/L
CREAT SERPL-MCNC: 0.7 MG/DL
CTP QC/QA: YES
DIFFERENTIAL METHOD: ABNORMAL
EOSINOPHIL # BLD AUTO: 0 K/UL
EOSINOPHIL NFR BLD: 0.1 %
ERYTHROCYTE [DISTWIDTH] IN BLOOD BY AUTOMATED COUNT: 12.7 %
EST. GFR  (AFRICAN AMERICAN): >60 ML/MIN/1.73 M^2
EST. GFR  (NON AFRICAN AMERICAN): >60 ML/MIN/1.73 M^2
GLUCOSE SERPL-MCNC: 83 MG/DL
HCT VFR BLD AUTO: 38.5 %
HGB BLD-MCNC: 12.6 G/DL
IMM GRANULOCYTES # BLD AUTO: 0.04 K/UL
IMM GRANULOCYTES NFR BLD AUTO: 0.5 %
INR PPP: 0.9
LYMPHOCYTES # BLD AUTO: 1.9 K/UL
LYMPHOCYTES NFR BLD: 21.6 %
MCH RBC QN AUTO: 32.5 PG
MCHC RBC AUTO-ENTMCNC: 32.7 G/DL
MCV RBC AUTO: 99 FL
MONOCYTES # BLD AUTO: 0.7 K/UL
MONOCYTES NFR BLD: 7.4 %
NEUTROPHILS # BLD AUTO: 6.2 K/UL
NEUTROPHILS NFR BLD: 69.9 %
NRBC BLD-RTO: 0 /100 WBC
PLATELET # BLD AUTO: 353 K/UL
PMV BLD AUTO: 9 FL
POTASSIUM SERPL-SCNC: 3.9 MMOL/L
PROT SERPL-MCNC: 7.2 G/DL
PROTHROMBIN TIME: 9.9 SEC
RBC # BLD AUTO: 3.88 M/UL
SODIUM SERPL-SCNC: 138 MMOL/L
WBC # BLD AUTO: 8.8 K/UL

## 2018-10-24 PROCEDURE — 81025 URINE PREGNANCY TEST: CPT | Performed by: EMERGENCY MEDICINE

## 2018-10-24 PROCEDURE — 96372 THER/PROPH/DIAG INJ SC/IM: CPT | Mod: 59 | Performed by: EMERGENCY MEDICINE

## 2018-10-24 PROCEDURE — 85025 COMPLETE CBC W/AUTO DIFF WBC: CPT

## 2018-10-24 PROCEDURE — 85730 THROMBOPLASTIN TIME PARTIAL: CPT

## 2018-10-24 PROCEDURE — 85610 PROTHROMBIN TIME: CPT

## 2018-10-24 PROCEDURE — 80053 COMPREHEN METABOLIC PANEL: CPT

## 2018-10-24 PROCEDURE — 99285 EMERGENCY DEPT VISIT HI MDM: CPT | Mod: 25

## 2018-10-24 PROCEDURE — 62270 DX LMBR SPI PNXR: CPT | Performed by: PSYCHIATRY & NEUROLOGY

## 2018-10-24 PROCEDURE — 62270 DX LMBR SPI PNXR: CPT

## 2018-10-24 PROCEDURE — 99284 EMERGENCY DEPT VISIT MOD MDM: CPT | Mod: ,,, | Performed by: EMERGENCY MEDICINE

## 2018-10-24 PROCEDURE — 12000002 HC ACUTE/MED SURGE SEMI-PRIVATE ROOM

## 2018-10-24 PROCEDURE — 99233 SBSQ HOSP IP/OBS HIGH 50: CPT | Mod: ,,, | Performed by: PSYCHIATRY & NEUROLOGY

## 2018-10-24 NOTE — TELEPHONE ENCOUNTER
----- Message from Ynes Christopher sent at 10/24/2018  7:53 AM CDT -----  Contact: self/[phone]  Patient called in regards needing to talk with Dr Bruce about what neurology she will recommend. Patient was seen over the emergency because migraine. Please call and advise. Thank you!!!

## 2018-10-24 NOTE — ED TRIAGE NOTES
Patient reports to the ED today with reports of a HA with complaints of slurred speech and difficulty swallowing. Deficits from previous stroke slight weakness to R side. Patient seen in this ED Tuesday for same complaint. Hx of stroke. Patient denies chest pain, SOB, abdominal pain N/V at this time.

## 2018-10-24 NOTE — TELEPHONE ENCOUNTER
Joseph, I would recommend Dr Macias or Dr Etienne in Neurology and have placed a referral for such.  Also, she is due to see me for an Appt/Physical by February 2019 with 1 week prior fasting lab.  I have placed the lab orders so you can help her schedule the appointments. Please mail appts as well as her memory is poor since her stroke.  Thanks

## 2018-10-25 PROBLEM — F10.90 ALCOHOL USE: Status: ACTIVE | Noted: 2018-10-25

## 2018-10-25 PROBLEM — E78.5 HYPERLIPIDEMIA: Status: ACTIVE | Noted: 2018-10-25

## 2018-10-25 PROBLEM — I63.50 RIGHT PONTINE STROKE: Status: ACTIVE | Noted: 2018-10-25

## 2018-10-25 PROBLEM — Z78.9 ALCOHOL USE: Status: ACTIVE | Noted: 2018-10-25

## 2018-10-25 LAB
ALBUMIN SERPL BCP-MCNC: 3.4 G/DL
ALP SERPL-CCNC: 68 U/L
ALT SERPL W/O P-5'-P-CCNC: 20 U/L
ANION GAP SERPL CALC-SCNC: 6 MMOL/L
APTT BLDCRRT: 25.9 SEC
AST SERPL-CCNC: 18 U/L
BACTERIA #/AREA URNS AUTO: ABNORMAL /HPF
BASOPHILS # BLD AUTO: 0.02 K/UL
BASOPHILS NFR BLD: 0.3 %
BILIRUB SERPL-MCNC: 0.3 MG/DL
BILIRUB UR QL STRIP: NEGATIVE
BLOOD COLLECTION FOR MS PROFILE: NORMAL
BUN SERPL-MCNC: 12 MG/DL
CALCIUM SERPL-MCNC: 9.2 MG/DL
CHLORIDE SERPL-SCNC: 102 MMOL/L
CK MB SERPL-MCNC: 0.3 NG/ML
CK MB SERPL-RTO: 0.8 %
CK SERPL-CCNC: 39 U/L
CLARITY CSF: CLEAR
CLARITY CSF: CLEAR
CLARITY UR REFRACT.AUTO: ABNORMAL
CO2 SERPL-SCNC: 30 MMOL/L
COLOR CSF: COLORLESS
COLOR CSF: COLORLESS
COLOR UR AUTO: YELLOW
CREAT SERPL-MCNC: 0.6 MG/DL
DIASTOLIC DYSFUNCTION: NO
DIFFERENTIAL METHOD: ABNORMAL
EOSINOPHIL # BLD AUTO: 0 K/UL
EOSINOPHIL NFR BLD: 0.3 %
ERYTHROCYTE [DISTWIDTH] IN BLOOD BY AUTOMATED COUNT: 12.5 %
EST. GFR  (AFRICAN AMERICAN): >60 ML/MIN/1.73 M^2
EST. GFR  (NON AFRICAN AMERICAN): >60 ML/MIN/1.73 M^2
ESTIMATED AVG GLUCOSE: 88 MG/DL
GLUCOSE CSF-MCNC: 46 MG/DL
GLUCOSE SERPL-MCNC: 72 MG/DL
GLUCOSE UR QL STRIP: NEGATIVE
HBA1C MFR BLD HPLC: 4.7 %
HCT VFR BLD AUTO: 38 %
HGB BLD-MCNC: 11.8 G/DL
HGB UR QL STRIP: ABNORMAL
IMM GRANULOCYTES # BLD AUTO: 0.02 K/UL
IMM GRANULOCYTES NFR BLD AUTO: 0.3 %
INDIA INK PREP CSF: NORMAL
INR PPP: 0.9
KETONES UR QL STRIP: ABNORMAL
LEUKOCYTE ESTERASE UR QL STRIP: NEGATIVE
LYMPHOCYTES # BLD AUTO: 1.8 K/UL
LYMPHOCYTES NFR BLD: 27.6 %
LYMPHOCYTES NFR CSF MANUAL: 59 %
LYMPHOCYTES NFR CSF MANUAL: 65 %
MAGNESIUM SERPL-MCNC: 2 MG/DL
MCH RBC QN AUTO: 31.3 PG
MCHC RBC AUTO-ENTMCNC: 31.1 G/DL
MCV RBC AUTO: 101 FL
MICROSCOPIC COMMENT: ABNORMAL
MONOCYTES # BLD AUTO: 0.5 K/UL
MONOCYTES NFR BLD: 8.4 %
MONOS+MACROS NFR CSF MANUAL: 33 %
MONOS+MACROS NFR CSF MANUAL: 38 %
NEUTROPHILS # BLD AUTO: 4.1 K/UL
NEUTROPHILS NFR BLD: 63.1 %
NEUTROPHILS NFR CSF MANUAL: 2 %
NEUTROPHILS NFR CSF MANUAL: 3 %
NITRITE UR QL STRIP: NEGATIVE
NRBC BLD-RTO: 0 /100 WBC
PH UR STRIP: 6 [PH] (ref 5–8)
PHOSPHATE SERPL-MCNC: 3.2 MG/DL
PLATELET # BLD AUTO: 337 K/UL
PMV BLD AUTO: 9.2 FL
POTASSIUM SERPL-SCNC: 3.7 MMOL/L
PROT CSF-MCNC: 40 MG/DL
PROT SERPL-MCNC: 6.9 G/DL
PROT UR QL STRIP: NEGATIVE
PROTHROMBIN TIME: 9.9 SEC
RBC # BLD AUTO: 3.77 M/UL
RBC # CSF: 2 /CU MM
RBC # CSF: 9 /CU MM
RBC #/AREA URNS AUTO: 26 /HPF (ref 0–4)
RETIRED EF AND QEF - SEE NOTES: 60 (ref 55–65)
SODIUM SERPL-SCNC: 138 MMOL/L
SP GR UR STRIP: 1.02 (ref 1–1.03)
SPECIMEN VOL CSF: 1 ML
SPECIMEN VOL CSF: 1 ML
SQUAMOUS #/AREA URNS AUTO: 1 /HPF
TROPONIN I SERPL DL<=0.01 NG/ML-MCNC: <0.006 NG/ML
TSH SERPL DL<=0.005 MIU/L-ACNC: 1.03 UIU/ML
URN SPEC COLLECT METH UR: ABNORMAL
WBC # BLD AUTO: 6.44 K/UL
WBC # CSF: 101 /CU MM
WBC # CSF: 107 /CU MM
WBC #/AREA URNS AUTO: 2 /HPF (ref 0–5)

## 2018-10-25 PROCEDURE — 86618 LYME DISEASE ANTIBODY: CPT

## 2018-10-25 PROCEDURE — G8997 SWALLOW GOAL STATUS: HCPCS | Mod: CH

## 2018-10-25 PROCEDURE — G8987 SELF CARE CURRENT STATUS: HCPCS | Mod: CL

## 2018-10-25 PROCEDURE — 86592 SYPHILIS TEST NON-TREP QUAL: CPT

## 2018-10-25 PROCEDURE — A9585 GADOBUTROL INJECTION: HCPCS

## 2018-10-25 PROCEDURE — 87116 MYCOBACTERIA CULTURE: CPT

## 2018-10-25 PROCEDURE — 82042 OTHER SOURCE ALBUMIN QUAN EA: CPT

## 2018-10-25 PROCEDURE — 93306 TTE W/DOPPLER COMPLETE: CPT

## 2018-10-25 PROCEDURE — 97165 OT EVAL LOW COMPLEX 30 MIN: CPT

## 2018-10-25 PROCEDURE — 87529 HSV DNA AMP PROBE: CPT

## 2018-10-25 PROCEDURE — 25000003 PHARM REV CODE 250: Performed by: STUDENT IN AN ORGANIZED HEALTH CARE EDUCATION/TRAINING PROGRAM

## 2018-10-25 PROCEDURE — 62270 DX LMBR SPI PNXR: CPT | Mod: ,,, | Performed by: PSYCHIATRY & NEUROLOGY

## 2018-10-25 PROCEDURE — 25500020 PHARM REV CODE 255: Performed by: STUDENT IN AN ORGANIZED HEALTH CARE EDUCATION/TRAINING PROGRAM

## 2018-10-25 PROCEDURE — 84100 ASSAY OF PHOSPHORUS: CPT

## 2018-10-25 PROCEDURE — 86403 PARTICLE AGGLUT ANTBDY SCRN: CPT

## 2018-10-25 PROCEDURE — 88185 FLOWCYTOMETRY/TC ADD-ON: CPT | Mod: 59 | Performed by: PATHOLOGY

## 2018-10-25 PROCEDURE — G8988 SELF CARE GOAL STATUS: HCPCS | Mod: CK

## 2018-10-25 PROCEDURE — 89051 BODY FLUID CELL COUNT: CPT | Mod: 91

## 2018-10-25 PROCEDURE — 82553 CREATINE MB FRACTION: CPT

## 2018-10-25 PROCEDURE — 87498 ENTEROVIRUS PROBE&REVRS TRNS: CPT

## 2018-10-25 PROCEDURE — 86255 FLUORESCENT ANTIBODY SCREEN: CPT

## 2018-10-25 PROCEDURE — 82550 ASSAY OF CK (CPK): CPT

## 2018-10-25 PROCEDURE — A4216 STERILE WATER/SALINE, 10 ML: HCPCS | Performed by: NURSE PRACTITIONER

## 2018-10-25 PROCEDURE — 25000003 PHARM REV CODE 250: Performed by: NURSE PRACTITIONER

## 2018-10-25 PROCEDURE — 82945 GLUCOSE OTHER FLUID: CPT

## 2018-10-25 PROCEDURE — 87205 SMEAR GRAM STAIN: CPT

## 2018-10-25 PROCEDURE — 88189 FLOWCYTOMETRY/READ 16 & >: CPT | Mod: ,,, | Performed by: PATHOLOGY

## 2018-10-25 PROCEDURE — 83520 IMMUNOASSAY QUANT NOS NONAB: CPT

## 2018-10-25 PROCEDURE — 80053 COMPREHEN METABOLIC PANEL: CPT

## 2018-10-25 PROCEDURE — 87556 M.TUBERCULO DNA AMP PROBE: CPT

## 2018-10-25 PROCEDURE — 92610 EVALUATE SWALLOWING FUNCTION: CPT

## 2018-10-25 PROCEDURE — 99233 SBSQ HOSP IP/OBS HIGH 50: CPT | Mod: ,,, | Performed by: PSYCHIATRY & NEUROLOGY

## 2018-10-25 PROCEDURE — 88184 FLOWCYTOMETRY/ TC 1 MARKER: CPT | Performed by: PATHOLOGY

## 2018-10-25 PROCEDURE — 009U3ZX DRAINAGE OF SPINAL CANAL, PERCUTANEOUS APPROACH, DIAGNOSTIC: ICD-10-PCS | Performed by: PSYCHIATRY & NEUROLOGY

## 2018-10-25 PROCEDURE — 25500020 PHARM REV CODE 255

## 2018-10-25 PROCEDURE — 99000 SPECIMEN HANDLING OFFICE-LAB: CPT

## 2018-10-25 PROCEDURE — 87102 FUNGUS ISOLATION CULTURE: CPT

## 2018-10-25 PROCEDURE — G8996 SWALLOW CURRENT STATUS: HCPCS | Mod: CI

## 2018-10-25 PROCEDURE — 84443 ASSAY THYROID STIM HORMONE: CPT

## 2018-10-25 PROCEDURE — 87210 SMEAR WET MOUNT SALINE/INK: CPT

## 2018-10-25 PROCEDURE — 87798 DETECT AGENT NOS DNA AMP: CPT

## 2018-10-25 PROCEDURE — 82164 ANGIOTENSIN I ENZYME TEST: CPT | Mod: 91

## 2018-10-25 PROCEDURE — 81001 URINALYSIS AUTO W/SCOPE: CPT

## 2018-10-25 PROCEDURE — 85610 PROTHROMBIN TIME: CPT

## 2018-10-25 PROCEDURE — S4991 NICOTINE PATCH NONLEGEND: HCPCS | Performed by: STUDENT IN AN ORGANIZED HEALTH CARE EDUCATION/TRAINING PROGRAM

## 2018-10-25 PROCEDURE — 93306 TTE W/DOPPLER COMPLETE: CPT | Mod: 26,,, | Performed by: INTERNAL MEDICINE

## 2018-10-25 PROCEDURE — 97161 PT EVAL LOW COMPLEX 20 MIN: CPT

## 2018-10-25 PROCEDURE — 84157 ASSAY OF PROTEIN OTHER: CPT

## 2018-10-25 PROCEDURE — 63600175 PHARM REV CODE 636 W HCPCS: Performed by: NURSE PRACTITIONER

## 2018-10-25 PROCEDURE — 63600175 PHARM REV CODE 636 W HCPCS: Performed by: STUDENT IN AN ORGANIZED HEALTH CARE EDUCATION/TRAINING PROGRAM

## 2018-10-25 PROCEDURE — 82164 ANGIOTENSIN I ENZYME TEST: CPT

## 2018-10-25 PROCEDURE — 85730 THROMBOPLASTIN TIME PARTIAL: CPT

## 2018-10-25 PROCEDURE — 87798 DETECT AGENT NOS DNA AMP: CPT | Mod: 91

## 2018-10-25 PROCEDURE — 87206 SMEAR FLUORESCENT/ACID STAI: CPT

## 2018-10-25 PROCEDURE — 83916 OLIGOCLONAL BANDS: CPT

## 2018-10-25 PROCEDURE — 83036 HEMOGLOBIN GLYCOSYLATED A1C: CPT

## 2018-10-25 PROCEDURE — 97112 NEUROMUSCULAR REEDUCATION: CPT

## 2018-10-25 PROCEDURE — 84484 ASSAY OF TROPONIN QUANT: CPT

## 2018-10-25 PROCEDURE — 83735 ASSAY OF MAGNESIUM: CPT

## 2018-10-25 PROCEDURE — 88108 CYTOPATH CONCENTRATE TECH: CPT | Performed by: PATHOLOGY

## 2018-10-25 PROCEDURE — 87070 CULTURE OTHR SPECIMN AEROBIC: CPT

## 2018-10-25 PROCEDURE — 20600001 HC STEP DOWN PRIVATE ROOM

## 2018-10-25 PROCEDURE — 85025 COMPLETE CBC W/AUTO DIFF WBC: CPT

## 2018-10-25 RX ORDER — CITALOPRAM 10 MG/1
20 TABLET ORAL DAILY
Status: DISCONTINUED | OUTPATIENT
Start: 2018-10-25 | End: 2018-10-28 | Stop reason: HOSPADM

## 2018-10-25 RX ORDER — SODIUM CHLORIDE 0.9 % (FLUSH) 0.9 %
3 SYRINGE (ML) INJECTION EVERY 8 HOURS
Status: DISCONTINUED | OUTPATIENT
Start: 2018-10-25 | End: 2018-10-28 | Stop reason: HOSPADM

## 2018-10-25 RX ORDER — ROSUVASTATIN CALCIUM 5 MG/1
5 TABLET, COATED ORAL DAILY
Status: DISCONTINUED | OUTPATIENT
Start: 2018-10-25 | End: 2018-10-25

## 2018-10-25 RX ORDER — HEPARIN SODIUM 5000 [USP'U]/ML
5000 INJECTION, SOLUTION INTRAVENOUS; SUBCUTANEOUS EVERY 8 HOURS
Status: DISCONTINUED | OUTPATIENT
Start: 2018-10-25 | End: 2018-10-28 | Stop reason: HOSPADM

## 2018-10-25 RX ORDER — IBUPROFEN 200 MG
1 TABLET ORAL DAILY
Status: DISCONTINUED | OUTPATIENT
Start: 2018-10-25 | End: 2018-10-28 | Stop reason: HOSPADM

## 2018-10-25 RX ORDER — POLYETHYLENE GLYCOL 3350 17 G/17G
17 POWDER, FOR SOLUTION ORAL DAILY PRN
Status: DISCONTINUED | OUTPATIENT
Start: 2018-10-25 | End: 2018-10-28 | Stop reason: HOSPADM

## 2018-10-25 RX ORDER — ASPIRIN AND DIPYRIDAMOLE 25; 200 MG/1; MG/1
1 CAPSULE, EXTENDED RELEASE ORAL 2 TIMES DAILY
Status: DISCONTINUED | OUTPATIENT
Start: 2018-10-25 | End: 2018-10-28 | Stop reason: HOSPADM

## 2018-10-25 RX ORDER — LABETALOL HYDROCHLORIDE 5 MG/ML
10 INJECTION, SOLUTION INTRAVENOUS EVERY 6 HOURS PRN
Status: DISCONTINUED | OUTPATIENT
Start: 2018-10-25 | End: 2018-10-28 | Stop reason: HOSPADM

## 2018-10-25 RX ORDER — GADOBUTROL 604.72 MG/ML
6 INJECTION INTRAVENOUS
Status: COMPLETED | OUTPATIENT
Start: 2018-10-25 | End: 2018-10-25

## 2018-10-25 RX ORDER — GABAPENTIN 300 MG/1
300 CAPSULE ORAL 3 TIMES DAILY
Status: DISCONTINUED | OUTPATIENT
Start: 2018-10-25 | End: 2018-10-28 | Stop reason: HOSPADM

## 2018-10-25 RX ORDER — ROSUVASTATIN CALCIUM 20 MG/1
20 TABLET, COATED ORAL DAILY
Status: DISCONTINUED | OUTPATIENT
Start: 2018-10-25 | End: 2018-10-28 | Stop reason: HOSPADM

## 2018-10-25 RX ORDER — ROSUVASTATIN CALCIUM 10 MG/1
10 TABLET, COATED ORAL DAILY
Status: DISCONTINUED | OUTPATIENT
Start: 2018-10-25 | End: 2018-10-25

## 2018-10-25 RX ORDER — ONDANSETRON 8 MG/1
8 TABLET, ORALLY DISINTEGRATING ORAL EVERY 8 HOURS PRN
Status: DISCONTINUED | OUTPATIENT
Start: 2018-10-25 | End: 2018-10-28 | Stop reason: HOSPADM

## 2018-10-25 RX ORDER — ACETAMINOPHEN 325 MG/1
650 TABLET ORAL EVERY 6 HOURS PRN
Status: DISCONTINUED | OUTPATIENT
Start: 2018-10-25 | End: 2018-10-28 | Stop reason: HOSPADM

## 2018-10-25 RX ORDER — HEPARIN SODIUM 5000 [USP'U]/ML
5000 INJECTION, SOLUTION INTRAVENOUS; SUBCUTANEOUS EVERY 8 HOURS
Status: DISCONTINUED | OUTPATIENT
Start: 2018-10-25 | End: 2018-10-25

## 2018-10-25 RX ADMIN — CITALOPRAM HYDROBROMIDE 20 MG: 10 TABLET ORAL at 09:10

## 2018-10-25 RX ADMIN — HEPARIN SODIUM 5000 UNITS: 5000 INJECTION, SOLUTION INTRAVENOUS; SUBCUTANEOUS at 05:10

## 2018-10-25 RX ADMIN — GABAPENTIN 300 MG: 300 CAPSULE ORAL at 03:10

## 2018-10-25 RX ADMIN — GADOBUTROL 6 ML: 604.72 INJECTION INTRAVENOUS at 11:10

## 2018-10-25 RX ADMIN — HEPARIN SODIUM 5000 UNITS: 5000 INJECTION, SOLUTION INTRAVENOUS; SUBCUTANEOUS at 09:10

## 2018-10-25 RX ADMIN — ASPIRIN AND EXTENDED-RELEASE DIPYRIDAMOLE 1 CAPSULE: 25; 200 CAPSULE ORAL at 09:10

## 2018-10-25 RX ADMIN — GABAPENTIN 300 MG: 300 CAPSULE ORAL at 09:10

## 2018-10-25 RX ADMIN — Medication 10 ML: at 08:10

## 2018-10-25 RX ADMIN — ROSUVASTATIN CALCIUM 20 MG: 20 TABLET, FILM COATED ORAL at 09:10

## 2018-10-25 RX ADMIN — SODIUM CHLORIDE 1000 ML: 0.9 INJECTION, SOLUTION INTRAVENOUS at 05:10

## 2018-10-25 RX ADMIN — ACETAMINOPHEN 650 MG: 325 TABLET ORAL at 09:10

## 2018-10-25 RX ADMIN — Medication 3 ML: at 05:10

## 2018-10-25 RX ADMIN — Medication 3 ML: at 09:10

## 2018-10-25 RX ADMIN — IOHEXOL 100 ML: 350 INJECTION, SOLUTION INTRAVENOUS at 10:10

## 2018-10-25 RX ADMIN — NICOTINE 1 PATCH: 14 PATCH, EXTENDED RELEASE TRANSDERMAL at 12:10

## 2018-10-25 NOTE — ED PROVIDER NOTES
Encounter Date: 10/24/2018       History     Chief Complaint   Patient presents with    Headache     c/o headache, difficulty swalllowing, and slurred speech since monday, seen in ED tuesday for complaint, discharged home. Reports HA has improved with meds but has noticed swallowing and slurred speech is worse than baseline. Hx of stroke-2015.      HPI   38 yo F with a PMH of MTHFR on Aggrenox complicated by a CVA in 2015 with residual right sided weakness, facial droop and mild slurring of speech who presents with worsening slurring of speech that began this morning. Patient seen at [facility] on 10/24/2018 for a 2 day history of posterior headache. She had a leukocytosis with a WBC of 16.34. CMP was unremarkable. UPT was negative. She reports that the headache improved with Fioricet and is currently a 3/10. She describes the headache as a posterior throbbing, non-radiating sensation. She also endorses associated dysphagia, word findinging difficulty, weakness, low back pain that radiates up to the neck today and decreased PO intake in the past 3 days. Denies fevers, chills, chest pain, shortness of breath, nausea, vomiting, syncope, vision changes, gait abnormalities from baseline. Denies recent trauma.     Review of patient's allergies indicates:  No Known Allergies  Past Medical History:   Diagnosis Date    Heart palpitations     MTHFR mutation     Stroke 08/2015     Past Surgical History:   Procedure Laterality Date    BELT ABDOMINOPLASTY      2001    AK TRANSCRAN DOPP INTRACRAN, EMBOLI W/INJ  8/11/2015         WISDOM TOOTH EXTRACTION       Family History   Problem Relation Age of Onset    Breast cancer Maternal Grandmother 70    Stroke Maternal Grandmother     Birth defects Maternal Grandmother         Breast Cancer    Birth defects Paternal Grandfather 80        Colon/Prostate Ca?    Cancer Neg Hx     Colon cancer Neg Hx     Diabetes Neg Hx     Eclampsia Neg Hx     Hypertension Neg Hx      Miscarriages / Stillbirths Neg Hx     Ovarian cancer Neg Hx      labor Neg Hx      Social History     Tobacco Use    Smoking status: Former Smoker     Packs/day: 0.50     Years: 8.00     Pack years: 4.00     Last attempt to quit: 2015     Years since quitting: 3.1    Smokeless tobacco: Never Used    Tobacco comment: quit 2015   Substance Use Topics    Alcohol use: Yes     Alcohol/week: 0.0 oz     Comment: Social    Drug use: No     Review of Systems   Constitutional: Negative for chills and fever.   HENT: Negative for drooling.    Eyes: Negative for visual disturbance.   Respiratory: Negative for shortness of breath.    Cardiovascular: Negative for chest pain.   Gastrointestinal: Negative for abdominal pain, nausea and vomiting.   Genitourinary: Negative for dysuria.   Musculoskeletal: Positive for back pain. Negative for gait problem, neck pain and neck stiffness.   Skin: Negative for wound.   Neurological: Positive for facial asymmetry, weakness and headaches. Negative for dizziness, syncope, light-headedness and numbness.   Psychiatric/Behavioral: Negative for confusion.       Physical Exam     Initial Vitals [10/24/18 1745]   BP Pulse Resp Temp SpO2   107/69 95 18 98.8 °F (37.1 °C) 95 %      MAP       --         Physical Exam    Nursing note and vitals reviewed.  Constitutional: She appears well-developed and well-nourished. She is not diaphoretic. No distress.   HENT:   Head: Normocephalic and atraumatic.   Eyes: Conjunctivae and EOM are normal. Pupils are equal, round, and reactive to light. No scleral icterus.   Neck: Normal range of motion. Neck supple. No JVD present.   Cardiovascular: Normal rate, regular rhythm, normal heart sounds and intact distal pulses. Exam reveals no gallop and no friction rub.    No murmur heard.  Pulmonary/Chest: Breath sounds normal. No respiratory distress. She has no wheezes. She has no rhonchi. She has no rales. She exhibits no tenderness.   Abdominal:  Soft. Bowel sounds are normal. She exhibits no mass. There is no tenderness. There is no rebound and no guarding.   Musculoskeletal: Normal range of motion. She exhibits no edema or tenderness.   Neurological: She is alert and oriented to person, place, and time. She has normal reflexes. She displays no atrophy and no tremor. No cranial nerve deficit or sensory deficit. She exhibits normal muscle tone. She displays a negative Romberg sign. She displays no seizure activity. GCS score is 15. GCS eye subscore is 4. GCS verbal subscore is 5. GCS motor subscore is 6.   Baseline right side weakness and right facial droop. Patient at baseline line gait with right foot drop.    Skin: Skin is warm and dry. No rash and no abscess noted. No erythema. No pallor.          HO-III MDM  This is an emergent evaluation of a 38 yo F with a PMH of MTHFR on Aggrenox complicated by a CVA in 2015 with residual right sided weakness, facial droop and mild slurring of speech who presents with worsening slurring of speech that began this morning. AFVSS. Physical exam notable for well appearing female, in no acute distress with residual right facial droop and hemipararesis. PERRL, no nystagmus. Full neck ROM. Heart RRR, no murmurs, gallops or rubs. Lungs CTAB, no wheeze rales or rhonchi. NIHSS 2. Baseline gait with right foot drop. DDx includes but is not limited to subacute stroke, MS, electrolyte abnormality, atypical migraine. Will obtain labs CBC, CMP, UPT. Discussed patient with stroke team, who recommended MRI brain without contrast and state they will evaluate the patient. Will also provide pain medication as need. Will continue to monitor and frequently reassess pending results of labs, treatments and final disposition. Case discussed with Dr. Parisi.       Zuri Olivares D.O  [facility] PGY-3  7:38 PM 10/24/2018    HO-III UPDATE  Labs unremarkable. MRI pending. Per nursing, patient's imaging delayed for critical  patient but she is next in line. Will sign patient out to Dr. Sigala pending MRI results and final disposition.     Zuri Olivares D.O  [facility] PGY-3  10:47 PM 10/24/2018      ED Course   Procedures  Labs Reviewed   CBC W/ AUTO DIFFERENTIAL - Abnormal; Notable for the following components:       Result Value    RBC 3.88 (*)     MCV 99 (*)     MCH 32.5 (*)     Platelets 353 (*)     MPV 9.0 (*)     All other components within normal limits   COMPREHENSIVE METABOLIC PANEL - Abnormal; Notable for the following components:    CO2 32 (*)     Anion Gap 4 (*)     All other components within normal limits   PROTIME-INR   APTT   POCT URINE PREGNANCY          Imaging Results    None             Additional MDM:     NIH Stroke Scale:   Interval = baseline (upon arrival/admit)  Level of consciousness = 0 - alert  LOC questions = 0 - answers both correctly  Best gaze = 0 - normal  Visual = 0 - no visual loss  Facial palsy = 1 - minor  Motor left arm =  0 - no drift  Motor right arm =  0 - no drift  Motor left leg = 0 - no drift  Motor right leg =  0 - no drift  Limb ataxia = 1 - present in one limb  Sensory = 0 - normal  Best language = 0 - no aphasia  Dysarthria = 0 - normal articulation  Extinction and inattention = 0 - no neglect  NIH Stroke Scale Total = 2           Attending Attestation:             Attending ED Notes:   Patient with history of blood disorder with slurred speech per mother.  She is requesting to take summer for Fioricet as she is having some mild symptoms at this time.  We have spoken with the stroke team.  Requesting MRI without.  We are waiting resolved.  If negative patient likely follow up outpatient with Neurology.  Patient nontoxic and appears well.  Overall speech appears normal w poss slight slurring ; mother rpts slightly slurred             Clinical Impression:   The primary encounter diagnosis was Slurred speech. A diagnosis of Headache was also pertinent to this visit.                              Zuri Olivares, DO  Resident  10/24/18 [id number]

## 2018-10-25 NOTE — HOSPITAL COURSE
10/25/18: LP today.    10/26: No acute events overnight  10/27: Asymptomatic hypotension last night while asleep, improved with fluids.  Day 1 steroids.  10/28: No acute events overnight.  Day 2 of 3 steroids.  Discharge to home with outpt PT/OT/ST.

## 2018-10-25 NOTE — PT/OT/SLP EVAL
"Occupational Therapy   Evaluation    Name: Maame Cortez  MRN: [id number]  Admitting Diagnosis:  R/o stroke    Recommendations:     Discharge Recommendations: outpatient OT  Discharge Equipment Recommendations:  none  Barriers to discharge:       History:     Occupational Profile:  Patient resides in [place] with  and 3 boys ages 19, 9 and 9 in one story home with 15 steps to enter, rail on the right.  Patient is right handed.  Unemployed; on disability.  Currently owns no DME.  PTA patient required assistance with tying laces, buttoning and cutting food 2* right sided weakness following stroke in 2015.  Hobbies:  Kickboxing.  Roles/Responsibilities:  Wife, mom, dtg.     Past Medical History:   Diagnosis Date    Heart palpitations     MTHFR mutation     Stroke 08/2015       Past Surgical History:   Procedure Laterality Date    BELT ABDOMINOPLASTY      2001    VT TRANSCRAN DOPP INTRACRAN, EMBOLI W/INJ  8/11/2015         WISDOM TOOTH EXTRACTION         Subjective     Patient:  "I remember you; thank you for seeing me again."  Mother:  "I remember you; we wanted to keep you.  She had a headache on Monday.  It got worse Tuesday so we came in and they treated her for a migraine and gave her medicine.  She felt better the next day.  Then we noticed her speech was slower and she had difficulty finding words.  When she'd swallow, she was choking more than she normally would.  I think her strength in her arm and legs and her walking is about the same.  She hasn't eaten in 3 days so she is weak and wobbly from that.  Her speech was not 100% after the first stroke."  "She has been ignoring her right hand."    Pain/Comfort:  · Pain Rating 1: 0/10  · Pain Rating Post-Intervention 1: 0/10    Patients cultural, spiritual, Holiness conflicts given the current situation: Evangelical    Objective:     Communicated with: Nurse prior to session.  Patient found all lines intact and peripheral IV, telemetry upon OT " entry to room.  Mother present.  General Precautions: Standard, aspiration, fall   Orthopedic Precautions:N/A   Braces: N/A     Occupational Performance:    Bed Mobility:    · Patient completed Rolling/Turning to Left with  modified independence  · Patient completed Rolling/Turning to Right with modified independence  · Patient completed Scooting/Bridging with modified independence  · Patient completed Supine to Sit with modified independence  · Patient completed Sit to Supine with modified independence    Functional Mobility/Transfers:  · Patient completed Sit <> Stand Transfer with modified independence  with  no assistive device   · Patient completed Bed <> Chair Transfer using Stand Pivot technique with modified independence with no assistive device    Activities of Daily Living:  · Grooming: modified independence while standing  · Upper Body Dressing: minimum assistance with assistance for buttoning; modified independence with pull over   · Lower Body Dressing: minimum assistance with tying laces    Cognitive/Visual Perceptual:  Cognitive/Psychosocial Skills:     -       Oriented to: Person, Place, Time and Situation   -       Follows Commands/attention:Follows two-step commands  -       Communication: dysarthria  -       Memory: No Deficits noted  -       Safety awareness/insight to disability: intact   -       Mood/Affect/Coping skills/emotional control: Cooperative    Physical Exam:  Postural examination/scapula alignment:    -       Rounded shoulders  Skin integrity: Visible skin intact  Edema:  None noted  Sensation:    -       Intact   Upper Extremity Range of Motion:  L UE: AROM WNL; R UE:  AAROM WNL  Upper Extremity Strength: L UE:  WNL; R UE:  3/5 shoulder, wrist    AMPAC 6 Click ADL:  AMPAC Total Score: 20    Treatment & Education:  Patient/ Family education provided for stroke warning signs, prevention guidelines and personal risk factors.  Patient/ Family verbalizing understanding via teach back  "method.  Patient education provided on role of OT and need for outpt OT upon discharge.  Patient education provided on right UE weight bearing / positioning, and FMC.  Patient and family verbalizing understanding.  Continued education, patient/ family training recommended.  AAROM performed right UE one set x 10 rep in all planes of motion with stretches provided at end range; sustained stretch provided for shoulder flexion and wrist extension.  Assistance and facilitation provided for upward rotation of the scapula during shoulder flexion and abduction.  Patient alert and oriented x 3; able to follow 4/4 one step commands.  Patient attentive and interactive throughout the session.  Patient able to identify 5/5 body parts.  Able to name 5/5 objects.  Able to sequence 7/7 days of the week and 12/12 months of the year.  Patient's functional status and disposition recommendation discussed with stroke team in daily rounds.  White board updated in patient's room.  OT asked if there were any other questions; patient/ family had no further questions.   Education:    Patient left supine with all lines intact, call button in reach and bed alarm on    Assessment:     Maame Cortez is a 37 y.o. female with a medical diagnosis of <principal problem not specified>.  She presents with the following performance deficits affecting function: weakness, impaired functional mobilty, impaired self care skills, decreased upper extremity function, abnormal tone, impaired fine motor.      Rehab Prognosis: Good; patient would benefit from acute skilled OT services to address these deficits and reach maximum level of function.         Clinical Decision Makin.  OT Low:  "Pt evaluation falls under low complexity for evaluation coding due to performance deficits noted in 1-3 areas as stated above and 0 co-morbities affecting current functional status. Data obtained from problem focused assessments. No modifications or assistance " "was required for completion of evaluation. Only brief occupational profile and history review completed."     Plan:     Patient to be seen 3 x/week to address the above listed problems via self-care/home management, neuromuscular re-education, cognitive retraining, sensory integration, therapeutic activities, therapeutic exercises  · Plan of Care Expires: 11/23/18  · Plan of Care Reviewed with: patient, mother    This Plan of care has been discussed with the patient who was involved in its development and understands and is in agreement with the identified goals and treatment plan    GOALS:   Multidisciplinary Problems     Occupational Therapy Goals        Problem: Occupational Therapy Goal    Goal Priority Disciplines Outcome Interventions   Occupational Therapy Goal     OT, PT/OT     Description:  Goals set 10/25 be addressed for 7 days with expiration date, 11/1:  Patient will increase functional independence with ADLs by performing:  Patient will demonstrate upper body dressing with supervision while seated EOB.   Not met  Patient will demonstrate lower body dressing with supervision while seated EOB.   Not met  Patient will demonstrate independence with HEP for right UE weight bearing /FMC.   Not met                          Time Tracking:     OT Date of Treatment: 10/25/18  OT Start Time: 0635  OT Stop Time: 0700  OT Total Time (min): 25 min    Billable Minutes:Evaluation 15  Neuromuscular Re-education 10    THERON La  10/25/2018    "

## 2018-10-25 NOTE — PROCEDURES
"Maame Cortez is a 37 y.o. female patient.    Temp: 98.2 °F (36.8 °C) (10/25/18 0905)  Pulse: 68 (10/25/18 1218)  Resp: 19 (10/25/18 1218)  BP: 102/68 (10/25/18 1219)  SpO2: 99 % (10/25/18 1219)  Weight: 59 kg (130 lb) (10/24/18 1745)  Height: 5' 4" (162.6 cm) (10/25/18 0400)       Lumbar Puncture  Date/Time: 10/25/2018 2:41 PM  Performed by: Nathanael Borja MD  Authorized by: Balta Richardson MD   Assisting provider: Balta Richardosn MD  Consent Done: Yes  Indications: evaluation for infection  Anesthesia: local infiltration    Anesthesia:  Local Anesthetic: lidocaine 1% with epinephrine  Anesthetic total: 2 mL  Patient sedated: no  Preparation: Patient was prepped and draped in the usual sterile fashion.  Lumbar space: L3-L4 interspace  Patient's position: right lateral decubitus  Number of attempts: 1  Opening pressure: 19 cm H2O  Fluid appearance: clear  Tubes of fluid: 4  Total volume: 19 ml  Complications: No  Estimated blood loss (mL): 2 cc CSF.  Specimens: Yes (CSF)  Patient tolerance: Patient tolerated the procedure well with no immediate complications          Nathanael Borja  10/25/2018    "

## 2018-10-25 NOTE — ASSESSMENT & PLAN NOTE
36 y/o female with prior strokes (last 2015) and known MTHFR mutation presents with a gradual progressive posterior HA and dysarthria.  On exam she appears to be at baseline with slightly worse dysarthria.  Symptoms improved yesterday after given fioricet.  Etiology complex migraine versus new infarct versus recrudescence of prior infarct.  CTA in 9/2017 showed no high grade stenosis or occlusion.  Prior MRI 2/2016 shows a left BG hemorrhagic infarct and a smaller right BG infarct.      Recommendations  1. MRI brain - if new area of infarct will admit to stroke service.  If no new areas of infarct, recommend fioricet for headache and follow up with headache neurology specialist for ongoing care.

## 2018-10-25 NOTE — ED NOTES
Pt resting comfortably and independently repositioned in stretcher with bed locked in lowest position for safety. NAD noted at this time. Respirations even and unlabored and visible chest rise noted.  Patient offered bathroom assistance and denies need at this time. Pt instructed to call if assistance is needed. Pt on continuous cardiac, BP, and O2 monitoring. Call light within reach. Family at bedside. No needs at this time. Will continue to monitor.   Neurological checks in progress  Telemetry monitoring in progress current Normal Sinus Rhythm Hr = 62  NAHUM completed

## 2018-10-25 NOTE — ED NOTES
Lumbar puncture performed per Peri Richardson and Jonh.  Pt tolerated well; advised to lay flat for the next 3-4 hours; verbalizes understanding.  Pt remains on continuous cardiac and pulse ox monitoring with non-invasive blood pressure to cycle every 60 minutes.  VS stable; NSR noted. Bed locked in lowest position; side rails up and locked x 2; call light, bedside table, and personal belongings within reach.  Pt informed of room assignment and status; instructed to alert nurse for assistance and before attempting to get out of bed; verbalizes understanding.  Pt denies needs or complaints at this time.  Family member remains at bedside; will continue to monitor pt.

## 2018-10-25 NOTE — ASSESSMENT & PLAN NOTE
Stroke risk factor  -Increase home rosuvastatin 5-> 20 for secondary stroke prevention while workup ongoing

## 2018-10-25 NOTE — PT/OT/SLP EVAL
"Speech Language Pathology Evaluation  Bedside Swallow    Patient Name:  Maame Cortez   MRN:  [id number]  Admitting Diagnosis: Right pontine stroke     Recommendations:                 General Recommendations:  Dysphagia therapy and Speech language evaluation  Diet recommendations:  Regular, Thin   Aspiration Precautions: No straws, Small bites/sips and Strict aspiration precautions   General Precautions: Standard, aspiration, fall  Communication strategies:  none    History:     Past Medical History:   Diagnosis Date    Heart palpitations     MTHFR mutation     Stroke 08/2015       Past Surgical History:   Procedure Laterality Date    BELT ABDOMINOPLASTY      2001    [place] TRANSCRAN DOPP INTRACRAN, EMBOLI W/INJ  8/11/2015         WISDOM TOOTH EXTRACTION         Social History: Patient lives with  and children.      Prior diet: regular/thin        Subjective     "I prefer a straw"  Patient goals: go home    Pain/Comfort:  Pain Rating 1: 0/10  Pain Rating Post-Intervention 1: 0/10    Objective:     Oral Musculature Evaluation  Oral Musculature: right weakness  Dentition: present and adequate  Mucosal Quality: good  Mandibular Strength and Mobility: WFL  Oral Labial Strength and Mobility: impaired retraction, functional pursing  Lingual Strength and Mobility: WFL  Volitional Cough: fair  Voice Prior to PO Intake: clear    Bedside Swallow Eval:   Consistencies Assessed:  · Thin liquids cup sips  · Solids cracker     Oral Phase:   · functional    Pharyngeal Phase:   · no overt clinical signs/symptoms of pharyngeal dysphagia    Compensatory Strategies  · None    Treatment: Pt took only very small sips. No overt coughing or choking noted.  and pt reported occasional coughing with liquids that they felt was worse over past few days. Pt stated she usually prefers to drink from a straw. She denied coughing/choking with pills or meals. Pt's mom also reported pt take large bites at time. Pt and  " also denied ever needing thickened liquids. Education provided to pt,  and mom re: s/s of aspiration, swallowing precautions and role of slp. All expressed understanding.     Assessment:     Maame Cortez is a 37 y.o. female with an SLP diagnosis of Dysphagia.  She presents with mild oralpharygneal dysphagia and dysarthria .    Goals:   Multidisciplinary Problems     SLP Goals        Problem: SLP Goal    Goal Priority Disciplines Outcome   SLP Goal     SLP    Description:  Goals to be met 11/1  1 pt will tolerate regular diet and thin liquids  2 pt will participate in speech lang eval                    Plan:     · Patient to be seen:  4 x/week   · Plan of Care expires:     · Plan of Care reviewed with:  patient, spouse, mother   · SLP Follow-Up:  Yes       Discharge recommendations:  outpatient speech therapy       Time Tracking:     SLP Treatment Date:   10/25/18  Speech Start Time:  1236  Speech Stop Time:  1251     Speech Total Time (min):  15 min    Billable Minutes: Eval Swallow and Oral Function 15    MYLENE Ying, CCC-SLP  10/25/2018

## 2018-10-25 NOTE — PLAN OF CARE
Problem: Occupational Therapy Goal  Goal: Occupational Therapy Goal  Goals set 10/25 be addressed for 7 days with expiration date, 11/1:  Patient will increase functional independence with ADLs by performing:  Patient will demonstrate upper body dressing with supervision while seated EOB.   Not met  Patient will demonstrate lower body dressing with supervision while seated EOB.   Not met  Patient will demonstrate independence with HEP for right UE weight bearing /FMC.   Not met        OT evaluation completed.  THERON La  10/25/2018

## 2018-10-25 NOTE — ASSESSMENT & PLAN NOTE
1 bottle wine/night, last drink 1 week prior to presentation  -Low likelihood of withdrawal at this point

## 2018-10-25 NOTE — HPI
36 y/o female with prior strokes (last 2015) and known MTHFR mutation presents to the ED from home with a 3 day history of a posterior headache.  The pain came on gradual and worsened over time.  She had a similar headache last year.  She was seen in the ED yesterday for these same symptoms.  At that time she was experiencing photophobia, generalized weakness, and 2 episodes of vomiting.  She was given Fioricet and the symptoms improved, but did not resolve.  Today she continues with the headache but has also noticed associated dysarthria.  Patient has residual right facial droop and right sided paresis from her 2015 stroke.  Currently, she continues with the headache which is tolerable and she is more concerned about the slurring of her speech.

## 2018-10-25 NOTE — CONSULTS
Inpatient consult to Physical Medicine Rehab  Consult performed by: Liza Snowden NP  Consult ordered by: Joycelyn Forde NP  Reason for consult: assess rehab needs        Reviewed patient history and current admission.  Rehab team following.  Full consult to follow.    HEIDY Goodrich, FNP-C  Physical Medicine & Rehabilitation   10/25/2018  Spectralink: [id number]

## 2018-10-25 NOTE — ED NOTES
MRI states they will send for pt after they are finished with current scan. Pt updated on POC and has no complaints.

## 2018-10-25 NOTE — MEDICAL/APP STUDENT
Progress Note  Vascular Neurology    Admit Date: 10/24/2018    LOS: 0    Subjective  Headache    c/o headache, difficulty swalllowing, and slurred speech since monday, seen in ED tuesday for complaint, discharged home. Reports HA has improved with meds but has noticed swallowing and slurred speech is worse than baseline. Hx of stroke-2015.     Brief HPI:   Maame Cortez is a 37 y.o. female w/ pmh significant for MTHFR on Aggrenox complicated by CVA in 2015 with residual right sided weakness, facial droop and slurring of speech at baseline.  She presented for a progressive throbbing posterior headache associated with worsening slurred speech that began yesterday morning (Thursday 10/24). Associated symptoms include difficulty swallowing, word-finding difficulty, weakness, low back pain that radiates to the neck and 3 days of decreased PO intake. Symptoms improved with Fioricet (acetaminophen/butalbital/caffeine) to a 3/10 in the ED.    She reports a similar more severe headache last year. Gait at baseline has right foot drop.   MRI brain showed an area in the jensen suggestive of acute infarction, T2/flair hyperintensity and T1 hypointensity. Suggestive of pontine myelinolysis/osmotic demyelination, other form of pontine demyelination or pontine neoplasm     Review of Systems:  Constitutional: no fever or chills  Respiratory: no cough or shortness of breath  Cardiovascular: no chest pain or palpitations  Gastrointestinal: no nausea or vomiting, no abdominal pain or change in bowel habits  Neurological: no seizures or tremors  Behavioral/Psych: no auditory or visual hallucinations    Objective    Vitals  Temp:  [97.8 °F (36.6 °C)-98.8 °F (37.1 °C)]   Pulse:  [74-95]   Resp:  [18]   BP: ()/(51-69)   SpO2:  [95 %-99 %]        Physical Exam:  Lungs:  {:323891120}  Cardiovascular: Heart: {:5510}. Chest Wall: {:16601}. Extremities: {:1469508}. Pulses: {:44348333}.  Abdomen/Rectal: Abdomen: {:10921917}. Rectal:  {:962358003}  Musculoskeletal:{:51690}  Neurologic: {:471028988}     Current NIH Stroke Score Values      Most Recent Value   1a. Level Of Consciousness  0-->Alert: keenly responsive   1b. LOC Questions  0-->Answers both questions correctly   1c. LOC Commands  0-->Performs both tasks correctly   2. Best Gaze  0-->Normal   3. Visual  0-->No visual loss   4. Facial Palsy  1-->Minor paralysis (flattened nasolabial fold, asymmetry on smiling)   5a. Motor Arm, Left  0-->No drift: limb holds 90 (or 45) degrees for full 10 secs   5b. Motor Arm, Right  1-->Drift: limb holds 90 (or 45) degrees, but drifts down before full 10 secs: does not hit bed or other support   6a. Motor Leg, Left  0-->No drift: leg holds 30 degree position for full 5 secs   6b. Motor Leg, Right  0-->No drift: leg holds 30 degree position for full 5 secs   7. Limb Ataxia  0-->Absent   8. Sensory  0-->Normal: no sensory loss   9. Best Language  0-->No aphasia: normal   10. Dysarthria  1-->Mild-to-moderate dysarthria: patient slurs at least some words and, at worst, can be understood with some difficulty   11. Extinction and Inattention (formerly Neglect)  0-->No abnormality   Total (NIH Stroke Scale)  3          Diagnostic Results:  CBC  Lab Results   Component Value Date    WBC 8.80 10/24/2018    HGB 12.6 10/24/2018    HCT 38.5 10/24/2018    MCV 99 (H) 10/24/2018     (H) 10/24/2018       BMP  Lab Results   Component Value Date     10/24/2018    K 3.9 10/24/2018     10/24/2018    CO2 32 (H) 10/24/2018    BUN 12 10/24/2018    CREATININE 0.7 10/24/2018    CALCIUM 9.6 10/24/2018    ANIONGAP 4 (L) 10/24/2018    ESTGFRAFRICA >60.0 10/24/2018    EGFRNONAA >60.0 10/24/2018       Micro  Microbiology Results (last 7 days)     ** No results found for the last 168 hours. **          Imaging  MRI Brain without contrast  In the jensen there is an area of diffusion restriction suggestive of acute infarction.   The jensen appears enlarged and edematous  with diffuse T2/FLAIR hyperintensity and T1 hypointensity which is new from the prior exam. Taken together these findings may be representative of pontine myelinolysis/osmotic demyelination, other form of pontine demyelination or pontine neoplasm.    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    Assessment  Maame Cortez is a 37 y.o. female w/ pmh significant for MTHFR and CVA in 2015 with residual right sided hemiparesis presenting with right pontine stroke and associated headache and dysarthria.     Plan     Right pontine stroke -  PT/OT/SLP  dipyramadole-aspirin 200-25mg PO BID  Heparin injection 5000 units SQ q8h  Rosuvastatin 20mg PO daily  EKG  Echo  Routine neuro checks q4h     Dysarthria and anarthria    Depression  Citalopram 20mg PO daily     Prior CVA:  Gabapentin 300mg PO TID      Doris Corey  MSIII

## 2018-10-25 NOTE — ED NOTES
"Called Dr Borja (Vascular Neurology) at #[phone] re: pt c/o headache 5/10 and requesting to take home med of Fioricet. MD reported he had not yet seen pt and would not order until then but he would be down to see patient "within the hour."    Updated pt and mother on phone call and POC. Echo tech at bedside.    Bed low and locked, cardiac and BP monitoring in place. NAD. HoB elevated to 30*. Call bell in reach. Mother at bedside.  "

## 2018-10-25 NOTE — PLAN OF CARE
Problem: SLP Goal  Goal: SLP Goal   Swallow eval completed. Pt safe for regular diet and thin liquids. NO STRAWS!    MYLENE Ying, CCC-SLP  10/25/2018

## 2018-10-25 NOTE — PROGRESS NOTES
"[facility]  Vascular Neurology  Comprehensive Stroke Center  Progress Note    Assessment/Plan:     * Pontine lesion    36 y/o female with prior strokes (last 2015) and known MTHFR mutation presents with a gradual progressive posterior HA and dysarthria.     MRI revealed b/l pontine lesion with significant surrounding edema, diffusion restriction, and central ring contrast enhancement.  Differential includes malignancy/met/lymphoma, PRES, infection, rhomboencephalitis, demyelinating/autoimmune disease, CPM.  Suspect less likely to be infarction, also query if prior "CVA" was related to current process as well (why did it occur at such a young age, with no further events, unusual location).    Maternal grandmother with history of breast cancer diagnosed in 60s.  No personal history of cancer.  History of alcohol use (1 bottle wine/night, quit ~1 week ago).  No history of immunosuppressant use or recreational drug use.    Plan  -Unclear etiology  -Continue home aggrenox, increased rosuvastatin  -F/u CSF and serum studies from today         Alcohol use    1 bottle wine/night, last drink 1 week prior to presentation  -Low likelihood of withdrawal at this point     Hyperlipidemia    Stroke risk factor  -Increase home rosuvastatin 5-> 20 for secondary stroke prevention while workup ongoing     Headache    Tylenol PRN     Impaired motor control    PT/OT/ST     Tobacco abuse    Recommended cessation  -Nicotine patch     Depression    Continue home citalopram          10/25/18: LP today.      STROKE DOCUMENTATION        NIH Scale:  1a. Level Of Consciousness: 0-->Alert: keenly responsive  1b. LOC Questions: 0-->Answers both questions correctly  1c. LOC Commands: 0-->Performs both tasks correctly  2. Best Gaze: 0-->Normal  3. Visual: 0-->No visual loss  4. Facial Palsy: 1-->Minor paralysis (flattened nasolabial fold, asymmetry on smiling)(R)  5a. Motor Arm, Left: 0-->No drift: limb holds 90 (or 45) degrees " for full 10 secs  5b. Motor Arm, Right: 1-->Drift: limb holds 90 (or 45) degrees, but drifts down before full 10 secs: does not hit bed or other support  6a. Motor Leg, Left: 0-->No drift: leg holds 30 degree position for full 5 secs  6b. Motor Leg, Right: 0-->No drift: leg holds 30 degree position for full 5 secs  7. Limb Ataxia: 0-->Absent  8. Sensory: 0-->Normal: no sensory loss  9. Best Language: 0-->No aphasia: normal  10. Dysarthria: 1-->Mild-to-moderate dysarthria: patient slurs at least some words and, at worst, can be understood with some difficulty  11. Extinction and Inattention (formerly Neglect): 0-->No abnormality  Total (NIH Stroke Scale): 3       Modified Houston Score: 2  Patricia Coma Scale:15   ABCD2 Score:    [id number] Score:   HAS -BLED Score:   ICH Score:   Hunt & Francis Classification:      Hemorrhagic change of an Ischemic Stroke: Does this patient have an ischemic stroke with hemorrhagic changes? No     Neurologic Chief Complaint: Pontine Lesion    Subjective:     Interval History: Patient is seen for follow-up neurological assessment and treatment recommendations:     LP today.  Headache, throbbing, posterior, 4/10 pain.      HPI, Past Medical, Family, and Social History remains the same as documented in the initial encounter.     Review of Systems   Constitutional: Negative for fever.   Eyes: Positive for visual disturbance.   Respiratory: Negative for shortness of breath.    Cardiovascular: Negative for chest pain.   Gastrointestinal: Negative for abdominal pain.   Genitourinary: Negative for dysuria.   Skin: Negative for rash.   Neurological: Positive for speech difficulty, weakness and headaches. Negative for numbness.   Psychiatric/Behavioral: Negative for confusion.     Scheduled Meds:   citalopram  20 mg Oral Daily    dipyridamole-aspirin 200-25 mg  1 capsule Oral BID    gabapentin  300 mg Oral TID    heparin (porcine)  5,000 Units Subcutaneous Q8H    nicotine  1 patch  Transdermal Daily    rosuvastatin  20 mg Oral Daily    sodium chloride 0.9%  1,000 mL Intravenous Once    sodium chloride 0.9%  3 mL Intravenous Q8H     Continuous Infusions:   sodium chloride 0.9%       PRN Meds:acetaminophen, labetalol, ondansetron, polyethylene glycol, sodium chloride 0.9%    Objective:     Vital Signs (Most Recent):  Temp: 98.2 °F (36.8 °C) (10/25/18 0905)  Pulse: 64 (10/25/18 1539)  Resp: 15 (10/25/18 1539)  BP: 104/65 (10/25/18 1539)  SpO2: 100 % (10/25/18 1539)  BP Location: Right arm    Vital Signs Range (Last 24H):  Temp:  [97.8 °F (36.6 °C)-98.8 °F (37.1 °C)]   Pulse:  [64-95]   Resp:  [14-22]   BP: ()/(51-73)   SpO2:  [95 %-100 %]   BP Location: Right arm    Physical Exam   Constitutional: She is oriented to person, place, and time. She appears well-developed. No distress.   HENT:   Head: Normocephalic.   No oral ulcers noted   Eyes: EOM are normal. Pupils are equal, round, and reactive to light.   Cardiovascular: Normal rate, regular rhythm and normal heart sounds. Exam reveals no friction rub.   No murmur heard.  Pulmonary/Chest: Effort normal and breath sounds normal. No stridor. No respiratory distress.   Abdominal: Soft. Bowel sounds are normal. She exhibits no distension. There is no tenderness.   Musculoskeletal: She exhibits no edema.   Neurological: She is oriented to person, place, and time.   Skin: Skin is warm and dry.        Neurological Exam:   LOC: alert  Attention Span: Good   Language: No aphasia  Articulation: Dysarthria  Orientation: Person, Place, Time   Visual Fields: Full  EOM (CN III, IV, VI): Full/intact  Pupils (CN II, III): PERRL  Facial Sensation (CN V): Normal  Facial Movement (CN VII): Lower facial weakness on the Right  Motor: 5/5 in BUE, 4+/5 RLE, 5/5 LLE  Cebellar: finger/nose intact b/l  Sensation: to light touch intact in BUE and BLE  Tone: Increased on RUE    Laboratory:  Recent Results (from the past 24 hour(s))   CBC auto differential     Collection Time: 10/24/18  7:18 PM   Result Value Ref Range    WBC 8.80 3.90 - 12.70 K/uL    RBC 3.88 (L) 4.00 - 5.40 M/uL    Hemoglobin 12.6 12.0 - 16.0 g/dL    Hematocrit 38.5 37.0 - 48.5 %    MCV 99 (H) 82 - 98 fL    MCH 32.5 (H) 27.0 - 31.0 pg    MCHC 32.7 32.0 - 36.0 g/dL    RDW 12.7 11.5 - 14.5 %    Platelets 353 (H) 150 - 350 K/uL    MPV 9.0 (L) 9.2 - 12.9 fL    Immature Granulocytes 0.5 0.0 - 0.5 %    Gran # (ANC) 6.2 1.8 - 7.7 K/uL    Immature Grans (Abs) 0.04 0.00 - 0.04 K/uL    Lymph # 1.9 1.0 - 4.8 K/uL    Mono # 0.7 0.3 - 1.0 K/uL    Eos # 0.0 0.0 - 0.5 K/uL    Baso # 0.04 0.00 - 0.20 K/uL    nRBC 0 0 /100 WBC    Gran% 69.9 38.0 - 73.0 %    Lymph% 21.6 18.0 - 48.0 %    Mono% 7.4 4.0 - 15.0 %    Eosinophil% 0.1 0.0 - 8.0 %    Basophil% 0.5 0.0 - 1.9 %    Differential Method Automated    Comprehensive metabolic panel    Collection Time: 10/24/18  7:18 PM   Result Value Ref Range    Sodium 138 136 - 145 mmol/L    Potassium 3.9 3.5 - 5.1 mmol/L    Chloride 102 95 - 110 mmol/L    CO2 32 (H) 23 - 29 mmol/L    Glucose 83 70 - 110 mg/dL    BUN, Bld 12 6 - 20 mg/dL    Creatinine 0.7 0.5 - 1.4 mg/dL    Calcium 9.6 8.7 - 10.5 mg/dL    Total Protein 7.2 6.0 - 8.4 g/dL    Albumin 3.6 3.5 - 5.2 g/dL    Total Bilirubin 0.2 0.1 - 1.0 mg/dL    Alkaline Phosphatase 70 55 - 135 U/L    AST 19 10 - 40 U/L    ALT 21 10 - 44 U/L    Anion Gap 4 (L) 8 - 16 mmol/L    eGFR if African American >60.0 >60 mL/min/1.73 m^2    eGFR if non African American >60.0 >60 mL/min/1.73 m^2   Protime-INR    Collection Time: 10/24/18  7:18 PM   Result Value Ref Range    Prothrombin Time 9.9 9.0 - 12.5 sec    INR 0.9 0.8 - 1.2   APTT    Collection Time: 10/24/18  7:18 PM   Result Value Ref Range    aPTT 26.1 21.0 - 32.0 sec   POCT urine pregnancy    Collection Time: 10/24/18  8:57 PM   Result Value Ref Range    POC Preg Test, Ur Negative Negative     Acceptable Yes    Hemoglobin A1c    Collection Time: 10/25/18  2:30 AM   Result  Value Ref Range    Hemoglobin A1C 4.7 4.0 - 5.6 %    Estimated Avg Glucose 88 68 - 131 mg/dL   TSH    Collection Time: 10/25/18  2:30 AM   Result Value Ref Range    TSH 1.029 0.400 - 4.000 uIU/mL   Troponin I    Collection Time: 10/25/18  2:30 AM   Result Value Ref Range    Troponin I <0.006 0.000 - 0.026 ng/mL   CK-MB    Collection Time: 10/25/18  2:30 AM   Result Value Ref Range    CPK 39 20 - 180 U/L    CPK MB 0.3 0.1 - 6.5 ng/mL    MB% 0.8 0.0 - 5.0 %   APTT    Collection Time: 10/25/18  2:30 AM   Result Value Ref Range    aPTT 25.9 21.0 - 32.0 sec   Protime-INR    Collection Time: 10/25/18  2:30 AM   Result Value Ref Range    Prothrombin Time 9.9 9.0 - 12.5 sec    INR 0.9 0.8 - 1.2   Urinalysis - clean catch    Collection Time: 10/25/18  2:30 AM   Result Value Ref Range    Specimen UA Urine, Clean Catch     Color, UA Yellow Yellow, Straw, Olivia    Appearance, UA Cloudy (A) Clear    pH, UA 6.0 5.0 - 8.0    Specific Gravity, UA 1.025 1.005 - 1.030    Protein, UA Negative Negative    Glucose, UA Negative Negative    Ketones, UA 3+ (A) Negative    Bilirubin (UA) Negative Negative    Occult Blood UA 1+ (A) Negative    Nitrite, UA Negative Negative    Leukocytes, UA Negative Negative   Urinalysis Microscopic    Collection Time: 10/25/18  2:30 AM   Result Value Ref Range    RBC, UA 26 (H) 0 - 4 /hpf    WBC, UA 2 0 - 5 /hpf    Bacteria, UA Rare None-Occ /hpf    Squam Epithel, UA 1 /hpf    Microscopic Comment SEE COMMENT    Echo doppler color flow    Collection Time: 10/25/18  8:07 AM   Result Value Ref Range    Calculated EF 60 55 - 65    Diastolic Dysfunction No    CBC auto differential    Collection Time: 10/25/18  8:40 AM   Result Value Ref Range    WBC 6.44 3.90 - 12.70 K/uL    RBC 3.77 (L) 4.00 - 5.40 M/uL    Hemoglobin 11.8 (L) 12.0 - 16.0 g/dL    Hematocrit 38.0 37.0 - 48.5 %     (H) 82 - 98 fL    MCH 31.3 (H) 27.0 - 31.0 pg    MCHC 31.1 (L) 32.0 - 36.0 g/dL    RDW 12.5 11.5 - 14.5 %    Platelets 337 150  - 350 K/uL    MPV 9.2 9.2 - 12.9 fL    Immature Granulocytes 0.3 0.0 - 0.5 %    Gran # (ANC) 4.1 1.8 - 7.7 K/uL    Immature Grans (Abs) 0.02 0.00 - 0.04 K/uL    Lymph # 1.8 1.0 - 4.8 K/uL    Mono # 0.5 0.3 - 1.0 K/uL    Eos # 0.0 0.0 - 0.5 K/uL    Baso # 0.02 0.00 - 0.20 K/uL    nRBC 0 0 /100 WBC    Gran% 63.1 38.0 - 73.0 %    Lymph% 27.6 18.0 - 48.0 %    Mono% 8.4 4.0 - 15.0 %    Eosinophil% 0.3 0.0 - 8.0 %    Basophil% 0.3 0.0 - 1.9 %    Differential Method Automated    Comprehensive metabolic panel    Collection Time: 10/25/18  8:40 AM   Result Value Ref Range    Sodium 138 136 - 145 mmol/L    Potassium 3.7 3.5 - 5.1 mmol/L    Chloride 102 95 - 110 mmol/L    CO2 30 (H) 23 - 29 mmol/L    Glucose 72 70 - 110 mg/dL    BUN, Bld 12 6 - 20 mg/dL    Creatinine 0.6 0.5 - 1.4 mg/dL    Calcium 9.2 8.7 - 10.5 mg/dL    Total Protein 6.9 6.0 - 8.4 g/dL    Albumin 3.4 (L) 3.5 - 5.2 g/dL    Total Bilirubin 0.3 0.1 - 1.0 mg/dL    Alkaline Phosphatase 68 55 - 135 U/L    AST 18 10 - 40 U/L    ALT 20 10 - 44 U/L    Anion Gap 6 (L) 8 - 16 mmol/L    eGFR if African American >60.0 >60 mL/min/1.73 m^2    eGFR if non African American >60.0 >60 mL/min/1.73 m^2   Magnesium    Collection Time: 10/25/18  8:40 AM   Result Value Ref Range    Magnesium 2.0 1.6 - 2.6 mg/dL   Phosphorus    Collection Time: 10/25/18  8:40 AM   Result Value Ref Range    Phosphorus 3.2 2.7 - 4.5 mg/dL   M. tuberculosis DNA by PCR [facility]; Cerebrospinal Fluid    Collection Time: 10/25/18  2:41 PM   Result Value Ref Range    MTB Specimen Source Cerebrospinal Fluid    Toxoplasma Gondii/PCR, Non-blood Cerebrospinal Fluid    Collection Time: 10/25/18  2:41 PM   Result Value Ref Range    T. gondii Source Cerebrospinal Fluid    Varicella Zoster By Rapid Pcr Cerebrospinal Fluid    Collection Time: 10/25/18  2:42 PM   Result Value Ref Range    VZV by PCR Source Cerebrospinal Fluid    MS Profile Blood Collection    Collection Time: 10/25/18  3:28 PM   Result Value Ref Range     Blood Collection for MS Profile See MS Panel Result when available          Diagnostic Results     Brain Imaging   10/25/18 MRI Brain w/ and w/o: Per independent resident review and interpretation,  Ring enhancing-lesion with surrounding T2 flair edema with abnormal diffusion restriction in b/l jensen.                Vessel Imaging   10/25/18 CTA Head and neck: Per independent resident review and interpretation,  No large-vessel occlusion, hemodynamically-significant stenosis, nor aneurysm visualized.    Cardiac Imaging   10/25/18 TTE: Per report,  LA wnl  EF 60-65%  No wall motion abnormalities  Diastolic function normal            Nathanael Borja MD  Comprehensive Stroke Center  Department of Vascular Neurology   [facility]

## 2018-10-25 NOTE — ED NOTES
Spoke with Joycelyn HARRELL from Stroke Team  Ok to given heparin subcutaneous this am  Will continue present plan of care and to monitor patient

## 2018-10-25 NOTE — SUBJECTIVE & OBJECTIVE
Past Medical History:   Diagnosis Date    Heart palpitations     MTHFR mutation     Stroke 2015     Past Surgical History:   Procedure Laterality Date    BELT ABDOMINOPLASTY          CA TRANSCRAN DOPP INTRACRAN, EMBOLI W/INJ  2015         WISDOM TOOTH EXTRACTION       Family History   Problem Relation Age of Onset    Breast cancer Maternal Grandmother 70    Stroke Maternal Grandmother     Birth defects Maternal Grandmother         Breast Cancer    Birth defects Paternal Grandfather 80        Colon/Prostate Ca?    Cancer Neg Hx     Colon cancer Neg Hx     Diabetes Neg Hx     Eclampsia Neg Hx     Hypertension Neg Hx     Miscarriages / Stillbirths Neg Hx     Ovarian cancer Neg Hx      labor Neg Hx      Social History     Tobacco Use    Smoking status: Former Smoker     Packs/day: 0.50     Years: 8.00     Pack years: 4.00     Last attempt to quit: 2015     Years since quitting: 3.1    Smokeless tobacco: Never Used    Tobacco comment: quit 2015   Substance Use Topics    Alcohol use: Yes     Alcohol/week: 0.0 oz     Comment: Social    Drug use: No     Review of patient's allergies indicates:  No Known Allergies    Medications: I have reviewed the current medication administration record.      (Not in a hospital admission)    Review of Systems   Constitutional: Negative for chills and fever.   HENT: Positive for trouble swallowing. Negative for congestion and sore throat.    Eyes: Negative for visual disturbance.   Respiratory: Negative for cough and shortness of breath.    Cardiovascular: Negative for chest pain.   Gastrointestinal: Positive for nausea and vomiting. Negative for abdominal pain.   Genitourinary: Negative for dysuria and hematuria.   Musculoskeletal: Positive for arthralgias and myalgias.   Skin: Negative for rash and wound.   Neurological: Positive for speech difficulty, weakness and headaches.   Psychiatric/Behavioral: Negative for agitation.      Objective:     Vital Signs (Most Recent):  Temp: 98.8 °F (37.1 °C) (10/24/18 1745)  Pulse: 95 (10/24/18 1745)  Resp: 18 (10/24/18 1745)  BP: 107/69 (10/24/18 1745)  SpO2: 95 % (10/24/18 1745)    Vital Signs Range (Last 24H):  Temp:  [98.8 °F (37.1 °C)]   Pulse:  [95]   Resp:  [18]   BP: (107)/(69)   SpO2:  [95 %]     Physical Exam   Constitutional: She appears well-developed and well-nourished. No distress.   Pulmonary/Chest: Effort normal.   Skin: Skin is warm and dry.   Psychiatric: She has a normal mood and affect. Her behavior is normal. Thought content normal.       Neurological Exam:   LOC: alert  Attention Span: Good   Language: No aphasia  Articulation: Dysarthria  Orientation: Person, Place, Time   Visual Fields: Full  EOM (CN III, IV, VI): Full/intact  Pupils (CN II, III): PERRL  Facial Sensation (CN V): Normal  Facial Movement (CN VII): Lower facial weakness on the Right  Motor: Normal power in LUE and LLE; distal weakness of RUE and RLE  Cebellar: No evidence of appendicular or axial ataxia  Sensation: Intact to light touch, temperature and vibration  Tone: Normal tone throughout      Laboratory:  CMP:   Recent Labs   Lab 10/24/18  1918   CALCIUM 9.6   ALBUMIN 3.6   PROT 7.2      K 3.9   CO2 32*      BUN 12   CREATININE 0.7   ALKPHOS 70   ALT 21   AST 19   BILITOT 0.2     CBC:   Recent Labs   Lab 10/24/18  1918   WBC 8.80   RBC 3.88*   HGB 12.6   HCT 38.5   *   MCV 99*   MCH 32.5*   MCHC 32.7     Lipid Panel: No results for input(s): CHOL, LDLCALC, HDL, TRIG in the last 168 hours.  Coagulation:   Recent Labs   Lab 10/24/18  1918   INR 0.9   APTT 26.1     Hgb A1C: No results for input(s): HGBA1C in the last 168 hours.  TSH: No results for input(s): TSH in the last 168 hours.    Diagnostic Results:      Brain imaging:  MRI pending

## 2018-10-25 NOTE — PT/OT/SLP EVAL
"Physical Therapy Evaluation    Patient Name:  Maame oCrtez   MRN:  [id number]    Recommendations:     Discharge Recommendations:  outpatient PT, outpatient OT   Discharge Equipment Recommendations: none   Barriers to discharge: Inaccessible home    Assessment:     Maame Cortez is a 37 y.o. female admitted with a medical diagnosis of <principal problem not specified>.  She presents with the following impairments/functional limitations:  weakness, impaired functional mobilty, impaired self care skills, decreased upper extremity function, decreased lower extremity function, impaired fine motor, abnormal tone, gait instability. Patient is a pleasant young lady who presents w/ abnormal gait and residual effects from a prior stroke. Patient able to transfer and ambulate w/ no assistance, but will benefit from outpatient PT to address gait abnormalities and exacerbations of current impairments; including abnormal gait and RLE foot drop. 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 Patient is expected to progress well.     Rehab Prognosis:  good; patient would benefit from acute skilled PT services to address these deficits and reach maximum level of function.      Recent Surgery: * No surgery found *      Plan:     During this hospitalization, patient to be seen 3 x/week to address the above listed problems via gait training, neuromuscular re-education, therapeutic exercises, therapeutic activities  · Plan of Care Expires:  11/24/18   Plan of Care Reviewed with: patient, mother    Subjective     Communicated with nurse prior to session.  Patient found supine upon PT entry to room, agreeable to evaluation.      Patient comments/goals: "You want to see me walk, I can show you"  Pain/Comfort:  · Pain Rating 1: 0/10  · Pain Rating Post-Intervention 1: 0/10    Patients cultural, spiritual, Mandaen conflicts given the current situation:      Living Environment:  Patient lives w/ her 2 sons and spouse " in a raised house w/ 15STE w/ RHR.    Prior to admission, patients level of function was Mod I (disabled from work).  Patient has the following equipment: none.   Upon discharge, patient will have assistance from family.    Objective:     Patient found with: peripheral IV, telemetry     General Precautions: Standard, aspiration, fall   Orthopedic Precautions:N/A   Braces: N/A     Exams:  · Cognitive Exam:  Patient is oriented to Person, Place, Time and Situation  · Gross Motor Coordination:  WFL  · Postural Exam:  Patient presented with the following abnormalities: -       No postural abnormalities identified  · RLE ROM: WFL  · RLE Strength: drop foot during gait (grossly 3+/5)  · LLE ROM: WFL  · LLE Strength: WFL (grossly 4/5)    Functional Mobility:  · Bed Mobility:  Supine to Sit: supervision  · Sit to Supine: supervision  · Transfers:  Sit to Stand:  stand by assistance with no AD  · Gait: 48'  · RLE foot drop, forefoot strike, lack of knee extension on R during stance phase    AM-PAC 6 CLICK MOBILITY  Total Score:23       Therapeutic Activities and Exercises:   PT Eval completed    Patient left supine with all lines intact, call button in reach, nurse notified and CAT scan technician present.    GOALS:   Multidisciplinary Problems     Physical Therapy Goals        Problem: Physical Therapy Goal    Goal Priority Disciplines Outcome Goal Variances Interventions   Physical Therapy Goal     PT, PT/OT Ongoing (interventions implemented as appropriate)     Description:  Goals to be met by: 18     Patient will increase functional independence with mobility by performin. Gait  x 100 feet with Supervision w/ decreased RLE foot drop.   2. Ascend/descend 15 stair with right Handrails Stand-by Assistance   3. Lower extremity exercise program x10-15 reps (in standing) per handout, with assistance as needed                      History:     Past Medical History:   Diagnosis Date    Heart palpitations      MTHFR mutation     Stroke 08/2015       Past Surgical History:   Procedure Laterality Date    BELT ABDOMINOPLASTY      2001    GA TRANSCRAN DOPP INTRACRAN, EMBOLI W/INJ  8/11/2015         WISDOM TOOTH EXTRACTION         Clinical Decision Making:     History  Co-morbidities and personal factors that may impact the plan of care Examination  Body Structures and Functions, activity limitations and participation restrictions that may impact the plan of care Clinical Presentation   Decision Making/ Complexity Score   Co-morbidities:   [] Time since onset of injury / illness / exacerbation  [] Status of current condition  []Patient's cognitive status and safety concerns    [] Multiple Medical Problems (see med hx)  Personal Factors:   [] Patient's age  [] Prior Level of function   [] Patient's home situation (environment and family support)  [] Patient's level of motivation  [] Expected progression of patient      HISTORY:(criteria)    [] 54345 - no personal factors/history    [] 53242 - has 1-2 personal factor/comorbidity     [] 05752 - has >3 personal factor/comorbidity     Body Regions:  [] Objective examination findings  [] Head     []  Neck  [] Trunk   [] Upper Extremity  [] Lower Extremity    Body Systems:  [] For communication ability, affect, cognition, language, and learning style: the assessment of the ability to make needs known, consciousness, orientation (person, place, and time), expected emotional /behavioral responses, and learning preferences (eg, learning barriers, education  needs)  [] For the neuromuscular system: a general assessment of gross coordinated movement (eg, balance, gait, locomotion, transfers, and transitions) and motor function  (motor control and motor learning)  [] For the musculoskeletal system: the assessment of gross symmetry, gross range of motion, gross strength, height, and weight  [] For the integumentary system: the assessment of pliability(texture), presence of scar  formation, skin color, and skin integrity  [] For cardiovascular/pulmonary system: the assessment of heart rate, respiratory rate, blood pressure, and edema     Activity limitations:    [] Patient's cognitive status and saf ety concerns          [] Status of current condition      [] Weight bearing restriction  [] Cardiopulmunary Restriction    Participation Restrictions:   [] Goals and goal agreement with the patient     [] Rehab potential (prognosis) and probable outcome      Examination of Body System: (criteria)    [] 17959 - addressing 1-2 elements    [] 58710 - addressing a total of 3 or more elements     [] 61353 -  Addressing a total of 4 or more elements         Clinical Presentation: (criteria)  Choose one     On examination of body system using standardized tests and measures patient presents with (CHOOSE ONE) elements from any of the following: body structures and functions, activity limitations, and/or participation restrictions.  Leading to a clinical presentation that is considered (CHOOSE ONE)                              Clinical Decision Making  (Eval Complexity):  Low- 26512     Time Tracking:     PT Received On: 10/25/18  PT Start Time: 0941     PT Stop Time: 0950  PT Total Time (min): 9 min     Billable Minutes: Evaluation 9 Min      Ramin Treviño, PT  10/25/2018

## 2018-10-25 NOTE — ED NOTES
Pt placed on portable telemetry monitoring box # [id number].  Ability to visualize pt's rhythm confirmed with  telemetry.  NSR with a HR of 66 noted.

## 2018-10-25 NOTE — SUBJECTIVE & OBJECTIVE
Neurologic Chief Complaint: Pontine Lesion    Subjective:     Interval History: Patient is seen for follow-up neurological assessment and treatment recommendations:     LP today.  Headache, throbbing, posterior, 4/10 pain.      HPI, Past Medical, Family, and Social History remains the same as documented in the initial encounter.     Review of Systems   Constitutional: Negative for fever.   Eyes: Positive for visual disturbance.   Respiratory: Negative for shortness of breath.    Cardiovascular: Negative for chest pain.   Gastrointestinal: Negative for abdominal pain.   Genitourinary: Negative for dysuria.   Skin: Negative for rash.   Neurological: Positive for speech difficulty, weakness and headaches. Negative for numbness.   Psychiatric/Behavioral: Negative for confusion.     Scheduled Meds:   citalopram  20 mg Oral Daily    dipyridamole-aspirin 200-25 mg  1 capsule Oral BID    gabapentin  300 mg Oral TID    heparin (porcine)  5,000 Units Subcutaneous Q8H    nicotine  1 patch Transdermal Daily    rosuvastatin  20 mg Oral Daily    sodium chloride 0.9%  1,000 mL Intravenous Once    sodium chloride 0.9%  3 mL Intravenous Q8H     Continuous Infusions:   sodium chloride 0.9%       PRN Meds:acetaminophen, labetalol, ondansetron, polyethylene glycol, sodium chloride 0.9%    Objective:     Vital Signs (Most Recent):  Temp: 98.2 °F (36.8 °C) (10/25/18 0905)  Pulse: 64 (10/25/18 1539)  Resp: 15 (10/25/18 1539)  BP: 104/65 (10/25/18 1539)  SpO2: 100 % (10/25/18 1539)  BP Location: Right arm    Vital Signs Range (Last 24H):  Temp:  [97.8 °F (36.6 °C)-98.8 °F (37.1 °C)]   Pulse:  [64-95]   Resp:  [14-22]   BP: ()/(51-73)   SpO2:  [95 %-100 %]   BP Location: Right arm    Physical Exam   Constitutional: She is oriented to person, place, and time. She appears well-developed. No distress.   HENT:   Head: Normocephalic.   No oral ulcers noted   Eyes: EOM are normal. Pupils are equal, round, and reactive to light.    Cardiovascular: Normal rate, regular rhythm and normal heart sounds. Exam reveals no friction rub.   No murmur heard.  Pulmonary/Chest: Effort normal and breath sounds normal. No stridor. No respiratory distress.   Abdominal: Soft. Bowel sounds are normal. She exhibits no distension. There is no tenderness.   Musculoskeletal: She exhibits no edema.   Neurological: She is oriented to person, place, and time.   Skin: Skin is warm and dry.        Neurological Exam:   LOC: alert  Attention Span: Good   Language: No aphasia  Articulation: Dysarthria  Orientation: Person, Place, Time   Visual Fields: Full  EOM (CN III, IV, VI): Full/intact  Pupils (CN II, III): PERRL  Facial Sensation (CN V): Normal  Facial Movement (CN VII): Lower facial weakness on the Right  Motor: 5/5 in BUE, 4+/5 RLE, 5/5 LLE  Cebellar: finger/nose intact b/l  Sensation: to light touch intact in BUE and BLE  Tone: Increased on RUE    Laboratory:  Recent Results (from the past 24 hour(s))   CBC auto differential    Collection Time: 10/24/18  7:18 PM   Result Value Ref Range    WBC 8.80 3.90 - 12.70 K/uL    RBC 3.88 (L) 4.00 - 5.40 M/uL    Hemoglobin 12.6 12.0 - 16.0 g/dL    Hematocrit 38.5 37.0 - 48.5 %    MCV 99 (H) 82 - 98 fL    MCH 32.5 (H) 27.0 - 31.0 pg    MCHC 32.7 32.0 - 36.0 g/dL    RDW 12.7 11.5 - 14.5 %    Platelets 353 (H) 150 - 350 K/uL    MPV 9.0 (L) 9.2 - 12.9 fL    Immature Granulocytes 0.5 0.0 - 0.5 %    Gran # (ANC) 6.2 1.8 - 7.7 K/uL    Immature Grans (Abs) 0.04 0.00 - 0.04 K/uL    Lymph # 1.9 1.0 - 4.8 K/uL    Mono # 0.7 0.3 - 1.0 K/uL    Eos # 0.0 0.0 - 0.5 K/uL    Baso # 0.04 0.00 - 0.20 K/uL    nRBC 0 0 /100 WBC    Gran% 69.9 38.0 - 73.0 %    Lymph% 21.6 18.0 - 48.0 %    Mono% 7.4 4.0 - 15.0 %    Eosinophil% 0.1 0.0 - 8.0 %    Basophil% 0.5 0.0 - 1.9 %    Differential Method Automated    Comprehensive metabolic panel    Collection Time: 10/24/18  7:18 PM   Result Value Ref Range    Sodium 138 136 - 145 mmol/L    Potassium 3.9  3.5 - 5.1 mmol/L    Chloride 102 95 - 110 mmol/L    CO2 32 (H) 23 - 29 mmol/L    Glucose 83 70 - 110 mg/dL    BUN, Bld 12 6 - 20 mg/dL    Creatinine 0.7 0.5 - 1.4 mg/dL    Calcium 9.6 8.7 - 10.5 mg/dL    Total Protein 7.2 6.0 - 8.4 g/dL    Albumin 3.6 3.5 - 5.2 g/dL    Total Bilirubin 0.2 0.1 - 1.0 mg/dL    Alkaline Phosphatase 70 55 - 135 U/L    AST 19 10 - 40 U/L    ALT 21 10 - 44 U/L    Anion Gap 4 (L) 8 - 16 mmol/L    eGFR if African American >60.0 >60 mL/min/1.73 m^2    eGFR if non African American >60.0 >60 mL/min/1.73 m^2   Protime-INR    Collection Time: 10/24/18  7:18 PM   Result Value Ref Range    Prothrombin Time 9.9 9.0 - 12.5 sec    INR 0.9 0.8 - 1.2   APTT    Collection Time: 10/24/18  7:18 PM   Result Value Ref Range    aPTT 26.1 21.0 - 32.0 sec   POCT urine pregnancy    Collection Time: 10/24/18  8:57 PM   Result Value Ref Range    POC Preg Test, Ur Negative Negative     Acceptable Yes    Hemoglobin A1c    Collection Time: 10/25/18  2:30 AM   Result Value Ref Range    Hemoglobin A1C 4.7 4.0 - 5.6 %    Estimated Avg Glucose 88 68 - 131 mg/dL   TSH    Collection Time: 10/25/18  2:30 AM   Result Value Ref Range    TSH 1.029 0.400 - 4.000 uIU/mL   Troponin I    Collection Time: 10/25/18  2:30 AM   Result Value Ref Range    Troponin I <0.006 0.000 - 0.026 ng/mL   CK-MB    Collection Time: 10/25/18  2:30 AM   Result Value Ref Range    CPK 39 20 - 180 U/L    CPK MB 0.3 0.1 - 6.5 ng/mL    MB% 0.8 0.0 - 5.0 %   APTT    Collection Time: 10/25/18  2:30 AM   Result Value Ref Range    aPTT 25.9 21.0 - 32.0 sec   Protime-INR    Collection Time: 10/25/18  2:30 AM   Result Value Ref Range    Prothrombin Time 9.9 9.0 - 12.5 sec    INR 0.9 0.8 - 1.2   Urinalysis - clean catch    Collection Time: 10/25/18  2:30 AM   Result Value Ref Range    Specimen UA Urine, Clean Catch     Color, UA Yellow Yellow, Straw, Olivia    Appearance, UA Cloudy (A) Clear    pH, UA 6.0 5.0 - 8.0    Specific Gravity, UA 1.025  1.005 - 1.030    Protein, UA Negative Negative    Glucose, UA Negative Negative    Ketones, UA 3+ (A) Negative    Bilirubin (UA) Negative Negative    Occult Blood UA 1+ (A) Negative    Nitrite, UA Negative Negative    Leukocytes, UA Negative Negative   Urinalysis Microscopic    Collection Time: 10/25/18  2:30 AM   Result Value Ref Range    RBC, UA 26 (H) 0 - 4 /hpf    WBC, UA 2 0 - 5 /hpf    Bacteria, UA Rare None-Occ /hpf    Squam Epithel, UA 1 /hpf    Microscopic Comment SEE COMMENT    Echo doppler color flow    Collection Time: 10/25/18  8:07 AM   Result Value Ref Range    Calculated EF 60 55 - 65    Diastolic Dysfunction No    CBC auto differential    Collection Time: 10/25/18  8:40 AM   Result Value Ref Range    WBC 6.44 3.90 - 12.70 K/uL    RBC 3.77 (L) 4.00 - 5.40 M/uL    Hemoglobin 11.8 (L) 12.0 - 16.0 g/dL    Hematocrit 38.0 37.0 - 48.5 %     (H) 82 - 98 fL    MCH 31.3 (H) 27.0 - 31.0 pg    MCHC 31.1 (L) 32.0 - 36.0 g/dL    RDW 12.5 11.5 - 14.5 %    Platelets 337 150 - 350 K/uL    MPV 9.2 9.2 - 12.9 fL    Immature Granulocytes 0.3 0.0 - 0.5 %    Gran # (ANC) 4.1 1.8 - 7.7 K/uL    Immature Grans (Abs) 0.02 0.00 - 0.04 K/uL    Lymph # 1.8 1.0 - 4.8 K/uL    Mono # 0.5 0.3 - 1.0 K/uL    Eos # 0.0 0.0 - 0.5 K/uL    Baso # 0.02 0.00 - 0.20 K/uL    nRBC 0 0 /100 WBC    Gran% 63.1 38.0 - 73.0 %    Lymph% 27.6 18.0 - 48.0 %    Mono% 8.4 4.0 - 15.0 %    Eosinophil% 0.3 0.0 - 8.0 %    Basophil% 0.3 0.0 - 1.9 %    Differential Method Automated    Comprehensive metabolic panel    Collection Time: 10/25/18  8:40 AM   Result Value Ref Range    Sodium 138 136 - 145 mmol/L    Potassium 3.7 3.5 - 5.1 mmol/L    Chloride 102 95 - 110 mmol/L    CO2 30 (H) 23 - 29 mmol/L    Glucose 72 70 - 110 mg/dL    BUN, Bld 12 6 - 20 mg/dL    Creatinine 0.6 0.5 - 1.4 mg/dL    Calcium 9.2 8.7 - 10.5 mg/dL    Total Protein 6.9 6.0 - 8.4 g/dL    Albumin 3.4 (L) 3.5 - 5.2 g/dL    Total Bilirubin 0.3 0.1 - 1.0 mg/dL    Alkaline Phosphatase  68 55 - 135 U/L    AST 18 10 - 40 U/L    ALT 20 10 - 44 U/L    Anion Gap 6 (L) 8 - 16 mmol/L    eGFR if African American >60.0 >60 mL/min/1.73 m^2    eGFR if non African American >60.0 >60 mL/min/1.73 m^2   Magnesium    Collection Time: 10/25/18  8:40 AM   Result Value Ref Range    Magnesium 2.0 1.6 - 2.6 mg/dL   Phosphorus    Collection Time: 10/25/18  8:40 AM   Result Value Ref Range    Phosphorus 3.2 2.7 - 4.5 mg/dL   M. tuberculosis DNA by PCR [facility]; Cerebrospinal Fluid    Collection Time: 10/25/18  2:41 PM   Result Value Ref Range    MTB Specimen Source Cerebrospinal Fluid    Toxoplasma Gondii/PCR, Non-blood Cerebrospinal Fluid    Collection Time: 10/25/18  2:41 PM   Result Value Ref Range    T. gondii Source Cerebrospinal Fluid    Varicella Zoster By Rapid Pcr Cerebrospinal Fluid    Collection Time: 10/25/18  2:42 PM   Result Value Ref Range    VZV by PCR Source Cerebrospinal Fluid    MS Profile Blood Collection    Collection Time: 10/25/18  3:28 PM   Result Value Ref Range    Blood Collection for MS Profile See MS Panel Result when available          Diagnostic Results     Brain Imaging   10/25/18 MRI Brain w/ and w/o: Per independent resident review and interpretation,  Ring enhancing-lesion with surrounding T2 flair edema with abnormal diffusion restriction in b/l jensen.                Vessel Imaging   10/25/18 CTA Head and neck: Per independent resident review and interpretation,  No large-vessel occlusion, hemodynamically-significant stenosis, nor aneurysm visualized.    Cardiac Imaging   10/25/18 TTE: Per report,  LA wnl  EF 60-65%  No wall motion abnormalities  Diastolic function normal

## 2018-10-25 NOTE — PLAN OF CARE
Problem: Physical Therapy Goal  Goal: Physical Therapy Goal  Goals to be met by: 18     Patient will increase functional independence with mobility by performin. Gait  x 100 feet with Supervision w/ decreased RLE foot drop.   2. Ascend/descend 15 stair with right Handrails Stand-by Assistance   3. Lower extremity exercise program x10-15 reps (in standing) per handout, with assistance as needed    Outcome: Ongoing (interventions implemented as appropriate)  PT Goals established following initial eval    Ramin Treviño PT, DPT  10/25/2018  Pager [phone]

## 2018-10-25 NOTE — PROVIDER PROGRESS NOTES - EMERGENCY DEPT.
Encounter Date: 10/24/2018 11:14 PM    ED Physician Progress Notes           ED Course: I, Julissa Philip PA-C, have assumed care of this patient from Dr. Zuri Olivares. Patient is a 37 year old female with PMHX of hx of CVA 2015 with Right facial droop and R hemiparesis deficit, MTHFR mutation on Aggrenox, and HLD. She presents to the ED for headache. Patient recently evaluated for similar presentation yesterday.    At the time of signout plan was pending MRI brain.     Medications given in the ED:Medications - No data to display     Patient reassessed, patient resting comfortably in NAD on RA. Reports pain well controlled at this time. Rates pain 2/10. Will continue to monitor.     12:43 AM  Informed by radiology, patient found to have acute infarct within jensen. Will consult vascular neurology, awaiting recommendations.     MRI brain final result found to have In the jensen there is a area of diffusion restriction suggestive of acute infarction. Additionally the jensen appears enlarged and edematous with diffuse T2/FLAIR hyperintensity and T1 hypointensity which is new from the prior exam.    Appreciate vascular neurology consult. They will admit patient to their service for further management.     Disposition: Admitted.     Impression: Right Pontine Stroke.     I have discussed and reviewed with my supervising physician.

## 2018-10-25 NOTE — CONSULTS
[facility]  Vascular Neurology  Comprehensive Stroke Center  Consult Note    Consults  Assessment/Plan:     Patient is a 37 y.o. year old female with:    Dysarthria and anarthria    38 y/o female with prior strokes (last 2015) and known MTHFR mutation presents with a gradual progressive posterior HA and dysarthria.  On exam she appears to be at baseline with slightly worse dysarthria.  Symptoms improved yesterday after given fioricet.  Etiology complex migraine versus new infarct versus recrudescence of prior infarct.  CTA in 9/2017 showed no high grade stenosis or occlusion.  Prior MRI 2/2016 shows a left BG hemorrhagic infarct and a smaller right BG infarct.      Recommendations  1. MRI brain - if new area of infarct will admit to stroke service.  If no new areas of infarct, recommend fioricet for headache and follow up with headache neurology specialist for ongoing care.           STROKE DOCUMENTATION          NIH Scale:  1a. Level Of Consciousness: 0-->Alert: keenly responsive  1b. LOC Questions: 0-->Answers both questions correctly  1c. LOC Commands: 0-->Performs both tasks correctly  2. Best Gaze: 0-->Normal  3. Visual: 0-->No visual loss  4. Facial Palsy: 1-->Minor paralysis (flattened nasolabial fold, asymmetry on smiling)  5a. Motor Arm, Left: 0-->No drift: limb holds 90 (or 45) degrees for full 10 secs  5b. Motor Arm, Right: 1-->Drift: limb holds 90 (or 45) degrees, but drifts down before full 10 secs: does not hit bed or other support  6a. Motor Leg, Left: 0-->No drift: leg holds 30 degree position for full 5 secs  6b. Motor Leg, Right: 0-->No drift: leg holds 30 degree position for full 5 secs  7. Limb Ataxia: 0-->Absent  8. Sensory: 0-->Normal: no sensory loss  9. Best Language: 0-->No aphasia: normal  10. Dysarthria: 1-->Mild-to-moderate dysarthria: patient slurs at least some words and, at worst, can be understood with some difficulty  11. Extinction and Inattention (formerly  Neglect): 0-->No abnormality  Total (NIH Stroke Scale): 3    Modified [place] Score: 2  Patricia Coma Scale:    ABCD2 Score:    [id number] Score:   HAS -BLED Score:   ICH Score:   Hunt & Francis Classification:       Thrombolysis Candidate? No, Out of window , Strong suspicion for stroke mimic or alternative diagnosis       Interventional Revascularization Candidate?   Is the patient eligible for mechanical endovascular reperfusion (LAURIE)?  No; No large vessel occlusion      Hemorrhagic change of an Ischemic Stroke: Does this patient have an ischemic stroke with hemorrhagic changes? No     Subjective:     History of Present Illness:  38 y/o female with prior strokes (last 2015) and known MTHFR mutation presents to the ED from home with a 3 day history of a posterior headache.  The pain came on gradual and worsened over time.  She had a similar headache last year.  She was seen in the ED yesterday for these same symptoms.  At that time she was experiencing photophobia, generalized weakness, and 2 episodes of vomiting.  She was given Fioricet and the symptoms improved, but did not resolve.  Today she continues with the headache but has also noticed associated dysarthria.  Patient has residual right facial droop and right sided paresis from her 2015 stroke.  Currently, she continues with the headache which is tolerable and she is more concerned about the slurring of her speech.        Past Medical History:   Diagnosis Date    Heart palpitations     MTHFR mutation     Stroke 08/2015     Past Surgical History:   Procedure Laterality Date    BELT ABDOMINOPLASTY      2001    NE TRANSCRAN DOPP INTRACRAN, EMBOLI W/INJ  8/11/2015         WISDOM TOOTH EXTRACTION       Family History   Problem Relation Age of Onset    Breast cancer Maternal Grandmother 70    Stroke Maternal Grandmother     Birth defects Maternal Grandmother         Breast Cancer    Birth defects Paternal Grandfather 80        Colon/Prostate Ca?    Cancer  Neg Hx     Colon cancer Neg Hx     Diabetes Neg Hx     Eclampsia Neg Hx     Hypertension Neg Hx     Miscarriages / Stillbirths Neg Hx     Ovarian cancer Neg Hx      labor Neg Hx      Social History     Tobacco Use    Smoking status: Former Smoker     Packs/day: 0.50     Years: 8.00     Pack years: 4.00     Last attempt to quit: 2015     Years since quitting: 3.1    Smokeless tobacco: Never Used    Tobacco comment: quit 2015   Substance Use Topics    Alcohol use: Yes     Alcohol/week: 0.0 oz     Comment: Social    Drug use: No     Review of patient's allergies indicates:  No Known Allergies    Medications: I have reviewed the current medication administration record.      (Not in a hospital admission)    Review of Systems   Constitutional: Negative for chills and fever.   HENT: Positive for trouble swallowing. Negative for congestion and sore throat.    Eyes: Negative for visual disturbance.   Respiratory: Negative for cough and shortness of breath.    Cardiovascular: Negative for chest pain.   Gastrointestinal: Positive for nausea and vomiting. Negative for abdominal pain.   Genitourinary: Negative for dysuria and hematuria.   Musculoskeletal: Positive for arthralgias and myalgias.   Skin: Negative for rash and wound.   Neurological: Positive for speech difficulty, weakness and headaches.   Psychiatric/Behavioral: Negative for agitation.     Objective:     Vital Signs (Most Recent):  Temp: 98.8 °F (37.1 °C) (10/24/18 1745)  Pulse: 95 (10/24/18 1745)  Resp: 18 (10/24/18 174)  BP: 107/69 (10/24/18 174)  SpO2: 95 % (10/24/18 174)    Vital Signs Range (Last 24H):  Temp:  [98.8 °F (37.1 °C)]   Pulse:  [95]   Resp:  [18]   BP: (107)/(69)   SpO2:  [95 %]     Physical Exam   Constitutional: She appears well-developed and well-nourished. No distress.   Pulmonary/Chest: Effort normal.   Skin: Skin is warm and dry.   Psychiatric: She has a normal mood and affect. Her behavior is normal. Thought  content normal.       Neurological Exam:   LOC: alert  Attention Span: Good   Language: No aphasia  Articulation: Dysarthria  Orientation: Person, Place, Time   Visual Fields: Full  EOM (CN III, IV, VI): Full/intact  Pupils (CN II, III): PERRL  Facial Sensation (CN V): Normal  Facial Movement (CN VII): Lower facial weakness on the Right  Motor: Normal power in LUE and LLE; distal weakness of RUE and RLE  Cebellar: No evidence of appendicular or axial ataxia  Sensation: Intact to light touch, temperature and vibration  Tone: Normal tone throughout      Laboratory:  CMP:   Recent Labs   Lab 10/24/18  1918   CALCIUM 9.6   ALBUMIN 3.6   PROT 7.2      K 3.9   CO2 32*      BUN 12   CREATININE 0.7   ALKPHOS 70   ALT 21   AST 19   BILITOT 0.2     CBC:   Recent Labs   Lab 10/24/18  1918   WBC 8.80   RBC 3.88*   HGB 12.6   HCT 38.5   *   MCV 99*   MCH 32.5*   MCHC 32.7     Lipid Panel: No results for input(s): CHOL, LDLCALC, HDL, TRIG in the last 168 hours.  Coagulation:   Recent Labs   Lab 10/24/18  1918   INR 0.9   APTT 26.1     Hgb A1C: No results for input(s): HGBA1C in the last 168 hours.  TSH: No results for input(s): TSH in the last 168 hours.    Diagnostic Results:      Brain imaging:  MRI pending          Cherry Dempsey NP  [facility]  Department of Vascular Neurology   [facility]

## 2018-10-26 LAB
ACE SERPL-CCNC: 32 U/L
ALBUMIN SERPL BCP-MCNC: 2.8 G/DL
ALP SERPL-CCNC: 55 U/L
ALT SERPL W/O P-5'-P-CCNC: 23 U/L
ANION GAP SERPL CALC-SCNC: 5 MMOL/L
AST SERPL-CCNC: 21 U/L
BASOPHILS # BLD AUTO: 0.02 K/UL
BASOPHILS NFR BLD: 0.3 %
BILIRUB SERPL-MCNC: 0.2 MG/DL
BUN SERPL-MCNC: 11 MG/DL
CALCIUM SERPL-MCNC: 8.5 MG/DL
CHLORIDE SERPL-SCNC: 108 MMOL/L
CO2 SERPL-SCNC: 26 MMOL/L
CREAT SERPL-MCNC: 0.5 MG/DL
CRP SERPL-MCNC: 10.7 MG/L
CRYPTOC AG CSF QL LA: NEGATIVE
DIFFERENTIAL METHOD: ABNORMAL
EOSINOPHIL # BLD AUTO: 0 K/UL
EOSINOPHIL NFR BLD: 0.4 %
ERYTHROCYTE [DISTWIDTH] IN BLOOD BY AUTOMATED COUNT: 12.6 %
ERYTHROCYTE [SEDIMENTATION RATE] IN BLOOD BY WESTERGREN METHOD: 33 MM/HR
EST. GFR  (AFRICAN AMERICAN): >60 ML/MIN/1.73 M^2
EST. GFR  (NON AFRICAN AMERICAN): >60 ML/MIN/1.73 M^2
FOLATE SERPL-MCNC: 6.6 NG/ML
GLUCOSE SERPL-MCNC: 80 MG/DL
HCT VFR BLD AUTO: 34.6 %
HCYS SERPL-SCNC: 4.9 UMOL/L
HGB BLD-MCNC: 10.7 G/DL
HIV 1+2 AB+HIV1 P24 AG SERPL QL IA: NEGATIVE
IGA SERPL-MCNC: 233 MG/DL
IGG SERPL-MCNC: 720 MG/DL
IGM SERPL-MCNC: 70 MG/DL
IMM GRANULOCYTES # BLD AUTO: 0.02 K/UL
IMM GRANULOCYTES NFR BLD AUTO: 0.3 %
LYMPHOCYTES # BLD AUTO: 1.8 K/UL
LYMPHOCYTES NFR BLD: 26.6 %
MAGNESIUM SERPL-MCNC: 1.8 MG/DL
MCH RBC QN AUTO: 31.1 PG
MCHC RBC AUTO-ENTMCNC: 30.9 G/DL
MCV RBC AUTO: 101 FL
MONOCYTES # BLD AUTO: 0.7 K/UL
MONOCYTES NFR BLD: 9.9 %
NEUTROPHILS # BLD AUTO: 4.3 K/UL
NEUTROPHILS NFR BLD: 62.5 %
NRBC BLD-RTO: 0 /100 WBC
PHOSPHATE SERPL-MCNC: 2.8 MG/DL
PLATELET # BLD AUTO: 345 K/UL
PMV BLD AUTO: 9.8 FL
POTASSIUM SERPL-SCNC: 4 MMOL/L
PROT SERPL-MCNC: 5.5 G/DL
RBC # BLD AUTO: 3.44 M/UL
SODIUM SERPL-SCNC: 139 MMOL/L
VIT B12 SERPL-MCNC: 543 PG/ML
WBC # BLD AUTO: 6.8 K/UL

## 2018-10-26 PROCEDURE — 86140 C-REACTIVE PROTEIN: CPT

## 2018-10-26 PROCEDURE — 83921 ORGANIC ACID SINGLE QUANT: CPT

## 2018-10-26 PROCEDURE — G8988 SELF CARE GOAL STATUS: HCPCS | Mod: CK

## 2018-10-26 PROCEDURE — 36415 COLL VENOUS BLD VENIPUNCTURE: CPT

## 2018-10-26 PROCEDURE — 25000003 PHARM REV CODE 250: Performed by: NURSE PRACTITIONER

## 2018-10-26 PROCEDURE — 85025 COMPLETE CBC W/AUTO DIFF WBC: CPT

## 2018-10-26 PROCEDURE — A9585 GADOBUTROL INJECTION: HCPCS | Performed by: PSYCHIATRY & NEUROLOGY

## 2018-10-26 PROCEDURE — 84165 PROTEIN E-PHORESIS SERUM: CPT | Mod: 26,,, | Performed by: PATHOLOGY

## 2018-10-26 PROCEDURE — 84100 ASSAY OF PHOSPHORUS: CPT

## 2018-10-26 PROCEDURE — G8989 SELF CARE D/C STATUS: HCPCS | Mod: CK

## 2018-10-26 PROCEDURE — 84425 ASSAY OF VITAMIN B-1: CPT

## 2018-10-26 PROCEDURE — 82607 VITAMIN B-12: CPT

## 2018-10-26 PROCEDURE — 80053 COMPREHEN METABOLIC PANEL: CPT

## 2018-10-26 PROCEDURE — 83735 ASSAY OF MAGNESIUM: CPT

## 2018-10-26 PROCEDURE — 85652 RBC SED RATE AUTOMATED: CPT

## 2018-10-26 PROCEDURE — 86618 LYME DISEASE ANTIBODY: CPT

## 2018-10-26 PROCEDURE — 82784 ASSAY IGA/IGD/IGG/IGM EACH: CPT | Mod: 59

## 2018-10-26 PROCEDURE — 86703 HIV-1/HIV-2 1 RESULT ANTBDY: CPT

## 2018-10-26 PROCEDURE — 92523 SPEECH SOUND LANG COMPREHEN: CPT

## 2018-10-26 PROCEDURE — 80074 ACUTE HEPATITIS PANEL: CPT

## 2018-10-26 PROCEDURE — 25500020 PHARM REV CODE 255: Performed by: PSYCHIATRY & NEUROLOGY

## 2018-10-26 PROCEDURE — 99223 1ST HOSP IP/OBS HIGH 75: CPT | Mod: GC,,, | Performed by: PSYCHIATRY & NEUROLOGY

## 2018-10-26 PROCEDURE — 99222 1ST HOSP IP/OBS MODERATE 55: CPT | Mod: ,,, | Performed by: NURSE PRACTITIONER

## 2018-10-26 PROCEDURE — 82746 ASSAY OF FOLIC ACID SERUM: CPT

## 2018-10-26 PROCEDURE — 99233 SBSQ HOSP IP/OBS HIGH 50: CPT | Mod: GC,,, | Performed by: PSYCHIATRY & NEUROLOGY

## 2018-10-26 PROCEDURE — 84165 PROTEIN E-PHORESIS SERUM: CPT

## 2018-10-26 PROCEDURE — 97535 SELF CARE MNGMENT TRAINING: CPT

## 2018-10-26 PROCEDURE — 97530 THERAPEUTIC ACTIVITIES: CPT

## 2018-10-26 PROCEDURE — 99222 1ST HOSP IP/OBS MODERATE 55: CPT | Mod: ,,, | Performed by: INTERNAL MEDICINE

## 2018-10-26 PROCEDURE — 20600001 HC STEP DOWN PRIVATE ROOM

## 2018-10-26 PROCEDURE — 86592 SYPHILIS TEST NON-TREP QUAL: CPT

## 2018-10-26 PROCEDURE — 63600175 PHARM REV CODE 636 W HCPCS: Performed by: STUDENT IN AN ORGANIZED HEALTH CARE EDUCATION/TRAINING PROGRAM

## 2018-10-26 PROCEDURE — 25000003 PHARM REV CODE 250: Performed by: STUDENT IN AN ORGANIZED HEALTH CARE EDUCATION/TRAINING PROGRAM

## 2018-10-26 PROCEDURE — 83090 ASSAY OF HOMOCYSTEINE: CPT

## 2018-10-26 PROCEDURE — S4991 NICOTINE PATCH NONLEGEND: HCPCS | Performed by: STUDENT IN AN ORGANIZED HEALTH CARE EDUCATION/TRAINING PROGRAM

## 2018-10-26 PROCEDURE — A4216 STERILE WATER/SALINE, 10 ML: HCPCS | Performed by: NURSE PRACTITIONER

## 2018-10-26 RX ORDER — GADOBUTROL 604.72 MG/ML
6 INJECTION INTRAVENOUS
Status: COMPLETED | OUTPATIENT
Start: 2018-10-26 | End: 2018-10-26

## 2018-10-26 RX ADMIN — Medication 3 ML: at 09:10

## 2018-10-26 RX ADMIN — NICOTINE 1 PATCH: 14 PATCH, EXTENDED RELEASE TRANSDERMAL at 10:10

## 2018-10-26 RX ADMIN — GADOBUTROL 6 ML: 604.72 INJECTION INTRAVENOUS at 07:10

## 2018-10-26 RX ADMIN — GABAPENTIN 300 MG: 300 CAPSULE ORAL at 04:10

## 2018-10-26 RX ADMIN — Medication 3 ML: at 04:10

## 2018-10-26 RX ADMIN — SODIUM CHLORIDE 500 ML: 0.9 INJECTION, SOLUTION INTRAVENOUS at 04:10

## 2018-10-26 RX ADMIN — Medication 3 ML: at 05:10

## 2018-10-26 RX ADMIN — GABAPENTIN 300 MG: 300 CAPSULE ORAL at 10:10

## 2018-10-26 RX ADMIN — ROSUVASTATIN CALCIUM 20 MG: 20 TABLET, FILM COATED ORAL at 10:10

## 2018-10-26 RX ADMIN — ASPIRIN AND EXTENDED-RELEASE DIPYRIDAMOLE 1 CAPSULE: 25; 200 CAPSULE ORAL at 09:10

## 2018-10-26 RX ADMIN — HEPARIN SODIUM 5000 UNITS: 5000 INJECTION, SOLUTION INTRAVENOUS; SUBCUTANEOUS at 05:10

## 2018-10-26 RX ADMIN — HEPARIN SODIUM 5000 UNITS: 5000 INJECTION, SOLUTION INTRAVENOUS; SUBCUTANEOUS at 09:10

## 2018-10-26 RX ADMIN — ASPIRIN AND EXTENDED-RELEASE DIPYRIDAMOLE 1 CAPSULE: 25; 200 CAPSULE ORAL at 10:10

## 2018-10-26 RX ADMIN — CITALOPRAM HYDROBROMIDE 20 MG: 10 TABLET ORAL at 10:10

## 2018-10-26 RX ADMIN — HEPARIN SODIUM 5000 UNITS: 5000 INJECTION, SOLUTION INTRAVENOUS; SUBCUTANEOUS at 04:10

## 2018-10-26 RX ADMIN — GABAPENTIN 300 MG: 300 CAPSULE ORAL at 09:10

## 2018-10-26 NOTE — SUBJECTIVE & OBJECTIVE
Past Medical History:   Diagnosis Date    Heart palpitations     MTHFR mutation     Stroke 08/2015       Past Surgical History:   Procedure Laterality Date    BELT ABDOMINOPLASTY      2001    [place] TRANSCRAN DOPP INTRACRAN, EMBOLI W/INJ  8/11/2015         WISDOM TOOTH EXTRACTION           There is no immunization history on file for this patient.    Review of patient's allergies indicates:  No Known Allergies  Current Facility-Administered Medications   Medication Frequency    acetaminophen tablet 650 mg Q6H PRN    citalopram tablet 20 mg Daily    dipyridamole-aspirin 200-25 mg per 12 hr capsule 1 capsule BID    gabapentin capsule 300 mg TID    heparin (porcine) injection 5,000 Units Q8H    labetalol injection 10 mg Q6H PRN    [START ON 10/27/2018] methylPREDNISolone sodium succinate (SOLU-MEDROL) 1,000 mg in dextrose 5 % 100 mL IVPB Daily    nicotine 14 mg/24 hr 1 patch Daily    ondansetron disintegrating tablet 8 mg Q8H PRN    polyethylene glycol packet 17 g Daily PRN    rosuvastatin tablet 20 mg Daily    sodium chloride 0.9% flush 3 mL Q8H     Family History     Problem Relation (Age of Onset)    Birth defects Maternal Grandmother, Paternal Grandfather (80)    Breast cancer Maternal Grandmother (70)    Stroke Maternal Grandmother        Tobacco Use    Smoking status: Former Smoker     Packs/day: 0.50     Years: 8.00     Pack years: 4.00     Last attempt to quit: 8/21/2015     Years since quitting: 3.1    Smokeless tobacco: Never Used    Tobacco comment: quit 8/8/2015   Substance and Sexual Activity    Alcohol use: Yes     Alcohol/week: 0.0 oz     Comment: Social    Drug use: No    Sexual activity: Yes     Partners: Male     Birth control/protection: None     Comment:       Review of Systems   Constitutional: Negative for appetite change, chills, fatigue, fever and unexpected weight change.   HENT: Positive for mouth sores. Negative for congestion, sore throat, tinnitus and trouble  swallowing.    Eyes: Positive for visual disturbance.   Respiratory: Negative for cough, chest tightness and shortness of breath.    Cardiovascular: Negative for chest pain and leg swelling.   Gastrointestinal: Negative for abdominal pain and diarrhea.   Genitourinary: Negative for difficulty urinating and dysuria.   Musculoskeletal: Negative for arthralgias, gait problem, joint swelling, myalgias and neck stiffness.   Skin: Negative for color change and rash.   Neurological: Negative for dizziness, weakness, light-headedness, numbness and headaches.   Psychiatric/Behavioral: Negative for agitation, confusion, hallucinations and sleep disturbance.     Objective:     Vital Signs (Most Recent):  Temp: 97.1 °F (36.2 °C) (10/26/18 1628)  Pulse: 84 (10/26/18 1819)  Resp: 18 (10/26/18 1628)  BP: 101/65 (10/26/18 1628)  SpO2: (!) 89 % (10/26/18 1628)  O2 Device (Oxygen Therapy): room air (10/26/18 1628) Vital Signs (24h Range):  Temp:  [96.3 °F (35.7 °C)-98.7 °F (37.1 °C)] 97.1 °F (36.2 °C)  Pulse:  [64-94] 84  Resp:  [15-20] 18  SpO2:  [89 %-98 %] 89 %  BP: ()/(51-65) 101/65     Weight: 59 kg (130 lb) (10/24/18 1745)  Body mass index is 22.31 kg/m².  Body surface area is 1.63 meters squared.      Intake/Output Summary (Last 24 hours) at 10/26/2018 1822  Last data filed at 10/26/2018 0600  Gross per 24 hour   Intake 1006 ml   Output --   Net 1006 ml       Physical Exam   Constitutional: She is oriented to person, place, and time and well-developed, well-nourished, and in no distress.   HENT:   Head: Normocephalic and atraumatic.   No signs of mouth ulcers  Normal salivary pooling    Eyes: EOM are normal. Pupils are equal, round, and reactive to light.   R central vision loss   Neck: Normal range of motion. Neck supple.   Cardiovascular: Normal rate, regular rhythm and normal heart sounds.    Pulmonary/Chest: Effort normal and breath sounds normal.   Abdominal: Soft. She exhibits no distension. There is no tenderness.    Neurological: She is alert and oriented to person, place, and time.   Skin: Skin is warm and dry.     No rashes or boils noted in the skin (including hairline and umbilicus).    No signs of pathergy in IV site     Psychiatric: Mood, affect and judgment normal.   Musculoskeletal: Normal range of motion. She exhibits no edema, tenderness or deformity.   Decrease strength of the R UE and LE.  4/5 compared to the L sided.  ROM intact.  Sensory intact.   No signs of synovitis or abnormalities of the joints         Significant Labs:  Recent Results (from the past 48 hour(s))   CBC auto differential    Collection Time: 10/24/18  7:18 PM   Result Value Ref Range    WBC 8.80 3.90 - 12.70 K/uL    RBC 3.88 (L) 4.00 - 5.40 M/uL    Hemoglobin 12.6 12.0 - 16.0 g/dL    Hematocrit 38.5 37.0 - 48.5 %    MCV 99 (H) 82 - 98 fL    MCH 32.5 (H) 27.0 - 31.0 pg    MCHC 32.7 32.0 - 36.0 g/dL    RDW 12.7 11.5 - 14.5 %    Platelets 353 (H) 150 - 350 K/uL    MPV 9.0 (L) 9.2 - 12.9 fL    Immature Granulocytes 0.5 0.0 - 0.5 %    Gran # (ANC) 6.2 1.8 - 7.7 K/uL    Immature Grans (Abs) 0.04 0.00 - 0.04 K/uL    Lymph # 1.9 1.0 - 4.8 K/uL    Mono # 0.7 0.3 - 1.0 K/uL    Eos # 0.0 0.0 - 0.5 K/uL    Baso # 0.04 0.00 - 0.20 K/uL    nRBC 0 0 /100 WBC    Gran% 69.9 38.0 - 73.0 %    Lymph% 21.6 18.0 - 48.0 %    Mono% 7.4 4.0 - 15.0 %    Eosinophil% 0.1 0.0 - 8.0 %    Basophil% 0.5 0.0 - 1.9 %    Differential Method Automated    Comprehensive metabolic panel    Collection Time: 10/24/18  7:18 PM   Result Value Ref Range    Sodium 138 136 - 145 mmol/L    Potassium 3.9 3.5 - 5.1 mmol/L    Chloride 102 95 - 110 mmol/L    CO2 32 (H) 23 - 29 mmol/L    Glucose 83 70 - 110 mg/dL    BUN, Bld 12 6 - 20 mg/dL    Creatinine 0.7 0.5 - 1.4 mg/dL    Calcium 9.6 8.7 - 10.5 mg/dL    Total Protein 7.2 6.0 - 8.4 g/dL    Albumin 3.6 3.5 - 5.2 g/dL    Total Bilirubin 0.2 0.1 - 1.0 mg/dL    Alkaline Phosphatase 70 55 - 135 U/L    AST 19 10 - 40 U/L    ALT 21 10 - 44 U/L     Anion Gap 4 (L) 8 - 16 mmol/L    eGFR if African American >60.0 >60 mL/min/1.73 m^2    eGFR if non African American >60.0 >60 mL/min/1.73 m^2   Protime-INR    Collection Time: 10/24/18  7:18 PM   Result Value Ref Range    Prothrombin Time 9.9 9.0 - 12.5 sec    INR 0.9 0.8 - 1.2   APTT    Collection Time: 10/24/18  7:18 PM   Result Value Ref Range    aPTT 26.1 21.0 - 32.0 sec   POCT urine pregnancy    Collection Time: 10/24/18  8:57 PM   Result Value Ref Range    POC Preg Test, Ur Negative Negative     Acceptable Yes    Hemoglobin A1c    Collection Time: 10/25/18  2:30 AM   Result Value Ref Range    Hemoglobin A1C 4.7 4.0 - 5.6 %    Estimated Avg Glucose 88 68 - 131 mg/dL   TSH    Collection Time: 10/25/18  2:30 AM   Result Value Ref Range    TSH 1.029 0.400 - 4.000 uIU/mL   Troponin I    Collection Time: 10/25/18  2:30 AM   Result Value Ref Range    Troponin I <0.006 0.000 - 0.026 ng/mL   CK-MB    Collection Time: 10/25/18  2:30 AM   Result Value Ref Range    CPK 39 20 - 180 U/L    CPK MB 0.3 0.1 - 6.5 ng/mL    MB% 0.8 0.0 - 5.0 %   APTT    Collection Time: 10/25/18  2:30 AM   Result Value Ref Range    aPTT 25.9 21.0 - 32.0 sec   Protime-INR    Collection Time: 10/25/18  2:30 AM   Result Value Ref Range    Prothrombin Time 9.9 9.0 - 12.5 sec    INR 0.9 0.8 - 1.2   Urinalysis - clean catch    Collection Time: 10/25/18  2:30 AM   Result Value Ref Range    Specimen UA Urine, Clean Catch     Color, UA Yellow Yellow, Straw, Olivia    Appearance, UA Cloudy (A) Clear    pH, UA 6.0 5.0 - 8.0    Specific Gravity, UA 1.025 1.005 - 1.030    Protein, UA Negative Negative    Glucose, UA Negative Negative    Ketones, UA 3+ (A) Negative    Bilirubin (UA) Negative Negative    Occult Blood UA 1+ (A) Negative    Nitrite, UA Negative Negative    Leukocytes, UA Negative Negative   Urinalysis Microscopic    Collection Time: 10/25/18  2:30 AM   Result Value Ref Range    RBC, UA 26 (H) 0 - 4 /hpf    WBC, UA 2 0 - 5 /hpf     Bacteria, UA Rare None-Occ /hpf    Squam Epithel, UA 1 /hpf    Microscopic Comment SEE COMMENT    Echo doppler color flow    Collection Time: 10/25/18  8:07 AM   Result Value Ref Range    Calculated EF 60 55 - 65    Diastolic Dysfunction No    CBC auto differential    Collection Time: 10/25/18  8:40 AM   Result Value Ref Range    WBC 6.44 3.90 - 12.70 K/uL    RBC 3.77 (L) 4.00 - 5.40 M/uL    Hemoglobin 11.8 (L) 12.0 - 16.0 g/dL    Hematocrit 38.0 37.0 - 48.5 %     (H) 82 - 98 fL    MCH 31.3 (H) 27.0 - 31.0 pg    MCHC 31.1 (L) 32.0 - 36.0 g/dL    RDW 12.5 11.5 - 14.5 %    Platelets 337 150 - 350 K/uL    MPV 9.2 9.2 - 12.9 fL    Immature Granulocytes 0.3 0.0 - 0.5 %    Gran # (ANC) 4.1 1.8 - 7.7 K/uL    Immature Grans (Abs) 0.02 0.00 - 0.04 K/uL    Lymph # 1.8 1.0 - 4.8 K/uL    Mono # 0.5 0.3 - 1.0 K/uL    Eos # 0.0 0.0 - 0.5 K/uL    Baso # 0.02 0.00 - 0.20 K/uL    nRBC 0 0 /100 WBC    Gran% 63.1 38.0 - 73.0 %    Lymph% 27.6 18.0 - 48.0 %    Mono% 8.4 4.0 - 15.0 %    Eosinophil% 0.3 0.0 - 8.0 %    Basophil% 0.3 0.0 - 1.9 %    Differential Method Automated    Comprehensive metabolic panel    Collection Time: 10/25/18  8:40 AM   Result Value Ref Range    Sodium 138 136 - 145 mmol/L    Potassium 3.7 3.5 - 5.1 mmol/L    Chloride 102 95 - 110 mmol/L    CO2 30 (H) 23 - 29 mmol/L    Glucose 72 70 - 110 mg/dL    BUN, Bld 12 6 - 20 mg/dL    Creatinine 0.6 0.5 - 1.4 mg/dL    Calcium 9.2 8.7 - 10.5 mg/dL    Total Protein 6.9 6.0 - 8.4 g/dL    Albumin 3.4 (L) 3.5 - 5.2 g/dL    Total Bilirubin 0.3 0.1 - 1.0 mg/dL    Alkaline Phosphatase 68 55 - 135 U/L    AST 18 10 - 40 U/L    ALT 20 10 - 44 U/L    Anion Gap 6 (L) 8 - 16 mmol/L    eGFR if African American >60.0 >60 mL/min/1.73 m^2    eGFR if non African American >60.0 >60 mL/min/1.73 m^2   Magnesium    Collection Time: 10/25/18  8:40 AM   Result Value Ref Range    Magnesium 2.0 1.6 - 2.6 mg/dL   Phosphorus    Collection Time: 10/25/18  8:40 AM   Result Value Ref Range     Phosphorus 3.2 2.7 - 4.5 mg/dL   CSF cell count with differential    Collection Time: 10/25/18  2:39 PM   Result Value Ref Range    Heme Aliquot 1.0 mL    Appearance, CSF Clear Clear    Color, CSF Colorless Colorless    WBC,  (H) 0 - 5 /cu mm    RBC, CSF 9 (A) 0 /cu mm    Segmented Neutrophils, CSF 2 0 - 6 %    Lymphs, CSF 65 40 - 80 %    Mono/Macrophage, CSF 33 15 - 45 %   Protein, CSF    Collection Time: 10/25/18  2:39 PM   Result Value Ref Range    Protein, CSF 40 15 - 40 mg/dL   Glucose, CSF    Collection Time: 10/25/18  2:39 PM   Result Value Ref Range    Glucose, CSF 46 40 - 70 mg/dL   CSF cell count with differential    Collection Time: 10/25/18  2:39 PM   Result Value Ref Range    Heme Aliquot 1.0 mL    Appearance, CSF Clear Clear    Color, CSF Colorless Colorless    WBC,  (H) 0 - 5 /cu mm    RBC, CSF 2 (A) 0 /cu mm    Segmented Neutrophils, CSF 3 0 - 6 %    Lymphs, CSF 59 40 - 80 %    Mono/Macrophage, CSF 38 15 - 45 %   CSF culture    Collection Time: 10/25/18  2:40 PM   Result Value Ref Range    CSF CULTURE No Growth to date     Gram Stain Result Cytospin indicates:     Gram Stain Result Rare WBC's     Gram Stain Result No organisms seen    AFB Culture & Smear    Collection Time: 10/25/18  2:40 PM   Result Value Ref Range    AFB CULTURE STAIN No acid fast bacilli seen.    Cryptococcal antigen, CSF    Collection Time: 10/25/18  2:40 PM   Result Value Ref Range    Crypto Ag, CSF Negative Negative   Connie Ink (CSF)    Collection Time: 10/25/18  2:40 PM   Result Value Ref Range    Connie Ink No encapsulated yeast seen    M. tuberculosis DNA by PCR [facility]; Cerebrospinal Fluid    Collection Time: 10/25/18  2:41 PM   Result Value Ref Range    MTB Specimen Source Cerebrospinal Fluid    Toxoplasma Gondii/PCR, Non-blood Cerebrospinal Fluid    Collection Time: 10/25/18  2:41 PM   Result Value Ref Range    T. gondii Source Cerebrospinal Fluid    Herpes Simplex (HSV) by PCR, CSF    Collection Time:  10/25/18  2:41 PM   Result Value Ref Range    HSV1, PCR, CSF Negative Negative    HSV2, PCR, CSF Negative Negative   Varicella Zoster By Rapid Pcr Cerebrospinal Fluid    Collection Time: 10/25/18  2:42 PM   Result Value Ref Range    VZV by PCR Source Cerebrospinal Fluid    MS Profile Blood Collection    Collection Time: 10/25/18  3:28 PM   Result Value Ref Range    Blood Collection for MS Profile See MS Panel Result when available    Angiotensin converting enzyme    Collection Time: 10/25/18  3:28 PM   Result Value Ref Range    Angio Convert Enzyme 32 8 - 53 U/L   CBC auto differential    Collection Time: 10/26/18  6:20 AM   Result Value Ref Range    WBC 6.80 3.90 - 12.70 K/uL    RBC 3.44 (L) 4.00 - 5.40 M/uL    Hemoglobin 10.7 (L) 12.0 - 16.0 g/dL    Hematocrit 34.6 (L) 37.0 - 48.5 %     (H) 82 - 98 fL    MCH 31.1 (H) 27.0 - 31.0 pg    MCHC 30.9 (L) 32.0 - 36.0 g/dL    RDW 12.6 11.5 - 14.5 %    Platelets 345 150 - 350 K/uL    MPV 9.8 9.2 - 12.9 fL    Immature Granulocytes 0.3 0.0 - 0.5 %    Gran # (ANC) 4.3 1.8 - 7.7 K/uL    Immature Grans (Abs) 0.02 0.00 - 0.04 K/uL    Lymph # 1.8 1.0 - 4.8 K/uL    Mono # 0.7 0.3 - 1.0 K/uL    Eos # 0.0 0.0 - 0.5 K/uL    Baso # 0.02 0.00 - 0.20 K/uL    nRBC 0 0 /100 WBC    Gran% 62.5 38.0 - 73.0 %    Lymph% 26.6 18.0 - 48.0 %    Mono% 9.9 4.0 - 15.0 %    Eosinophil% 0.4 0.0 - 8.0 %    Basophil% 0.3 0.0 - 1.9 %    Differential Method Automated    Comprehensive metabolic panel    Collection Time: 10/26/18  6:20 AM   Result Value Ref Range    Sodium 139 136 - 145 mmol/L    Potassium 4.0 3.5 - 5.1 mmol/L    Chloride 108 95 - 110 mmol/L    CO2 26 23 - 29 mmol/L    Glucose 80 70 - 110 mg/dL    BUN, Bld 11 6 - 20 mg/dL    Creatinine 0.5 0.5 - 1.4 mg/dL    Calcium 8.5 (L) 8.7 - 10.5 mg/dL    Total Protein 5.5 (L) 6.0 - 8.4 g/dL    Albumin 2.8 (L) 3.5 - 5.2 g/dL    Total Bilirubin 0.2 0.1 - 1.0 mg/dL    Alkaline Phosphatase 55 55 - 135 U/L    AST 21 10 - 40 U/L    ALT 23 10 - 44 U/L     Anion Gap 5 (L) 8 - 16 mmol/L    eGFR if African American >60.0 >60 mL/min/1.73 m^2    eGFR if non African American >60.0 >60 mL/min/1.73 m^2   Magnesium    Collection Time: 10/26/18  6:20 AM   Result Value Ref Range    Magnesium 1.8 1.6 - 2.6 mg/dL   Phosphorus    Collection Time: 10/26/18  6:20 AM   Result Value Ref Range    Phosphorus 2.8 2.7 - 4.5 mg/dL   HIV-1 and HIV-2 antibodies    Collection Time: 10/26/18  8:09 AM   Result Value Ref Range    HIV 1/2 Ag/Ab Negative Negative   Vitamin B12    Collection Time: 10/26/18  8:52 AM   Result Value Ref Range    Vitamin B-12 543 210 - 950 pg/mL   Folate    Collection Time: 10/26/18  8:52 AM   Result Value Ref Range    Folate 6.6 4.0 - 24.0 ng/mL   Homocysteine, serum    Collection Time: 10/26/18  8:52 AM   Result Value Ref Range    Homocysteine 4.9 4.0 - 15.5 umol/L         Significant Imaging:  Imaging results within the past 24 hours have been reviewed.   MRI of brain from Oct 22 and Oct 24 independently reviewed.

## 2018-10-26 NOTE — CONSULTS
[facility]  Physical Medicine & Rehab  Consult Note    Patient Name: Maame Cortez  MRN: [id number]  Admission Date: 10/24/2018  Hospital Length of Stay: 1 days  Attending Physician: Balta Richardson MD     Inpatient consult to Physical Medicine & Rehabilitation  Consult performed by: Liza Snowden NP  Consult requested by:  Balta Richardson MD    Reason for Consult:  assess rehabilitation needs  Consults  Subjective:     Principal Problem: Pontine lesion    HPI: Maame Cortez is a 37-year-old female with PMHx of alcohol use (1 bottle of wine/night), MTHFR mutation and a previous left BG hemorrhagic infarct and a smaller right BG infarct in which she received inpatient therapy at [facility]  in 2015.  [place] [facility] [place] with gradual progressive posterior HA and dysarthria. CTH revealed no acute pathology.  Not a tPA candidate 2/2 strong suspicion for stroke mimic or alternative diagnosis.  CTA revealed no LVO.  MRI brain revealed bilateral pontine lesion with significant surrounding edema, diffusion restriction, and central ring contrast enhancement. Etiology unclear but DDX includes malignancy/met/lymphoma, PRES, infection, rhomboencephalitis, demyelinating/autoimmune disease, & CPM per Vascular Neurology.      Functional History: Patient lives in [place] with  and 3 boys ages 19, 9 and 9 in one story home with 15 steps to enter.  PTA patient required assistance with tying laces, buttoning and cutting food 2/2 right sided weakness following stroke in 2015.  DME: none.    Hospital Course: 10/25/2018: Evaluated by therapy.  Bed mobility SV-Mod (I).  Sit to stand and transfers SBA-Mod(I).  Ambulated 48 ftRLE foot drop, forefoot strike, lack of knee extension on R during stance.  UBD and LBD Jacqueline.  Grooming Mod (I). Passed bedside swallow evaluation.  SLP recommending regular diet and thin liquids. She presents with mild oralpharygneal dysphagia and dysarthria .    Past  Medical History:   Diagnosis Date    Heart palpitations     MTHFR mutation     Stroke 08/2015     Past Surgical History:   Procedure Laterality Date    BELT ABDOMINOPLASTY      2001    [place] TRANSCRAN DOPP INTRACRAN, EMBOLI W/INJ  8/11/2015         WISDOM TOOTH EXTRACTION       Review of patient's allergies indicates:  No Known Allergies    Scheduled Medications:    citalopram  20 mg Oral Daily    dipyridamole-aspirin 200-25 mg  1 capsule Oral BID    gabapentin  300 mg Oral TID    heparin (porcine)  5,000 Units Subcutaneous Q8H    nicotine  1 patch Transdermal Daily    rosuvastatin  20 mg Oral Daily    sodium chloride 0.9%  3 mL Intravenous Q8H       PRN Medications: acetaminophen, labetalol, ondansetron, polyethylene glycol    Family History     Problem Relation (Age of Onset)    Birth defects Maternal Grandmother, Paternal Grandfather (80)    Breast cancer Maternal Grandmother (70)    Stroke Maternal Grandmother        Tobacco Use    Smoking status: Former Smoker     Packs/day: 0.50     Years: 8.00     Pack years: 4.00     Last attempt to quit: 8/21/2015     Years since quitting: 3.1    Smokeless tobacco: Never Used    Tobacco comment: quit 8/8/2015   Substance and Sexual Activity    Alcohol use: Yes     Alcohol/week: 0.0 oz     Comment: Social    Drug use: No    Sexual activity: Yes     Partners: Male     Birth control/protection: None     Comment:       Review of Systems   Constitutional: Negative for chills, fatigue and fever.   HENT: Negative for trouble swallowing and voice change.    Eyes: Negative for photophobia and visual disturbance.   Respiratory: Negative for cough, shortness of breath and wheezing.    Cardiovascular: Negative for chest pain and palpitations.   Gastrointestinal: Negative for abdominal distention, nausea and vomiting.   Genitourinary: Negative for difficulty urinating and flank pain.   Musculoskeletal: Positive for gait problem. Negative for arthralgias.   Skin:  Negative for color change and rash.   Neurological: Positive for speech difficulty and weakness.  Negative for numbness and headaches.   Psychiatric/Behavioral: Negative for agitation and confusion.     Objective:     Vital Signs (Most Recent):  Temp: 97.6 °F (36.4 °C) (10/26/18 0427)  Pulse: 78 (10/26/18 0901)  Resp: 15 (10/26/18 0901)  BP: (!) 111/58 (10/26/18 0901)  SpO2: 95 % (10/26/18 0901)    Vital Signs (24h Range):  Temp:  [96.7 °F (35.9 °C)-98.7 °F (37.1 °C)] 97.6 °F (36.4 °C)  Pulse:  [64-94] 78  Resp:  [14-22] 15  SpO2:  [92 %-100 %] 95 %  BP: ()/(51-73) 111/58     Body mass index is 22.31 kg/m².    Physical Exam   Constitutional: She is oriented to person, place, and time. She appears well-developed and well-nourished.   HENT:   Head: Normocephalic and atraumatic.   Eyes: Conjunctivae are normal. Right eye exhibits no discharge. Left eye exhibits no discharge.   Neck: Neck supple.   Cardiovascular: Normal rate and regular rhythm.   Pulmonary/Chest: Effort normal. No respiratory distress.   Abdominal: Soft. There is no tenderness.   Musculoskeletal: She exhibits no edema or deformity.   Neurological: She is alert and oriented to person, place, and time. No sensory deficit. She exhibits normal muscle tone.   Mild dysarthria  Follows commands  RUE: 3/5, 3/5 .  LUE: 5/5, 5/5 .  RLE: 4/5, DF 4/5, PF 4/5.  LLE: 5/5, DF 5/5, PF 5/5.  Skin: Skin is warm and dry.   Psychiatric: She has a normal mood and affect. Her behavior is normal. Her speech is slurred (mild)        Diagnostic Results:   Labs: Reviewed  ECG: Reviewed  CT: Reviewed  MRI: Reviewed    Assessment/Plan:     * Pontine lesion    -MRI revealed b/l pontine lesion with significant surrounding edema, diffusion restriction, and central ring contrast enhancement.  Differential includes malignancy/met/lymphoma, PRES, infection, rhomboencephalitis, demyelinating/autoimmune disease, & CPM per stroke team      See hospital course for functional,  cognitive/speech/language, and nutrition/swallow status.      Recommendations  -  Encourage mobility, OOB in chair at least 3 hours per day, and early ambulation as appropriate   -  PT/OT evaluate and treat  -  SLP speech and cognitive evaluate and treat  -  Monitor sleep disturbances and establish consistent sleep-wake cycle  -  Monitor for bowel and bladder dysfunction  -  Monitor for shoulder pain and subluxation  -  Monitor for spasticity  -  Monitor for and prevent skin breakdown and pressure ulcers  · Early mobility, repositioning/weight shifting every 20-30 minutes when sitting, turn patient every 2 hours, proper mattress/overlay and chair cushioning, pressure relief/heel protector boots  -  DVT prophylaxis:  [facility]  -  Reviewed discharge options (IP rehab, SNF, HH therapy, and OP therapy)       Alcohol use    -low suspicion for withdrawal  -drinks 1 bottle of wine/night     Impaired motor control    -2/2 stroke with h/o previous stroke 2016  -PT/OT evaluate and treat     Participating with therapy. Patient has residual RSW from previous stroke in 2015. Therapy recommending outpatient therapy and patient hopes for this as well. Will follow progress and discuss with rehab team for post acute care/rehab recommendation.      Thank you for your consult.     Liza Snowden NP  Department of Physical Medicine & Rehab  [facility]

## 2018-10-26 NOTE — CONSULTS
[facility]  Rheumatology  Consult Note    Patient Name: Maame Cortez  MRN: [id number]  Admission Date: 10/24/2018  Hospital Length of Stay: 1 days  Code Status: Full Code   Attending Provider: Balta Richardson MD  Primary Care Physician: Woodrow Bruce MD  Principal Problem:Pontine lesion    Inpatient consult to Rheumatology  Consult performed by: Mansi Velasco MD  Consult ordered by: Nathanael Borja MD  Reason for consult: Neuro Behcets?        Subjective:     HPI: 38yo F with history of MTHFR mutation and 4 strokes? Present with new onset of dysarthria and dysphagia since Oct 22.      2015 - patient present with acute onset of R sided weakness including R facial droop.  MRI at that time was concerning for acute ischemic stroke off the R lacune and possible old stroke.  Workup at that time revealed MTHFR mutation (Later reviewed, son has the same mutation).  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  She's been on aggrenox and statin for secondary stroke prevention.   She continues to have residual R (upper and lower) side and R central vision deficits from the stroke.     Oct 22 - patient developed acute onset of severe posterior headache.  Patient thought she was experiencing migraines but nothing was alleviating the symptoms.  She presented to the ED and was discharge home.  She continues to have worsening headaches, associated symptoms include nausea/vomiting, dizziness, and dysarthria. MRI at time showed new infarction of the jensen that appears to be edematous and swollen.  Repeat imaging done on Oct 24 showed evolving diffused edema signal within the pones with ill-defined central enhancement.  There was a concern about infectious vs autoimmune phenomenon.      Rhuematology was consult for evaluation of possible Neuro Bechet's    Patient has a history of oral ulcer (non  aphthosis - which occurs ~1/3x, more frequently when she was younger.  Self resolve in a couple of days and does not interfere with eating.).  She had a remote history of genital ulcer/lesion x 1, that was evaluated by Ob/Gyn and didn't think it was anything.  Self resolve and no recurrence.  Denies any rash, photosensitivity, skin bumps/boils.  She denies any history of uveitis or eye problem.      No family history of any autoimmune diseases that she is aware of.  Denies any rashes, alopecia, N/V,fever/chills, sick contacts, recent travels (outside of the [place] or to other parts of the country), recent weight loss, SOB, CP, diarrhea/constipation, urinary/bowel symptoms.       Past Medical History:   Diagnosis Date    Heart palpitations     MTHFR mutation     Stroke 08/2015       Past Surgical History:   Procedure Laterality Date    BELT ABDOMINOPLASTY      2001    SC TRANSCRAN DOPP INTRACRAN, EMBOLI W/INJ  8/11/2015         WISDOM TOOTH EXTRACTION           There is no immunization history on file for this patient.    Review of patient's allergies indicates:  No Known Allergies  Current Facility-Administered Medications   Medication Frequency    acetaminophen tablet 650 mg Q6H PRN    citalopram tablet 20 mg Daily    dipyridamole-aspirin 200-25 mg per 12 hr capsule 1 capsule BID    gabapentin capsule 300 mg TID    heparin (porcine) injection 5,000 Units Q8H    labetalol injection 10 mg Q6H PRN    [START ON 10/27/2018] methylPREDNISolone sodium succinate (SOLU-MEDROL) 1,000 mg in dextrose 5 % 100 mL IVPB Daily    nicotine 14 mg/24 hr 1 patch Daily    ondansetron disintegrating tablet 8 mg Q8H PRN    polyethylene glycol packet 17 g Daily PRN    rosuvastatin tablet 20 mg Daily    sodium chloride 0.9% flush 3 mL Q8H     Family History     Problem Relation (Age of Onset)    Birth defects Maternal Grandmother, Paternal Grandfather (80)    Breast cancer Maternal Grandmother (70)    Stroke Maternal  Grandmother        Tobacco Use    Smoking status: Former Smoker     Packs/day: 0.50     Years: 8.00     Pack years: 4.00     Last attempt to quit: 8/21/2015     Years since quitting: 3.1    Smokeless tobacco: Never Used    Tobacco comment: quit 8/8/2015   Substance and Sexual Activity    Alcohol use: Yes     Alcohol/week: 0.0 oz     Comment: Social    Drug use: No    Sexual activity: Yes     Partners: Male     Birth control/protection: None     Comment:       Review of Systems   Constitutional: Negative for appetite change, chills, fatigue, fever and unexpected weight change.   HENT: Positive for mouth sores. Negative for congestion, sore throat, tinnitus and trouble swallowing.    Eyes: Positive for visual disturbance.   Respiratory: Negative for cough, chest tightness and shortness of breath.    Cardiovascular: Negative for chest pain and leg swelling.   Gastrointestinal: Negative for abdominal pain and diarrhea.   Genitourinary: Negative for difficulty urinating and dysuria.   Musculoskeletal: Negative for arthralgias, gait problem, joint swelling, myalgias and neck stiffness.   Skin: Negative for color change and rash.   Neurological: Negative for dizziness, weakness, light-headedness, numbness and headaches.   Psychiatric/Behavioral: Negative for agitation, confusion, hallucinations and sleep disturbance.     Objective:     Vital Signs (Most Recent):  Temp: 97.1 °F (36.2 °C) (10/26/18 1628)  Pulse: 84 (10/26/18 1819)  Resp: 18 (10/26/18 1628)  BP: 101/65 (10/26/18 1628)  SpO2: (!) 89 % (10/26/18 1628)  O2 Device (Oxygen Therapy): room air (10/26/18 1628) Vital Signs (24h Range):  Temp:  [96.3 °F (35.7 °C)-98.7 °F (37.1 °C)] 97.1 °F (36.2 °C)  Pulse:  [64-94] 84  Resp:  [15-20] 18  SpO2:  [89 %-98 %] 89 %  BP: ()/(51-65) 101/65     Weight: 59 kg (130 lb) (10/24/18 1745)  Body mass index is 22.31 kg/m².  Body surface area is 1.63 meters squared.      Intake/Output Summary (Last 24 hours) at  10/26/2018 1822  Last data filed at 10/26/2018 0600  Gross per 24 hour   Intake 1006 ml   Output --   Net 1006 ml       Physical Exam   Constitutional: She is oriented to person, place, and time and well-developed, well-nourished, and in no distress.   HENT:   Head: Normocephalic and atraumatic.   No signs of mouth ulcers  Normal salivary pooling    Eyes: EOM are normal. Pupils are equal, round, and reactive to light.   R central vision loss   Neck: Normal range of motion. Neck supple.   Cardiovascular: Normal rate, regular rhythm and normal heart sounds.    Pulmonary/Chest: Effort normal and breath sounds normal.   Abdominal: Soft. She exhibits no distension. There is no tenderness.   Neurological: She is alert and oriented to person, place, and time.   Skin: Skin is warm and dry.     No rashes or boils noted in the skin (including hairline and umbilicus).    No signs of pathergy in IV site     Psychiatric: Mood, affect and judgment normal.   Musculoskeletal: Normal range of motion. She exhibits no edema, tenderness or deformity.   Decrease strength of the R UE and LE.  4/5 compared to the L sided.  ROM intact.  Sensory intact.   No signs of synovitis or abnormalities of the joints         Significant Labs:  Recent Results (from the past 48 hour(s))   CBC auto differential    Collection Time: 10/24/18  7:18 PM   Result Value Ref Range    WBC 8.80 3.90 - 12.70 K/uL    RBC 3.88 (L) 4.00 - 5.40 M/uL    Hemoglobin 12.6 12.0 - 16.0 g/dL    Hematocrit 38.5 37.0 - 48.5 %    MCV 99 (H) 82 - 98 fL    MCH 32.5 (H) 27.0 - 31.0 pg    MCHC 32.7 32.0 - 36.0 g/dL    RDW 12.7 11.5 - 14.5 %    Platelets 353 (H) 150 - 350 K/uL    MPV 9.0 (L) 9.2 - 12.9 fL    Immature Granulocytes 0.5 0.0 - 0.5 %    Gran # (ANC) 6.2 1.8 - 7.7 K/uL    Immature Grans (Abs) 0.04 0.00 - 0.04 K/uL    Lymph # 1.9 1.0 - 4.8 K/uL    Mono # 0.7 0.3 - 1.0 K/uL    Eos # 0.0 0.0 - 0.5 K/uL    Baso # 0.04 0.00 - 0.20 K/uL    nRBC 0 0 /100 WBC    Gran% 69.9 38.0 -  73.0 %    Lymph% 21.6 18.0 - 48.0 %    Mono% 7.4 4.0 - 15.0 %    Eosinophil% 0.1 0.0 - 8.0 %    Basophil% 0.5 0.0 - 1.9 %    Differential Method Automated    Comprehensive metabolic panel    Collection Time: 10/24/18  7:18 PM   Result Value Ref Range    Sodium 138 136 - 145 mmol/L    Potassium 3.9 3.5 - 5.1 mmol/L    Chloride 102 95 - 110 mmol/L    CO2 32 (H) 23 - 29 mmol/L    Glucose 83 70 - 110 mg/dL    BUN, Bld 12 6 - 20 mg/dL    Creatinine 0.7 0.5 - 1.4 mg/dL    Calcium 9.6 8.7 - 10.5 mg/dL    Total Protein 7.2 6.0 - 8.4 g/dL    Albumin 3.6 3.5 - 5.2 g/dL    Total Bilirubin 0.2 0.1 - 1.0 mg/dL    Alkaline Phosphatase 70 55 - 135 U/L    AST 19 10 - 40 U/L    ALT 21 10 - 44 U/L    Anion Gap 4 (L) 8 - 16 mmol/L    eGFR if African American >60.0 >60 mL/min/1.73 m^2    eGFR if non African American >60.0 >60 mL/min/1.73 m^2   Protime-INR    Collection Time: 10/24/18  7:18 PM   Result Value Ref Range    Prothrombin Time 9.9 9.0 - 12.5 sec    INR 0.9 0.8 - 1.2   APTT    Collection Time: 10/24/18  7:18 PM   Result Value Ref Range    aPTT 26.1 21.0 - 32.0 sec   POCT urine pregnancy    Collection Time: 10/24/18  8:57 PM   Result Value Ref Range    POC Preg Test, Ur Negative Negative     Acceptable Yes    Hemoglobin A1c    Collection Time: 10/25/18  2:30 AM   Result Value Ref Range    Hemoglobin A1C 4.7 4.0 - 5.6 %    Estimated Avg Glucose 88 68 - 131 mg/dL   TSH    Collection Time: 10/25/18  2:30 AM   Result Value Ref Range    TSH 1.029 0.400 - 4.000 uIU/mL   Troponin I    Collection Time: 10/25/18  2:30 AM   Result Value Ref Range    Troponin I <0.006 0.000 - 0.026 ng/mL   CK-MB    Collection Time: 10/25/18  2:30 AM   Result Value Ref Range    CPK 39 20 - 180 U/L    CPK MB 0.3 0.1 - 6.5 ng/mL    MB% 0.8 0.0 - 5.0 %   APTT    Collection Time: 10/25/18  2:30 AM   Result Value Ref Range    aPTT 25.9 21.0 - 32.0 sec   Protime-INR    Collection Time: 10/25/18  2:30 AM   Result Value Ref Range    Prothrombin Time  9.9 9.0 - 12.5 sec    INR 0.9 0.8 - 1.2   Urinalysis - clean catch    Collection Time: 10/25/18  2:30 AM   Result Value Ref Range    Specimen UA Urine, Clean Catch     Color, UA Yellow Yellow, Straw, Olivia    Appearance, UA Cloudy (A) Clear    pH, UA 6.0 5.0 - 8.0    Specific Gravity, UA 1.025 1.005 - 1.030    Protein, UA Negative Negative    Glucose, UA Negative Negative    Ketones, UA 3+ (A) Negative    Bilirubin (UA) Negative Negative    Occult Blood UA 1+ (A) Negative    Nitrite, UA Negative Negative    Leukocytes, UA Negative Negative   Urinalysis Microscopic    Collection Time: 10/25/18  2:30 AM   Result Value Ref Range    RBC, UA 26 (H) 0 - 4 /hpf    WBC, UA 2 0 - 5 /hpf    Bacteria, UA Rare None-Occ /hpf    Squam Epithel, UA 1 /hpf    Microscopic Comment SEE COMMENT    Echo doppler color flow    Collection Time: 10/25/18  8:07 AM   Result Value Ref Range    Calculated EF 60 55 - 65    Diastolic Dysfunction No    CBC auto differential    Collection Time: 10/25/18  8:40 AM   Result Value Ref Range    WBC 6.44 3.90 - 12.70 K/uL    RBC 3.77 (L) 4.00 - 5.40 M/uL    Hemoglobin 11.8 (L) 12.0 - 16.0 g/dL    Hematocrit 38.0 37.0 - 48.5 %     (H) 82 - 98 fL    MCH 31.3 (H) 27.0 - 31.0 pg    MCHC 31.1 (L) 32.0 - 36.0 g/dL    RDW 12.5 11.5 - 14.5 %    Platelets 337 150 - 350 K/uL    MPV 9.2 9.2 - 12.9 fL    Immature Granulocytes 0.3 0.0 - 0.5 %    Gran # (ANC) 4.1 1.8 - 7.7 K/uL    Immature Grans (Abs) 0.02 0.00 - 0.04 K/uL    Lymph # 1.8 1.0 - 4.8 K/uL    Mono # 0.5 0.3 - 1.0 K/uL    Eos # 0.0 0.0 - 0.5 K/uL    Baso # 0.02 0.00 - 0.20 K/uL    nRBC 0 0 /100 WBC    Gran% 63.1 38.0 - 73.0 %    Lymph% 27.6 18.0 - 48.0 %    Mono% 8.4 4.0 - 15.0 %    Eosinophil% 0.3 0.0 - 8.0 %    Basophil% 0.3 0.0 - 1.9 %    Differential Method Automated    Comprehensive metabolic panel    Collection Time: 10/25/18  8:40 AM   Result Value Ref Range    Sodium 138 136 - 145 mmol/L    Potassium 3.7 3.5 - 5.1 mmol/L    Chloride 102 95 -  110 mmol/L    CO2 30 (H) 23 - 29 mmol/L    Glucose 72 70 - 110 mg/dL    BUN, Bld 12 6 - 20 mg/dL    Creatinine 0.6 0.5 - 1.4 mg/dL    Calcium 9.2 8.7 - 10.5 mg/dL    Total Protein 6.9 6.0 - 8.4 g/dL    Albumin 3.4 (L) 3.5 - 5.2 g/dL    Total Bilirubin 0.3 0.1 - 1.0 mg/dL    Alkaline Phosphatase 68 55 - 135 U/L    AST 18 10 - 40 U/L    ALT 20 10 - 44 U/L    Anion Gap 6 (L) 8 - 16 mmol/L    eGFR if African American >60.0 >60 mL/min/1.73 m^2    eGFR if non African American >60.0 >60 mL/min/1.73 m^2   Magnesium    Collection Time: 10/25/18  8:40 AM   Result Value Ref Range    Magnesium 2.0 1.6 - 2.6 mg/dL   Phosphorus    Collection Time: 10/25/18  8:40 AM   Result Value Ref Range    Phosphorus 3.2 2.7 - 4.5 mg/dL   CSF cell count with differential    Collection Time: 10/25/18  2:39 PM   Result Value Ref Range    Heme Aliquot 1.0 mL    Appearance, CSF Clear Clear    Color, CSF Colorless Colorless    WBC,  (H) 0 - 5 /cu mm    RBC, CSF 9 (A) 0 /cu mm    Segmented Neutrophils, CSF 2 0 - 6 %    Lymphs, CSF 65 40 - 80 %    Mono/Macrophage, CSF 33 15 - 45 %   Protein, CSF    Collection Time: 10/25/18  2:39 PM   Result Value Ref Range    Protein, CSF 40 15 - 40 mg/dL   Glucose, CSF    Collection Time: 10/25/18  2:39 PM   Result Value Ref Range    Glucose, CSF 46 40 - 70 mg/dL   CSF cell count with differential    Collection Time: 10/25/18  2:39 PM   Result Value Ref Range    Heme Aliquot 1.0 mL    Appearance, CSF Clear Clear    Color, CSF Colorless Colorless    WBC,  (H) 0 - 5 /cu mm    RBC, CSF 2 (A) 0 /cu mm    Segmented Neutrophils, CSF 3 0 - 6 %    Lymphs, CSF 59 40 - 80 %    Mono/Macrophage, CSF 38 15 - 45 %   CSF culture    Collection Time: 10/25/18  2:40 PM   Result Value Ref Range    CSF CULTURE No Growth to date     Gram Stain Result Cytospin indicates:     Gram Stain Result Rare WBC's     Gram Stain Result No organisms seen    AFB Culture & Smear    Collection Time: 10/25/18  2:40 PM   Result Value Ref  Range    AFB CULTURE STAIN No acid fast bacilli seen.    Cryptococcal antigen, CSF    Collection Time: 10/25/18  2:40 PM   Result Value Ref Range    Crypto Ag, CSF Negative Negative   Connie Ink (CSF)    Collection Time: 10/25/18  2:40 PM   Result Value Ref Range    Connie Ink No encapsulated yeast seen    M. tuberculosis DNA by PCR [facility]; Cerebrospinal Fluid    Collection Time: 10/25/18  2:41 PM   Result Value Ref Range    MTB Specimen Source Cerebrospinal Fluid    Toxoplasma Gondii/PCR, Non-blood Cerebrospinal Fluid    Collection Time: 10/25/18  2:41 PM   Result Value Ref Range    T. gondii Source Cerebrospinal Fluid    Herpes Simplex (HSV) by PCR, CSF    Collection Time: 10/25/18  2:41 PM   Result Value Ref Range    HSV1, PCR, CSF Negative Negative    HSV2, PCR, CSF Negative Negative   Varicella Zoster By Rapid Pcr Cerebrospinal Fluid    Collection Time: 10/25/18  2:42 PM   Result Value Ref Range    VZV by PCR Source Cerebrospinal Fluid    MS Profile Blood Collection    Collection Time: 10/25/18  3:28 PM   Result Value Ref Range    Blood Collection for MS Profile See MS Panel Result when available    Angiotensin converting enzyme    Collection Time: 10/25/18  3:28 PM   Result Value Ref Range    Angio Convert Enzyme 32 8 - 53 U/L   CBC auto differential    Collection Time: 10/26/18  6:20 AM   Result Value Ref Range    WBC 6.80 3.90 - 12.70 K/uL    RBC 3.44 (L) 4.00 - 5.40 M/uL    Hemoglobin 10.7 (L) 12.0 - 16.0 g/dL    Hematocrit 34.6 (L) 37.0 - 48.5 %     (H) 82 - 98 fL    MCH 31.1 (H) 27.0 - 31.0 pg    MCHC 30.9 (L) 32.0 - 36.0 g/dL    RDW 12.6 11.5 - 14.5 %    Platelets 345 150 - 350 K/uL    MPV 9.8 9.2 - 12.9 fL    Immature Granulocytes 0.3 0.0 - 0.5 %    Gran # (ANC) 4.3 1.8 - 7.7 K/uL    Immature Grans (Abs) 0.02 0.00 - 0.04 K/uL    Lymph # 1.8 1.0 - 4.8 K/uL    Mono # 0.7 0.3 - 1.0 K/uL    Eos # 0.0 0.0 - 0.5 K/uL    Baso # 0.02 0.00 - 0.20 K/uL    nRBC 0 0 /100 WBC    Gran% 62.5 38.0 - 73.0 %     Lymph% 26.6 18.0 - 48.0 %    Mono% 9.9 4.0 - 15.0 %    Eosinophil% 0.4 0.0 - 8.0 %    Basophil% 0.3 0.0 - 1.9 %    Differential Method Automated    Comprehensive metabolic panel    Collection Time: 10/26/18  6:20 AM   Result Value Ref Range    Sodium 139 136 - 145 mmol/L    Potassium 4.0 3.5 - 5.1 mmol/L    Chloride 108 95 - 110 mmol/L    CO2 26 23 - 29 mmol/L    Glucose 80 70 - 110 mg/dL    BUN, Bld 11 6 - 20 mg/dL    Creatinine 0.5 0.5 - 1.4 mg/dL    Calcium 8.5 (L) 8.7 - 10.5 mg/dL    Total Protein 5.5 (L) 6.0 - 8.4 g/dL    Albumin 2.8 (L) 3.5 - 5.2 g/dL    Total Bilirubin 0.2 0.1 - 1.0 mg/dL    Alkaline Phosphatase 55 55 - 135 U/L    AST 21 10 - 40 U/L    ALT 23 10 - 44 U/L    Anion Gap 5 (L) 8 - 16 mmol/L    eGFR if African American >60.0 >60 mL/min/1.73 m^2    eGFR if non African American >60.0 >60 mL/min/1.73 m^2   Magnesium    Collection Time: 10/26/18  6:20 AM   Result Value Ref Range    Magnesium 1.8 1.6 - 2.6 mg/dL   Phosphorus    Collection Time: 10/26/18  6:20 AM   Result Value Ref Range    Phosphorus 2.8 2.7 - 4.5 mg/dL   HIV-1 and HIV-2 antibodies    Collection Time: 10/26/18  8:09 AM   Result Value Ref Range    HIV 1/2 Ag/Ab Negative Negative   Vitamin B12    Collection Time: 10/26/18  8:52 AM   Result Value Ref Range    Vitamin B-12 543 210 - 950 pg/mL   Folate    Collection Time: 10/26/18  8:52 AM   Result Value Ref Range    Folate 6.6 4.0 - 24.0 ng/mL   Homocysteine, serum    Collection Time: 10/26/18  8:52 AM   Result Value Ref Range    Homocysteine 4.9 4.0 - 15.5 umol/L         Significant Imaging:  Imaging results within the past 24 hours have been reviewed.   MRI of brain from Oct 22 and Oct 24 independently reviewed.     Assessment/Plan:     Right pontine stroke    36yo F with history of MTHFR mutation and 4 strokes? Present with new onset of dysarthria and dysphagia since Oct 22.  MRI at time showed new infarction of the jensen that appears to be edematous and swollen.  Repeat imaging done on  Oct 24 showed evolving diffused edema signal within the pones with ill-defined central enhancement.  There was a concern about infectious vs autoimmune phenomenon.      Rhuematology was consult for evaluation of possible Neuro Bechet's    Patient has a history of oral ulcer (non aphthosis - which occurs ~1/3x, more frequently when she was younger.  Self resolve in a couple of days and does not interfere with eating.).  She had a remote history of genital ulcer/lesion x 1, that was evaluated by Ob/Gyn and didn't think it was anything.  Self resolve and no recurrence.  Denies any rash, photosensitivity, skin bumps/boils.  She denies any history of uveitis or eye problem.      No family history of any autoimmune diseases that she is aware of.  Denies any rashes, alopecia, N/V,fever/chills, sick contacts, recent travels (outside of the [place] or to other parts of the country), recent weight loss, SOB, CP, diarrhea/constipation, urinary/bowel symptoms.        LP was performed and showed +WBC (lymphocytic pleocytosis) suggestive of neuro inflammatory disorder.      HIV negative.  ROB in the past was negative. CXR in the past was unremarkable.      At this time, ddx does include Neuro Bechet's disorder (but patient only meets 1 criteria of diagnosis - which makes this very unlikely at this time).  Other differential diagnosis includes MS, Neurosyphilis, Neuro sarcoidosis, and Neuro Lupus.  Infective causes such as Lyme's, viral (Herpes, CMV, Varicella) all cannot be ruled out at this time.     Plan  - Agree with primary to start on high dose steroid x 3 days  - Recommend to get SPEP, IPEP, immunoglobulins, ESR/CRP, Hepatitis panel, quantiferon gold,  RPR and Lyme's test prior to starting steroid as the steroid can change the findings of some of the these labs  - Follow up with ACE level and other infective lab workup  - Continue management of stroke as per primary team               Thank you for your consult. I will follow-up  with patient. Please contact us if you have any additional questions.    Mansi Velasco MD  Rheumatology  [facility]

## 2018-10-26 NOTE — PLAN OF CARE
Problem: Patient Care Overview  Goal: Plan of Care Review  POC reviewed with pt and family. All questions and concerns reviewed. VSS throughout shift. Fall precautions implemented and maintained. Will continue to monitor.

## 2018-10-26 NOTE — ASSESSMENT & PLAN NOTE
-MRI revealed b/l pontine lesion with significant surrounding edema, diffusion restriction, and central ring contrast enhancement.  Differential includes malignancy/met/lymphoma, PRES, infection, rhomboencephalitis, demyelinating/autoimmune disease, & CPM per stroke team      See hospital course for functional, cognitive/speech/language, and nutrition/swallow status.      Recommendations  -  Encourage mobility, OOB in chair at least 3 hours per day, and early ambulation as appropriate   -  PT/OT evaluate and treat  -  SLP speech and cognitive evaluate and treat  -  Monitor sleep disturbances and establish consistent sleep-wake cycle  -  Monitor for bowel and bladder dysfunction  -  Monitor for shoulder pain and subluxation  -  Monitor for spasticity  -  Monitor for and prevent skin breakdown and pressure ulcers  · Early mobility, repositioning/weight shifting every 20-30 minutes when sitting, turn patient every 2 hours, proper mattress/overlay and chair cushioning, pressure relief/heel protector boots  -  DVT prophylaxis:  [facility]  -  Reviewed discharge options (IP rehab, SNF, HH therapy, and OP therapy)

## 2018-10-26 NOTE — PLAN OF CARE
CM met with patient in reference to discharge planning. Patient lives with her  and 2 sons in a raised home with 15 steps and a railing to get into home. Prior to admission, patient needed minimal  assistance doing some ADLs.     PCP: Woodrow Bruce MD  Pharmacy:   [facility] [address] [address]  Phone: [phone] Fax: [phone]       10/26/18 1008   Discharge Assessment   Assessment Type Discharge Planning Assessment   Confirmed/corrected address and phone number on facesheet? Yes   Assessment information obtained from? Patient   Expected Length of Stay (days) (TBD)   Communicated expected length of stay with patient/caregiver yes   Prior to hospitilization cognitive status: Alert/Oriented;No Deficits   Prior to hospitalization functional status: Independent   Current cognitive status: Alert/Oriented;No Deficits   Current Functional Status: Independent   Lives With child(amanda), dependent;spouse   Able to Return to Prior Arrangements unable to determine at this time (comments)   Is patient able to care for self after discharge? Unable to determine at this time (comments)   Who are your caregiver(s) and their phone number(s)? Claude Cortez Spouse [phone]    Patient's perception of discharge disposition home or selfcare   Readmission Within The Last 30 Days no previous admission in last 30 days   Patient currently being followed by outpatient case management? No   Patient currently receives any other outside agency services? No   Equipment Currently Used at Home none   Do you have any problems affording any of your prescribed medications? No   Is the patient taking medications as prescribed? yes   Does the patient have transportation home? Yes   Transportation Available car;family or friend will provide   Does the patient receive services at the [facility] Clinic? No   Discharge Plan A Home   Discharge Plan B Home with family    Patient/Family In Agreement With Plan yes

## 2018-10-26 NOTE — PLAN OF CARE
Problem: Occupational Therapy Goal  Goal: Occupational Therapy Goal  Goals set 10/25 be addressed for 7 days with expiration date, 11/1:  Patient will increase functional independence with ADLs by performing:  Patient will demonstrate upper body dressing with supervision while seated EOB.   Not met  Patient will demonstrate lower body dressing with supervision while seated EOB.   Not met  Patient will demonstrate independence with HEP for right UE weight bearing /FMC.   Not met         Goals remain appropriate.  THERON La  10/26/2018

## 2018-10-26 NOTE — ASSESSMENT & PLAN NOTE
"38 y/o female with prior strokes (last 2015) and known MTHFR mutation presents with a gradual progressive posterior HA and dysarthria.     MRI revealed b/l pontine lesion with significant surrounding edema, diffusion restriction, and central ring contrast enhancement.  CSF findings notable for lymphocytic leukocytosis.    Differential includes malignancy/met/lymphoma (perhaps less likely given evolution of prior lesion in 2015), PRES (no clear risk factors, abnormal contrast enhancement), infection (2 lesions in 2 years would make this less likely), rhomboencephalitis, demyelinating/autoimmune disease (Behcet's?), CPM (no clear changes in Na based on history or documentation; also 2 lesions over 2 years makes this less likely).  Suspect less likely to be infarction, also query if prior "CVA" was related to current process as well (why did it occur at such a young age, with no further events, unusual location, and upon further review imaging has similar characteristics to current lesion).      After further review of imaging, suspicious for Behcet's.  Reports a history of oral ulcers, but none currently. Has decreased color vision and visual acuity on R eye.  However, CSF leukocytosis has a lymphocytic predominance (neutrophils more likely to predominate in Behcet's), no evidence of vasculitis on CTA.      Maternal grandmother with history of breast cancer diagnosed in 60s.  No personal history of cancer.  History of alcohol use (1 bottle wine/night, quit ~1 week prior to presentation).  No history of immunosuppressant use or recreational drug use.    Plan  -Unclear etiology  -Continue home aggrenox, increased rosuvastatin  -F/u CSF and serum studies from 10/26  -Consulted general neurology, appreciate assistance      "

## 2018-10-26 NOTE — PT/OT/SLP PROGRESS
"Occupational Therapy   Treatment    Name: Maame Cortez  MRN: [id number]  Admitting Diagnosis:  Pontine lesion       Recommendations:     Discharge Recommendations: outpatient OT  Discharge Equipment Recommendations:  none  Barriers to discharge:  None    Subjective   Patient:  "I slept well last night."  Communicated with: Nurse prior to session.  Pain/Comfort:  · Pain Rating 1: 0/10  · Pain Rating Post-Intervention 1: 0/10    Patients cultural, spiritual, Islam conflicts given the current situation: Protestant    Objective:     Patient found with: peripheral IV, telemetry  Mother present.  General Precautions: Standard, aspiration, fall   Orthopedic Precautions:N/A   Braces: N/A     Occupational Performance:    Bed Mobility:    · Patient completed Rolling/Turning to Left with  modified independence  · Patient completed Rolling/Turning to Right with modified independence  · Patient completed Scooting/Bridging with modified independence  · Patient completed Supine to Sit with modified independence  · Patient completed Sit to Supine with modified independence     Functional Mobility/Transfers:  · Patient completed Sit <> Stand Transfer with modified independence  with  no assistive device   · Patient completed Bed <> Chair Transfer using Stand Pivot technique with modified independence with no assistive device    Activities of Daily Living:  · Lower Body Dressing: minimum assistance for tying laces.  Handout provided for one handed shoe tying; continued education required.    Patient left supine with all lines intact and call button in reach    [facility] 6 Click:  AMPA Total Score: 20    Treatment & Education:  P/AAROM performed right UE one set x 10 rep in all planes of motion with stretches provided at end range; sustained stretch provided for wrist extension, finger extension.  Addressed right UE weight bearing during ADLs and during wall push ups, 20 rep; min assist required.  Family present during the " session.    Education:    Assessment:     Maame Cortez is a 37 y.o. female with a medical diagnosis of Pontine lesion.  She presents with performance deficits affecting function are weakness, impaired endurance, impaired self care skills, impaired functional mobilty, gait instability, decreased upper extremity function, decreased lower extremity function, decreased coordination, impaired fine motor.      Rehab Prognosis:  Good; patient would benefit from acute skilled OT services to address these deficits and reach maximum level of function.       Plan:     Patient to be seen 3 x/week to address the above listed problems via self-care/home management, community/work re-entry, therapeutic activities, therapeutic exercises, therapeutic groups, neuromuscular re-education  · Plan of Care Expires: 11/23/18  · Plan of Care Reviewed with: patient, mother    This Plan of care has been discussed with the patient who was involved in its development and understands and is in agreement with the identified goals and treatment plan    GOALS:   Multidisciplinary Problems     Occupational Therapy Goals        Problem: Occupational Therapy Goal    Goal Priority Disciplines Outcome Interventions   Occupational Therapy Goal     OT, PT/OT     Description:  Goals set 10/25 be addressed for 7 days with expiration date, 11/1:  Patient will increase functional independence with ADLs by performing:  Patient will demonstrate upper body dressing with supervision while seated EOB.   Not met  Patient will demonstrate lower body dressing with supervision while seated EOB.   Not met  Patient will demonstrate independence with HEP for right UE weight bearing /FMC.   Not met  Patient will demonstrate independent with one hand shoe tying.  Not met                           Time Tracking:     OT Date of Treatment: 10/26/18  OT Start Time: 0830  OT Stop Time: 0858  OT Total Time (min): 28 min    Billable Minutes:Self Care/Home Management  14  Therapeutic Activity 14    Betty Quezada, LOTR  10/26/2018

## 2018-10-26 NOTE — HOSPITAL COURSE
10/25/2018: Evaluated by therapy.  Bed mobility SV-Mod (I).  Sit to stand and transfers SBA-Mod(I).  Ambulated 48 ftRLE foot drop, forefoot strike, lack of knee extension on R during stance.  UBD and LBD Jacqueline.  Grooming Mod (I). Passed bedside swallow evaluation.  SLP recommending regular diet and thin liquids. She presents with mild oralpharygneal dysphagia and dysarthria .

## 2018-10-26 NOTE — SUBJECTIVE & OBJECTIVE
Past Medical History:   Diagnosis Date    Heart palpitations     MTHFR mutation     Stroke 08/2015     Past Surgical History:   Procedure Laterality Date    BELT ABDOMINOPLASTY      2001    CA TRANSCRAN DOPP INTRACRAN, EMBOLI W/INJ  8/11/2015         WISDOM TOOTH EXTRACTION       Review of patient's allergies indicates:  No Known Allergies    Scheduled Medications:    citalopram  20 mg Oral Daily    dipyridamole-aspirin 200-25 mg  1 capsule Oral BID    gabapentin  300 mg Oral TID    heparin (porcine)  5,000 Units Subcutaneous Q8H    nicotine  1 patch Transdermal Daily    rosuvastatin  20 mg Oral Daily    sodium chloride 0.9%  3 mL Intravenous Q8H       PRN Medications: acetaminophen, labetalol, ondansetron, polyethylene glycol    Family History     Problem Relation (Age of Onset)    Birth defects Maternal Grandmother, Paternal Grandfather (80)    Breast cancer Maternal Grandmother (70)    Stroke Maternal Grandmother        Tobacco Use    Smoking status: Former Smoker     Packs/day: 0.50     Years: 8.00     Pack years: 4.00     Last attempt to quit: 8/21/2015     Years since quitting: 3.1    Smokeless tobacco: Never Used    Tobacco comment: quit 8/8/2015   Substance and Sexual Activity    Alcohol use: Yes     Alcohol/week: 0.0 oz     Comment: Social    Drug use: No    Sexual activity: Yes     Partners: Male     Birth control/protection: None     Comment:       Review of Systems   Constitutional: Negative for chills, fatigue and fever.   HENT: Negative for trouble swallowing and voice change.    Eyes: Negative for photophobia and visual disturbance.   Respiratory: Negative for cough, shortness of breath and wheezing.    Cardiovascular: Negative for chest pain and palpitations.   Gastrointestinal: Negative for abdominal distention, nausea and vomiting.   Genitourinary: Negative for difficulty urinating and flank pain.   Musculoskeletal: Positive for gait problem. Negative for arthralgias.    Skin: Negative for color change and rash.   Neurological: Positive for speech difficulty. Negative for weakness, numbness and headaches.   Psychiatric/Behavioral: Negative for agitation and confusion.     Objective:     Vital Signs (Most Recent):  Temp: 97.6 °F (36.4 °C) (10/26/18 0427)  Pulse: 78 (10/26/18 0901)  Resp: 15 (10/26/18 0901)  BP: (!) 111/58 (10/26/18 0901)  SpO2: 95 % (10/26/18 0901)    Vital Signs (24h Range):  Temp:  [96.7 °F (35.9 °C)-98.7 °F (37.1 °C)] 97.6 °F (36.4 °C)  Pulse:  [64-94] 78  Resp:  [14-22] 15  SpO2:  [92 %-100 %] 95 %  BP: ()/(51-73) 111/58     Body mass index is 22.31 kg/m².    Physical Exam   Constitutional: She is oriented to person, place, and time. She appears well-developed and well-nourished.   HENT:   Head: Normocephalic and atraumatic.   Eyes: Conjunctivae are normal. Right eye exhibits no discharge. Left eye exhibits no discharge.   Neck: Neck supple.   Cardiovascular: Normal rate and regular rhythm.   Pulmonary/Chest: Effort normal. No respiratory distress.   Abdominal: Soft. There is no tenderness.   Musculoskeletal: She exhibits no edema or deformity.   Neurological: She is alert and oriented to person, place, and time. No sensory deficit. She exhibits normal muscle tone.   Mild dysarthria  Follows commands  RUE: 3/5, 3/5 .  LUE: 5/5, 5/5 .  RLE: 4/5, DF 4/5, PF 4/5.  LLE: 5/5, DF 5/5, PF 5/5.     Skin: Skin is warm and dry.   Psychiatric: She has a normal mood and affect. Her behavior is normal. Her speech is slurred.   Vitals reviewed.    NEUROLOGICAL EXAMINATION:     MENTAL STATUS   Oriented to person, place, and time.   Speech: slurred       Diagnostic Results:   Labs: Reviewed  ECG: Reviewed  CT: Reviewed  MRI: Reviewed

## 2018-10-26 NOTE — PROGRESS NOTES
"[facility]  Vascular Neurology  Comprehensive Stroke Center  Progress Note    Assessment/Plan:     * Pontine lesion    36 y/o female with prior L Basal ganglia lesion (2015, thought to be stroke at the time) and known MTHFR mutation presents with a gradual progressive posterior HA and dysarthria.     MRI revealed b/l pontine lesion with significant surrounding edema, diffusion restriction, and central ring contrast enhancement.  CSF findings notable for lymphocytic leukocytosis.    Differential includes malignancy/met/lymphoma (perhaps less likely given evolution of prior lesion in 2015), PRES (no clear risk factors, abnormal contrast enhancement), infection (2 lesions in 2 years would make this less likely), rhomboencephalitis, demyelinating/autoimmune disease (Behcet's?), CPM (no clear changes in Na based on history or documentation; also 2 lesions over 2 years makes this less likely).  Suspect less likely to be infarction, also query if prior "CVA" was related to current process as well (why did it occur at such a young age, with no further events, unusual location, and upon further review imaging has similar characteristics to current lesion).      After further review of imaging, suspicious for Behcet's.  Reports a history of oral ulcers, but none currently. Has decreased color vision and visual acuity on R eye.  However, CSF leukocytosis has a lymphocytic predominance (neutrophils more likely to predominate in Behcet's), no evidence of vasculitis on CTA.      Maternal grandmother with history of breast cancer diagnosed in 60s.  No personal history of cancer.  History of alcohol use (1 bottle wine/night, quit ~1 week prior to presentation).  No history of immunosuppressant use or recreational drug use.    Plan  -Unclear etiology  -Continue home aggrenox, increased rosuvastatin  -F/u CSF and serum studies from 10/26  -Consulted general neurology, appreciate assistance         Alcohol use    1 " bottle wine/night, last drink 1 week prior to presentation  -Low likelihood of withdrawal at this point     Hyperlipidemia    Stroke risk factor  -Increase home rosuvastatin 5-> 20 for secondary stroke prevention while workup ongoing     Headache    Tylenol PRN     Impaired motor control    PT/OT/ST --> outpatient PT/OT/ST     Tobacco abuse    Recommended cessation  -Nicotine patch     Depression    Continue home citalopram          10/25/18: LP today.    10/26: No acute events overnight    STROKE DOCUMENTATION        NIH Scale:  1a. Level Of Consciousness: 0-->Alert: keenly responsive  1b. LOC Questions: 0-->Answers both questions correctly  1c. LOC Commands: 0-->Performs both tasks correctly  2. Best Gaze: 0-->Normal  3. Visual: 0-->No visual loss  4. Facial Palsy: 1-->Minor paralysis (flattened nasolabial fold, asymmetry on smiling)  5a. Motor Arm, Left: 0-->No drift: limb holds 90 (or 45) degrees for full 10 secs  5b. Motor Arm, Right: 1-->Drift: limb holds 90 (or 45) degrees, but drifts down before full 10 secs: does not hit bed or other support  6a. Motor Leg, Left: 0-->No drift: leg holds 30 degree position for full 5 secs  6b. Motor Leg, Right: 0-->No drift: leg holds 30 degree position for full 5 secs  7. Limb Ataxia: 0-->Absent  8. Sensory: 0-->Normal: no sensory loss  9. Best Language: 0-->No aphasia: normal  10. Dysarthria: 1-->Mild-to-moderate dysarthria: patient slurs at least some words and, at worst, can be understood with some difficulty  11. Extinction and Inattention (formerly Neglect): 0-->No abnormality  Total (NIH Stroke Scale): 3       Modified Woods Score: 2  [place] Coma Scale:    ABCD2 Score:    [id number] Score:   HAS -BLED Score:   ICH Score:   Hunt & Francis Classification:      Hemorrhagic change of an Ischemic Stroke: Does this patient have an ischemic stroke with hemorrhagic changes? No     Neurologic Chief Complaint: Pontine Lesion    Subjective:     Interval History: Patient is seen  for follow-up neurological assessment and treatment recommendations:     Headache resolved after LP yesterday.  Discussed with neuroradiology, suspicious for neuro-behcets.      HPI, Past Medical, Family, and Social History remains the same as documented in the initial encounter.     Review of Systems   Constitutional: Negative for fever.   Eyes: Positive for visual disturbance.   Respiratory: Negative for shortness of breath.    Cardiovascular: Negative for chest pain.   Gastrointestinal: Negative for abdominal pain.   Genitourinary: Negative for dysuria.   Skin: Negative for rash.   Neurological: Positive for speech difficulty, weakness and headaches. Negative for numbness.   Psychiatric/Behavioral: Negative for confusion.     Scheduled Meds:   citalopram  20 mg Oral Daily    dipyridamole-aspirin 200-25 mg  1 capsule Oral BID    gabapentin  300 mg Oral TID    heparin (porcine)  5,000 Units Subcutaneous Q8H    nicotine  1 patch Transdermal Daily    rosuvastatin  20 mg Oral Daily    sodium chloride 0.9%  3 mL Intravenous Q8H     Continuous Infusions:    PRN Meds:acetaminophen, labetalol, ondansetron, polyethylene glycol    Objective:     Vital Signs (Most Recent):  Temp: 97.6 °F (36.4 °C) (10/26/18 0427)  Pulse: 78 (10/26/18 0901)  Resp: 15 (10/26/18 0901)  BP: (!) 111/58 (10/26/18 0901)  SpO2: 95 % (10/26/18 0901)  BP Location: Right arm    Vital Signs Range (Last 24H):  Temp:  [96.7 °F (35.9 °C)-98.7 °F (37.1 °C)]   Pulse:  [64-94]   Resp:  [14-22]   BP: ()/(51-73)   SpO2:  [92 %-100 %]   BP Location: Right arm    Physical Exam   Constitutional: She is oriented to person, place, and time. She appears well-developed. No distress.   HENT:   Head: Normocephalic.   No oral ulcers noted   Eyes: EOM are normal. Pupils are equal, round, and reactive to light.   Cardiovascular: Normal rate, regular rhythm and normal heart sounds. Exam reveals no friction rub.   No murmur heard.  Pulmonary/Chest: Effort normal  and breath sounds normal. No stridor. No respiratory distress.   Abdominal: Soft. Bowel sounds are normal. She exhibits no distension. There is no tenderness.   Musculoskeletal: She exhibits no edema.   Neurological: She is oriented to person, place, and time.   Skin: Skin is warm and dry.        Neurological Exam:   LOC: alert  Attention Span: Good   Language: No aphasia  Articulation: Dysarthria  Orientation: Person, Place, Time   Visual Fields: Full  EOM (CN III, IV, VI): Full/intact  Pupils (CN II, III): PERRL  Facial Sensation (CN V): Normal  Facial Movement (CN VII): Lower facial weakness on the Right  Motor: 5/5 in BUE, 4+/5 RLE, 5/5 LLE  Cebellar: finger/nose intact b/l  Sensation: to light touch intact in BUE and BLE  Tone: Increased on RUE    Color plates: 2/17 on R (although limited by decreased visual acuity), 17/17 on L    Laboratory:  Recent Results (from the past 24 hour(s))   CSF cell count with differential    Collection Time: 10/25/18  2:39 PM   Result Value Ref Range    Heme Aliquot 1.0 mL    Appearance, CSF Clear Clear    Color, CSF Colorless Colorless    WBC,  (H) 0 - 5 /cu mm    RBC, CSF 9 (A) 0 /cu mm    Segmented Neutrophils, CSF 2 0 - 6 %    Lymphs, CSF 65 40 - 80 %    Mono/Macrophage, CSF 33 15 - 45 %   Protein, CSF    Collection Time: 10/25/18  2:39 PM   Result Value Ref Range    Protein, CSF 40 15 - 40 mg/dL   Glucose, CSF    Collection Time: 10/25/18  2:39 PM   Result Value Ref Range    Glucose, CSF 46 40 - 70 mg/dL   CSF cell count with differential    Collection Time: 10/25/18  2:39 PM   Result Value Ref Range    Heme Aliquot 1.0 mL    Appearance, CSF Clear Clear    Color, CSF Colorless Colorless    WBC,  (H) 0 - 5 /cu mm    RBC, CSF 2 (A) 0 /cu mm    Segmented Neutrophils, CSF 3 0 - 6 %    Lymphs, CSF 59 40 - 80 %    Mono/Macrophage, CSF 38 15 - 45 %   CSF culture    Collection Time: 10/25/18  2:40 PM   Result Value Ref Range    CSF CULTURE No Growth to date     Gram  Stain Result Cytospin indicates:     Gram Stain Result Rare WBC's     Gram Stain Result No organisms seen    Connie Ink (CSF)    Collection Time: 10/25/18  2:40 PM   Result Value Ref Range    Connie Ink No encapsulated yeast seen    M. tuberculosis DNA by PCR [facility]; Cerebrospinal Fluid    Collection Time: 10/25/18  2:41 PM   Result Value Ref Range    MTB Specimen Source Cerebrospinal Fluid    Toxoplasma Gondii/PCR, Non-blood Cerebrospinal Fluid    Collection Time: 10/25/18  2:41 PM   Result Value Ref Range    T. gondii Source Cerebrospinal Fluid    Varicella Zoster By Rapid Pcr Cerebrospinal Fluid    Collection Time: 10/25/18  2:42 PM   Result Value Ref Range    VZV by PCR Source Cerebrospinal Fluid    MS Profile Blood Collection    Collection Time: 10/25/18  3:28 PM   Result Value Ref Range    Blood Collection for MS Profile See MS Panel Result when available    CBC auto differential    Collection Time: 10/26/18  6:20 AM   Result Value Ref Range    WBC 6.80 3.90 - 12.70 K/uL    RBC 3.44 (L) 4.00 - 5.40 M/uL    Hemoglobin 10.7 (L) 12.0 - 16.0 g/dL    Hematocrit 34.6 (L) 37.0 - 48.5 %     (H) 82 - 98 fL    MCH 31.1 (H) 27.0 - 31.0 pg    MCHC 30.9 (L) 32.0 - 36.0 g/dL    RDW 12.6 11.5 - 14.5 %    Platelets 345 150 - 350 K/uL    MPV 9.8 9.2 - 12.9 fL    Immature Granulocytes 0.3 0.0 - 0.5 %    Gran # (ANC) 4.3 1.8 - 7.7 K/uL    Immature Grans (Abs) 0.02 0.00 - 0.04 K/uL    Lymph # 1.8 1.0 - 4.8 K/uL    Mono # 0.7 0.3 - 1.0 K/uL    Eos # 0.0 0.0 - 0.5 K/uL    Baso # 0.02 0.00 - 0.20 K/uL    nRBC 0 0 /100 WBC    Gran% 62.5 38.0 - 73.0 %    Lymph% 26.6 18.0 - 48.0 %    Mono% 9.9 4.0 - 15.0 %    Eosinophil% 0.4 0.0 - 8.0 %    Basophil% 0.3 0.0 - 1.9 %    Differential Method Automated    Comprehensive metabolic panel    Collection Time: 10/26/18  6:20 AM   Result Value Ref Range    Sodium 139 136 - 145 mmol/L    Potassium 4.0 3.5 - 5.1 mmol/L    Chloride 108 95 - 110 mmol/L    CO2 26 23 - 29 mmol/L    Glucose 80 70 -  110 mg/dL    BUN, Bld 11 6 - 20 mg/dL    Creatinine 0.5 0.5 - 1.4 mg/dL    Calcium 8.5 (L) 8.7 - 10.5 mg/dL    Total Protein 5.5 (L) 6.0 - 8.4 g/dL    Albumin 2.8 (L) 3.5 - 5.2 g/dL    Total Bilirubin 0.2 0.1 - 1.0 mg/dL    Alkaline Phosphatase 55 55 - 135 U/L    AST 21 10 - 40 U/L    ALT 23 10 - 44 U/L    Anion Gap 5 (L) 8 - 16 mmol/L    eGFR if African American >60.0 >60 mL/min/1.73 m^2    eGFR if non African American >60.0 >60 mL/min/1.73 m^2   Magnesium    Collection Time: 10/26/18  6:20 AM   Result Value Ref Range    Magnesium 1.8 1.6 - 2.6 mg/dL   Phosphorus    Collection Time: 10/26/18  6:20 AM   Result Value Ref Range    Phosphorus 2.8 2.7 - 4.5 mg/dL   Vitamin B12    Collection Time: 10/26/18  8:52 AM   Result Value Ref Range    Vitamin B-12 543 210 - 950 pg/mL   Folate    Collection Time: 10/26/18  8:52 AM   Result Value Ref Range    Folate 6.6 4.0 - 24.0 ng/mL   Homocysteine, serum    Collection Time: 10/26/18  8:52 AM   Result Value Ref Range    Homocysteine 4.9 4.0 - 15.5 umol/L         Diagnostic Results     Brain Imaging   10/25/18 MRI Brain w/ and w/o: Per independent resident review and interpretation,  Ring enhancing-lesion with surrounding T2 flair edema with abnormal diffusion restriction in b/l jensen.                Vessel Imaging   10/25/18 CTA Head and neck: Per independent resident review and interpretation,  No large-vessel occlusion, hemodynamically-significant stenosis, nor aneurysm visualized.    Cardiac Imaging   10/25/18 TTE: Per report,  LA wnl  EF 60-65%  No wall motion abnormalities  Diastolic function normal            Nathanael Borja MD  Comprehensive Stroke Center  Department of Vascular Neurology   [facility]

## 2018-10-26 NOTE — HPI
Maame Cortez is a 37-year-old female with PMHx of alcohol use (1 bottle of wine/night), MTHFR mutation and a previous left BG hemorrhagic infarct and a smaller right BG infarct in which she received inpatient therapy at [facility] in 2015.  Patient presented to [facility] on 10/24 with gradual progressive posterior HA and dysarthria. CTH revealed no acute pathology.  Not a tPA candidate 2/2 strong suspicion for stroke mimic or alternative diagnosis.  CTA revealed no LVO.  MRI brain revealed bilateral pontine lesion with significant surrounding edema, diffusion restriction, and central ring contrast enhancement. Etiology unclear but DDX includes malignancy/met/lymphoma, PRES, infection, rhomboencephalitis, demyelinating/autoimmune disease, & CPM per Vascular Neurology.      Functional History: Patient lives in [place] with  and 3 boys ages 19, 9 and 9 in one story home with 15 steps to enter.  PTA patient required assistance with tying laces, buttoning and cutting food 2* right sided weakness following stroke in 2015.  DME: none.

## 2018-10-26 NOTE — ASSESSMENT & PLAN NOTE
"Mrs. Cortez is a 38 y/o female with prior L Basal ganglia lesion (2015, thought to be stroke at the time, likely some infarcted component) and known MTHFR mutation presents with a gradual progressive posterior HA and dysarthria.     MRI revealed b/l pontine lesion with significant surrounding edema, diffusion restriction, and central ring contrast enhancement.  CSF findings notable for lymphocytic pleocytosis.    Differential includes malignancy/met/lymphoma (perhaps less likely given evolution of prior lesion in 2015), PRES (no clear risk factors, abnormal contrast enhancement), infection (2 lesions in 2 years would make this less likely), rhomboencephalitis, demyelinating/autoimmune disease (Behcet's?), CPM (no clear changes in Na based on history or documentation; also 2 lesions over 2 years makes this less likely).  Suspect less likely to be infarction, also query if prior "CVA" was related to current process as well (why did it occur at such a young age, with no further events, unusual location, and upon further review imaging has similar characteristics to current lesion).  Overall clinical course is more consistent with an autoimmune disorder.      After further review of imaging and labs, most suspicious for Behcet's or other neuro-inflammatory disorder.  Reports a history of oral ulcers, but none currently. Has decreased color vision and visual acuity on R eye.  However, CSF leukocytosis has a lymphocytic pleocytosis (neutrophils more likely to predominate in Behcet's), no evidence of vasculitis on CTA.  No lesions noted on C and T spine MRI.  Cytology negative for malignant cells.    Notable labs resulted:  CRP slight elevation (10.7)      Plan  -Unclear etiology  -Continue home aggrenox, increased rosuvastatin  -F/u CSF and serum studies from 10/26, 10/27  -Consulted general neurology and rheumatology, appreciate assistance    -Considering discharge on 10/28, if arrangements for final dose of steroids to " be given as outpt can be made.  Will need outpt PT/OT/ST.  Anticipate follow-up in my clinic or MS clinic with Dr. Butt, subject to scheduling.

## 2018-10-26 NOTE — HPI
Mrs. Cortez is a 37 year old woman with a history MTHFR mutation, admitted for abnormal MRI in the setting of new dysarthria and dysphagia.    [place] [facility] [place]ided [place]. MRI was concerning for an acute ischemic stroke. Workup was remarkable for MTHFR mutation, which was discovered due to the history of her son with autism and the same mutation. Imaging at that time showed a remote right sided lacune. During admission she developed vision loss int he center of her right eye, which they were later told was due to a stroke (ischemic optic neuropathy). She was put on aggrenox and atorvastatin for secondary prevention. She recovered well from this but has residual right sided weakness.    On 9/29/17 she developed a posterior headache with associated dizziness, auditory hallucination (heard dogs barking), lip numbness/tingling, feeling off balance and unable to focus on faces. She was evaluated in the ER by vascular neurology, but no etiology was found so she was discharged home. She continued to complain of lip tingling/numbness, as well as right arm numbness and feeling dizzy into October but this gradually resolved.     She reports having frequent oral ulcers in the past (1 every 3 months), which she attributes to eating lots of sour/vinegar based foods. She had 1 genital ulcer, but not recurrent. After her first event in 2015 she had persistent hiccups, no significant nausea. She denies any rashes, vision/eye issues, fevers, or other focal deficits. She has gained weight since her stroke, now at 130lbs. She has a family history of ruptured aneurysm (maternal great-aunt) and ischemic stroke (maternal great-aunt). No family history of autoimmune disorders.

## 2018-10-26 NOTE — ASSESSMENT & PLAN NOTE
37 year old woman with new onset dysarthria and dysphagia, and an enhancing pontine lesion. The etiology is unclear, though differentials include demyelinating, infectious, autoimmune (eg. Behcets), malignant. CSF studies were remarkable for pleocytosis, with negative gram stain and negative cytology. Multiple studies are pending.    Recommendations:  -- Follow up remaining serums/CSF labs  -- Labs added on: NMO serum, HSV 1&2 Ab (to rule out HSV as cause for oral ulcers), HLA-B51 (evaluate for Behcet's)  -- Start solumedrol 1000mg IV q24hr x3 days  -- Rheumatology consult for question of Behcets  -- MRI cervical and thoracic spine w/wo contrast

## 2018-10-26 NOTE — PLAN OF CARE
Problem: SLP Goal  Goal: SLP Goal  Goals to be met 11/2  1 pt will tolerate regular diet and thin liquids/met  2 pt will participate in speech lang eval /met  3.  Recall speech strategies with no cues  4.  Assess higher lever cognitive skills   5.  Respond to functional math and time calculations with 100% accuracy  6.  Assess functional reading and writing skills  Outcome: Ongoing (interventions implemented as appropriate)  Outpt. Speech therapy recommended.    Sally Pendleton MA/[facility]-SLP  Speech Language Pathologist  Pager [phone]  10/26/2018

## 2018-10-26 NOTE — HPI
38yo F with history of MTHFR mutation and 4 strokes? Present with new onset of dysarthria and dysphagia since Oct 22.      2015 - patient present with acute onset of R sided weakness including R facial droop.  MRI at that time was concerning for acute ischemic stroke off the R lacune and possible old stroke.  Workup at that time revealed MTHFR mutation (Later reviewed, son has the same mutation).  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  She's been on aggrenox and statin for secondary stroke prevention.   She continues to have residual R (upper and lower) side and R central vision deficits from the stroke.     Oct 22 - patient developed acute onset of severe posterior headache.  Patient thought she was experiencing migraines but nothing was alleviating the symptoms.  She presented to the ED and was discharge home.  She continues to have worsening headaches, associated symptoms include nausea/vomiting, dizziness, and dysarthria. MRI at time showed new infarction of the jensen that appears to be edematous and swollen.  Repeat imaging done on Oct 24 showed evolving diffused edema signal within the pones with ill-defined central enhancement.  There was a concern about infectious vs autoimmune phenomenon.      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  Self resolve in a couple of days and does not interfere with eating.).  She had a remote history of genital ulcer/lesion x 1, that was evaluated by Ob/Gyn and didn't think it was anything.  Self resolve and no recurrence.  Denies any rash, photosensitivity, skin bumps/boils.  She denies any history of uveitis or eye problem.      No family history of any autoimmune diseases that she is aware of.  Denies any rashes, alopecia, N/V,fever/chills,  sick contacts, recent travels (outside of the [place] or to other parts of the country), recent weight loss, SOB, CP, diarrhea/constipation, urinary/bowel symptoms.

## 2018-10-26 NOTE — SUBJECTIVE & OBJECTIVE
Past Medical History:   Diagnosis Date    Heart palpitations     MTHFR mutation     Stroke 08/2015       Past Surgical History:   Procedure Laterality Date    BELT ABDOMINOPLASTY      2001    MN TRANSCRAN DOPP INTRACRAN, EMBOLI W/INJ  8/11/2015         WISDOM TOOTH EXTRACTION         Review of patient's allergies indicates:  No Known Allergies      No current facility-administered medications on file prior to encounter.      Current Outpatient Medications on File Prior to Encounter   Medication Sig    butalbital-acetaminophen-caffeine -40 mg (FIORICET, ESGIC) -40 mg per tablet Take 1 tablet by mouth every 4 (four) hours as needed for Pain.    citalopram (CELEXA) 20 MG tablet Take 1 tablet (20 mg total) by mouth once daily.    dipyridamole-aspirin 200-25 mg (AGGRENOX)  mg CM12 TAKE 1 CAPSULE BY MOUTH 2 (TWO) TIMES DAILY.    gabapentin (NEURONTIN) 300 MG capsule Take 300 mg by mouth 3 (three) times daily.    rosuvastatin (CRESTOR) 5 MG tablet TAKE 1 TABLET (5 MG TOTAL) BY MOUTH ONCE DAILY FOR CHOLESTEROL     Family History     Problem Relation (Age of Onset)    Birth defects Maternal Grandmother, Paternal Grandfather (80)    Breast cancer Maternal Grandmother (70)    Stroke Maternal Grandmother        Tobacco Use    Smoking status: Former Smoker     Packs/day: 0.50     Years: 8.00     Pack years: 4.00     Last attempt to quit: 8/21/2015     Years since quitting: 3.1    Smokeless tobacco: Never Used    Tobacco comment: quit 8/8/2015   Substance and Sexual Activity    Alcohol use: Yes     Alcohol/week: 0.0 oz     Comment: Social    Drug use: No    Sexual activity: Yes     Partners: Male     Birth control/protection: None     Comment:       Review of Systems   Constitutional: Negative for activity change, chills and fever.   HENT: Negative for sinus pressure and trouble swallowing.    Eyes: Negative for pain and visual disturbance.   Respiratory: Negative for cough, chest  tightness and shortness of breath.    Cardiovascular: Negative for chest pain and palpitations.   Gastrointestinal: Negative for abdominal pain, diarrhea, nausea and vomiting.   Genitourinary: Negative for difficulty urinating and dysuria.   Musculoskeletal: Negative for back pain and neck pain.   Skin: Negative for rash and wound.   Neurological: Negative for weakness, numbness and headaches.   Psychiatric/Behavioral: Negative for agitation and confusion.     Objective:     Vital Signs (Most Recent):  Temp: 96.3 °F (35.7 °C) (10/26/18 1145)  Pulse: 84 (10/26/18 1200)  Resp: 18 (10/26/18 1145)  BP: (!) 85/54 (10/26/18 1145)  SpO2: (!) 93 % (10/26/18 1145) Vital Signs (24h Range):  Temp:  [96.3 °F (35.7 °C)-98.7 °F (37.1 °C)] 96.3 °F (35.7 °C)  Pulse:  [64-94] 84  Resp:  [15-20] 18  SpO2:  [92 %-100 %] 93 %  BP: ()/(51-65) 85/54     Weight: 59 kg (130 lb)  Body mass index is 22.31 kg/m².    Physical Exam   Constitutional: She is oriented to person, place, and time. She appears well-developed and well-nourished.   HENT:   Head: Normocephalic and atraumatic.   Eyes: EOM are normal. Pupils are equal, round, and reactive to light.   Neck: Normal range of motion.   Cardiovascular: Normal rate.   Pulmonary/Chest: Effort normal. No respiratory distress.   Abdominal: She exhibits no distension.   Neurological: She is oriented to person, place, and time. She has a normal Finger-Nose-Finger Test.   Reflex Scores:       Tricep reflexes are 3+ on the right side and 2+ on the left side.       Bicep reflexes are 3+ on the right side and 2+ on the left side.       Brachioradialis reflexes are 3+ on the right side and 2+ on the left side.       Patellar reflexes are 3+ on the right side and 2+ on the left side.       Achilles reflexes are 3+ on the right side and 2+ on the left side.  Skin: Skin is warm and dry.   Psychiatric: She has a normal mood and affect. Her behavior is normal. Her speech is slurred.   Nursing note and  vitals reviewed.      NEUROLOGICAL EXAMINATION:     MENTAL STATUS   Oriented to person, place, and time.   Attention: normal. Concentration: normal.   Speech: slurred   Level of consciousness: alert    CRANIAL NERVES     CN II   Visual fields full to confrontation.     CN III, IV, VI   Pupils are equal, round, and reactive to light.  Extraocular motions are normal.     CN V   Facial sensation intact.     CN VII   Right facial weakness: central  Left facial weakness: none    CN VIII   CN VIII normal.     CN IX, X   CN IX normal.   CN X normal.     CN XI   CN XI normal.     CN XII   CN XII normal.     MOTOR EXAM   Muscle bulk: normal  Overall muscle tone: normal    Strength   Strength 5/5 except as noted.   Right deltoid: 4/5  Right biceps: 4/5  Right triceps: 4/5  Right interossei: 4/5  Right iliopsoas: 4/5    REFLEXES     Reflexes   Right brachioradialis: 3+  Left brachioradialis: 2+  Right biceps: 3+  Left biceps: 2+  Right triceps: 3+  Left triceps: 2+  Right patellar: 3+  Left patellar: 2+  Right achilles: 3+  Left achilles: 2+  Right plantar: normal  Left plantar: normal  Right Kennedy: present  Left Kennedy: absent    SENSORY EXAM   Light touch normal.   Vibration normal.     GAIT AND COORDINATION      Coordination   Finger to nose coordination: normal    Tremor   Resting tremor: absent  Intention tremor: absent  Action tremor: absent      Significant Labs:   CBC:   Recent Labs   Lab 10/24/18  1918 10/25/18  0840 10/26/18  0620   WBC 8.80 6.44 6.80   HGB 12.6 11.8* 10.7*   HCT 38.5 38.0 34.6*   * 337 345     CMP:   Recent Labs   Lab 10/24/18  1918 10/25/18  0840 10/26/18  0620   GLU 83 72 80    138 139   K 3.9 3.7 4.0    102 108   CO2 32* 30* 26   BUN 12 12 11   CREATININE 0.7 0.6 0.5   CALCIUM 9.6 9.2 8.5*   MG  --  2.0 1.8   PROT 7.2 6.9 5.5*   ALBUMIN 3.6 3.4* 2.8*   BILITOT 0.2 0.3 0.2   ALKPHOS 70 68 55   AST 19 18 21   ALT 21 20 23   ANIONGAP 4* 6* 5*   EGFRNONAA >60.0 >60.0 >60.0      Lab Results   Component Value Date    [id number] 543 10/26/2018        Ref. Range 10/25/2018 14:39 10/25/2018 14:39   Color, CSF Latest Ref Range: Colorless  Colorless Colorless   Heme Aliquot Latest Units: mL 1.0 1.0   Appearance, CSF Latest Ref Range: Clear  Clear Clear   WBC, CSF Latest Ref Range: 0 - 5 /cu mm 107 (H) 101 (H)   RBC, CSF Latest Ref Range: 0 /cu mm 9 (A) 2 (A)   Segmented Neutrophils, CSF Latest Ref Range: 0 - 6 % 2 3   Lymphs, CSF Latest Ref Range: 40 - 80 % 65 59   Mono/Macrophage, CSF Latest Ref Range: 15 - 45 % 33 38   Glucose, CSF Latest Ref Range: 40 - 70 mg/dL 46    Protein, CSF Latest Ref Range: 15 - 40 mg/dL 40      Pending CSF studies:  - NMO  - ACE  - MS profile  - VDRL  - Flow cytometry  - MTb DNA  - Toxo PCR  - HSV PCR  - WNV PCR  - VZV PCR  - Enterovirus RNA    Pending serum studies:  - B1  - MMA    Significant Imaging: I have reviewed all pertinent imaging results/findings within the past 24 hours.   MRI brain w wo contrast 10/25/18

## 2018-10-26 NOTE — CONSULTS
[facility]  Neurology  Consult Note    Patient Name: Maame Cortez  MRN: [id number]  Admission Date: 10/24/2018  Hospital Length of Stay: 1 days  Code Status: Full Code   Attending Provider: Balta Richardson MD   Consulting Provider: Dolores Hernandez MD  Primary Care Physician: Woodrow Bruce MD  Principal Problem:Pontine lesion    Inpatient consult to Neurology  Consult performed by: Dolores Hernandez MD  Consult ordered by: Nathanael Borja MD         Subjective:     Chief Complaint:  Pontine lesion     HPI:   Mrs. Cortez is a 37 year old woman with a history MTHFR mutation, admitted for abnormal MRI in the setting of new dysarthria and dysphagia.    In 2015, she presented to [facility] [place] with sudden onset of right sided weakness. MRI was concerning for an acute ischemic stroke. Workup was remarkable for MTHFR mutation, which was discovered due to the history of her son with autism and the same mutation. Imaging at that time showed a remote right sided lacune. During admission she developed vision loss int he center of her right eye, which they were later told was due to a stroke (ischemic optic neuropathy). She was put on aggrenox and atorvastatin for secondary prevention. She recovered well from this but has residual right sided weakness.    On 9/29/17 she developed a posterior headache with associated dizziness, auditory hallucination (heard dogs barking), lip numbness/tingling, feeling off balance and unable to focus on faces. She was evaluated in the ER by vascular neurology, but no etiology was found so she was discharged home. She continued to complain of lip tingling/numbness, as well as right arm numbness and feeling dizzy into October but this gradually resolved.     She reports having frequent oral ulcers in the past (1 every 3 months), which she attributes to eating lots of sour/vinegar based foods. She had 1 genital ulcer, but not recurrent. After her first event in 2015 she had  persistent hiccups, no significant nausea. She denies any rashes, vision/eye issues, fevers, or other focal deficits. She has gained weight since her stroke, now at 130lbs. She has a family history of ruptured aneurysm (maternal great-aunt) and ischemic stroke (maternal great-aunt). No family history of autoimmune disorders.     Past Medical History:   Diagnosis Date    Heart palpitations     MTHFR mutation     Stroke 08/2015       Past Surgical History:   Procedure Laterality Date    BELT ABDOMINOPLASTY      2001    SC TRANSCRAN DOPP INTRACRAN, EMBOLI W/INJ  8/11/2015         WISDOM TOOTH EXTRACTION         Review of patient's allergies indicates:  No Known Allergies      No current facility-administered medications on file prior to encounter.      Current Outpatient Medications on File Prior to Encounter   Medication Sig    butalbital-acetaminophen-caffeine -40 mg (FIORICET, ESGIC) -40 mg per tablet Take 1 tablet by mouth every 4 (four) hours as needed for Pain.    citalopram (CELEXA) 20 MG tablet Take 1 tablet (20 mg total) by mouth once daily.    dipyridamole-aspirin 200-25 mg (AGGRENOX)  mg CM12 TAKE 1 CAPSULE BY MOUTH 2 (TWO) TIMES DAILY.    gabapentin (NEURONTIN) 300 MG capsule Take 300 mg by mouth 3 (three) times daily.    rosuvastatin (CRESTOR) 5 MG tablet TAKE 1 TABLET (5 MG TOTAL) BY MOUTH ONCE DAILY FOR CHOLESTEROL     Family History     Problem Relation (Age of Onset)    Birth defects Maternal Grandmother, Paternal Grandfather (80)    Breast cancer Maternal Grandmother (70)    Stroke Maternal Grandmother        Tobacco Use    Smoking status: Former Smoker     Packs/day: 0.50     Years: 8.00     Pack years: 4.00     Last attempt to quit: 8/21/2015     Years since quitting: 3.1    Smokeless tobacco: Never Used    Tobacco comment: quit 8/8/2015   Substance and Sexual Activity    Alcohol use: Yes     Alcohol/week: 0.0 oz     Comment: Social    Drug use: No    Sexual  activity: Yes     Partners: Male     Birth control/protection: None     Comment:       Review of Systems   Constitutional: Negative for activity change, chills and fever.   HENT: Negative for sinus pressure and trouble swallowing.    Eyes: Negative for pain and visual disturbance.   Respiratory: Negative for cough, chest tightness and shortness of breath.    Cardiovascular: Negative for chest pain and palpitations.   Gastrointestinal: Negative for abdominal pain, diarrhea, nausea and vomiting.   Genitourinary: Negative for difficulty urinating and dysuria.   Musculoskeletal: Negative for back pain and neck pain.   Skin: Negative for rash and wound.   Neurological: Negative for weakness, numbness and headaches.   Psychiatric/Behavioral: Negative for agitation and confusion.     Objective:     Vital Signs (Most Recent):  Temp: 96.3 °F (35.7 °C) (10/26/18 1145)  Pulse: 84 (10/26/18 1200)  Resp: 18 (10/26/18 1145)  BP: (!) 85/54 (10/26/18 1145)  SpO2: (!) 93 % (10/26/18 1145) Vital Signs (24h Range):  Temp:  [96.3 °F (35.7 °C)-98.7 °F (37.1 °C)] 96.3 °F (35.7 °C)  Pulse:  [64-94] 84  Resp:  [15-20] 18  SpO2:  [92 %-100 %] 93 %  BP: ()/(51-65) 85/54     Weight: 59 kg (130 lb)  Body mass index is 22.31 kg/m².    Physical Exam   Constitutional: She is oriented to person, place, and time. She appears well-developed and well-nourished.   HENT:   Head: Normocephalic and atraumatic.   Eyes: EOM are normal. Pupils are equal, round, and reactive to light.   Neck: Normal range of motion.   Cardiovascular: Normal rate.   Pulmonary/Chest: Effort normal. No respiratory distress.   Abdominal: She exhibits no distension.   Neurological: She is oriented to person, place, and time. She has a normal Finger-Nose-Finger Test.   Reflex Scores:       Tricep reflexes are 3+ on the right side and 2+ on the left side.       Bicep reflexes are 3+ on the right side and 2+ on the left side.       Brachioradialis reflexes are 3+ on the  right side and 2+ on the left side.       Patellar reflexes are 3+ on the right side and 2+ on the left side.       Achilles reflexes are 3+ on the right side and 2+ on the left side.  Skin: Skin is warm and dry.   Psychiatric: She has a normal mood and affect. Her behavior is normal. Her speech is slurred.   Nursing note and vitals reviewed.      NEUROLOGICAL EXAMINATION:     MENTAL STATUS   Oriented to person, place, and time.   Attention: normal. Concentration: normal.   Speech: slurred   Level of consciousness: alert    CRANIAL NERVES     CN II   Visual fields full to confrontation.     CN III, IV, VI   Pupils are equal, round, and reactive to light.  Extraocular motions are normal.     CN V   Facial sensation intact.     CN VII   Right facial weakness: central  Left facial weakness: none    CN VIII   CN VIII normal.     CN IX, X   CN IX normal.   CN X normal.     CN XI   CN XI normal.     CN XII   CN XII normal.     MOTOR EXAM   Muscle bulk: normal  Overall muscle tone: normal    Strength   Strength 5/5 except as noted.   Right deltoid: 4/5  Right biceps: 4/5  Right triceps: 4/5  Right interossei: 4/5  Right iliopsoas: 4/5    REFLEXES     Reflexes   Right brachioradialis: 3+  Left brachioradialis: 2+  Right biceps: 3+  Left biceps: 2+  Right triceps: 3+  Left triceps: 2+  Right patellar: 3+  Left patellar: 2+  Right achilles: 3+  Left achilles: 2+  Right plantar: normal  Left plantar: normal  Right Kennedy: present  Left Kennedy: absent    SENSORY EXAM   Light touch normal.   Vibration normal.     GAIT AND COORDINATION      Coordination   Finger to nose coordination: normal    Tremor   Resting tremor: absent  Intention tremor: absent  Action tremor: absent      Significant Labs:   CBC:   Recent Labs   Lab 10/24/18  1918 10/25/18  0840 10/26/18  0620   WBC 8.80 6.44 6.80   HGB 12.6 11.8* 10.7*   HCT 38.5 38.0 34.6*   * 337 345     CMP:   Recent Labs   Lab 10/24/18  1918 10/25/18  0840 10/26/18  0620   GLU  83 72 80    138 139   K 3.9 3.7 4.0    102 108   CO2 32* 30* 26   BUN 12 12 11   CREATININE 0.7 0.6 0.5   CALCIUM 9.6 9.2 8.5*   MG  --  2.0 1.8   PROT 7.2 6.9 5.5*   ALBUMIN 3.6 3.4* 2.8*   BILITOT 0.2 0.3 0.2   ALKPHOS 70 68 55   AST 19 18 21   ALT 21 20 23   ANIONGAP 4* 6* 5*   EGFRNONAA >60.0 >60.0 >60.0     Lab Results   Component Value Date    [id number] 543 10/26/2018        Ref. Range 10/25/2018 14:39 10/25/2018 14:39   Color, CSF Latest Ref Range: Colorless  Colorless Colorless   Heme Aliquot Latest Units: mL 1.0 1.0   Appearance, CSF Latest Ref Range: Clear  Clear Clear   WBC, CSF Latest Ref Range: 0 - 5 /cu mm 107 (H) 101 (H)   RBC, CSF Latest Ref Range: 0 /cu mm 9 (A) 2 (A)   Segmented Neutrophils, CSF Latest Ref Range: 0 - 6 % 2 3   Lymphs, CSF Latest Ref Range: 40 - 80 % 65 59   Mono/Macrophage, CSF Latest Ref Range: 15 - 45 % 33 38   Glucose, CSF Latest Ref Range: 40 - 70 mg/dL 46    Protein, CSF Latest Ref Range: 15 - 40 mg/dL 40      Pending CSF studies:  - NMO  - ACE  - MS profile  - VDRL  - Flow cytometry  - MTb DNA  - Toxo PCR  - HSV PCR  - WNV PCR  - VZV PCR  - Enterovirus RNA    Pending serum studies:  - B1  - MMA    Significant Imaging: I have reviewed all pertinent imaging results/findings within the past 24 hours.   MRI brain w wo contrast 10/25/18      Assessment and Plan:     * Pontine lesion    37 year old woman with new onset dysarthria and dysphagia, and an enhancing pontine lesion. The etiology is unclear, though differentials include demyelinating, infectious, autoimmune (eg. Behcets), malignant. CSF studies were remarkable for pleocytosis, with negative gram stain and negative cytology. Multiple studies are pending.    Recommendations:  -- Follow up remaining serums/CSF labs  -- Labs added on: NMO serum, HSV 1&2 Ab (to rule out HSV as cause for oral ulcers), HLA-B51 (evaluate for Behcet's)  -- Start solumedrol 1000mg IV q24hr x3 days  -- Rheumatology consult for question  of Behcets  -- MRI cervical and thoracic spine w/wo contrast         VTE Risk Mitigation (From admission, onward)        Ordered     heparin (porcine) injection 5,000 Units  Every 8 hours      10/25/18 1635     Place sequential compression device  Until discontinued      10/25/18 0137     IP VTE HIGH RISK PATIENT  Once      10/25/18 0137          Thank you for your consult. I will follow-up with patient. Please contact us if you have any additional questions.    Dolores Hernandez MD  Neurology  [facility]

## 2018-10-26 NOTE — PT/OT/SLP EVAL
"Speech Language Pathology Evaluation  Cognitive Communication    Patient Name:  Maame Cortez   MRN:  [id number]  Admitting Diagnosis: Pontine lesion    Recommendations:     Recommendations:                General Recommendations:  Speech/language therapy and Cognitive-linguistic therapy  Diet recommendations:  Regular, Thin   Aspiration Precautions: Standard aspiration precautions   General Precautions: Standard, fall  Communication strategies:  none    History:     Past Medical History:   Diagnosis Date    Heart palpitations     MTHFR mutation     Stroke 08/2015       Past Surgical History:   Procedure Laterality Date    BELT ABDOMINOPLASTY      2001    TX TRANSCRAN DOPP INTRACRAN, EMBOLI W/INJ  8/11/2015         WISDOM TOOTH EXTRACTION         Social History: Patient lives with spouse and 2 children.    Prior diet: regular with thin        Subjective     "She is answering faster today"  Per mother  Patient goals: home    Pain/Comfort:  · Pain Rating 1: 0/10  · Pain Rating Post-Intervention 1: 0/10    Objective:   Cognitive Status:    Pt. was oriented x3 with good recall of recent and remote temporal and general information.  Immediate/delayed verbal recall was wfl with pt. Repeating 7 digits and 5 words without difficulty however mild delays in responding noted   Responses to hypothetical verbal problem solving tasks were accurate and complete though mild delays in responding noted.  Pt. Compared but did not contrast objects and generated  2/3 solutions to problems.  Thought organization and categorization skills were  Mildly impaired with pt. Naming 11 animals given one minute when 15-20 are wnl.  Pt. Responded to functional math and time calculations with 80% accuracy.        Receptive Language:   Comprehension:   Pt. Responded to complex yes/no questions and multi step commands with 100% accuracy and no delays in responding.        Pragmatics:    Mild delays in responding noted    Expressive " Language:  Verbal:    Verbal language skills were wfl with no evidence of aphasia.  Pt. Expressed their thoughts coherently in conversation with no evidence of confusion or word finding deficits        Motor Speech:  Mild dysarthria    Voice:   wfl    Visual-Spatial:  tba    Reading:   tba     Written Expression:   tba        Assessment:   Maame Cortez is a 37 y.o. female with an SLP diagnosis of Dysarthria and Cognitive-Linguistic Impairment.      Goals:   Multidisciplinary Problems     SLP Goals        Problem: SLP Goal    Goal Priority Disciplines Outcome   SLP Goal     SLP Ongoing (interventions implemented as appropriate)   Description:  Goals to be met 11/2  1 pt will tolerate regular diet and thin liquids/met  2 pt will participate in speech lang eval /met  3.  Recall speech strategies with no cues  4.  Assess higher lever cognitive skills   5.  Respond to functional math and time calculations with 100% accuracy  6.  Assess functional reading and writing skills                    Plan:   · Patient to be seen:  4 x/week   · Plan of Care expires:  11/23/18  · Plan of Care reviewed with:  patient, mother   · SLP Follow-Up:  Yes       Discharge recommendations:  Discharge Facility/Level Of Care Needs: outpatient speech therapy       Time Tracking:   SLP Treatment Date:   10/26/18  Speech Start Time:  0740  Speech Stop Time:  0800     Speech Total Time (min):  20 min    Billable Minutes: Eval 20     Sally Pendleton MA, CCC-SLP  10/26/2018

## 2018-10-26 NOTE — ASSESSMENT & PLAN NOTE
38yo F with history of MTHFR mutation and 4 strokes? Present with new onset of dysarthria and dysphagia since Oct 22.  MRI at time showed new infarction of the jensen that appears to be edematous and swollen.  Repeat imaging done on Oct 24 showed evolving diffused edema signal within the pones with ill-defined central enhancement.  There was a concern about infectious vs autoimmune phenomenon.      Rhuematology was consult for evaluation of possible Neuro Bechet's    Patient has a history of oral ulcer (non aphthosis - which occurs ~1/3x, more frequently when she was younger.  Self resolve in a couple of days and does not interfere with eating.).  She had a remote history of genital ulcer/lesion x 1, that was evaluated by Ob/Gyn and didn't think it was anything.  Self resolve and no recurrence.  Denies any rash, photosensitivity, skin bumps/boils.  She denies any history of uveitis or eye problem.      No family history of any autoimmune diseases that she is aware of.  Denies any rashes, alopecia, N/V,fever/chills, sick contacts, recent travels (outside of the [place] or to other parts of the country), recent weight loss, SOB, CP, diarrhea/constipation, urinary/bowel symptoms.        LP was performed and showed +WBC (lymphocytic pleocytosis) suggestive of neuro inflammatory disorder.      HIV negative.  ROB in the past was negative. CXR in the past was unremarkable.      At this time, ddx does include Neuro Bechet's disorder (but patient only meets 1 criteria of diagnosis - which makes this very unlikely at this time).  Other differential diagnosis includes MS, Neurosyphilis, Neuro sarcoidosis, and Neuro Lupus.  Infective causes such as Lyme's, viral (Herpes, CMV, Varicella) all cannot be ruled out at this time.     Plan  - Agree with primary to start on high dose steroid x 3 days  - Recommend to get SPEP, IPEP, immunoglobulins, ESR/CRP, Hepatitis panel, quantiferon gold,  RPR and Lyme's test prior to starting steroid as  the steroid can change the findings of some of the these labs  - Follow up with ACE level and other infective lab workup  - Continue management of stroke as per primary team

## 2018-10-27 LAB
ALBUMIN CSF-MCNC: 20.4 MG/DL
ALBUMIN SERPL BCP-MCNC: 3.2 G/DL
ALBUMIN SERPL-MCNC: 3290 MG/DL
ALP SERPL-CCNC: 64 U/L
ALT SERPL W/O P-5'-P-CCNC: 29 U/L
ANION GAP SERPL CALC-SCNC: 6 MMOL/L
AST SERPL-CCNC: 23 U/L
BASOPHILS # BLD AUTO: 0.03 K/UL
BASOPHILS NFR BLD: 0.4 %
BILIRUB SERPL-MCNC: 0.1 MG/DL
BUN SERPL-MCNC: 11 MG/DL
CALCIUM SERPL-MCNC: 9.1 MG/DL
CHLORIDE SERPL-SCNC: 106 MMOL/L
CO2 SERPL-SCNC: 27 MMOL/L
CREAT SERPL-MCNC: 0.6 MG/DL
DIFFERENTIAL METHOD: ABNORMAL
EOSINOPHIL # BLD AUTO: 0.1 K/UL
EOSINOPHIL NFR BLD: 1.1 %
ERYTHROCYTE [DISTWIDTH] IN BLOOD BY AUTOMATED COUNT: 12.4 %
EST. GFR  (AFRICAN AMERICAN): >60 ML/MIN/1.73 M^2
EST. GFR  (NON AFRICAN AMERICAN): >60 ML/MIN/1.73 M^2
EV RNA SPEC QL NAA+PROBE: NEGATIVE
GLUCOSE SERPL-MCNC: 93 MG/DL
HCT VFR BLD AUTO: 35.3 %
HGB BLD-MCNC: 11.1 G/DL
IGG CSF-MCNC: 2.2 MG/DL
IGG SERPL-MCNC: 786 MG/DL
IGG SYNTH RATE SER+CSF CALC-MRATE: 0 MG/24 H
IGG/ALB CLEAR SER+CSF-RTO: 0.46
IGG/ALB CSF: 0.11 {RATIO}
IGG/ALB SER: 0.24 {RATIO}
IMM GRANULOCYTES # BLD AUTO: 0.04 K/UL
IMM GRANULOCYTES NFR BLD AUTO: 0.6 %
LYMPHOCYTES # BLD AUTO: 2.2 K/UL
LYMPHOCYTES NFR BLD: 31 %
MAGNESIUM SERPL-MCNC: 1.8 MG/DL
MCH RBC QN AUTO: 31.1 PG
MCHC RBC AUTO-ENTMCNC: 31.4 G/DL
MCV RBC AUTO: 99 FL
MONOCYTES # BLD AUTO: 0.7 K/UL
MONOCYTES NFR BLD: 9.3 %
NEUTROPHILS # BLD AUTO: 4 K/UL
NEUTROPHILS NFR BLD: 57.6 %
NRBC BLD-RTO: 0 /100 WBC
OLIGOCLONAL BANDS CSF ELPH-IMP: 0 BANDS
OLIGOCLONAL BANDS CSF ELPH-IMP: 0 BANDS
OLIGOCLONAL BANDS SERPL: 0 BANDS
PHOSPHATE SERPL-MCNC: 3.7 MG/DL
PLATELET # BLD AUTO: 354 K/UL
PMV BLD AUTO: 10 FL
POTASSIUM SERPL-SCNC: 3.7 MMOL/L
PROT SERPL-MCNC: 6 G/DL
RBC # BLD AUTO: 3.57 M/UL
RPR SER QL: NORMAL
SODIUM SERPL-SCNC: 139 MMOL/L
SPECIMEN SOURCE: NORMAL
SPECIMEN SOURCE: NORMAL
VARICELLA ZOSTER BY PCR RESULT: NEGATIVE
WBC # BLD AUTO: 6.97 K/UL

## 2018-10-27 PROCEDURE — 83735 ASSAY OF MAGNESIUM: CPT

## 2018-10-27 PROCEDURE — 25000003 PHARM REV CODE 250: Performed by: STUDENT IN AN ORGANIZED HEALTH CARE EDUCATION/TRAINING PROGRAM

## 2018-10-27 PROCEDURE — 84100 ASSAY OF PHOSPHORUS: CPT

## 2018-10-27 PROCEDURE — 80053 COMPREHEN METABOLIC PANEL: CPT

## 2018-10-27 PROCEDURE — 84156 ASSAY OF PROTEIN URINE: CPT

## 2018-10-27 PROCEDURE — 86696 HERPES SIMPLEX TYPE 2 TEST: CPT

## 2018-10-27 PROCEDURE — 20600001 HC STEP DOWN PRIVATE ROOM

## 2018-10-27 PROCEDURE — 36415 COLL VENOUS BLD VENIPUNCTURE: CPT

## 2018-10-27 PROCEDURE — 81372 HLA I TYPING COMPLETE LR: CPT

## 2018-10-27 PROCEDURE — 63600175 PHARM REV CODE 636 W HCPCS: Performed by: STUDENT IN AN ORGANIZED HEALTH CARE EDUCATION/TRAINING PROGRAM

## 2018-10-27 PROCEDURE — 86255 FLUORESCENT ANTIBODY SCREEN: CPT

## 2018-10-27 PROCEDURE — 84166 PROTEIN E-PHORESIS/URINE/CSF: CPT | Mod: 26,,, | Performed by: PATHOLOGY

## 2018-10-27 PROCEDURE — 99233 SBSQ HOSP IP/OBS HIGH 50: CPT | Mod: GC,,, | Performed by: PSYCHIATRY & NEUROLOGY

## 2018-10-27 PROCEDURE — 84166 PROTEIN E-PHORESIS/URINE/CSF: CPT

## 2018-10-27 PROCEDURE — A4216 STERILE WATER/SALINE, 10 ML: HCPCS | Performed by: NURSE PRACTITIONER

## 2018-10-27 PROCEDURE — S4991 NICOTINE PATCH NONLEGEND: HCPCS | Performed by: STUDENT IN AN ORGANIZED HEALTH CARE EDUCATION/TRAINING PROGRAM

## 2018-10-27 PROCEDURE — 85025 COMPLETE CBC W/AUTO DIFF WBC: CPT

## 2018-10-27 PROCEDURE — 99233 SBSQ HOSP IP/OBS HIGH 50: CPT | Mod: ,,, | Performed by: PSYCHIATRY & NEUROLOGY

## 2018-10-27 PROCEDURE — 25000003 PHARM REV CODE 250: Performed by: NURSE PRACTITIONER

## 2018-10-27 RX ADMIN — ASPIRIN AND EXTENDED-RELEASE DIPYRIDAMOLE 1 CAPSULE: 25; 200 CAPSULE ORAL at 09:10

## 2018-10-27 RX ADMIN — DEXTROSE: 50 INJECTION, SOLUTION INTRAVENOUS at 08:10

## 2018-10-27 RX ADMIN — GABAPENTIN 300 MG: 300 CAPSULE ORAL at 08:10

## 2018-10-27 RX ADMIN — CITALOPRAM HYDROBROMIDE 20 MG: 10 TABLET ORAL at 08:10

## 2018-10-27 RX ADMIN — NICOTINE 1 PATCH: 14 PATCH, EXTENDED RELEASE TRANSDERMAL at 08:10

## 2018-10-27 RX ADMIN — Medication 3 ML: at 09:10

## 2018-10-27 RX ADMIN — SODIUM CHLORIDE 500 ML: 0.9 INJECTION, SOLUTION INTRAVENOUS at 04:10

## 2018-10-27 RX ADMIN — HEPARIN SODIUM 5000 UNITS: 5000 INJECTION, SOLUTION INTRAVENOUS; SUBCUTANEOUS at 03:10

## 2018-10-27 RX ADMIN — Medication 3 ML: at 03:10

## 2018-10-27 RX ADMIN — GABAPENTIN 300 MG: 300 CAPSULE ORAL at 03:10

## 2018-10-27 RX ADMIN — ASPIRIN AND EXTENDED-RELEASE DIPYRIDAMOLE 1 CAPSULE: 25; 200 CAPSULE ORAL at 08:10

## 2018-10-27 RX ADMIN — GABAPENTIN 300 MG: 300 CAPSULE ORAL at 09:10

## 2018-10-27 RX ADMIN — Medication 3 ML: at 05:10

## 2018-10-27 RX ADMIN — ROSUVASTATIN CALCIUM 20 MG: 20 TABLET, FILM COATED ORAL at 08:10

## 2018-10-27 RX ADMIN — HEPARIN SODIUM 5000 UNITS: 5000 INJECTION, SOLUTION INTRAVENOUS; SUBCUTANEOUS at 09:10

## 2018-10-27 RX ADMIN — HEPARIN SODIUM 5000 UNITS: 5000 INJECTION, SOLUTION INTRAVENOUS; SUBCUTANEOUS at 05:10

## 2018-10-27 NOTE — SUBJECTIVE & OBJECTIVE
Neurologic Chief Complaint: Pontine Lesion    Subjective:     Interval History: Patient is seen for follow-up neurological assessment and treatment recommendations:     Asymptomatic hypotension last night while asleep, improved with fluids.  Steroids delayed last night to obtain rheum labs (concerned steroids would influence results).       HPI, Past Medical, Family, and Social History remains the same as documented in the initial encounter.     Review of Systems   Constitutional: Negative for fever.   Eyes: Positive for visual disturbance.   Respiratory: Negative for shortness of breath.    Cardiovascular: Negative for chest pain.   Gastrointestinal: Negative for abdominal pain.   Genitourinary: Negative for dysuria.   Skin: Negative for rash.   Neurological: Positive for speech difficulty and weakness. Negative for numbness and headaches.   Psychiatric/Behavioral: Negative for confusion.     Scheduled Meds:   citalopram  20 mg Oral Daily    dipyridamole-aspirin 200-25 mg  1 capsule Oral BID    gabapentin  300 mg Oral TID    heparin (porcine)  5,000 Units Subcutaneous Q8H    methylPREDNISolone (SOLU-Medrol) IVPB (doses > 250 mg)   Intravenous Daily    nicotine  1 patch Transdermal Daily    rosuvastatin  20 mg Oral Daily    sodium chloride 0.9%  3 mL Intravenous Q8H     Continuous Infusions:    PRN Meds:acetaminophen, labetalol, ondansetron, polyethylene glycol    Objective:     Vital Signs (Most Recent):  Temp: 98.6 °F (37 °C) (10/27/18 0841)  Pulse: 80 (10/27/18 0841)  Resp: 18 (10/27/18 0841)  BP: 91/67 (10/27/18 0841)  SpO2: 99 % (10/27/18 0841)  BP Location: Left arm    Vital Signs Range (Last 24H):  Temp:  [97.1 °F (36.2 °C)-98.6 °F (37 °C)]   Pulse:  [63-84]   Resp:  [18]   BP: ()/(48-67)   SpO2:  [89 %-99 %]   BP Location: Left arm    Physical Exam   Constitutional: She is oriented to person, place, and time. She appears well-developed. No distress.   HENT:   Head: Normocephalic.   No oral  ulcers noted   Eyes: EOM are normal. Pupils are equal, round, and reactive to light.   Cardiovascular: Normal rate, regular rhythm and normal heart sounds. Exam reveals no friction rub.   No murmur heard.  Pulmonary/Chest: Effort normal and breath sounds normal. No stridor. No respiratory distress.   Abdominal: Soft. Bowel sounds are normal. She exhibits no distension. There is no tenderness.   Musculoskeletal: She exhibits no edema.   Neurological: She is oriented to person, place, and time.   Skin: Skin is warm and dry.        Neurological Exam:   LOC: alert  Attention Span: Good   Language: No aphasia  Articulation: Dysarthria  Orientation: Person, Place, Time   Visual Fields: Full  EOM (CN III, IV, VI): Full/intact  Pupils (CN II, III): PERRL  Facial Sensation (CN V): Normal  Facial Movement (CN VII): Lower facial weakness on the Right  Motor: 5/5 in BUE, 4+/5 RLE, 5/5 LLE  Cebellar: finger/nose intact b/l  Sensation: to light touch intact in BUE and BLE  Tone: Increased on RUE    Laboratory:  Recent Results (from the past 24 hour(s))   Protein electrophoresis, serum    Collection Time: 10/26/18  8:39 PM   Result Value Ref Range    Protein, Serum 5.6 (L) 6.0 - 8.4 g/dL   Immunoglobulins (IgG, IgA, IgM) Quantitative    Collection Time: 10/26/18  8:39 PM   Result Value Ref Range    IgG - Serum 720 650 - 1600 mg/dL    IgA 233 40 - 350 mg/dL    IgM 70 50 - 300 mg/dL   Sedimentation rate, manual    Collection Time: 10/26/18  8:39 PM   Result Value Ref Range    Sed Rate 33 0 - 36 mm/Hr   C-reactive protein    Collection Time: 10/26/18  8:39 PM   Result Value Ref Range    CRP 10.7 (H) 0.0 - 8.2 mg/L   CBC auto differential    Collection Time: 10/27/18  4:06 AM   Result Value Ref Range    WBC 6.97 3.90 - 12.70 K/uL    RBC 3.57 (L) 4.00 - 5.40 M/uL    Hemoglobin 11.1 (L) 12.0 - 16.0 g/dL    Hematocrit 35.3 (L) 37.0 - 48.5 %    MCV 99 (H) 82 - 98 fL    MCH 31.1 (H) 27.0 - 31.0 pg    MCHC 31.4 (L) 32.0 - 36.0 g/dL    RDW  12.4 11.5 - 14.5 %    Platelets 354 (H) 150 - 350 K/uL    MPV 10.0 9.2 - 12.9 fL    Immature Granulocytes 0.6 (H) 0.0 - 0.5 %    Gran # (ANC) 4.0 1.8 - 7.7 K/uL    Immature Grans (Abs) 0.04 0.00 - 0.04 K/uL    Lymph # 2.2 1.0 - 4.8 K/uL    Mono # 0.7 0.3 - 1.0 K/uL    Eos # 0.1 0.0 - 0.5 K/uL    Baso # 0.03 0.00 - 0.20 K/uL    nRBC 0 0 /100 WBC    Gran% 57.6 38.0 - 73.0 %    Lymph% 31.0 18.0 - 48.0 %    Mono% 9.3 4.0 - 15.0 %    Eosinophil% 1.1 0.0 - 8.0 %    Basophil% 0.4 0.0 - 1.9 %    Differential Method Automated    Comprehensive metabolic panel    Collection Time: 10/27/18  4:06 AM   Result Value Ref Range    Sodium 139 136 - 145 mmol/L    Potassium 3.7 3.5 - 5.1 mmol/L    Chloride 106 95 - 110 mmol/L    CO2 27 23 - 29 mmol/L    Glucose 93 70 - 110 mg/dL    BUN, Bld 11 6 - 20 mg/dL    Creatinine 0.6 0.5 - 1.4 mg/dL    Calcium 9.1 8.7 - 10.5 mg/dL    Total Protein 6.0 6.0 - 8.4 g/dL    Albumin 3.2 (L) 3.5 - 5.2 g/dL    Total Bilirubin 0.1 0.1 - 1.0 mg/dL    Alkaline Phosphatase 64 55 - 135 U/L    AST 23 10 - 40 U/L    ALT 29 10 - 44 U/L    Anion Gap 6 (L) 8 - 16 mmol/L    eGFR if African American >60.0 >60 mL/min/1.73 m^2    eGFR if non African American >60.0 >60 mL/min/1.73 m^2   Magnesium    Collection Time: 10/27/18  4:06 AM   Result Value Ref Range    Magnesium 1.8 1.6 - 2.6 mg/dL   Phosphorus    Collection Time: 10/27/18  4:06 AM   Result Value Ref Range    Phosphorus 3.7 2.7 - 4.5 mg/dL         Diagnostic Results     Brain Imaging   10/25/18 MRI Brain w/ and w/o: Per independent resident review and interpretation,  Ring enhancing-lesion with surrounding T2 flair edema with abnormal diffusion restriction in b/l jensen.                Vessel Imaging   10/25/18 CTA Head and neck: Per independent resident review and interpretation,  No large-vessel occlusion, hemodynamically-significant stenosis, nor aneurysm visualized.    Spine Imaging  10/26/18 MRI C and T spine w/ and w/o: Per independent resident review and  "interpretation,  No abnormal enhancement within C and T spines.  Enhancing pontine lesion noted, better evaluated on prior brain MRIs.            Pathology  10/25/18 CSF Cytology: Per report,  "-Negative for malignant cells.  -Mild mononuclear pleocytosis including lymphocytes and monocytes."    Cardiac Imaging   10/25/18 TTE: Per report,  LA wnl  EF 60-65%  No wall motion abnormalities  Diastolic function normal        "

## 2018-10-27 NOTE — PLAN OF CARE
Pt lying supine, 30 degrees in bed, no complaints of pain, calm, comfortable. Pt mother at bedside. Will continue to closely monitor neuro status q4h, will await consultations today for further evaluations on pt.

## 2018-10-27 NOTE — PLAN OF CARE
Problem: Patient Care Overview  Goal: Plan of Care Review  POC reviewed with patient and mother. All questions and concerns reviewed. VSS throughout shift. No distress noted. 24 hour urine collect initiated at 2240. Fall precautions implemented and maintained. Will continue to monitor.

## 2018-10-27 NOTE — ASSESSMENT & PLAN NOTE
37 year old woman with new onset dysarthria and dysphagia, and an enhancing pontine lesion. The etiology is unclear, though differentials include demyelinating, infectious, autoimmune (eg. Behcets), malignant. CSF studies were remarkable for pleocytosis, with negative gram stain and negative cytology. Multiple studies are pending.  MRI of the cervical and thoracic spine are unremarkable.  Rheumatology consulted on 10/26. Recommended SPEP, ESR/CRP, Hep panel, Quant gold, RPR, Lyme serology.    Recommendations:  -- Solumedrol 1000mg IV q24hr x3 days. S/p D1  -- ACE normal.   -- CSG WN, HSV, Enterovirus negative.  -- F/u remaining CSF and serum studies.  -- F/u in MS clinic with Dr. Butt      Lab pending: NMO serum, HSV 1&2 Ab (to rule out HSV as cause for oral ulcers), HLA-B51 (evaluate for Behcet's)  CSF studies pending - NMO, MS profile, VDRL, Flow cytometry, MTb DNA, Toxo PCR, VZV PCR

## 2018-10-27 NOTE — PROGRESS NOTES
"[facility]  Vascular Neurology  Comprehensive Stroke Center  Progress Note    Assessment/Plan:     * Autoimmune Disorder    Mrs. Cortez is a 38 y/o female with prior L Basal ganglia lesion (2015, thought to be stroke at the time, likely some infarcted component) and known MTHFR mutation presents with a gradual progressive posterior HA and dysarthria.     MRI revealed b/l pontine lesion with significant surrounding edema, diffusion restriction, and central ring contrast enhancement.  CSF findings notable for lymphocytic pleocytosis.    Differential includes malignancy/met/lymphoma (perhaps less likely given evolution of prior lesion in 2015), PRES (no clear risk factors, abnormal contrast enhancement), infection (2 lesions in 2 years would make this less likely), rhomboencephalitis, demyelinating/autoimmune disease (Behcet's?), CPM (no clear changes in Na based on history or documentation; also 2 lesions over 2 years makes this less likely).  Suspect less likely to be infarction, also query if prior "CVA" was related to current process as well (why did it occur at such a young age, with no further events, unusual location, and upon further review imaging has similar characteristics to current lesion).  Overall clinical course is more consistent with an autoimmune disorder.      After further review of imaging and labs, most suspicious for Behcet's or other neuro-inflammatory disorder.  Reports a history of oral ulcers, but none currently. Has decreased color vision and visual acuity on R eye.  However, CSF leukocytosis has a lymphocytic pleocytosis (neutrophils more likely to predominate in Behcet's), no evidence of vasculitis on CTA.  No lesions noted on C and T spine MRI.  Cytology negative for malignant cells.    Notable labs resulted:  CRP slight elevation (10.7)      Plan  -Unclear etiology  -Continue home aggrenox, increased rosuvastatin  -F/u CSF and serum studies from 10/26, " 10/27  -Consulted general neurology and rheumatology, appreciate assistance    -Considering discharge on 10/28, if arrangements for final dose of steroids to be given as outpt can be made.  Will need outpt PT/OT/ST.  Anticipate follow-up in my clinic or MS clinic with Dr. Butt, subject to scheduling.         Alcohol use    1 bottle wine/night, last drink 1 week prior to presentation  -Low likelihood of withdrawal at this point     Hyperlipidemia    Stroke risk factor  -Increase home rosuvastatin 5-> 20 for secondary stroke prevention while workup ongoing     Headache    Resolved  -Tylenol PRN     Impaired motor control    PT/OT/ST --> outpatient PT/OT/ST     Tobacco abuse    Recommended cessation  -Nicotine patch     Depression    Continue home citalopram          10/25/18: LP today.    10/26: No acute events overnight  10/27: Asymptomatic hypotension last night while asleep, improved with fluids.    STROKE DOCUMENTATION        NIH Scale:  1a. Level Of Consciousness: 0-->Alert: keenly responsive  1b. LOC Questions: 0-->Answers both questions correctly  1c. LOC Commands: 0-->Performs both tasks correctly  2. Best Gaze: 0-->Normal  3. Visual: 0-->No visual loss  4. Facial Palsy: 1-->Minor paralysis (flattened nasolabial fold, asymmetry on smiling)  5a. Motor Arm, Left: 0-->No drift: limb holds 90 (or 45) degrees for full 10 secs  5b. Motor Arm, Right: 1-->Drift: limb holds 90 (or 45) degrees, but drifts down before full 10 secs: does not hit bed or other support  6a. Motor Leg, Left: 0-->No drift: leg holds 30 degree position for full 5 secs  6b. Motor Leg, Right: 0-->No drift: leg holds 30 degree position for full 5 secs  7. Limb Ataxia: 0-->Absent  8. Sensory: 0-->Normal: no sensory loss  9. Best Language: 0-->No aphasia: normal  10. Dysarthria: 1-->Mild-to-moderate dysarthria: patient slurs at least some words and, at worst, can be understood with some difficulty  11. Extinction and Inattention (formerly  Neglect): 0-->No abnormality  Total (NIH Stroke Scale): 3       Modified [place] Score: 2  Patricia Coma Scale:    ABCD2 Score:    [id number] Score:   HAS -BLED Score:   ICH Score:   Hunt & Francis Classification:      Hemorrhagic change of an Ischemic Stroke: Does this patient have an ischemic stroke with hemorrhagic changes? No     Neurologic Chief Complaint: Pontine Lesion    Subjective:     Interval History: Patient is seen for follow-up neurological assessment and treatment recommendations:     Asymptomatic hypotension last night while asleep, improved with fluids.  Steroids delayed last night to obtain rheum labs (concerned steroids would influence results).       HPI, Past Medical, Family, and Social History remains the same as documented in the initial encounter.     Review of Systems   Constitutional: Negative for fever.   Eyes: Positive for visual disturbance.   Respiratory: Negative for shortness of breath.    Cardiovascular: Negative for chest pain.   Gastrointestinal: Negative for abdominal pain.   Genitourinary: Negative for dysuria.   Skin: Negative for rash.   Neurological: Positive for speech difficulty and weakness. Negative for numbness and headaches.   Psychiatric/Behavioral: Negative for confusion.     Scheduled Meds:   citalopram  20 mg Oral Daily    dipyridamole-aspirin 200-25 mg  1 capsule Oral BID    gabapentin  300 mg Oral TID    heparin (porcine)  5,000 Units Subcutaneous Q8H    methylPREDNISolone (SOLU-Medrol) IVPB (doses > 250 mg)   Intravenous Daily    nicotine  1 patch Transdermal Daily    rosuvastatin  20 mg Oral Daily    sodium chloride 0.9%  3 mL Intravenous Q8H     Continuous Infusions:    PRN Meds:acetaminophen, labetalol, ondansetron, polyethylene glycol    Objective:     Vital Signs (Most Recent):  Temp: 98.6 °F (37 °C) (10/27/18 0841)  Pulse: 80 (10/27/18 0841)  Resp: 18 (10/27/18 0841)  BP: 91/67 (10/27/18 0841)  SpO2: 99 % (10/27/18 0841)  BP Location: Left arm    Vital  Signs Range (Last 24H):  Temp:  [97.1 °F (36.2 °C)-98.6 °F (37 °C)]   Pulse:  [63-84]   Resp:  [18]   BP: ()/(48-67)   SpO2:  [89 %-99 %]   BP Location: Left arm    Physical Exam   Constitutional: She is oriented to person, place, and time. She appears well-developed. No distress.   HENT:   Head: Normocephalic.   No oral ulcers noted   Eyes: EOM are normal. Pupils are equal, round, and reactive to light.   Cardiovascular: Normal rate, regular rhythm and normal heart sounds. Exam reveals no friction rub.   No murmur heard.  Pulmonary/Chest: Effort normal and breath sounds normal. No stridor. No respiratory distress.   Abdominal: Soft. Bowel sounds are normal. She exhibits no distension. There is no tenderness.   Musculoskeletal: She exhibits no edema.   Neurological: She is oriented to person, place, and time.   Skin: Skin is warm and dry.        Neurological Exam:   LOC: alert  Attention Span: Good   Language: No aphasia  Articulation: Dysarthria  Orientation: Person, Place, Time   Visual Fields: Full  EOM (CN III, IV, VI): Full/intact  Pupils (CN II, III): PERRL  Facial Sensation (CN V): Normal  Facial Movement (CN VII): Lower facial weakness on the Right  Motor: 5/5 in BUE, 4+/5 RLE, 5/5 LLE  Cebellar: finger/nose intact b/l  Sensation: to light touch intact in BUE and BLE  Tone: Increased on RUE    Laboratory:  Recent Results (from the past 24 hour(s))   Protein electrophoresis, serum    Collection Time: 10/26/18  8:39 PM   Result Value Ref Range    Protein, Serum 5.6 (L) 6.0 - 8.4 g/dL   Immunoglobulins (IgG, IgA, IgM) Quantitative    Collection Time: 10/26/18  8:39 PM   Result Value Ref Range    IgG - Serum 720 650 - 1600 mg/dL    IgA 233 40 - 350 mg/dL    IgM 70 50 - 300 mg/dL   Sedimentation rate, manual    Collection Time: 10/26/18  8:39 PM   Result Value Ref Range    Sed Rate 33 0 - 36 mm/Hr   C-reactive protein    Collection Time: 10/26/18  8:39 PM   Result Value Ref Range    CRP 10.7 (H) 0.0 - 8.2  mg/L   CBC auto differential    Collection Time: 10/27/18  4:06 AM   Result Value Ref Range    WBC 6.97 3.90 - 12.70 K/uL    RBC 3.57 (L) 4.00 - 5.40 M/uL    Hemoglobin 11.1 (L) 12.0 - 16.0 g/dL    Hematocrit 35.3 (L) 37.0 - 48.5 %    MCV 99 (H) 82 - 98 fL    MCH 31.1 (H) 27.0 - 31.0 pg    MCHC 31.4 (L) 32.0 - 36.0 g/dL    RDW 12.4 11.5 - 14.5 %    Platelets 354 (H) 150 - 350 K/uL    MPV 10.0 9.2 - 12.9 fL    Immature Granulocytes 0.6 (H) 0.0 - 0.5 %    Gran # (ANC) 4.0 1.8 - 7.7 K/uL    Immature Grans (Abs) 0.04 0.00 - 0.04 K/uL    Lymph # 2.2 1.0 - 4.8 K/uL    Mono # 0.7 0.3 - 1.0 K/uL    Eos # 0.1 0.0 - 0.5 K/uL    Baso # 0.03 0.00 - 0.20 K/uL    nRBC 0 0 /100 WBC    Gran% 57.6 38.0 - 73.0 %    Lymph% 31.0 18.0 - 48.0 %    Mono% 9.3 4.0 - 15.0 %    Eosinophil% 1.1 0.0 - 8.0 %    Basophil% 0.4 0.0 - 1.9 %    Differential Method Automated    Comprehensive metabolic panel    Collection Time: 10/27/18  4:06 AM   Result Value Ref Range    Sodium 139 136 - 145 mmol/L    Potassium 3.7 3.5 - 5.1 mmol/L    Chloride 106 95 - 110 mmol/L    CO2 27 23 - 29 mmol/L    Glucose 93 70 - 110 mg/dL    BUN, Bld 11 6 - 20 mg/dL    Creatinine 0.6 0.5 - 1.4 mg/dL    Calcium 9.1 8.7 - 10.5 mg/dL    Total Protein 6.0 6.0 - 8.4 g/dL    Albumin 3.2 (L) 3.5 - 5.2 g/dL    Total Bilirubin 0.1 0.1 - 1.0 mg/dL    Alkaline Phosphatase 64 55 - 135 U/L    AST 23 10 - 40 U/L    ALT 29 10 - 44 U/L    Anion Gap 6 (L) 8 - 16 mmol/L    eGFR if African American >60.0 >60 mL/min/1.73 m^2    eGFR if non African American >60.0 >60 mL/min/1.73 m^2   Magnesium    Collection Time: 10/27/18  4:06 AM   Result Value Ref Range    Magnesium 1.8 1.6 - 2.6 mg/dL   Phosphorus    Collection Time: 10/27/18  4:06 AM   Result Value Ref Range    Phosphorus 3.7 2.7 - 4.5 mg/dL         Diagnostic Results     Brain Imaging   10/25/18 MRI Brain w/ and w/o: Per independent resident review and interpretation,  Ring enhancing-lesion with surrounding T2 flair edema with abnormal  "diffusion restriction in b/l jensen.                Vessel Imaging   10/25/18 CTA Head and neck: Per independent resident review and interpretation,  No large-vessel occlusion, hemodynamically-significant stenosis, nor aneurysm visualized.    Spine Imaging  10/26/18 MRI C and T spine w/ and w/o: Per independent resident review and interpretation,  No abnormal enhancement within C and T spines.  Enhancing pontine lesion noted, better evaluated on prior brain MRIs.            Pathology  10/25/18 CSF Cytology: Per report,  "-Negative for malignant cells.  -Mild mononuclear pleocytosis including lymphocytes and monocytes."    Cardiac Imaging   10/25/18 TTE: Per report,  LA wnl  EF 60-65%  No wall motion abnormalities  Diastolic function normal            Nathanael Borja MD  Comprehensive Stroke Center  Department of Vascular Neurology   [facility]      "

## 2018-10-27 NOTE — SUBJECTIVE & OBJECTIVE
Subjective:     Interval History: No acute events overnight.    Current Neurological Medications: Citalopram, gabapentin    Current Facility-Administered Medications   Medication Dose Route Frequency Provider Last Rate Last Dose    acetaminophen tablet 650 mg  650 mg Oral Q6H PRN Joycelyn Forde, NP   650 mg at 10/25/18 0936    citalopram tablet 20 mg  20 mg Oral Daily Joycelyn Forde, NP   20 mg at 10/27/18 0846    dipyridamole-aspirin 200-25 mg per 12 hr capsule 1 capsule  1 capsule Oral BID Joycelyn Forde, NP   1 capsule at 10/27/18 0846    gabapentin capsule 300 mg  300 mg Oral TID Joycelyn Forde, NP   300 mg at 10/27/18 0847    heparin (porcine) injection 5,000 Units  5,000 Units Subcutaneous Q8H Nathanael Borja MD   5,000 Units at 10/27/18 0542    labetalol injection 10 mg  10 mg Intravenous Q6H PRN Joycelyn Forde NP        methylPREDNISolone sodium succinate (SOLU-MEDROL) 1,000 mg in dextrose 5 % 100 mL IVPB   Intravenous Daily Nathanael Borja MD        nicotine 14 mg/24 hr 1 patch  1 patch Transdermal Daily Nathanael Borja MD   1 patch at 10/27/18 0848    ondansetron disintegrating tablet 8 mg  8 mg Oral Q8H PRN Joycelyn Forde NP        polyethylene glycol packet 17 g  17 g Oral Daily PRN Joycelyn Forde, NP        rosuvastatin tablet 20 mg  20 mg Oral Daily Nathanael Borja MD   20 mg at 10/27/18 0847    sodium chloride 0.9% flush 3 mL  3 mL Intravenous Q8H Joycelyn Forde NP   3 mL at 10/27/18 0542       Review of Systems   HENT: Negative for trouble swallowing.    Eyes: Negative for visual disturbance.   Neurological: Positive for weakness. Negative for numbness and headaches.   Psychiatric/Behavioral: Negative for agitation and confusion.     Objective:     Vital Signs (Most Recent):  Temp: 98.6 °F (37 °C) (10/27/18 0841)  Pulse: 80 (10/27/18 0841)  Resp: 18 (10/27/18 0841)  BP: 91/67 (10/27/18 0841)  SpO2: 99 % (10/27/18 0841) Vital Signs (24h  Range):  Temp:  [96.3 °F (35.7 °C)-98.6 °F (37 °C)] 98.6 °F (37 °C)  Pulse:  [63-84] 80  Resp:  [18] 18  SpO2:  [89 %-99 %] 99 %  BP: ()/(48-67) 91/67     Weight: 59 kg (130 lb)  Body mass index is 22.31 kg/m².    Physical Exam   Constitutional: She is oriented to person, place, and time. No distress.   Eyes: EOM are normal. Pupils are equal, round, and reactive to light.   Neurological: She is alert and oriented to person, place, and time.   Reflex Scores:       Bicep reflexes are 3+ on the right side and 2+ on the left side.       Brachioradialis reflexes are 3+ on the right side and 2+ on the left side.       Patellar reflexes are 3+ on the right side and 2+ on the left side.       Achilles reflexes are 3+ on the right side and 2+ on the left side.  Psychiatric: Her speech is slurred.   Nursing note and vitals reviewed.      NEUROLOGICAL EXAMINATION:     MENTAL STATUS   Oriented to person, place, and time.   Oriented to person.   Oriented to place.   Oriented to time.   Speech: slurred   Level of consciousness: alert    CRANIAL NERVES     CN III, IV, VI   Pupils are equal, round, and reactive to light.  Extraocular motions are normal.     CN V   Facial sensation intact.     CN VII   Right facial weakness: central    CN XII   CN XII normal.        Mild pallor of right optic disc on fundoscopic exam.     MOTOR EXAM   Muscle bulk: normal  Overall muscle tone: normal    Strength   Right deltoid: 4/5  Left deltoid: 5/5  Right biceps: 4/5  Left biceps: 5/5  Right triceps: 4/5  Left triceps: 5/5  Right quadriceps: 4/5  Left quadriceps: 5/5  Right peroneal: 4/5  Left peroneal: 5/5  Right gastroc: 4/5  Left gastroc: 5/5    REFLEXES     Reflexes   Right brachioradialis: 3+  Left brachioradialis: 2+  Right biceps: 3+  Left biceps: 2+  Right patellar: 3+  Left patellar: 2+  Right achilles: 3+  Left achilles: 2+    GAIT AND COORDINATION     Tremor   Resting tremor: absent      Significant Labs:   Hemoglobin A1c:    Recent Labs   Lab 10/25/18  0230   HGBA1C 4.7     CBC:   Recent Labs   Lab 10/26/18  0620 10/27/18  0406   WBC 6.80 6.97   HGB 10.7* 11.1*   HCT 34.6* 35.3*    354*     CMP:   Recent Labs   Lab 10/26/18  0620 10/27/18  0406   GLU 80 93    139   K 4.0 3.7    106   CO2 26 27   BUN 11 11   CREATININE 0.5 0.6   CALCIUM 8.5* 9.1   MG 1.8 1.8   PROT 5.5* 6.0   ALBUMIN 2.8* 3.2*   BILITOT 0.2 0.1   ALKPHOS 55 64   AST 21 23   ALT 23 29   ANIONGAP 5* 6*   EGFRNONAA >60.0 >60.0     Inflammatory Markers:   Recent Labs   Lab 10/26/18  2039   SEDRATE 33   CRP 10.7*     POCT Glucose: No results for input(s): POCTGLUCOSE in the last 24 hours.  Prealbumin: No results for input(s): PREALBUMIN in the last 48 hours.  All pertinent lab results from the past 24 hours have been reviewed.    Significant Imaging: I have reviewed all pertinent imaging results/findings within the past 24 hours.

## 2018-10-27 NOTE — PROGRESS NOTES
[facility]  Neurology  Progress Note    Patient Name: Maame Cortze  MRN: [id number]  Admission Date: 10/24/2018  Hospital Length of Stay: 2 days  Code Status: Full Code   Attending Provider: Balta Richardson MD  Primary Care Physician: Woodrow Bruce MD   Principal Problem:Autoimmune disorder      Subjective:     Interval History: No acute events overnight.    Current Neurological Medications: Citalopram, gabapentin    Current Facility-Administered Medications   Medication Dose Route Frequency Provider Last Rate Last Dose    acetaminophen tablet 650 mg  650 mg Oral Q6H PRN Joycelyn Forde, NP   650 mg at 10/25/18 0936    citalopram tablet 20 mg  20 mg Oral Daily Joycelyn Forde NP   20 mg at 10/27/18 0846    dipyridamole-aspirin 200-25 mg per 12 hr capsule 1 capsule  1 capsule Oral BID Joycelyn Forde NP   1 capsule at 10/27/18 0846    gabapentin capsule 300 mg  300 mg Oral TID Joycelyn Forde NP   300 mg at 10/27/18 0847    heparin (porcine) injection 5,000 Units  5,000 Units Subcutaneous Q8H Nathanael Borja MD   5,000 Units at 10/27/18 0542    labetalol injection 10 mg  10 mg Intravenous Q6H PRN Joycelyn Forde NP        methylPREDNISolone sodium succinate (SOLU-MEDROL) 1,000 mg in dextrose 5 % 100 mL IVPB   Intravenous Daily Nathanael Borja MD        nicotine 14 mg/24 hr 1 patch  1 patch Transdermal Daily Nathanael Borja MD   1 patch at 10/27/18 0848    ondansetron disintegrating tablet 8 mg  8 mg Oral Q8H PRN Joycelyn Forde, NP        polyethylene glycol packet 17 g  17 g Oral Daily PRN Joycelyn Forde NP        rosuvastatin tablet 20 mg  20 mg Oral Daily Nathanael Borja MD   20 mg at 10/27/18 0847    sodium chloride 0.9% flush 3 mL  3 mL Intravenous Q8H Joycelyn Forde NP   3 mL at 10/27/18 0542       Review of Systems   HENT: Negative for trouble swallowing.    Eyes: Negative for visual disturbance.   Neurological: Positive for  weakness. Negative for numbness and headaches.   Psychiatric/Behavioral: Negative for agitation and confusion.     Objective:     Vital Signs (Most Recent):  Temp: 98.6 °F (37 °C) (10/27/18 0841)  Pulse: 80 (10/27/18 0841)  Resp: 18 (10/27/18 0841)  BP: 91/67 (10/27/18 0841)  SpO2: 99 % (10/27/18 0841) Vital Signs (24h Range):  Temp:  [96.3 °F (35.7 °C)-98.6 °F (37 °C)] 98.6 °F (37 °C)  Pulse:  [63-84] 80  Resp:  [18] 18  SpO2:  [89 %-99 %] 99 %  BP: ()/(48-67) 91/67     Weight: 59 kg (130 lb)  Body mass index is 22.31 kg/m².    Physical Exam   Constitutional: She is oriented to person, place, and time. No distress.   Eyes: EOM are normal. Pupils are equal, round, and reactive to light.   Neurological: She is alert and oriented to person, place, and time.   Reflex Scores:       Bicep reflexes are 3+ on the right side and 2+ on the left side.       Brachioradialis reflexes are 3+ on the right side and 2+ on the left side.       Patellar reflexes are 3+ on the right side and 2+ on the left side.       Achilles reflexes are 3+ on the right side and 2+ on the left side.  Psychiatric: Her speech is slurred.   Nursing note and vitals reviewed.      NEUROLOGICAL EXAMINATION:     MENTAL STATUS   Oriented to person, place, and time.   Oriented to person.   Oriented to place.   Oriented to time.   Speech: slurred   Level of consciousness: alert    CRANIAL NERVES     CN III, IV, VI   Pupils are equal, round, and reactive to light.  Extraocular motions are normal.     CN V   Facial sensation intact.     CN VII   Right facial weakness: central    CN XII   CN XII normal.        Mild pallor of right optic disc on fundoscopic exam.     MOTOR EXAM   Muscle bulk: normal  Overall muscle tone: normal    Strength   Right deltoid: 4/5  Left deltoid: 5/5  Right biceps: 4/5  Left biceps: 5/5  Right triceps: 4/5  Left triceps: 5/5  Right quadriceps: 4/5  Left quadriceps: 5/5  Right peroneal: 4/5  Left peroneal: 5/5  Right gastroc:  4/5  Left gastroc: 5/5    REFLEXES     Reflexes   Right brachioradialis: 3+  Left brachioradialis: 2+  Right biceps: 3+  Left biceps: 2+  Right patellar: 3+  Left patellar: 2+  Right achilles: 3+  Left achilles: 2+    GAIT AND COORDINATION     Tremor   Resting tremor: absent      Significant Labs:   Hemoglobin A1c:   Recent Labs   Lab 10/25/18  0230   HGBA1C 4.7     CBC:   Recent Labs   Lab 10/26/18  0620 10/27/18  0406   WBC 6.80 6.97   HGB 10.7* 11.1*   HCT 34.6* 35.3*    354*     CMP:   Recent Labs   Lab 10/26/18  0620 10/27/18  0406   GLU 80 93    139   K 4.0 3.7    106   CO2 26 27   BUN 11 11   CREATININE 0.5 0.6   CALCIUM 8.5* 9.1   MG 1.8 1.8   PROT 5.5* 6.0   ALBUMIN 2.8* 3.2*   BILITOT 0.2 0.1   ALKPHOS 55 64   AST 21 23   ALT 23 29   ANIONGAP 5* 6*   EGFRNONAA >60.0 >60.0     Inflammatory Markers:   Recent Labs   Lab 10/26/18  2039   SEDRATE 33   CRP 10.7*     POCT Glucose: No results for input(s): POCTGLUCOSE in the last 24 hours.  Prealbumin: No results for input(s): PREALBUMIN in the last 48 hours.  All pertinent lab results from the past 24 hours have been reviewed.    Significant Imaging: I have reviewed all pertinent imaging results/findings within the past 24 hours.    Assessment and Plan:     * Autoimmune disorder    37 year old woman with new onset dysarthria and dysphagia, and an enhancing pontine lesion. The etiology is unclear, though differentials include demyelinating, infectious, autoimmune (eg. Behcets), malignant. CSF studies were remarkable for pleocytosis, with negative gram stain and negative cytology. Multiple studies are pending.  MRI of the cervical and thoracic spine are unremarkable.  Rheumatology consulted on 10/26. Recommended SPEP, ESR/CRP, Hep panel, Quant gold, RPR, Lyme serology.    Recommendations:  -- Solumedrol 1000mg IV q24hr x3 days. S/p D1  -- ACE normal.   -- CSG WN, HSV, Enterovirus negative.  -- F/u remaining CSF and serum studies.  -- F/u in MS  clinic with Dr. Butt      Lab pending: NMO serum, HSV 1&2 Ab (to rule out HSV as cause for oral ulcers), HLA-B51 (evaluate for Behcet's)  CSF studies pending - NMO, MS profile, VDRL, Flow cytometry, MTb DNA, Toxo PCR, VZV PCR           VTE Risk Mitigation (From admission, onward)        Ordered     heparin (porcine) injection 5,000 Units  Every 8 hours      10/25/18 1635     Place sequential compression device  Until discontinued      10/25/18 0137     IP VTE HIGH RISK PATIENT  Once      10/25/18 0137        Will continue to follow. Please call with any questions.    Matias Lora MD  Neurology  [facility]

## 2018-10-28 VITALS
TEMPERATURE: 99 F | BODY MASS INDEX: 22.2 KG/M2 | DIASTOLIC BLOOD PRESSURE: 53 MMHG | RESPIRATION RATE: 18 BRPM | OXYGEN SATURATION: 96 % | HEART RATE: 88 BPM | WEIGHT: 130 LBS | HEIGHT: 64 IN | SYSTOLIC BLOOD PRESSURE: 97 MMHG

## 2018-10-28 DIAGNOSIS — G37.9 DEMYELINATING DISEASE OF CENTRAL NERVOUS SYSTEM, UNSPECIFIED: Primary | ICD-10-CM

## 2018-10-28 DIAGNOSIS — R90.89 ABNORMAL FINDING ON MRI OF BRAIN: ICD-10-CM

## 2018-10-28 DIAGNOSIS — G93.9 PONTINE LESION: ICD-10-CM

## 2018-10-28 LAB
ALBUMIN SERPL BCP-MCNC: 3.3 G/DL
ALP SERPL-CCNC: 64 U/L
ALT SERPL W/O P-5'-P-CCNC: 26 U/L
ANION GAP SERPL CALC-SCNC: 7 MMOL/L
AST SERPL-CCNC: 19 U/L
BASOPHILS # BLD AUTO: 0.01 K/UL
BASOPHILS NFR BLD: 0.1 %
BILIRUB SERPL-MCNC: 0.1 MG/DL
BUN SERPL-MCNC: 7 MG/DL
CALCIUM SERPL-MCNC: 9.5 MG/DL
CHLORIDE SERPL-SCNC: 106 MMOL/L
CO2 SERPL-SCNC: 23 MMOL/L
CREAT SERPL-MCNC: 0.5 MG/DL
DIFFERENTIAL METHOD: ABNORMAL
EOSINOPHIL # BLD AUTO: 0 K/UL
EOSINOPHIL NFR BLD: 0 %
ERYTHROCYTE [DISTWIDTH] IN BLOOD BY AUTOMATED COUNT: 12.4 %
EST. GFR  (AFRICAN AMERICAN): >60 ML/MIN/1.73 M^2
EST. GFR  (NON AFRICAN AMERICAN): >60 ML/MIN/1.73 M^2
FLOW CYTOMETRY ANTIBODIES ANALYZED - CSF: NORMAL
FLOW CYTOMETRY COMMENT - CSF: NORMAL
FLOW CYTOMETRY INTERPRETATION - CSF: NORMAL
GLUCOSE SERPL-MCNC: 104 MG/DL
HCT VFR BLD AUTO: 36.5 %
HGB BLD-MCNC: 11.6 G/DL
IMM GRANULOCYTES # BLD AUTO: 0.04 K/UL
IMM GRANULOCYTES NFR BLD AUTO: 0.3 %
INTERLEUKIN 2 (IL-2) RECEPTOR: 268 PG/ML
LYMPHOCYTES # BLD AUTO: 1.7 K/UL
LYMPHOCYTES NFR BLD: 13.9 %
MAGNESIUM SERPL-MCNC: 1.9 MG/DL
MCH RBC QN AUTO: 31.7 PG
MCHC RBC AUTO-ENTMCNC: 31.8 G/DL
MCV RBC AUTO: 100 FL
MONOCYTES # BLD AUTO: 0.8 K/UL
MONOCYTES NFR BLD: 6.7 %
NEUTROPHILS # BLD AUTO: 9.8 K/UL
NEUTROPHILS NFR BLD: 79 %
NRBC BLD-RTO: 0 /100 WBC
PHOSPHATE SERPL-MCNC: 2.8 MG/DL
PLATELET # BLD AUTO: 375 K/UL
PMV BLD AUTO: 10.1 FL
POTASSIUM SERPL-SCNC: 3.8 MMOL/L
PROT 24H UR-MRATE: NORMAL MG/SPEC
PROT SERPL-MCNC: 6.5 G/DL
PROT UR-MCNC: <7 MG/DL
RBC # BLD AUTO: 3.66 M/UL
SODIUM SERPL-SCNC: 136 MMOL/L
URINE COLLECTION DURATION: 24 HR
URINE VOLUME: 1350 ML
WBC # BLD AUTO: 12.44 K/UL

## 2018-10-28 PROCEDURE — 84100 ASSAY OF PHOSPHORUS: CPT

## 2018-10-28 PROCEDURE — A4216 STERILE WATER/SALINE, 10 ML: HCPCS | Performed by: NURSE PRACTITIONER

## 2018-10-28 PROCEDURE — 36415 COLL VENOUS BLD VENIPUNCTURE: CPT

## 2018-10-28 PROCEDURE — 25000003 PHARM REV CODE 250: Performed by: STUDENT IN AN ORGANIZED HEALTH CARE EDUCATION/TRAINING PROGRAM

## 2018-10-28 PROCEDURE — 85025 COMPLETE CBC W/AUTO DIFF WBC: CPT

## 2018-10-28 PROCEDURE — 99233 SBSQ HOSP IP/OBS HIGH 50: CPT | Mod: GC,,, | Performed by: PSYCHIATRY & NEUROLOGY

## 2018-10-28 PROCEDURE — 63600175 PHARM REV CODE 636 W HCPCS: Performed by: STUDENT IN AN ORGANIZED HEALTH CARE EDUCATION/TRAINING PROGRAM

## 2018-10-28 PROCEDURE — 83735 ASSAY OF MAGNESIUM: CPT

## 2018-10-28 PROCEDURE — 80053 COMPREHEN METABOLIC PANEL: CPT

## 2018-10-28 PROCEDURE — 25000003 PHARM REV CODE 250: Performed by: NURSE PRACTITIONER

## 2018-10-28 PROCEDURE — S4991 NICOTINE PATCH NONLEGEND: HCPCS | Performed by: STUDENT IN AN ORGANIZED HEALTH CARE EDUCATION/TRAINING PROGRAM

## 2018-10-28 RX ORDER — IBUPROFEN 200 MG
1 TABLET ORAL DAILY
Refills: 0 | COMMUNITY
Start: 2018-10-29 | End: 2018-12-20

## 2018-10-28 RX ORDER — METHYLPREDNISOLONE SODIUM SUCCINATE 1 G/16ML
INJECTION INTRAMUSCULAR; INTRAVENOUS
Qty: 1000 MG | Refills: 0 | Status: SHIPPED | OUTPATIENT
Start: 2018-10-28 | End: 2018-10-30 | Stop reason: ALTCHOICE

## 2018-10-28 RX ADMIN — Medication 3 ML: at 05:10

## 2018-10-28 RX ADMIN — NICOTINE 1 PATCH: 14 PATCH, EXTENDED RELEASE TRANSDERMAL at 08:10

## 2018-10-28 RX ADMIN — CITALOPRAM HYDROBROMIDE 20 MG: 10 TABLET ORAL at 08:10

## 2018-10-28 RX ADMIN — GABAPENTIN 300 MG: 300 CAPSULE ORAL at 08:10

## 2018-10-28 RX ADMIN — DEXTROSE: 50 INJECTION, SOLUTION INTRAVENOUS at 08:10

## 2018-10-28 RX ADMIN — HEPARIN SODIUM 5000 UNITS: 5000 INJECTION, SOLUTION INTRAVENOUS; SUBCUTANEOUS at 05:10

## 2018-10-28 RX ADMIN — ROSUVASTATIN CALCIUM 20 MG: 20 TABLET, FILM COATED ORAL at 08:10

## 2018-10-28 RX ADMIN — ASPIRIN AND EXTENDED-RELEASE DIPYRIDAMOLE 1 CAPSULE: 25; 200 CAPSULE ORAL at 08:10

## 2018-10-28 NOTE — DISCHARGE SUMMARY
[facility]  Vascular Neurology  Comprehensive Stroke Center  Discharge Summary     Summary:     Admit Date: 10/24/2018  6:25 PM    Discharge Date and Time: 10/28/2018    Attending Physician: Balta Richardson MD     Discharge Provider: Nathanael Borja MD    History of Present Illness: 38 y/o female with prior strokes (last 2015) and known MTHFR mutation presents to the ED from home with a 3 day history of a posterior headache.  The pain came on gradual and worsened over time.  She had a similar headache last year.  She was seen in the ED yesterday for these same symptoms.  At that time she was experiencing photophobia, generalized weakness, and 2 episodes of vomiting.  She was given Fioricet and the symptoms improved, but did not resolve.  Today she continues with the headache but has also noticed associated dysarthria.  Patient has residual right facial droop and right sided paresis from her 2015 stroke.  Currently, she continues with the headache which is tolerable and she is more concerned about the slurring of her speech.    Hospital Course (synopsis of major diagnoses, care, treatment, and services provided during the course of the hospital stay): 10/25/18: LP today.    10/26: No acute events overnight  10/27: Asymptomatic hypotension last night while asleep, improved with fluids.  Day 1 steroids.  10/28: No acute events overnight.  Day 2 of 3 steroids.  Discharge to home with outpt PT/OT/ST.    Stroke Etiology: Not Applicable/Not Ischemic Stroke    STROKE DOCUMENTATION         NIH Scale:  Interval: 7 days or at discharge (whichever comes first)  1a. Level Of Consciousness: 0-->Alert: keenly responsive  1b. LOC Questions: 0-->Answers both questions correctly  1c. LOC Commands: 0-->Performs both tasks correctly  2. Best Gaze: 0-->Normal  3. Visual: 0-->No visual loss  4. Facial Palsy: 1-->Minor paralysis (flattened nasolabial fold, asymmetry on smiling)  5a. Motor Arm, Left: 0-->No drift: limb  "holds 90 (or 45) degrees for full 10 secs  5b. Motor Arm, Right: 1-->Drift: limb holds 90 (or 45) degrees, but drifts down before full 10 secs: does not hit bed or other support  6a. Motor Leg, Left: 0-->No drift: leg holds 30 degree position for full 5 secs  6b. Motor Leg, Right: 0-->No drift: leg holds 30 degree position for full 5 secs  7. Limb Ataxia: 0-->Absent  8. Sensory: 0-->Normal: no sensory loss  9. Best Language: 0-->No aphasia: normal  10. Dysarthria: 1-->Mild-to-moderate dysarthria: patient slurs at least some words and, at worst, can be understood with some difficulty  11. Extinction and Inattention (formerly Neglect): 0-->No abnormality  Total (NIH Stroke Scale): 3        Modified Yuma Score: 2  Patricia Coma Scale:15   ABCD2 Score:    [id number] Score:   HAS -BLED Score:   ICH Score:   Hunt & Francis Classification:       Assessment/Plan:     Diagnostic Results:    Brain Imaging   10/25/18 MRI Brain w/ and w/o: Per independent resident review and interpretation,  Ring enhancing-lesion with surrounding T2 flair edema with abnormal diffusion restriction in b/l jensen.                    Vessel Imaging   10/25/18 CTA Head and neck: Per independent resident review and interpretation,  No large-vessel occlusion, hemodynamically-significant stenosis, nor aneurysm visualized.     Spine Imaging  10/26/18 MRI C and T spine w/ and w/o: Per independent resident review and interpretation,  No abnormal enhancement within C and T spines.  Enhancing pontine lesion noted, better evaluated on prior brain MRIs.                Pathology  10/25/18 CSF Cytology: Per report,  "-Negative for malignant cells.  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 Cortez is a 36 y/o female with prior L Basal ganglia lesion (2015, thought to be stroke at the time, likely some infarcted component) and known MTHFR mutation presents with a gradual progressive posterior HA and dysarthria.     MRI revealed b/l pontine lesion with significant surrounding edema, diffusion restriction, and central ring contrast enhancement.  CSF findings notable for lymphocytic pleocytosis.    Differential includes malignancy/met/lymphoma (perhaps less likely given evolution of prior lesion in 2015), PRES (no clear risk factors, abnormal contrast enhancement), infection (2 lesions in 2 years would make this less likely), rhomboencephalitis, demyelinating/autoimmune disease (Behcet's?), CPM (no clear changes in Na based on history or documentation; also 2 lesions over 2 years makes this less likely).  Suspect less likely to be infarction, also query if prior "CVA" was related to current process as well (why did it occur at such a young age, with no further events, unusual location, and upon further review imaging has similar characteristics to current lesion).  Overall clinical course is more consistent with an autoimmune disorder.      After further review of imaging and labs, most suspicious for Behcet's or other neuro-inflammatory disorder.  Reports a history of oral ulcers, but none currently. Has decreased color vision and visual acuity on R eye.  However, CSF " leukocytosis has a lymphocytic pleocytosis (neutrophils more likely to predominate in Behcet's), no evidence of vasculitis on CTA.  No lesions noted on C and T spine MRI.  Cytology negative for malignant cells.    Notable labs resulted:  CSF: Lymphocyctic pleocytosis  CRP: slight elevation (10.7)  MS profile: negative for OCBs  Cytology: Negative for malignant cells    Discharge to home with outpt PT/OT/ST.  Final steroid dose as outpt tomorrow.  F/u in MS clinic on Tuesday afternoon at 1530 with Dr. Butt and myself (will ask clinic to schedule Monday).        Plan  -Unclear etiology  -1 g solumedrol Qdaily x3 days (day 1 = 10/27)  -Continue home aggrenox, rosuvastatin  -F/u CSF and serum studies from 10/26, 10/27  -Quantiferon gold TB as outpt  -Consulted general neurology and rheumatology, appreciate assistance  -Discharge to home 10/28.         Hemiparesis    PT/OT     Alcohol use    1 bottle wine/night, last drink 1 week prior to presentation  -Low likelihood of withdrawal at this point     Hyperlipidemia    Stroke risk factor  -Continue home rosuvastatin     Headache    Resolved  -Tylenol PRN     Impaired motor control    PT/OT/ST --> outpatient PT/OT/ST     Tobacco abuse    Recommended cessation  -Nicotine patch     Depression    Continue home citalopram         Recommendations:     Post-discharge complication risks: Falls    Stroke Education given to: patient, family and caregiver    Follow-up in MS clinic in 2 days    Discharge Plan:  Antithrombotics: Aggrenox  Statin: Rosuvastatin 5 mg (no stroke this admission)  Smoking Cessation  Aggresive risk factor modification:  Smoking  High Cholesterol  Diet  Exercise    Follow Up:  Follow-up Information     Esthela Butt MD On 10/30/2018.    Specialty:  Neurology  Why:  3:30 6th floor MS clinic  Contact information:  [address]  [facility] [id number] [phone]                   Patient Instructions:      Quantiferon Gold TB   Standing Status:  Future Standing Exp. Date: 12/27/19     Ambulatory Referral to Neurology   Referral Priority: Routine Referral Type: Consultation   Referral Reason: Specialty Services Required   Requested Specialty: Neurology   Number of Visits Requested: 1     Referral to OutPatient Speech Therapy   Referral Priority: Routine Referral Type: Speech Therapy   Referral Reason: Specialty Services Required   Requested Specialty: Speech Pathology   Number of Visits Requested: 1     Ambulatory Referral to Occupational Therapy   Referral Priority: Routine Referral Type: Physical Medicine   Referral Reason: Specialty Services Required   Number of Visits Requested: 1     Referral to OutPatient  Physical therapy   Referral Priority: Routine Referral Type: Physical Medicine   Referral Reason: Specialty Services Required   Requested Specialty: Physical Therapy   Number of Visits Requested: 1     Diet Cardiac   Order Comments: See Stroke Patient Education Guide Booklet for details.     Call 911 for any of the following:   Order Comments: Call 911  right away if any of the following warning signs come on suddenly, even if the symptoms only last for a few minutes. With stroke, timing is very important.   - Warning Signs of Stroke:  - Weakness: You may feel a sudden weakness, tingling or loss of feeling on one side of your face or body.  - Vision Problems: You may have sudden double vision or trouble seeing in one or both eyes.  - Speech Problems: You may have sudden trouble talking, slured speech, or problems understanding others.  - Headache: You may have sudden, severe headache.  - Movement Problems: You may experience dizziness, a feeling of spinning, a loss of balance, a feeling of falling or blackouts.     Call MD for:  increased confusion or weakness     Call MD for:  persistent dizziness, light-headedness, or visual disturbances     Call MD for:  worsening rash     Call MD for:  severe persistent headache     Call MD for:  redness,  tenderness, or signs of infection (pain, swelling, redness, odor or green/yellow discharge around incision site)     Call MD for:  severe uncontrolled pain     Call MD for:  persistent nausea and vomiting or diarrhea     Call MD for:  temperature >100.4     Call MD for:  difficulty breathing or increased cough     Activity as tolerated       Medications:  Reconciled Home Medications:      Medication List      START taking these medications    methylPREDNISolone sodium succinate 1,000 mg injection  Commonly known as:  SOLU-MEDROL  Mix 1,000mg of solumedrol in smoothie and take by mouth [id number] on Monday October 29th     nicotine 14 mg/24 hr  Commonly known as:  NICODERM CQ  Place 1 patch onto the skin once daily.  Start taking on:  10/29/2018        CONTINUE taking these medications    citalopram 20 MG tablet  Commonly known as:  CELEXA  Take 1 tablet (20 mg total) by mouth once daily.     dipyridamole-aspirin 200-25 mg  mg Cm12  Commonly known as:  AGGRENOX  TAKE 1 CAPSULE BY MOUTH 2 (TWO) TIMES DAILY.     gabapentin 300 MG capsule  Commonly known as:  NEURONTIN  Take 300 mg by mouth 3 (three) times daily.     rosuvastatin 5 MG tablet  Commonly known as:  CRESTOR  TAKE 1 TABLET (5 MG TOTAL) BY MOUTH ONCE DAILY FOR CHOLESTEROL        STOP taking these medications    butalbital-acetaminophen-caffeine -40 mg -40 mg per tablet  Commonly known as:  FIORICET, ESBRETT Borja MD  Comprehensive Stroke Center  Department of Vascular Neurology   [facility]

## 2018-10-28 NOTE — ASSESSMENT & PLAN NOTE
"Mrs. Cortez is a 38 y/o female with prior L Basal ganglia lesion (2015, thought to be stroke at the time, likely some infarcted component) and known MTHFR mutation presents with a gradual progressive posterior HA and dysarthria.     MRI revealed b/l pontine lesion with significant surrounding edema, diffusion restriction, and central ring contrast enhancement.  CSF findings notable for lymphocytic pleocytosis.    Differential includes malignancy/met/lymphoma (perhaps less likely given evolution of prior lesion in 2015), PRES (no clear risk factors, abnormal contrast enhancement), infection (2 lesions in 2 years would make this less likely), rhomboencephalitis, demyelinating/autoimmune disease (Behcet's?), CPM (no clear changes in Na based on history or documentation; also 2 lesions over 2 years makes this less likely).  Suspect less likely to be infarction, also query if prior "CVA" was related to current process as well (why did it occur at such a young age, with no further events, unusual location, and upon further review imaging has similar characteristics to current lesion).  Overall clinical course is more consistent with an autoimmune disorder.      After further review of imaging and labs, most suspicious for Behcet's or other neuro-inflammatory disorder.  Reports a history of oral ulcers, but none currently. Has decreased color vision and visual acuity on R eye.  However, CSF leukocytosis has a lymphocytic pleocytosis (neutrophils more likely to predominate in Behcet's), no evidence of vasculitis on CTA.  No lesions noted on C and T spine MRI.  Cytology negative for malignant cells.    Notable labs resulted:  CSF: Lymphocyctic pleocytosis  CRP: slight elevation (10.7)  MS profile: negative for OCBs  Cytology: Negative for malignant cells    Discharge to home with outpt PT/OT/ST.  Final steroid dose as outpt tomorrow.  F/u in MS clinic on Tuesday afternoon at 1530 with Dr. Butt and myself (will ask clinic to " schedule Monday).        Plan  -Unclear etiology  -1 g solumedrol Qdaily x3 days (day 1 = 10/27)  -Continue home aggrenox, rosuvastatin  -F/u CSF and serum studies from 10/26, 10/27  -Quantiferon gold TB as outpt  -Consulted general neurology and rheumatology, appreciate assistance  -Discharge to home 10/28.

## 2018-10-28 NOTE — PLAN OF CARE
Problem: Patient Care Overview  Goal: Plan of Care Review  POC reviewed with pt and family. All questions and concerns reviewed. VSS throughout shift. Fall precautions implemented and maintained. No injuries noted. 24 hour urine collection completed at 2240 and sent to lab. Will continue to monitor.

## 2018-10-28 NOTE — ASSESSMENT & PLAN NOTE
"Mrs. Cortez is a 36 y/o female with prior L Basal ganglia lesion (2015, thought to be stroke at the time, likely some infarcted component) and known MTHFR mutation presents with a gradual progressive posterior HA and dysarthria.     MRI revealed b/l pontine lesion with significant surrounding edema, diffusion restriction, and central ring contrast enhancement.  CSF findings notable for lymphocytic pleocytosis.    Differential includes malignancy/met/lymphoma (perhaps less likely given evolution of prior lesion in 2015), PRES (no clear risk factors, abnormal contrast enhancement), infection (2 lesions in 2 years would make this less likely), rhomboencephalitis, demyelinating/autoimmune disease (Behcet's?), CPM (no clear changes in Na based on history or documentation; also 2 lesions over 2 years makes this less likely).  Suspect less likely to be infarction, also query if prior "CVA" was related to current process as well (why did it occur at such a young age, with no further events, unusual location, and upon further review imaging has similar characteristics to current lesion).  Overall clinical course is more consistent with an autoimmune disorder.      After further review of imaging and labs, most suspicious for Behcet's or other neuro-inflammatory disorder.  Reports a history of oral ulcers, but none currently. Has decreased color vision and visual acuity on R eye.  However, CSF leukocytosis has a lymphocytic pleocytosis (neutrophils more likely to predominate in Behcet's), no evidence of vasculitis on CTA.  No lesions noted on C and T spine MRI.  Cytology negative for malignant cells.    Notable labs resulted:  CSF: Lymphocyctic pleocytosis  CRP: slight elevation (10.7)  MS profile: negative for OCBs  Cytology: Negative for malignant cells    Discharge to home with outpt PT/OT/ST.  Final steroid dose as outpt tomorrow.  F/u in MS clinic on Tuesday afternoon at 1530 with Dr. Butt and myself (will ask clinic to " schedule Monday).        Plan  -Unclear etiology  -1 g solumedrol Qdaily x3 days (day 1 = 10/27)  -Continue home aggrenox, rosuvastatin  -F/u CSF and serum studies from 10/26, 10/27  -Quantiferon gold TB as outpt  -Consulted general neurology and rheumatology, appreciate assistance  -Discharge to home 10/28.

## 2018-10-28 NOTE — PLAN OF CARE
Problem: Patient Care Overview  Goal: Plan of Care Review  Outcome: Outcome(s) achieved Date Met: 10/28/18  Patient awake, alert and responsive. Oriented x 4. Vital signs stable. Able to ambulate in the bathroom with stand by assist. Referral for outpatient therapy  And discharge instructions given to patient. Medications delivered to the room before discharge.

## 2018-10-28 NOTE — PLAN OF CARE
10/28/2018      Maame Cortez  [address] [facility] [address] [facility] [id number]          Neurology Dept.  [facility] [address] [place] [address]  [phone] Diagnosis:  Autoimmune disorder                         Debility    Ambulatory referral to outpatient Speech Therapy: Evaluate and Treat  Ambulatory referral to outpatient Physical Therapy: Evaluate and Treat  Ambulatory referral to outpatient Occupational Therapy: Evaluate and Treat          __________________________  Nathanael Borja MD  10/28/2018

## 2018-10-28 NOTE — PT/OT/SLP DISCHARGE
Occupational Therapy Discharge Summary    Maame Cortez  MRN: [id number]   Principal Problem: Autoimmune disorder      Patient Discharged from acute Occupational Therapy on 10/28.  Please refer to prior OT note dated 10/26 for functional status.    Assessment:      Patient appropriate for care in another setting.    Objective:     GOALS:   Multidisciplinary Problems     Occupational Therapy Goals        Problem: Occupational Therapy Goal    Goal Priority Disciplines Outcome Interventions   Occupational Therapy Goal     OT, PT/OT     Description:  Goals set 10/25 be addressed for 7 days with expiration date, 11/1:  Patient will increase functional independence with ADLs by performing:  Patient will demonstrate upper body dressing with supervision while seated EOB.   Not met  Patient will demonstrate lower body dressing with supervision while seated EOB.   Not met  Patient will demonstrate independence with HEP for right UE weight bearing /FMC.   Not met  Patient will demonstrate independent with one hand shoe tying.  Not met                           Reasons for Discontinuation of Therapy Services  Transfer to alternate level of care.      Plan:     Patient Discharged to: Outpatient Therapy Services    THERON La  10/28/2018

## 2018-10-28 NOTE — SUBJECTIVE & OBJECTIVE
Neurologic Chief Complaint: Pontine Lesion    Subjective:     Interval History: Patient is seen for follow-up neurological assessment and treatment recommendations:     No acute events overnight.  Day 2 of 3 steroids.  Reports subjective improvement in symptoms and increased energy level.    HPI, Past Medical, Family, and Social History remains the same as documented in the initial encounter.     Review of Systems   Constitutional: Negative for fever.   Eyes: Positive for visual disturbance.   Respiratory: Negative for shortness of breath.    Cardiovascular: Negative for chest pain.   Gastrointestinal: Negative for abdominal pain.   Genitourinary: Negative for dysuria.   Skin: Negative for rash.   Neurological: Positive for speech difficulty and weakness. Negative for numbness and headaches.   Psychiatric/Behavioral: Negative for confusion.     Scheduled Meds:   citalopram  20 mg Oral Daily    dipyridamole-aspirin 200-25 mg  1 capsule Oral BID    gabapentin  300 mg Oral TID    heparin (porcine)  5,000 Units Subcutaneous Q8H    methylPREDNISolone (SOLU-Medrol) IVPB (doses > 250 mg)   Intravenous Daily    nicotine  1 patch Transdermal Daily    rosuvastatin  20 mg Oral Daily    sodium chloride 0.9%  3 mL Intravenous Q8H     Continuous Infusions:    PRN Meds:acetaminophen, labetalol, ondansetron, polyethylene glycol    Objective:     Vital Signs (Most Recent):  Temp: 98.5 °F (36.9 °C) (10/28/18 1115)  Pulse: 88 (10/28/18 1115)  Resp: 18 (10/28/18 1115)  BP: (!) 97/53 (10/28/18 1115)  SpO2: 96 % (10/28/18 1115)  BP Location: Right arm    Vital Signs Range (Last 24H):  Temp:  [97.9 °F (36.6 °C)-98.7 °F (37.1 °C)]   Pulse:  [57-88]   Resp:  [18-20]   BP: ()/(53-76)   SpO2:  [94 %-98 %]   BP Location: Right arm    Physical Exam   Constitutional: She is oriented to person, place, and time. She appears well-developed. No distress.   HENT:   Head: Normocephalic.   No oral ulcers noted   Eyes: EOM are normal.  Pupils are equal, round, and reactive to light.   Cardiovascular: Normal rate, regular rhythm and normal heart sounds. Exam reveals no friction rub.   No murmur heard.  Pulmonary/Chest: Effort normal and breath sounds normal. No stridor. No respiratory distress.   Abdominal: Soft. Bowel sounds are normal. She exhibits no distension. There is no tenderness.   Musculoskeletal: She exhibits no edema.   Neurological: She is oriented to person, place, and time.   Skin: Skin is warm and dry.        Neurological Exam:   LOC: alert  Attention Span: Good   Language: No aphasia  Articulation: Dysarthria  Orientation: Person, Place, Time   Visual Fields: Full  EOM (CN III, IV, VI): Full/intact  Pupils (CN II, III): PERRL  Facial Sensation (CN V): Normal  Facial Movement (CN VII): Lower facial weakness on the Right  Motor: 5/5 in BUE, 4+/5 RLE, 5/5 LLE  Cebellar: finger/nose intact b/l  Sensation: to light touch intact in BUE and BLE  Tone: Increased on RUE    Laboratory:  Recent Results (from the past 24 hour(s))   Protein, urine, timed    Collection Time: 10/27/18 10:40 PM   Result Value Ref Range    Urine Volume 1350 mL    Urine Collection Duration 24 Hr    Protein, Urine <7 0 - 15 mg/dL    Urine Protein, Timed Unable to calculate 0 - 100 mg/Spec   CBC auto differential    Collection Time: 10/28/18  4:28 AM   Result Value Ref Range    WBC 12.44 3.90 - 12.70 K/uL    RBC 3.66 (L) 4.00 - 5.40 M/uL    Hemoglobin 11.6 (L) 12.0 - 16.0 g/dL    Hematocrit 36.5 (L) 37.0 - 48.5 %     (H) 82 - 98 fL    MCH 31.7 (H) 27.0 - 31.0 pg    MCHC 31.8 (L) 32.0 - 36.0 g/dL    RDW 12.4 11.5 - 14.5 %    Platelets 375 (H) 150 - 350 K/uL    MPV 10.1 9.2 - 12.9 fL    Immature Granulocytes 0.3 0.0 - 0.5 %    Gran # (ANC) 9.8 (H) 1.8 - 7.7 K/uL    Immature Grans (Abs) 0.04 0.00 - 0.04 K/uL    Lymph # 1.7 1.0 - 4.8 K/uL    Mono # 0.8 0.3 - 1.0 K/uL    Eos # 0.0 0.0 - 0.5 K/uL    Baso # 0.01 0.00 - 0.20 K/uL    nRBC 0 0 /100 WBC    Gran% 79.0 (H)  "38.0 - 73.0 %    Lymph% 13.9 (L) 18.0 - 48.0 %    Mono% 6.7 4.0 - 15.0 %    Eosinophil% 0.0 0.0 - 8.0 %    Basophil% 0.1 0.0 - 1.9 %    Differential Method Automated    Comprehensive metabolic panel    Collection Time: 10/28/18  4:28 AM   Result Value Ref Range    Sodium 136 136 - 145 mmol/L    Potassium 3.8 3.5 - 5.1 mmol/L    Chloride 106 95 - 110 mmol/L    CO2 23 23 - 29 mmol/L    Glucose 104 70 - 110 mg/dL    BUN, Bld 7 6 - 20 mg/dL    Creatinine 0.5 0.5 - 1.4 mg/dL    Calcium 9.5 8.7 - 10.5 mg/dL    Total Protein 6.5 6.0 - 8.4 g/dL    Albumin 3.3 (L) 3.5 - 5.2 g/dL    Total Bilirubin 0.1 0.1 - 1.0 mg/dL    Alkaline Phosphatase 64 55 - 135 U/L    AST 19 10 - 40 U/L    ALT 26 10 - 44 U/L    Anion Gap 7 (L) 8 - 16 mmol/L    eGFR if African American >60.0 >60 mL/min/1.73 m^2    eGFR if non African American >60.0 >60 mL/min/1.73 m^2   Magnesium    Collection Time: 10/28/18  4:28 AM   Result Value Ref Range    Magnesium 1.9 1.6 - 2.6 mg/dL   Phosphorus    Collection Time: 10/28/18  4:28 AM   Result Value Ref Range    Phosphorus 2.8 2.7 - 4.5 mg/dL         Diagnostic Results     Brain Imaging   10/25/18 MRI Brain w/ and w/o: Per independent resident review and interpretation,  Ring enhancing-lesion with surrounding T2 flair edema with abnormal diffusion restriction in b/l jensen.                Vessel Imaging   10/25/18 CTA Head and neck: Per independent resident review and interpretation,  No large-vessel occlusion, hemodynamically-significant stenosis, nor aneurysm visualized.    Spine Imaging  10/26/18 MRI C and T spine w/ and w/o: Per independent resident review and interpretation,  No abnormal enhancement within C and T spines.  Enhancing pontine lesion noted, better evaluated on prior brain MRIs.            Pathology  10/25/18 CSF Cytology: Per report,  "-Negative for malignant cells.  -Mild mononuclear pleocytosis including lymphocytes and monocytes."    Cardiac Imaging   10/25/18 TTE: Per report,  LA wnl  EF " 60-65%  No wall motion abnormalities  Diastolic function normal

## 2018-10-28 NOTE — PROGRESS NOTES
"[facility]  Vascular Neurology  Comprehensive Stroke Center  Progress Note    Assessment/Plan:     * Autoimmune disorder    Mrs. Cortez is a 36 y/o female with prior L Basal ganglia lesion (2015, thought to be stroke at the time, likely some infarcted component) and known MTHFR mutation presents with a gradual progressive posterior HA and dysarthria.     MRI revealed b/l pontine lesion with significant surrounding edema, diffusion restriction, and central ring contrast enhancement.  CSF findings notable for lymphocytic pleocytosis.    Differential includes malignancy/met/lymphoma (perhaps less likely given evolution of prior lesion in 2015), PRES (no clear risk factors, abnormal contrast enhancement), infection (2 lesions in 2 years would make this less likely), rhomboencephalitis, demyelinating/autoimmune disease (Behcet's?), CPM (no clear changes in Na based on history or documentation; also 2 lesions over 2 years makes this less likely).  Suspect less likely to be infarction, also query if prior "CVA" was related to current process as well (why did it occur at such a young age, with no further events, unusual location, and upon further review imaging has similar characteristics to current lesion).  Overall clinical course is more consistent with an autoimmune disorder.      After further review of imaging and labs, most suspicious for Behcet's or other neuro-inflammatory disorder.  Reports a history of oral ulcers, but none currently. Has decreased color vision and visual acuity on R eye.  However, CSF leukocytosis has a lymphocytic pleocytosis (neutrophils more likely to predominate in Behcet's), no evidence of vasculitis on CTA.  No lesions noted on C and T spine MRI.  Cytology negative for malignant cells.    Notable labs resulted:  CSF: Lymphocyctic pleocytosis  CRP: slight elevation (10.7)  MS profile: negative for OCBs  Cytology: Negative for malignant cells    Discharge to home with " outpt PT/OT/ST.  Final steroid dose as outpt tomorrow.  F/u in [facility] clinic on Tuesday afternoon at 1530 with Dr. Butt and myself (will ask clinic to schedule Monday).        Plan  -Unclear etiology  -1 g solumedrol Qdaily x3 days (day 1 = 10/27)  -Continue home aggrenox, rosuvastatin  -F/u CSF and serum studies from 10/26, 10/27  -Quantiferon gold TB as outpt  -Consulted general neurology and rheumatology, appreciate assistance  -Discharge to home 10/28.         Hemiparesis    PT/OT     Alcohol use    1 bottle wine/night, last drink 1 week prior to presentation  -Low likelihood of withdrawal at this point     Hyperlipidemia    Stroke risk factor  -Continue home rosuvastatin     Headache    Resolved  -Tylenol PRN     Impaired motor control    PT/OT/ST --> outpatient PT/OT/ST     Tobacco abuse    Recommended cessation  -Nicotine patch     Depression    Continue home citalopram          10/25/18: LP today.    10/26: No acute events overnight  10/27: Asymptomatic hypotension last night while asleep, improved with fluids.  Day 1 steroids.  10/28: No acute events overnight.  Day 2 of 3 steroids.    STROKE DOCUMENTATION        NIH Scale:  Interval: 7 days or at discharge (whichever comes first)  1a. Level Of Consciousness: 0-->Alert: keenly responsive  1b. LOC Questions: 0-->Answers both questions correctly  1c. LOC Commands: 0-->Performs both tasks correctly  2. Best Gaze: 0-->Normal  3. Visual: 0-->No visual loss  4. Facial Palsy: 1-->Minor paralysis (flattened nasolabial fold, asymmetry on smiling)  5a. Motor Arm, Left: 0-->No drift: limb holds 90 (or 45) degrees for full 10 secs  5b. Motor Arm, Right: 1-->Drift: limb holds 90 (or 45) degrees, but drifts down before full 10 secs: does not hit bed or other support  6a. Motor Leg, Left: 0-->No drift: leg holds 30 degree position for full 5 secs  6b. Motor Leg, Right: 0-->No drift: leg holds 30 degree position for full 5 secs  7. Limb Ataxia: 0-->Absent  8. Sensory:  0-->Normal: no sensory loss  9. Best Language: 0-->No aphasia: normal  10. Dysarthria: 1-->Mild-to-moderate dysarthria: patient slurs at least some words and, at worst, can be understood with some difficulty  11. Extinction and Inattention (formerly Neglect): 0-->No abnormality  Total (NIH Stroke Scale): 3       Modified Jaden Score: 2  Patricia Coma Scale:15   ABCD2 Score:    [id number] Score:   HAS -BLED Score:   ICH Score:   Hunt & Francis Classification:      Hemorrhagic change of an Ischemic Stroke: Does this patient have an ischemic stroke with hemorrhagic changes? No     Neurologic Chief Complaint: Pontine Lesion    Subjective:     Interval History: Patient is seen for follow-up neurological assessment and treatment recommendations:     No acute events overnight.  Day 2 of 3 steroids.  Reports subjective improvement in symptoms and increased energy level.    HPI, Past Medical, Family, and Social History remains the same as documented in the initial encounter.     Review of Systems   Constitutional: Negative for fever.   Eyes: Positive for visual disturbance.   Respiratory: Negative for shortness of breath.    Cardiovascular: Negative for chest pain.   Gastrointestinal: Negative for abdominal pain.   Genitourinary: Negative for dysuria.   Skin: Negative for rash.   Neurological: Positive for speech difficulty and weakness. Negative for numbness and headaches.   Psychiatric/Behavioral: Negative for confusion.     Scheduled Meds:   citalopram  20 mg Oral Daily    dipyridamole-aspirin 200-25 mg  1 capsule Oral BID    gabapentin  300 mg Oral TID    heparin (porcine)  5,000 Units Subcutaneous Q8H    methylPREDNISolone (SOLU-Medrol) IVPB (doses > 250 mg)   Intravenous Daily    nicotine  1 patch Transdermal Daily    rosuvastatin  20 mg Oral Daily    sodium chloride 0.9%  3 mL Intravenous Q8H     Continuous Infusions:    PRN Meds:acetaminophen, labetalol, ondansetron, polyethylene glycol    Objective:     Vital  Signs (Most Recent):  Temp: 98.5 °F (36.9 °C) (10/28/18 1115)  Pulse: 88 (10/28/18 1115)  Resp: 18 (10/28/18 1115)  BP: (!) 97/53 (10/28/18 1115)  SpO2: 96 % (10/28/18 1115)  BP Location: Right arm    Vital Signs Range (Last 24H):  Temp:  [97.9 °F (36.6 °C)-98.7 °F (37.1 °C)]   Pulse:  [57-88]   Resp:  [18-20]   BP: ()/(53-76)   SpO2:  [94 %-98 %]   BP Location: Right arm    Physical Exam   Constitutional: She is oriented to person, place, and time. She appears well-developed. No distress.   HENT:   Head: Normocephalic.   No oral ulcers noted   Eyes: EOM are normal. Pupils are equal, round, and reactive to light.   Cardiovascular: Normal rate, regular rhythm and normal heart sounds. Exam reveals no friction rub.   No murmur heard.  Pulmonary/Chest: Effort normal and breath sounds normal. No stridor. No respiratory distress.   Abdominal: Soft. Bowel sounds are normal. She exhibits no distension. There is no tenderness.   Musculoskeletal: She exhibits no edema.   Neurological: She is oriented to person, place, and time.   Skin: Skin is warm and dry.        Neurological Exam:   LOC: alert  Attention Span: Good   Language: No aphasia  Articulation: Dysarthria  Orientation: Person, Place, Time   Visual Fields: Full  EOM (CN III, IV, VI): Full/intact  Pupils (CN II, III): PERRL  Facial Sensation (CN V): Normal  Facial Movement (CN VII): Lower facial weakness on the Right  Motor: 5/5 in BUE, 4+/5 RLE, 5/5 LLE  Cebellar: finger/nose intact b/l  Sensation: to light touch intact in BUE and BLE  Tone: Increased on RUE    Laboratory:  Recent Results (from the past 24 hour(s))   Protein, urine, timed    Collection Time: 10/27/18 10:40 PM   Result Value Ref Range    Urine Volume 1350 mL    Urine Collection Duration 24 Hr    Protein, Urine <7 0 - 15 mg/dL    Urine Protein, Timed Unable to calculate 0 - 100 mg/Spec   CBC auto differential    Collection Time: 10/28/18  4:28 AM   Result Value Ref Range    WBC 12.44 3.90 - 12.70  K/uL    RBC 3.66 (L) 4.00 - 5.40 M/uL    Hemoglobin 11.6 (L) 12.0 - 16.0 g/dL    Hematocrit 36.5 (L) 37.0 - 48.5 %     (H) 82 - 98 fL    MCH 31.7 (H) 27.0 - 31.0 pg    MCHC 31.8 (L) 32.0 - 36.0 g/dL    RDW 12.4 11.5 - 14.5 %    Platelets 375 (H) 150 - 350 K/uL    MPV 10.1 9.2 - 12.9 fL    Immature Granulocytes 0.3 0.0 - 0.5 %    Gran # (ANC) 9.8 (H) 1.8 - 7.7 K/uL    Immature Grans (Abs) 0.04 0.00 - 0.04 K/uL    Lymph # 1.7 1.0 - 4.8 K/uL    Mono # 0.8 0.3 - 1.0 K/uL    Eos # 0.0 0.0 - 0.5 K/uL    Baso # 0.01 0.00 - 0.20 K/uL    nRBC 0 0 /100 WBC    Gran% 79.0 (H) 38.0 - 73.0 %    Lymph% 13.9 (L) 18.0 - 48.0 %    Mono% 6.7 4.0 - 15.0 %    Eosinophil% 0.0 0.0 - 8.0 %    Basophil% 0.1 0.0 - 1.9 %    Differential Method Automated    Comprehensive metabolic panel    Collection Time: 10/28/18  4:28 AM   Result Value Ref Range    Sodium 136 136 - 145 mmol/L    Potassium 3.8 3.5 - 5.1 mmol/L    Chloride 106 95 - 110 mmol/L    CO2 23 23 - 29 mmol/L    Glucose 104 70 - 110 mg/dL    BUN, Bld 7 6 - 20 mg/dL    Creatinine 0.5 0.5 - 1.4 mg/dL    Calcium 9.5 8.7 - 10.5 mg/dL    Total Protein 6.5 6.0 - 8.4 g/dL    Albumin 3.3 (L) 3.5 - 5.2 g/dL    Total Bilirubin 0.1 0.1 - 1.0 mg/dL    Alkaline Phosphatase 64 55 - 135 U/L    AST 19 10 - 40 U/L    ALT 26 10 - 44 U/L    Anion Gap 7 (L) 8 - 16 mmol/L    eGFR if African American >60.0 >60 mL/min/1.73 m^2    eGFR if non African American >60.0 >60 mL/min/1.73 m^2   Magnesium    Collection Time: 10/28/18  4:28 AM   Result Value Ref Range    Magnesium 1.9 1.6 - 2.6 mg/dL   Phosphorus    Collection Time: 10/28/18  4:28 AM   Result Value Ref Range    Phosphorus 2.8 2.7 - 4.5 mg/dL         Diagnostic Results     Brain Imaging   10/25/18 MRI Brain w/ and w/o: Per independent resident review and interpretation,  Ring enhancing-lesion with surrounding T2 flair edema with abnormal diffusion restriction in b/l jensen.                Vessel Imaging   10/25/18 CTA Head and neck: Per  "independent resident review and interpretation,  No large-vessel occlusion, hemodynamically-significant stenosis, nor aneurysm visualized.    Spine Imaging  10/26/18 MRI C and T spine w/ and w/o: Per independent resident review and interpretation,  No abnormal enhancement within C and T spines.  Enhancing pontine lesion noted, better evaluated on prior brain MRIs.            Pathology  10/25/18 CSF Cytology: Per report,  "-Negative for malignant cells.  -Mild mononuclear pleocytosis including lymphocytes and monocytes."    Cardiac Imaging   10/25/18 TTE: Per report,  LA wnl  EF 60-65%  No wall motion abnormalities  Diastolic function normal            Nathanael Borja MD  Comprehensive Stroke Center  Department of Vascular Neurology   [facility]      "

## 2018-10-29 ENCOUNTER — TELEPHONE (OUTPATIENT)
Dept: INTERNAL MEDICINE | Facility: CLINIC | Age: 38
End: 2018-10-29

## 2018-10-29 DIAGNOSIS — I63.30 CEREBRAL THROMBOSIS WITH CEREBRAL INFARCTION: ICD-10-CM

## 2018-10-29 DIAGNOSIS — E78.5 HYPERLIPIDEMIA, UNSPECIFIED HYPERLIPIDEMIA TYPE: ICD-10-CM

## 2018-10-29 LAB
ACE CSF-CCNC: 1.5 U/L (ref 0–2.5)
ALBUMIN SERPL ELPH-MCNC: 3.06 G/DL
ALPHA1 GLOB SERPL ELPH-MCNC: 0.32 G/DL
ALPHA2 GLOB SERPL ELPH-MCNC: 0.74 G/DL
B-GLOBULIN SERPL ELPH-MCNC: 0.77 G/DL
GAMMA GLOB SERPL ELPH-MCNC: 0.72 G/DL
HAV IGM SERPL QL IA: NEGATIVE
HBV CORE IGM SERPL QL IA: NEGATIVE
HBV SURFACE AG SERPL QL IA: NEGATIVE
HCV AB SERPL QL IA: NEGATIVE
PATHOLOGIST INTERPRETATION SPE: NORMAL
PATHOLOGIST INTERPRETATION UPE: NORMAL
PROT PATTERN UR ELPH-IMP: NORMAL
PROT SERPL-MCNC: 5.6 G/DL
VDRL CSF QL: NORMAL
VIT B1 SERPL-MCNC: 44 UG/L (ref 38–122)

## 2018-10-29 RX ORDER — ROSUVASTATIN CALCIUM 5 MG/1
TABLET, COATED ORAL
Qty: 90 TABLET | Refills: 1 | Status: SHIPPED | OUTPATIENT
Start: 2018-10-29 | End: 2018-10-30

## 2018-10-29 NOTE — TELEPHONE ENCOUNTER
Joseph, please put Maame on the cancellation list. I had wanted to get her in for a physical but recent admit for possible stroke vs multiple sclerosis so I should try to get her in in November/early December with lab.  Thanks

## 2018-10-30 ENCOUNTER — PATIENT OUTREACH (OUTPATIENT)
Dept: ADMINISTRATIVE | Facility: CLINIC | Age: 38
End: 2018-10-30

## 2018-10-30 ENCOUNTER — OFFICE VISIT (OUTPATIENT)
Dept: NEUROLOGY | Facility: CLINIC | Age: 38
End: 2018-10-30
Payer: MEDICARE

## 2018-10-30 VITALS
WEIGHT: 134.69 LBS | DIASTOLIC BLOOD PRESSURE: 65 MMHG | BODY MASS INDEX: 22.99 KG/M2 | SYSTOLIC BLOOD PRESSURE: 95 MMHG | HEIGHT: 64 IN | HEART RATE: 68 BPM

## 2018-10-30 DIAGNOSIS — G81.91 HEMIPARESIS OF RIGHT DOMINANT SIDE DUE TO NON-CEREBROVASCULAR ETIOLOGY: ICD-10-CM

## 2018-10-30 DIAGNOSIS — R90.89 ABNORMAL FINDING ON MRI OF BRAIN: ICD-10-CM

## 2018-10-30 DIAGNOSIS — H47.011 ISCHEMIC OPTIC NEUROPATHY OF RIGHT EYE: ICD-10-CM

## 2018-10-30 DIAGNOSIS — G93.9 PONTINE LESION: ICD-10-CM

## 2018-10-30 DIAGNOSIS — G37.9 DEMYELINATING DISEASE OF CENTRAL NERVOUS SYSTEM, UNSPECIFIED: ICD-10-CM

## 2018-10-30 DIAGNOSIS — R83.9 CSF ABNORMAL: ICD-10-CM

## 2018-10-30 DIAGNOSIS — D89.89 AUTOIMMUNE DISORDER: Primary | ICD-10-CM

## 2018-10-30 LAB
B BURGDOR AB SER IA-ACNC: 0.22 INDEX VALUE
BACTERIA CSF CULT: NO GROWTH
DNA1Q INTERPRETATION: NORMAL
DNA1Q TESTING DATE: NORMAL
GRAM STN SPEC: NORMAL
HLA-A 1 SERO. EQUIV: NORMAL
HLA-A 1: NORMAL
HLA-A 2 SERO. EQUIV: NORMAL
HLA-A 2: NORMAL
HLA-B 1 SERO. EQUIV: 27
HLA-B 1: NORMAL
HLA-B 2 SERO. EQUIV: 35
HLA-B 2: NORMAL
HLA-BW 1 SERO. EQUIV: 4
HLA-BW 2 SERO. EQUIV: 6
HLA-C 1: NORMAL
HLA-C 2: NORMAL
HLA-CW 1 SERO. EQUIV: NORMAL
HLA-CW 2 SERO. EQUIV: NORMAL
HSV1 IGG SERPL QL IA: NEGATIVE
HSV2 IGG SERPL QL IA: NEGATIVE
LDNA1 TESTING DATE: NORMAL
METHYLMALONATE SERPL-SCNC: <0.1 UMOL/L

## 2018-10-30 PROCEDURE — 99215 OFFICE O/P EST HI 40 MIN: CPT | Mod: S$GLB,,, | Performed by: PSYCHIATRY & NEUROLOGY

## 2018-10-30 PROCEDURE — 3008F BODY MASS INDEX DOCD: CPT | Mod: CPTII,S$GLB,, | Performed by: PSYCHIATRY & NEUROLOGY

## 2018-10-30 PROCEDURE — 99213 OFFICE O/P EST LOW 20 MIN: CPT | Mod: PBBFAC | Performed by: PSYCHIATRY & NEUROLOGY

## 2018-10-30 PROCEDURE — 99999 PR PBB SHADOW E&M-EST. PATIENT-LVL III: CPT | Mod: PBBFAC,,, | Performed by: PSYCHIATRY & NEUROLOGY

## 2018-10-30 RX ORDER — PREDNISONE 10 MG/1
10 TABLET ORAL DAILY
Qty: 30 TABLET | Refills: 1 | Status: SHIPPED | OUTPATIENT
Start: 2018-10-30 | End: 2018-12-10 | Stop reason: SDUPTHER

## 2018-10-30 RX ORDER — METHYLPREDNISOLONE SODIUM SUCCINATE 1 G/16ML
INJECTION INTRAMUSCULAR; INTRAVENOUS
Qty: 2000 MG | Refills: 0 | Status: SHIPPED | OUTPATIENT
Start: 2018-10-30 | End: 2018-12-10

## 2018-10-30 NOTE — LETTER
October 30, 2018      Nathanael Borja MD  [address]  [facility] [address]           [facility] Multiple Sclerosis  [address]  [facility] [address]  Phone: [phone]          Patient: Maame Cortez   MR Number: [id number]   YOB: 1980   Date of Visit: 10/30/2018       Dear Dr. Nathanael Borja:    Thank you for referring Maame Cortez to me for evaluation. Attached you will find relevant portions of my assessment and plan of care.    If you have questions, please do not hesitate to call me. I look forward to following Maame Cortez along with you.    Sincerely,    Nathanael Borja MD    Enclosure  CC:  No Recipients    If you would like to receive this communication electronically, please contact [email or web address] or [phone] to request more information on [facility] access.    For providers and/or their staff who would like to refer a patient to [facility], please contact us through our one-stop-shop provider referral line, [facility] , at [phone].    If you feel you have received this communication in error or would no longer like to receive these types of communications, please e-mail [email or web address]

## 2018-10-30 NOTE — PATIENT INSTRUCTIONS
Discharge Instructions for Stroke  You have been diagnosed with a stroke, or with a TIA (transient ischemic attack). Or you have been identified as having a high risk for stroke. During a stroke, blood stops flowing to part of your brain. This can damage areas in the brain that control other parts of the body. Symptoms after a stroke depend on which part of the brain has been affected.  Stroke risk factors  Once youve had a stroke, youre at greater risk for another one. Listed below are some other factors that can increase your risk for a stroke:  · High blood pressure  · High cholesterol  · Cigarette or cigar smoking  · Diabetes  · Carotid or other artery disease  · Atrial fibrillation, atrial flutter, or other heart disease  · Not being physically active  · Obesity  · Certain blood disorders (such as sickle cell anemia)  · Excessive alcohol use  · Abuse of street drugs  · Race  · Gender  · Family history of stroke  · Diet high in salty, fried, or greasy foods  Changes in daily living  Doing your regular tasks may be difficult after youve had a stroke, but you can learn new ways to manage your daily activities. In fact, doing daily activities may help you to regain muscle strength and bring back function to affected limbs. Be patient, give yourself time to adjust, and appreciate the progress you make.  Daily activities  You may be at risk of falling. Make changes to your home to help you walk more easily. A therapist will decide if you need an assistive device to walk safely.  You may need to see an occupational therapist or physical therapist to learn new ways of doing things. For example, you may need to make adjustments when bathing or dressing:  Tips for showering or bathing  · Test the water temperature with a hand or foot that was not affected by the stroke.  · Use grab bars, a shower seat, a hand-held showerhead, and a long-handled brush.  Tips for getting dressed  · Dress while sitting, starting with  the affected side or limb.  · Wear shirts that pull easily over your head. Wear pants or skirts with elastic waistbands.  · Use zippers with loops attached to the pull tabs.  Lifestyle changes  · Take your medicines exactly as directed. Dont skip doses.  · Begin an exercise program. Ask your provider how to get started. Also ask how much activity you should try to get on a daily or weekly basis. You can benefit from simple activities such as walking or gardening.  · Limit alcohol intake. Men should have no more than 2 alcoholic drinks a day. Women should limit themselves to 1 alcoholic drink per day.  · Know your cholesterol level. Follow your providers recommendations about how to keep cholesterol under control.  · If you are a smoker, quit now. Join a stop-smoking program to improve your chances of success. Ask your provider about medicines or other methods to help you quit.  · Learn stress management techniques to help you deal with stress in your home and work life.  Diet  Your healthcare provider will give you information on dietary changes that you may need to make, based on your situation. Your provider may recommend that you see a registered dietitian for help with diet changes. Changes may include:  · Reducing fat and cholesterol intake  · Reducing salt (sodium) intake, especially if you have high blood pressure  · Eating more fresh vegetables and fruits  · Eating more lean proteins, such as fish, poultry, and beans and peas (legumes)  · Eating less red meat and processed meats  · Using low-fat dairy products  · Limiting vegetable oils and nut oils  · Limiting sweets and processed foods such as chips, cookies, and baked goods  Follow-up care  · Keep your medical appointments. Close follow-up is important to stroke rehabilitation and recovery.  · Some medicines require blood tests to check for progress or problems. Keep follow-up appointments for any blood tests ordered by your providers.  When to call  911  Call 911 right away if you have any of the following symptoms of stroke:  · Weakness, tingling, or loss of feeling on one side of your face or body  · Sudden double vision or trouble seeing in one or both eyes  · Sudden trouble talking or slurred speech  · Trouble understanding others  · Sudden, severe headache  · Dizziness, loss of balance, or a sense of falling  · Blackouts or seizures      F.A.S.T. is an easy way to remember the signs of stroke. When you see these signs, you know that you need to call 911 fast.  F.A.S.T. stands for:  · F is for face drooping. One side of the face is drooping or numb. When the person smiles, the smile is uneven.  · A is for arm weakness. One arm is weak or numb. When the person lifts both arms at the same time, one arm may drift downward.  · S is for speech difficulty. You may notice slurred speech or trouble speaking. The person can't repeat a simple sentence correctly when asked.  · T is for time to call 911. If someone shows any of these symptoms, even if they go away, call 911 immediately. Make note of the time the symptoms first appeared.  Date Last Reviewed: 8/26/2015 © 2000-2017 [employer]. [address] [place] [address], [place] [address]. All rights reserved. This information is not intended as a substitute for professional medical care. Always follow your healthcare professional's instructions.

## 2018-10-30 NOTE — TELEPHONE ENCOUNTER
----- Message from Lucila Khan LPN sent at 10/30/2018 10:27 AM CDT -----  Spoke with patient during TCC call, states that she was given Nicotine patches when dc'd from hospital. Patient states that she did not receive patches. Spoke with Caity at [facility], states they did not receive a prescription for the patches. Please call patient to discuss.    Respectfully,  Lucila Khan LPN  [facility]    [email or web address]       Please do not reply to this message, as this inbox is not routinely monitored.

## 2018-10-30 NOTE — PROGRESS NOTES
Subjective:       Patient ID: Maame Cortez is a 37 y.o. female who presents today for a hospital follow-up clinic visit for a neuro-inflammatory disorder.    She originally presented to [facility] on 10/24 w/ worsened dysarthria, dysphagia, and headache, initially thought to be secondary to a pontine stroke.  After further evaluation, thought this pontine lesion was less likely to be a stroke given significant surrounding edema, diffusion restriction, and central ring contrast enhancement.  Subsequent workup included LP, notable for lymphocytic pleocytosis.  Concerned for NMO vs neuro-behcet's vs sarcoid.  She was given a course of 3 days of solumedrol and discharged.  She presents 2 days later for follow-up.    She previously was diagnosed with a L basal ganglia stroke in 2015.  She was started on aggrenox and rosuvastatin afterwards for secondary stroke prevention.  States when she left the hospital in 2015 she was in a wheelchair due to the severity of her weakness, but this has since improved (inpt rehab) and she is now able to ambulate without assistance w/ residual R hemiparesis.  During this time period, she also developed worsened vision loss on the R eye.  Subsequently evaluated by Dr. Ricketts who was concerned for R ischemic optic neuropathy.      In 9/2017, evaluated by vascular neurology for dizziness, auditory hallucinations, lip numbness/tingling, thought to be related to migraine with aura.  These symptoms gradually resolved over a period of weeks.  CTA completed at that time was unremarkable, no MR imaging.     Reports a history of oral ulcers (inner lower lip) approximately Q3 months.  She reports a history of 1 genital ulcer years ago, for which she thinks she might have been given medicine with subsequent improvement, but is uncertain.    No FMH of autoimmune disorders or similar symptoms.  Maternal grandmother with history of breast cancer diagnosed in 60s.  No personal history of heat  "intolerance, malignancy, nights sweats, fever/chills.      She has a history of significant alcohol use (1 bottle wine/night), last drink ~10/22/18.  No history of recreational drug use.      HPI:  · Feels like walking isn't as "sturdy" since discharge.  · Pt states dysarthria is worse and has not had improvement since steroids.  Mom believes dysarthria has improved (although not back to prior baseline)        SOCIAL HISTORY  Social History     Tobacco Use    Smoking status: Former Smoker     Packs/day: 0.50     Years: 8.00     Pack years: 4.00     Last attempt to quit: 8/21/2015     Years since quitting: 3.1    Smokeless tobacco: Never Used    Tobacco comment: quit 8/8/2015   Substance Use Topics    Alcohol use: Yes     Alcohol/week: 0.0 oz     Comment: Social    Drug use: No     Living arrangements - the patient lives with their spouse.    ROS:  · Bladder Dysfunction: No  · Bowel Dysfunction: No  · Spasticity: No  · Visual Symptoms: Yes - R color vision loss, decreased visual acuity  · Cognitive: No  · Gait Disturbance: Yes   · Hand Dysfunction: Yes - R  · Pain: No  · Sexual Dysfunction: Not Assessed  · Skin Breakdown: No  · Tremors: No  · Dysphagia:  Yes - outpt ST  · Dysarthria:  Yes - see above  · Heat sensitivity:  No  · Any un-met adaptive needs? No  · Copay Assist?  No  · Clinical Trial candidate? No          Objective:        1. 25 foot timed walk: 5.8 s    No flowsheet data found.    No flowsheet data found.    Neurologic Exam     Mental Status   Oriented to person, place, and time.   Level of consciousness: alert    Cranial Nerves     CN II   Visual fields full to confrontation.     CN III, IV, VI   Pupils are equal, round, and reactive to light.  Extraocular motions are normal.     CN V   Facial sensation intact.     CN VII   Right facial weakness: central  Left facial weakness: none    CN VIII   Hearing: intact    CN IX, X   Palate: symmetric    CN XI   CN XI normal.     CN XII   CN XII normal. "     Motor Exam   Muscle bulk: normal  Right arm tone: increased    Strength   Right deltoid: 4/5  Left deltoid: 5/5  Right biceps: 4/5  Left biceps: 5/5  Right triceps: 4/5  Left triceps: 5/5  Right wrist flexion: 3/5  Left wrist flexion: 5/5  Right wrist extension: 3/5  Left wrist extension: 5/5  Right interossei: 3/5  Left interossei: 5/5  Right iliopsoas: 4/5  Left iliopsoas: 5/5  Right quadriceps: 4/5  Left quadriceps: 5/5  Right hamstrin/5  Left hamstrin/5  Right glutei: 4/5  Left glutei: 5/5  Right anterior tibial: 4/5  Left anterior tibial: 5/5  Right posterior tibial: 4/5  Left posterior tibial: 5/5  Right peroneal: 4/5  Left peroneal: 5/5  Right gastroc: 4/5  Left gastroc: 5/5R strength: 4+/5     Sensory Exam   Light touch normal.     Gait, Coordination, and Reflexes     Coordination   Finger to nose coordination: abnormal (R ataxia)  Heel to shin coordination: normal    Reflexes   Right brachioradialis: 3+  Left brachioradialis: 2+  Right biceps: 4+  Left biceps: 2+  Right triceps: 4+  Left triceps: 2+  Right patellar: 4+  Left patellar: 2+  Right achilles: 3+  Left achilles: 2+  Right plantar: upgoing  Left plantar: equivocal  Right Kennedy: present  Left Kennedy: absent  Right ankle clonus: present (sustained)  Left ankle clonus: absentGait with RLE spasticity, circumduction.  R foot drop.           Color Plates  6/17 on R  17/17 on L      Imaging:     Diagnostic Results:     Brain Imaging   10/25/18 MRI Brain w/ and w/o: Per independent resident review and interpretation,  Ring enhancing-lesion with surrounding T2 flair edema with abnormal diffusion restriction in b/l jensen.                    Vessel Imaging   10/25/18 CTA Head and neck: Per independent resident review and interpretation,  No large-vessel occlusion, hemodynamically-significant stenosis, nor aneurysm visualized.     Spine Imaging  10/26/18 MRI C and T spine w/ and w/o: Per independent resident review and interpretation,  No  "abnormal enhancement within C and T spines.  Enhancing pontine lesion noted, better evaluated on prior brain MRIs.                Pathology  10/25/18 CSF Cytology: Per report,  "-Negative for malignant cells.  -Mild mononuclear pleocytosis including lymphocytes and monocytes."            Labs:       Positive/Abnormal Labs  CRP (10.7; normal range 0-8.2)  CSF WBC: lymphocytic pleocytosis (101, 107)    Negative/normal labs  HIV  VDRL (CSF)  RPR (serum)  IL-2 Receptor  ACE (serum and CSF)  MS profile/OCBs  CSF glucose, protein  M Tuberculosis DNA (CSF)  HSV, WNV, VZV, Enterovirus (CSF)  Flow cytometry (CSF)  SPEP  UPEP  Immunoglobulins  ESR  Acute hepatitis Panel  Lyme Ab  HLA phenotype negative for B51    Labs In process  NMO (serum, CSF)  Toxo PCR (CSF)  Lyme disease serology (CSF)  Quantiferon gold (ordered, to be done as outpt)      Diagnosis/Assessment/Plan:    1. Neuroinflammatory Disorder  · Assessment: Unclear etiology.  Most likely autoimmune disorder with a relapsing/remitting course based on history so far.  Differential diagnoses include NMO and neuro-behcet's (NBD).  While she does not currently meet the criteria for Behcet's under the ICBD criteria published in 2014, remain concerned for NBD based on imaging characteristics.  However, if NBD, per Radha et al., she has potentially poor prognostic markers including CSF pleocytosis and brainstem disease.  History of R monocular deficits and loss of color vision concerning for prior episode of optic neuritis.    · Complete 5 day course of 1 g solumedrol Qdaily (2 more days)  · START prednisone 10 mg Qdaily  · Referral to ophthalmology/Dr. Ricketts -> Prior optic neuritis vs optic ischemic neuropathy?  Help in coordinating OCT  · F/u NMO antibodies, toxo PCR, lyme disease serology (CSF), quantiferon gold  · CXR to evaluate for sarcoid (if negative, very low likelihood of sarcoid)  · Continue outpatient PT/OT/ST  · Discontinue rosuvastatin, aggrenox  · RTC in 2 " weeks (Wednesday afternoon)      International Team for the Revision of the International Criteria for Behcet's Disease. (2014). The International Criteria for Behcet's Disease (ICBD): A collaborative study of 27 countries on the sensitivity and specificity of the new criteria. Journal of the  Academy of Dermatology and Venereology, 28, 338-347. doi:10.1111/jdv.99486    DARVIN Garcia, TURNER Canela, JOSE Acosta, DARVIN Cordoba, TURNER Byrne, TRINA Fuentes., . . . Al-DREW Delgadillo. (2014). Diagnosis and management of Neuro-Behçets disease: International consensus recommendations. Journal of Neurology, 261(9), 6681-2842. doi:10.1007/m66746-164-6016-5      Over 50% of this 40 minute visit was spent in direct face to face counseling of the patient about neuroinflammatory disorder, DMT considerations, and MS symptom management.       Problem List Items Addressed This Visit        Neuro    Pontine lesion    Relevant Medications    methylPREDNISolone sodium succinate (SOLU-MEDROL) 1,000 mg injection    predniSONE (DELTASONE) 10 MG tablet    Other Relevant Orders    X-Ray Chest PA And Lateral    Ambulatory Referral to Ophthalmology    Hemiparesis    CSF abnormal       Ophtho    Ischemic optic neuropathy of right eye       Immunology/Multi System    Autoimmune disorder - Primary    Relevant Medications    methylPREDNISolone sodium succinate (SOLU-MEDROL) 1,000 mg injection    predniSONE (DELTASONE) 10 MG tablet      Other Visit Diagnoses     Demyelinating disease of central nervous system, unspecified        Relevant Medications    methylPREDNISolone sodium succinate (SOLU-MEDROL) 1,000 mg injection    predniSONE (DELTASONE) 10 MG tablet    Other Relevant Orders    X-Ray Chest PA And Lateral    Ambulatory Referral to Ophthalmology    Abnormal finding on MRI of brain        Relevant Medications    methylPREDNISolone sodium succinate (SOLU-MEDROL) 1,000 mg injection    predniSONE (DELTASONE) 10 MG tablet    Other  Relevant Orders    X-Ray Chest PA And Lateral    Ambulatory Referral to Ophthalmology

## 2018-10-30 NOTE — PATIENT INSTRUCTIONS
-Steroid smoothie for two days  -START prednisone 10 mg daily after finishing smoothie  -Referral to ophthalmology  -Chest x-ray  -Blood test for Tb   -DISCONTINUE rosuvastatin, aggrenox

## 2018-10-31 ENCOUNTER — TELEPHONE (OUTPATIENT)
Dept: INTERNAL MEDICINE | Facility: CLINIC | Age: 38
End: 2018-10-31

## 2018-10-31 LAB — B BURGDOR AB CSF IA-ACNC: 0.06 LIV

## 2018-10-31 NOTE — TELEPHONE ENCOUNTER
Please forward this important TCC information to your provider in order to maximize the post discharge care delivery of this patient.     C3 nurse spoke with Maame Cortez for a TCC post hospital discharge follow up call. The patient does not have a scheduled HOSFU appointment with Woodrow Bruce MD within 7-14 days post hospital discharge date 10/28/18. C3 nurse was unable to schedule HOSFU appointment in [facility].   Please contact patient and schedule follow up appointment using HOSFU visit type on or before 11/11/18.     Respectfully,   Lucila Khan LPN   [facility]     [email or web address]       Please do not reply to this message, as this inbox is not routinely monitored.   (Routing comment)

## 2018-11-01 ENCOUNTER — TELEPHONE (OUTPATIENT)
Dept: OPTOMETRY | Facility: CLINIC | Age: 38
End: 2018-11-01

## 2018-11-01 ENCOUNTER — PATIENT MESSAGE (OUTPATIENT)
Dept: NEUROLOGY | Facility: CLINIC | Age: 38
End: 2018-11-01

## 2018-11-01 LAB — NMO/AQP4 FACS,S: NEGATIVE

## 2018-11-02 ENCOUNTER — TELEPHONE (OUTPATIENT)
Dept: OPHTHALMOLOGY | Facility: CLINIC | Age: 38
End: 2018-11-02

## 2018-11-02 LAB
HSV1, PCR, CSF: NEGATIVE
HSV2, PCR, CSF: NEGATIVE
M TB CMPLX DNA SPEC QL NAA+PROBE: NEGATIVE
SPECIMEN SOURCE: NORMAL
SPECIMEN SOURCE: NORMAL
WNV RNA CSF QL NAA+PROBE: NEGATIVE

## 2018-11-06 DIAGNOSIS — F32.A DEPRESSION, UNSPECIFIED DEPRESSION TYPE: ICD-10-CM

## 2018-11-06 RX ORDER — CITALOPRAM 20 MG/1
20 TABLET, FILM COATED ORAL DAILY
Qty: 90 TABLET | Refills: 1 | Status: SHIPPED | OUTPATIENT
Start: 2018-11-06 | End: 2018-12-20

## 2018-11-08 LAB — NMO/AQP4 FACS,CSF: NEGATIVE

## 2018-11-09 ENCOUNTER — TELEPHONE (OUTPATIENT)
Dept: NEUROLOGY | Facility: CLINIC | Age: 38
End: 2018-11-09

## 2018-11-09 NOTE — TELEPHONE ENCOUNTER
Attempted to call to discuss importance of follow-up appointment.    No answer to phone calls.    Nathanael Borja MD

## 2018-11-16 ENCOUNTER — CLINICAL SUPPORT (OUTPATIENT)
Dept: REHABILITATION | Facility: HOSPITAL | Age: 38
End: 2018-11-16
Payer: MEDICARE

## 2018-11-16 ENCOUNTER — CLINICAL SUPPORT (OUTPATIENT)
Dept: REHABILITATION | Facility: HOSPITAL | Age: 38
End: 2018-11-16
Attending: STUDENT IN AN ORGANIZED HEALTH CARE EDUCATION/TRAINING PROGRAM
Payer: MEDICARE

## 2018-11-16 DIAGNOSIS — Z78.9 IMPAIRED MOBILITY AND ADLS: ICD-10-CM

## 2018-11-16 DIAGNOSIS — Z74.09 IMPAIRED MOBILITY AND ADLS: ICD-10-CM

## 2018-11-16 DIAGNOSIS — Z78.9 IMPAIRED MOTOR CONTROL: ICD-10-CM

## 2018-11-16 DIAGNOSIS — M25.60 DECREASED RANGE OF MOTION WITH DECREASED STRENGTH: ICD-10-CM

## 2018-11-16 DIAGNOSIS — R53.1 DECREASED RANGE OF MOTION WITH DECREASED STRENGTH: ICD-10-CM

## 2018-11-16 DIAGNOSIS — R47.1 FLACCID DYSARTHRIA: ICD-10-CM

## 2018-11-16 DIAGNOSIS — R13.12 OROPHARYNGEAL DYSPHAGIA: ICD-10-CM

## 2018-11-16 PROCEDURE — G8988 SELF CARE GOAL STATUS: HCPCS | Mod: CJ,PO

## 2018-11-16 PROCEDURE — 92522 EVALUATE SPEECH PRODUCTION: CPT | Mod: PO

## 2018-11-16 PROCEDURE — 92610 EVALUATE SWALLOWING FUNCTION: CPT | Mod: PO

## 2018-11-16 PROCEDURE — G8987 SELF CARE CURRENT STATUS: HCPCS | Mod: CJ,PO

## 2018-11-16 PROCEDURE — 97166 OT EVAL MOD COMPLEX 45 MIN: CPT | Mod: PO

## 2018-11-16 NOTE — PATIENT INSTRUCTIONS
"Fine Motor Coordination  1. Place palm on table;raise and lower fingers one at a time.  2. Rest hand on table and spread fingers wide and bring together  3. Keep time to music with each finger.  4. Make an "0" by touching thumb to each finger at a time.  5.  coins, buttons, washers, bolts or beans of assorted sizes one at a time.  6.  a handful of coins, buttons, washers, bolts or beans of assorted sizes and drop one at a time.  7. Stack coins.  8.  Screw and unscrew different sized nuts and bolts.  9.  Crumple a piece of paper into a small ball.  10. Thump the ball of paper with each finger.  11. Hold a deck of cards in hand and drop one at a time.  12. Turn one card over at a time.  13  Shuffle and deal playing cards.  14. Tie knots in a string.  15. Practice buttoning and unbuttoning.  16.  Practice using a computer keyboard.  17. Do simple finger exercises on piano.  18. String beads.  19. Toss small ball or bean bag up in air and catch.  20. Toss small ball  Or bean bag from one hand to the other.  21. Roll small ball on table using fingers.  22. Toss small ball or bean bag in air and clap hands before catching.  23. Work on model car, plane, etc.   24. Play games with pieces like checkers.  25. Turn pages in book or magazine.    Strength:  1. Squeeze clothes pins.  2.  small items with tweezers.  3. Wring out wet wash cloth.      "

## 2018-11-16 NOTE — PROGRESS NOTES
"Appeared to be within functional limits for this evaluation. No concerns were reported. Outpatient Neurological Rehabilitation  Speech and Language Therapy Evaluation    Date: 11/16/2018   Start Time:  ***  Stop Time:  ***    Name: Maame Cortez   MRN: [id number]    Therapy Diagnosis: No diagnosis found. Physician: Nathanael Borja MD  Physician Orders: ***  Medical Diagnosis: ***    Visit #:  ***  Visits Authorized: ***  Date of Evaluation:  ***  Insurance Authorization Period: ***  Plan of Care Certification:    ***     Procedure Min.   {Blank single:48945::"Speech- Language Evaluation","Motor Speech Evaluation","AAC Evaluation","Additional 30 minutes AAC Evaluation","Swallowing and Oral Function Evaluation","Cognitive Communication Evaluation","Qualitative Evaluation of Voice and Resonance","Fluency Evaluation","***"}    ***    {Blank single:70236::"Speech- Language Evaluation","Motor Speech Evaluation","AAC Evaluation","Additional 30 minutes AAC Evaluation","Swallowing and Oral Function Evaluation","Cognitive Communication Evaluation","Qualitative Evaluation of Voice and Resonance","Fluency Evaluation","***"}    ***     Total Minutes: ***  Total Untimed Units: ***  Charges Billed/# of units: ***    Precautions:{IP WOUND PRECAUTIONS OHS:49279}  Subjective   Date of Onset: ***  History of Current Condition:   Pt reports ***   Past Medical History: Maame Cortez  has a past medical history of Heart palpitations, MTHFR mutation, and Stroke (08/2015).  Maame Cortez  has a past surgical history that includes [place] tooth extraction; Belt abdominoplasty; and pr transcran dopp intracran, emboli w/inj (8/11/2015).  Medical Hx and Allergies: Maame has a current medication list which includes the following prescription(s): citalopram, gabapentin, methylprednisolone sodium succinate, nicotine, and prednisone. Review of patient's allergies indicates:  No Known Allergies  Imaging: {AMB IMAGING " "STUDIES:01146}  Prior Therapy:  ***  Social History:  *** {LIVES WITH:83367}  Prior Level of Function: {INDEPENDENT ASSISTED:92608}  Current Level of Function: {INDEPENDENT ASSISTED:99999} ***  Pain:   {0-10:20507::"0"}/10  Pain Location / Description: ***  Nutrition:  ***  Patient Therapy Goals:  ***  Objective   Formal Assessment:  ***   Description:  *** (evalobjectivems or evalobjectivespeechandlanguage)    [facility] National Outcome Measures System (NOMS):   Severity Modifier for Medicare G-Code:  ***    Treatment   Treatment Time In: {Blank single:19197::"n/a"}  Treatment Time Out: {Blank single:19197::"n/a"}  Total Treatment Time: {Blank single:19197::"*** minutes","n/a"}  {Blank single:19197::"n/a"}    Education: {Blank multiple:19196::"POC","role of SLP","aspiration precautions","vocal hygeine","motor speech strategies","memory strategies","attention shifting strategies","course of medical disease affect on therapy diagnosis","scheduling/ cancellation policy","insurance limitations","***"}  was discussed with pt. Patient and family members expressed understanding. ***    Home Program: ***  Assessment     [facility]ame [facility] to [facility] {Blank single:19197::[facility]". "***"} s/p medical diagnosis of  ***.  Demonstrates impairments including limitations as described in the problem list.She presents with *** c/b ***. Positive prognostic factors include ***. Negative prognostic factors include ***.Barriers to progress include ***. *** Patient will benefit from skilled, outpatient neurological rehabilitation speech therapy.  Rehab Potential: {DESC; POOR/FAIR/GOOD/EXCELLENT:19665}  Pt's spiritual, cultural and educational needs considered and pt agreeable to plan of care and goals.    Short Term Goals (*** {TIME; HOURS/DAY/DAYS/WEEK/WEEKS:19187}):   ***  Long Term Goals (*** {TIME; HOURS/DAY/DAYS/WEEK/WEEKS:19187}):   ***    Plan     Plan of Care " Certification Period: *** to ***    Recommended Treatment Plan:  Patient will participate in the [facility] neurological rehabilitation program for speech therapy {NUMBER 1-5:51803} times per week to address her  {SLP TREATMENT AREAS:1251154018} deficits, to educate patient and their family, and to participate in a home exercise program.    Other Recommendations: ***    Therapist's Name:   ***    Date: 11/16/2018   See initial evaluation in POC

## 2018-11-16 NOTE — PROGRESS NOTES
"Outpatient Neurological Rehabilitation  Speech and Language Therapy Evaluation    Date: 11/16/2018   Start Time:  8:25  Stop Time:  9:00    Name: Maame Cortez   MRN: [id number]    Therapy Diagnosis:   Encounter Diagnoses   Name Primary?    Flaccid dysarthria     Oropharyngeal dysphagia     Physician: Nathanael Borja MD (resident)/ Dr. 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   Motor Speech Evaluation    20    Swallowing and Oral Function Evaluation    10     Total Minutes: 30  Total Untimed Units: 2  Charges Billed/# of units: 2    Precautions:Standard  Subjective   Date of Onset: 10/25/18  History of Current Condition: Maame reported increased slurred speech and choking on liquids and was admitted to [facility] [place] for about 1 week. Medical testing r/o a pontine stroke. Per Dr. Borja's note, "after further evaluation, thought this pontine lesion was less likely to be a stroke given significant surrounding edema, diffusion restriction, and central ring contrast enhancement.  Subsequent workup included LP, notable for lymphocytic pleocytosis.  Concerned for NMO vs neuro-behcet's vs sarcoid"  Patient previously was treated by speech therapy for a L basal ganglia stroke in 2015 with residual dysarthria that eventually resolved.      Past Medical History: Maame Cortez  has a past medical history of Heart palpitations, MTHFR mutation, and Stroke (08/2015).  Maame Cortez  has a past surgical history that includes [place] tooth extraction; Belt abdominoplasty; and pr transcran dopp intracran, emboli w/inj (8/11/2015).  Medical Hx and Allergies: Maame has a current medication list which includes the " "following prescription(s): citalopram, gabapentin, methylprednisolone sodium succinate, nicotine, and prednisone. Review of patient's allergies indicates:  No Known Allergies  Imaging: MRI scan of brain (10/25/18): "Evolving diffuse edema signal within the jensen with ill-defined central enhancement.  Configuration nonspecific with differential to include vasculopathy with region of active demyelination or alternative inflammatory/infectious process not excluded. No evidence for remote hemorrhagic infarction in the left corona radiata extending along the cortical spinal tract with small area of additional encephalomalacia along the right inferior basal ganglia suggestive for additional prior infarction."  Prior Therapy:  Inpatient and outpatient speech tx (2015), acute speech tx last week  Social History:  Maame lives with her family ( and 3 children including one away at college)  Prior Level of Function: Independent  Current Level of Function: Independent   Pain:   0/10  Pain Location / Description: n/a  Nutrition:  Regular consistency and thin liquids  Patient Therapy Goals:  "To not slur my speech and be able to swallow."  Objective   Formal Assessment:    Respiration / Phonation:    Observations:  Activity Stimulus Quality Duration Loudness Steadiness   Phonation "ah"  clear    mildly  tremulous 12.3 seconds    Mild WFL Mild tremors noted     Oral Reading DNT  DNT DNT DNT DNT   Conversation   clear    appropriate for age and gender   Throughout the session WFL WFL      Diadochokinetic (DDK) rates for rapid repetition of 1 - 3 syllable utterances were .     Speech Intelligibility:   The [place]ay Dysarthria Assessment-2nd Edition was administered to Maame  to assess her motor speech skills. This test assesses the patient's reflexes, respiration, lips, palate, laryngeal function, tongue, and overall intelligibility. Results are described in the following tables:    Reflexes Respiration Lips Palate Laryngeal " Tongue Intelligibility   Normal for Age  x x x x     Mild Abnormality x     x x   Abnormality obvious but can perform task with reasonable approximation          Some production of task poor in quality, unable to sustain, or extremely labored          Unable to undertake task/ movement/ sound            Description: Elissa's reflexes were mildly impaired for swallowing. She reported some difficulty swallowing especially with liquids. She only counted 1 to 16 on one breath. Labial muscles were WFL even with the mild residual droop on the right side. Her lingual muscles were reduced mildly for strength and range of motion for elevation and lateralization. Mild imprecise articulation was present when speaking in sentences and in conversation. Palatal muscles were WFL. She sustained a vowel for an average of 12.3 seconds (norm = 10 to 26). Her vocal quality and intensity were WFL. Overall speech intelligibility was about 90% in most contexts.     Awareness / Strategy Use:   Pt demonstrates adequate awareness of motor speech impairment. Pt was able to use strategies to improve intelligibility with minimal cues. Strategies introduced included: over-articulation, slow down rate    Impact of Motor Speech Impairment on Functioning:   Comment on: Mild imprecise articulation noted in conversation. Elsisa reported decreased speech intelligibility in conversation throughout the day but especially when she is tired.   Safety Risks: standard    Clinical Swallow Exam/ Mahad Mechanism Exam:  Oral Motor Exam:  Oral Motor Exam:    Oral Musculature: WFL  Structure Abnormalities: none  Dentition: WFL  Secretion Management: WFL  Mucosal Quality: WFL  Mandibular Strength and Mobility: WFL  Rest: WFL   Lateralization: WFL  Protrusion: WFL  Retraction:  WFL  Involuntary Movement: absent   Oral Labial Strength and Mobility: mild reduced ROM on the right side but WFL  Rest: WFL   Pucker: WFL  Retraction: mild reduced ROM on the right side but  WFL  Alternating Pucker / Retraction: mild reduced ROM on the right side but WFL  Involuntary Movement: absent   Lingual Strength and Mobility: mild decreased strength and ROM  Rest: WFL  Protrusion: mild decreased strength and ROM   Retraction: WFL  Lateralization: mild decreased strength and ROM  Involuntary Movement: absent   Velar Elevation: WFL  Rest: WFL  Symmetry: WFL  Elevation: WFL   Sustained Elevation: WFL  Involuntary Movement: absent   Buccal Strength and Mobility: mild decreased strength and ROM   Volitional Cough: WFL  Volitional Swallow: WFL  Voice Prior to PO Intake: clear    Trigeminal Nerve (CN V): WFL    Facial Nerve (CN VII): WFL    Glossopharyngeal Nerve (IX) and Vagus (X): WFL    Hypoglossal Nerve (XII): WFL    Facial, Glossopharyngeal, and Vagus Nerves: WFL    [place] Swallow Protocol:    [place] Swallow Protocol dictates pt remain NPO if fail screener; (Apurva et al. 2014) however, as objective swallow assessment is not available for greater than a week, pt will remain on current diet until objective assessment is completed unless otherwise indicated.     Clinical Swallow Examination:   Pt presented with:   THIN:-3 oz water test, self regulated cup sip of water x2, self regulated straw sip of water x2    PUREE:- tsp bites of pudding x2    DENTAL SOFT:- DNT    SOLID: -bite of santi cracker x1     DESCRIPTION: Oral phase: adequate lip seal with no anterior loss. Good mastication of the cracker. Mild residue noted on the tongue for pudding. Pharyngeal phase: slow, hesitant but adequate hyolaryngeal elevation palpated. Patient purposefully took small sips due to fear of choking. No overt s/s of aspiration were evident today. She stated that it usually takes about 30 to 45 minutes to complete a meal and is usually left alone at the table. Recommend MBSS to fully assess swallowing function.       SHAUNA National Outcome Measures System (NOMS):   Severity Modifier for Medicare G-Code:  Motor  Speech  Current status: FCM:  LEVEL 6: The individual is successfully able to communicate intelligibly in most activities, but some limitations in intelligibility are still apparent in vocational, avocational, and social activities. The individual rarely requires minimal cueing to produce complex sentences/messages intelligibly. The individual usually uses compensatory strategies when encountering difficulty.   - CI at least 1% but less than 20% impaired, limited or restricted  Projected status:  FCM:   LEVEL 7: The individual¢s ability to successfully and independently participate in vocational, avocational, or social activities is not limited by speech production.  Independent functioning may occasionally include the use of compensatory techniques.   - CH 0% impaired, limited or restricted        Swallowing  Current status:  FCM:  LEVEL 6: Swallowing is safe, and the individual eats and drinks independently and may rarely require minimal cueing. The individual usually self-cues when difficulty occurs. May need to avoid specific food items (e.g., popcorn and nuts), or require additional time (due to dysphagia).   -  CI at least 1% but less than 20% impaired, limited or restricted  Projected status:  FCM:   LEVEL 7: The individual's ability to eat independently is not limited by swallow function.  Swallowing would be safe and efficient for all consistencies. Compensatory strategies are effectively used when needed.   - CH 0% impaired, limited or restricted     Treatment   Treatment Time In: n/a  Treatment Time Out: n/a  Total Treatment Time: n/a  n/a    Education: recommendation for MBSS, POC, role of SLP, aspiration precautions, motor speech strategies, course of medical disease affect on therapy diagnosis, scheduling/ cancellation policy and insurance limitations  was discussed with pt. Patient and family members expressed understanding.     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  Demonstrates impairments including limitations as described in the problem list.She presents with mild flaccid dysarthria and mild oral-pharyngeal dysphagia c/b mild imprecise articulation, choking with liquids, slow hyolaryngeal elevation. Positive prognostic factors include patient motivation. Negative prognostic factors include autoimmune disorder. Barriers to progress include autoimmune disorder. Patient will benefit from skilled, outpatient neurological rehabilitation speech therapy.  Rehab Potential: good  Pt's spiritual, cultural and educational needs considered and pt agreeable to plan of care and goals.    Short Term Goals (4 weeks):   1. Pt will use motor speech strategies in speech hierarchy tasks with 90% acc ind'ly.  2. Pt will [facility] in [facility] to further assess her swallowing.   3. Pt will tolerate a regular consistency diet with thin liquids with no reported difficulty.   4. Pt will participate in an assessment of her cognitive-linguistic skills.     Long Term Goals (8 weeks):  1. Pt will develop functional and intelligible speech and utilize compensatory strategies through the use of adequate labial and lingual function, increased articulatory precision and speech prosody.  2. Pt will utilize compensatory strategies with optimum safety and efficiency of swallowing function without overt s/s of aspiration for regular consistency diet and thin liquids.    Plan     Plan of Care Certification Period: 11/16/18 to 01/11/19    Recommended Treatment Plan:  Patient will participate in the [facility] program for speech therapy 2 times per week to address her  Motor Speech and Swallow deficits, to educate patient and their family, and to participate in a home exercise program.    Other Recommendations: [facility]    Therapist's Name:   Thalia BOSTON  GAURI Rizo, CCC-SLP, [facility]  Speech-Language Pathologist    Date: 11/16/2018

## 2018-11-18 NOTE — PROGRESS NOTES
I have personally seen and examined the patient with Dr. Borja, and agree with assessment and recommendations.    Esthela Butt MD

## 2018-11-19 PROBLEM — R13.10 DYSPHAGIA: Status: ACTIVE | Noted: 2018-11-19

## 2018-11-19 PROBLEM — R47.1 FLACCID DYSARTHRIA: Status: ACTIVE | Noted: 2018-11-19

## 2018-11-19 PROCEDURE — G9186 MOTOR SPEECH GOAL STATUS: HCPCS | Mod: CH,PO

## 2018-11-19 PROCEDURE — G8999 MOTOR SPEECH CURRENT STATUS: HCPCS | Mod: CI,PO

## 2018-11-19 PROCEDURE — G8998 SWALLOW D/C STATUS: HCPCS | Mod: CI,PO

## 2018-11-19 PROCEDURE — G8996 SWALLOW CURRENT STATUS: HCPCS | Mod: CI,PO

## 2018-11-19 PROCEDURE — G8997 SWALLOW GOAL STATUS: HCPCS | Mod: CH,PO

## 2018-11-19 NOTE — PLAN OF CARE
"Outpatient Neurological Rehabilitation  Speech and Language Therapy Evaluation    Date: 11/16/2018   Start Time:  8:25  Stop Time:  9:00    Name: Maame Cortez   MRN: [id number]    Therapy Diagnosis:   Encounter Diagnoses   Name Primary?    Flaccid dysarthria     Oropharyngeal dysphagia     Physician: Nathanael Borja MD (resident)/ Dr. 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   Motor Speech Evaluation    20    Swallowing and Oral Function Evaluation    10     Total Minutes: 30  Total Untimed Units: 2  Charges Billed/# of units: 2    Precautions:Standard  Subjective   Date of Onset: 10/25/18  History of Current Condition: Maame reported increased slurred speech and choking on liquids and was admitted to [facility] [place] for about 1 week. Medical testing r/o a pontine stroke. Per Dr. Borja's note, "after further evaluation, thought this pontine lesion was less likely to be a stroke given significant surrounding edema, diffusion restriction, and central ring contrast enhancement.  Subsequent workup included LP, notable for lymphocytic pleocytosis.  Concerned for NMO vs neuro-behcet's vs sarcoid"  Patient previously was treated by speech therapy for a L basal ganglia stroke in 2015 with residual dysarthria that eventually resolved.      Past Medical History: Maame Cortez  has a past medical history of Heart palpitations, MTHFR mutation, and Stroke (08/2015).  Maame Cortez  has a past surgical history that includes [place] tooth extraction; Belt abdominoplasty; and pr transcran dopp intracran, emboli w/inj (8/11/2015).  Medical Hx and Allergies: Maame has a current medication list which includes the " "following prescription(s): citalopram, gabapentin, methylprednisolone sodium succinate, nicotine, and prednisone. Review of patient's allergies indicates:  No Known Allergies  Imaging: MRI scan of brain (10/25/18): "Evolving diffuse edema signal within the jensen with ill-defined central enhancement.  Configuration nonspecific with differential to include vasculopathy with region of active demyelination or alternative inflammatory/infectious process not excluded. No evidence for remote hemorrhagic infarction in the left corona radiata extending along the cortical spinal tract with small area of additional encephalomalacia along the right inferior basal ganglia suggestive for additional prior infarction."  Prior Therapy:  Inpatient and outpatient speech tx (2015), acute speech tx last week  Social History:  Maame lives with her family ( and 3 children including one away at college)  Prior Level of Function: Independent  Current Level of Function: Independent   Pain:   0/10  Pain Location / Description: n/a  Nutrition:  Regular consistency and thin liquids  Patient Therapy Goals:  "To not slur my speech and be able to swallow."  Objective   Formal Assessment:    Respiration / Phonation:    Observations:  Activity Stimulus Quality Duration Loudness Steadiness   Phonation "ah"  clear    mildly  tremulous 12.3 seconds    Mild WFL Mild tremors noted     Oral Reading DNT  DNT DNT DNT DNT   Conversation   clear    appropriate for age and gender   Throughout the session WFL WFL      Diadochokinetic (DDK) rates for rapid repetition of 1 - 3 syllable utterances were .     Speech Intelligibility:   The French[place] Dysarthria Assessment-2nd Edition was administered to Maame  to assess her motor speech skills. This test assesses the patient's reflexes, respiration, lips, palate, laryngeal function, tongue, and overall intelligibility. Results are described in the following tables:    Reflexes Respiration Lips Palate Laryngeal " Tongue Intelligibility   Normal for Age  x x x x     Mild Abnormality x     x x   Abnormality obvious but can perform task with reasonable approximation          Some production of task poor in quality, unable to sustain, or extremely labored          Unable to undertake task/ movement/ sound            Description: Elissa's reflexes were mildly impaired for swallowing. She reported some difficulty swallowing especially with liquids. She only counted 1 to 16 on one breath. Labial muscles were WFL even with the mild residual droop on the right side. Her lingual muscles were reduced mildly for strength and range of motion for elevation and lateralization. Mild imprecise articulation was present when speaking in sentences and in conversation. Palatal muscles were WFL. She sustained a vowel for an average of 12.3 seconds (norm = 10 to 26). Her vocal quality and intensity were WFL. Overall speech intelligibility was about 90% in most contexts.     Awareness / Strategy Use:   Pt demonstrates adequate awareness of motor speech impairment. Pt was able to use strategies to improve intelligibility with minimal cues. Strategies introduced included: over-articulation, slow down rate    Impact of Motor Speech Impairment on Functioning:   Comment on: Mild imprecise articulation noted in conversation. Elissa reported decreased speech intelligibility in conversation throughout the day but especially when she is tired.   Safety Risks: standard    Clinical Swallow Exam/ Mahad Mechanism Exam:  Oral Motor Exam:  Oral Motor Exam:    Oral Musculature: WFL  Structure Abnormalities: none  Dentition: WFL  Secretion Management: WFL  Mucosal Quality: WFL  Mandibular Strength and Mobility: WFL  Rest: WFL   Lateralization: WFL  Protrusion: WFL  Retraction:  WFL  Involuntary Movement: absent   Oral Labial Strength and Mobility: mild reduced ROM on the right side but WFL  Rest: WFL   Pucker: WFL  Retraction: mild reduced ROM on the right side but  WFL  Alternating Pucker / Retraction: mild reduced ROM on the right side but WFL  Involuntary Movement: absent   Lingual Strength and Mobility: mild decreased strength and ROM  Rest: WFL  Protrusion: mild decreased strength and ROM   Retraction: WFL  Lateralization: mild decreased strength and ROM  Involuntary Movement: absent   Velar Elevation: WFL  Rest: WFL  Symmetry: WFL  Elevation: WFL   Sustained Elevation: WFL  Involuntary Movement: absent   Buccal Strength and Mobility: mild decreased strength and ROM   Volitional Cough: WFL  Volitional Swallow: WFL  Voice Prior to PO Intake: clear    Trigeminal Nerve (CN V): WFL    Facial Nerve (CN VII): WFL    Glossopharyngeal Nerve (IX) and Vagus (X): WFL    Hypoglossal Nerve (XII): WFL    Facial, Glossopharyngeal, and Vagus Nerves: WFL    [place] Swallow Protocol:    [place] Swallow Protocol dictates pt remain NPO if fail screener; (Apurva et al. 2014) however, as objective swallow assessment is not available for greater than a week, pt will remain on current diet until objective assessment is completed unless otherwise indicated.     Clinical Swallow Examination:   Pt presented with:   THIN:-3 oz water test, self regulated cup sip of water x2, self regulated straw sip of water x2    PUREE:- tsp bites of pudding x2    DENTAL SOFT:- DNT    SOLID: -bite of santi cracker x1     DESCRIPTION: Oral phase: adequate lip seal with no anterior loss. Good mastication of the cracker. Mild residue noted on the tongue for pudding. Pharyngeal phase: slow, hesitant but adequate hyolaryngeal elevation palpated. Patient purposefully took small sips due to fear of choking. No overt s/s of aspiration were evident today. She stated that it usually takes about 30 to 45 minutes to complete a meal and is usually left alone at the table. Recommend MBSS to fully assess swallowing function.       SHAUNA National Outcome Measures System (NOMS):   Severity Modifier for Medicare G-Code:  Motor  Speech  Current status: FCM:  LEVEL 6: The individual is successfully able to communicate intelligibly in most activities, but some limitations in intelligibility are still apparent in vocational, avocational, and social activities. The individual rarely requires minimal cueing to produce complex sentences/messages intelligibly. The individual usually uses compensatory strategies when encountering difficulty.   - CI at least 1% but less than 20% impaired, limited or restricted  Projected status:  FCM:   LEVEL 7: The individual¢s ability to successfully and independently participate in vocational, avocational, or social activities is not limited by speech production.  Independent functioning may occasionally include the use of compensatory techniques.   - CH 0% impaired, limited or restricted        Swallowing  Current status:  FCM:  LEVEL 6: Swallowing is safe, and the individual eats and drinks independently and may rarely require minimal cueing. The individual usually self-cues when difficulty occurs. May need to avoid specific food items (e.g., popcorn and nuts), or require additional time (due to dysphagia).   -  CI at least 1% but less than 20% impaired, limited or restricted  Projected status:  FCM:   LEVEL 7: The individual's ability to eat independently is not limited by swallow function.  Swallowing would be safe and efficient for all consistencies. Compensatory strategies are effectively used when needed.   - CH 0% impaired, limited or restricted     Treatment   Treatment Time In: n/a  Treatment Time Out: n/a  Total Treatment Time: n/a  n/a    Education: recommendation for MBSS, POC, role of SLP, aspiration precautions, motor speech strategies, course of medical disease affect on therapy diagnosis, scheduling/ cancellation policy and insurance limitations  was discussed with pt. Patient and family members expressed understanding.     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  Demonstrates impairments including limitations as described in the problem list.She presents with mild flaccid dysarthria and mild oral-pharyngeal dysphagia c/b mild imprecise articulation, choking with liquids, slow hyolaryngeal elevation. Positive prognostic factors include patient motivation. Negative prognostic factors include autoimmune disorder. Barriers to progress include autoimmune disorder. Patient will benefit from skilled, outpatient neurological rehabilitation speech therapy.  Rehab Potential: good  Pt's spiritual, cultural and educational needs considered and pt agreeable to plan of care and goals.    Short Term Goals (4 weeks):   1. Pt will use motor speech strategies in speech hierarchy tasks with 90% acc ind'ly.  2. [facility].   3. Pt will tolerate a regular consistency diet with thin liquids with no reported difficulty.   4. Pt will participate in an assessment of her cognitive-linguistic skills.     Long Term Goals (8 weeks):  1. Pt will develop functional and intelligible speech and utilize compensatory strategies through the use of adequate labial and lingual function, increased articulatory precision and speech prosody.  2. Pt will utilize compensatory strategies with optimum safety and efficiency of swallowing function without overt s/s of aspiration for regular consistency diet and thin liquids.    Plan     Plan of Care Certification Period: 11/16/18 to 01/11/19    Recommended Treatment Plan:  Patient will participate in the [facility] rehabilitation program for speech therapy 2 times per week to address her  Motor Speech and Swallow deficits, to educate patient and their family, and to participate in a home exercise program.    Other Recommendations[facility]    Therapist's Name:   Thalia BOSTON  GAURI Rizo, CCC-SLP, [facility]  Speech-Language Pathologist    Date: 11/16/2018

## 2018-11-19 NOTE — PLAN OF CARE
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    Impaired mobility and ADLs     Decreased range of motion with decreased strength     Impaired motor control      Evaluation Date: 11/16/2018  Plan of Care Expiration Period: 1/11/18  Insurance Authorization period Expiration: 12/31/18   Date of Return to MD: 3/15 with primary care physician   Visit # / Visits Authortized: 1 / 20 auth     Time In:0905  Time Out: 0945  Total Billable (one on one) Time: 40 minutes    Precautions: Standard    Subjective     History of Current Condition: Patient presents to [facility] Out[facility] Occupational therapy with a medical diagnosis of Pontine lesion, Autoimmune Disorder, and impaired motor control.  She reports autoimmune diagnosis has not been confirmed/specified at this time period.  She reports she is currently undergoing testing for Neuromyelitis Optica and Neuro Bechet's disorder.      Patient reports she presented to the ER for a severe headache and slurred speech on 10/24/18.  Symptoms were thought to be due to a pontine CVA.  Pt reports she was then admitted to the hospital for further evaluation and treatment.  After further evaluation and imaging, the pontine lesion was thought to be less likely due to stroke.  She reports she was then diagnosed with unspecified autoimmune disorder and is now undergoing subsequent testing.      Her past medical history is significant for L basal ganglia CVA in 2015 with residual R sided weakness, but her  physician is now questioning if CVA is related to current condition.  Patient presents today with chief complaints of impaired swallowing, slurred speech, and decreased balance during gait. She reports decreased functional use of her R, dominant UE since 2015.  She states she has not noticed a significant worsening in RUE functioning with current episode.      Involved Side: Right    Dominant Side: Right  Date of Onset: episode of worsening symptoms on 10/24/18.   Surgical Procedure: NA   Imaging: MRI studies, CT scan films - see Epic imagining for in depth analysis   Previous Therapy: outpatient therapy in 2015.     Patient's Goals for Therapy: improve functional performance in the home environment.     Pain:  Pain Related Behaviors Observed: no   Functional Pain Scale Rating 0-10:   0/10 on average  Comments: Patient reports increased muscle tightness throughout her R hand in the morning.    Location: R hand   Description: Tight   Easing Factors: movement/ increased hand activity     Occupation:    Working presently: No- disability. Was working as a nurse prior to episode in 2015.        Functional Limitations/Social History:    Prior Level of Function: Independent   Current Level of Function: Modified Independent to Min A     Home/Living environment: lives with her  and 3 sons  - pt is primary caretaker of her son with special needs.    Home Access: one story home with 15 KANA   DME: single point cane and quad cane    Hobbies: Kickboxing     Driving: Mod I - using L hand to start car and shift gears     Past Medical History/Physical Systems Review:     Past Medical History:  Maame Cortez  has a past medical history of Heart palpitations, MTHFR mutation, and Stroke.    Past Surgical History:  Maame Cortez  has a past surgical history that includes Rougon tooth extraction; Belt abdominoplasty; and pr transcran dopp intracran, emboli w/inj (8/11/2015).    Current Medications:  Maame has a  current medication list which includes the following prescription(s): citalopram, gabapentin, methylprednisolone sodium succinate, nicotine, and prednisone.    Allergies:  Review of patient's allergies indicates:  No Known Allergies     Other: NA    Objective     Cognitive Exam:  Oriented: Person, Place and Time  Behaviors: normal, cooperative  Follows Commands/attention: Follows multistep  commands as determined by 3 word recall after 1 minute and 3 minutes  Communication: mild dysarthria - see ST eval for in depth analysis   Memory: No Deficits noted during Eval   Safety awareness/insight to disability: no deficits noted   Coping skills/emotional control: Appropriate to situation. PMH of depression.     Visual/Perceptual:  Comments: Patient denies change in vision.  Per chart review, pt has decreased color vision and visual acuity on R eye.      Physical Exam:  Postural examination/scapula alignment: Rounded shoulders and R scapular slightly depressed   Joint integrity: WNL   Skin integrity: visibly intact   Edema: none noted    Palpation: no TTP     Range of Motion:   - LUE WNL   - RUE WNL shoulder and elbow. Limitations as follows:     Joint Evaluation  AROM  11/16/2018 PROM   11/16/2018    Right Right   Wrist flex 0-80 50 90    Wrist ext 0-70 45 70   Supination 0-80 55 80    Pronation 0-80 80  NT   UD 25 45   RD 5 35   Comments: patient demonstrates bradykinetic movement patterns.  R shoulder and elbow AROM is WNL with increased time for movement.  R wrist movements are bradykinetic and below WNL due to increased distal weakness.      Fist: WNL with increased time.     Strength MMT:   Strength 11/16/2018    Right   Shoulder flex 4/5   Shoulder abd 4-/5   Shoulder ER 3+/5   Shoulder IR 3+/5   Shoulder Extension 3+/5   Shoulder Horizontal adduction 4-/5   Elbow flex 3+/5   Elbow ext 3+/5   Wrist flex 2+/5   Wrist ext 2+/5   Supination 2+/5   Pronation 3/5   UD 2+/5   RD 2+/5      Strength: (RENEA Dynamometer  in lbs.) Average 3 trials, Position II:     11/16/2018 11/16/2018    Right Left   Rung II 8# 60#     Pinch Strength (Measured in psi)     11/16/2018 11/16/2018    Right Left   Key Pinch 1 psi 10 psi   3pt Pinch unable  NT   2pt Pinch unable  NT    Comments: patient had to position R hand digits on pinch strength gauge for key pinch with L hand.  She was not able to perform 3 pt or 2 pt pinch due to R hand weakness and inability to achieve/maintain testing position.     Fine Motor Coordination: 9 Hole Peg Test  Right    11/16/2018 Left   11/16/2018   Unable  26 sec   Comments: impaired motor control with decreased movement speed more distally (hands, wrist) than proximally (elbow, shoulder)     Gross motor coordination:   - DERICK (Rapid Alternating Movements): severely impaired on the R - bradykinetic    - Finger to Nose (5 times): bradykinetic   - Finger Flicks (coordination, rapid moving from digit flexion to digit extension): severely impaired on the R - bradykinetic     Tone:  Modified Brook Scale:   0 - No increase in muscle tone    Sensation:  Maame reports no change in sensation. Denies numbness and tingling.   Light touch: NT     Balance:   Static Sit - NORMAL: No deviations seen in posture held statically  Dynamic sit- Good- Normal   Static Stand - pt to be evaluated by PT   Dynamic stand - "   Comments: patient is walking without AD, but reports she has noticed a mild decrease in balance.     Endurance Deficit: mild                    Functional Status      Functional Mobility:  Bed mobility: I  Roll to left: I  Roll to right: I  Supine to sit: I  Sit to supine: I  Transfers to bed: Mod I    Transfers to toilet: I  Car transfers: Mod I    Wheelchair mobility: NA     ADL's:  Feeding: Min A to cut foods   Grooming: Mod I - not styling her hair, only brushing.   Hygiene: Mod I  UB Dressing: Mod I - Min A for clothening fasteners   LB Dressing: Mod I - Min A to button pants. Pt is only wearing shoes without  ties/laces   Toileting: Mod I  Bathing: Mod I  Comments - patient has learned to use L (nondominat hand) efficiently and effectively     IADL's:  Homecare: [facility]. Can perform light duty cleaning. Pt has a maid to clean house.    Cooking: Max A - patient is performing only simple meal prep, light cooking tasks.   Laundry: Mod I  Yard work: D  Use of telephone: Mod I - using L hand   Money management: Mod I   Medication management: Mod I  Handwriting:severly impaired. Only writing signature for signing credit card payments   Technology Use:Mod I - using L hand      CMS Impairment/Limitation/Restriction for FOTO Survey    Intake scores not captured this date by error. Will capture at first follow up visit.          Treatment   Evaluation with HEP and education only.     Home Exercises and Patient Education Provided     Education provided:   -role of OT, OT POC, goals for OT, scheduling/cancellations, insurance limitations with patient.  -Additional Education provided: HEP     Written Home Exercises Provided: yes - FM and hand strength.   Exercises were reviewed and Maame was able to demonstrate them prior to the end of the session.    Maame demonstrated good  understanding of the education provided.     See EMR under Patient Instructions for exercises provided 11/16/2018.    Assessment     Maame Cortez is a 38 y.o. female referred to outpatient occupational therapy and presents with a medical diagnosis of Pontine lesion and Autoimmunue Disorder with impaired motor control.  She presents with deficits of physical nature, including weakness, bradykinetic movements, and impaired motor coordination and control. These limitations have affected her performance and particpation in desired roles and occupations including, but not limited to: self care, , IADLs, vocational activities, community participation and leisure pursuits.  Following medical record review it is determined that pt will benefit from  occupational therapy services to address the deficits stated above and in the chart below, provide pt/family education, and to maximize pt's level of independence.     Pt prognosis is fair due to unconfirmed/unspecified autoimmune disorder diagnosis.   Pt will benefit from skilled outpatient Occupational Therapy     Plan of care discussed with patient: Yes  Pt's spiritual, cultural and educational needs considered and patient is agreeable to the plan of care and goals as stated below:     Anticipated Barriers for therapy: None     Medical Necessity is demonstrated by the following  Profile and History Assessment of Occupational Performance Level of Clinical Decision Making Complexity Score   Occupational Profile:   Maame Cortez is a 38 y.o. female who lives with her family and is currently on disability. Maame Cortez has difficulty with  feeding, bathing, grooming and dressing  driving/transportation management, shopping, phone/computer use and housework/household chores  affecting her daily functional abilities. Her main goal for therapy is improve RUE use and overall functional performance in the home and community environment.     Comorbidities:   Heart palpitations, MTHFR mutation, and Stroke     Medical and Therapy History Review:   Expanded               Performance Deficits    Physical:  Joint Mobility  Muscle Power/Strength  Muscle Endurance   Strength  Pinch Strength  Gross Motor Coordination  Fine Motor Coordination    Cognitive:  Communication    Psychosocial:    Family Support     Clinical Decision Making:  moderate    Assessment Process:  Detailed Assessments    Modification/Need for Assistance:  Minimal-Moderate Modifications/Assistance    Intervention Selection:  Several Treatment Options       moderate  Based on PMHX, co morbidities , data from assessments and functional level of assistance required with task and clinical presentation directly impacting function.       The  following goals were discussed with the patient and patient is in agreement with them as to be addressed in the treatment plan.     Goals:  Short Term Goals: 4 weeks   1.  Patient will be independent in performance and progression of HEP.   2.  Patient will demonstrate improved R hand motor control and coordination to participate in 9 HPT  3.  Patient will demonstrate improved R hand strength and motor control to perform baseline pinch measures.   4.  Patient will improve R hand  strength 5-10 pounds to improve R hand gripping, carrying, holding, and moving skills for self care, , and IADLs.   5.  Patient will demonstrate knowledge and understanding of available AD/DME to improve performance and participation in ADLs such as feeding and dressing.     Long Term Goals: 8 weeks   1.  Patient will be Mod I for upper body dressing using AD as needed for clothing fasteners in order to improve independence in self care skills.   2.  Patient will be Mod I for lower body dressing using AD as needed for clothing fasteners, socks, and shoes in order to improve independence in self care skills.    3.  Patient will improve R hand  strength 10- 15 pounds to improve R hand gripping, carrying, holding, and moving skills for self care, , and IADLs  4.  Patient will improve R hand pinch strength 1-3 psi's from baseline measures (once captured) to improve R hand/digit strength for functional self care skills.   5.  Patient will improve R hand 9 HPT by 10-15 seconds of baseline score (one captured) to demonstrate improved R hand motor control for self care skills and other daily functional activities.     (Goals will be updated as needed based on patient's needs and progress).     Plan   Certification Period/Plan of care expiration: 11/16/2018 to 1/11/18.    Outpatient Occupational Therapy 2 times weekly for 8 weeks to include the following interventions: Electrical Stimulation, Manual Therapy, Neuromuscular  Re-ed, Patient Education, Self Care, Therapeutic Activites, Therapeutic Exercise and Bioness.

## 2018-11-20 ENCOUNTER — TELEPHONE (OUTPATIENT)
Dept: NEUROLOGY | Facility: CLINIC | Age: 38
End: 2018-11-20

## 2018-11-20 ENCOUNTER — CLINICAL SUPPORT (OUTPATIENT)
Dept: REHABILITATION | Facility: HOSPITAL | Age: 38
End: 2018-11-20
Payer: MEDICARE

## 2018-11-20 DIAGNOSIS — Z74.09 IMPAIRED FUNCTIONAL MOBILITY, BALANCE, GAIT, AND ENDURANCE: ICD-10-CM

## 2018-11-20 DIAGNOSIS — M62.81 MUSCLE WEAKNESS OF LOWER EXTREMITY: ICD-10-CM

## 2018-11-20 DIAGNOSIS — I69.319 COGNITIVE DEFICIT FOLLOWING CEREBROVASCULAR ACCIDENT (CVA): ICD-10-CM

## 2018-11-20 PROCEDURE — 97127 HC THERAPEUTIC INTVTN, COGN FUNCTION - ST: CPT | Mod: PO

## 2018-11-20 PROCEDURE — G8978 MOBILITY CURRENT STATUS: HCPCS | Mod: CK,PO

## 2018-11-20 PROCEDURE — 97110 THERAPEUTIC EXERCISES: CPT | Mod: PO

## 2018-11-20 PROCEDURE — G8979 MOBILITY GOAL STATUS: HCPCS | Mod: CJ,PO

## 2018-11-20 PROCEDURE — 97161 PT EVAL LOW COMPLEX 20 MIN: CPT | Mod: PO

## 2018-11-20 NOTE — PROGRESS NOTES
Outpatient Neurological Rehabilitation   Speech and Language Therapy Daily Note  Date:  11/20/2018     Start Time:  1435  Stop Time:  1500    Name: Maame Cortez   MRN: [id number]   Therapy Diagnosis:   Encounter Diagnosis   Name Primary?    Cognitive deficit following cerebrovascular accident (CVA)    Physician: Nathanael Borja MD  Physician Orders: St eval and treat  Medical Diagnosis: Pontine lesion G93.9, Autoimmune disorder F32.9, impaired motor control Z78.9  Visit #/ Visits Authorized: 2/ 20  Visits Cancelled: 0  Visits No Show: 0  Date of Evaluation:  11/16/18  Insurance Authorization Period: 11/16/18 to 12/31/18  Plan of Care Expiration Date:    11/16/18 to 1/11/19  Extended POC:  n/a   G-CODE   2 /10    Precautions: Standard    Subjective:   Pt reports: Pt reported that she would have to leave 15 minutes early this session.    Response to previous treatment: n/a    Pain Scale:  0/10 on VAS currently.   Pain Location: n/a  Objective:   TIMED  Procedure Min.   Cognitive Communication Therapy    25         Total Minutes: 25  Total Timed Units: 2  Total Untimed Units: 0  Charges Billed/# of units: 2    Short Term Goals: (4 weeks) Current Progress:   1. Pt will use motor speech strategies in speech hierarchy tasks w/ 90% acc ind'ly.  Not formally addressed.    2. Pt will participate in MBSS to further assess her swallowing.  Note sent to the doctor, awaiting scheduling.    3. Pt will tolerate a regular consistency diet with thin liquids w/ no reported difficulty.  Not formally addressed.    4. Pt will participate in an assessment of her cognitive-linguistic skills.  Goal Met 11/20/18   See Below     Cognitive Linguistic Quick Test (CLQT) was administered to quickly assess the patient's overall cognitive-linguistic function and to determine cognitive strengths and weaknesses.           Cognitive Domain Score     Severity Rating      Attention    211 / 215 WNL  Memory    178 / 185 WNL  Executive  Functions   37 / 40  WNL  Language    35 / 37  WNL  Visuospatial Skills    103 / 105 WNL  Clock Drawing    12 / 13  WNL    Composite Severity Rating  4.0 / 4.0 WNL    Description:   Ms. Cortez did not exhibit difficulty across any subtest. She answered personal orientation questions with 100% accuracy independently. She selectively crossed out a predetermined symbol out of a field of various symbols w/ 100% accuracy, indicating adequate visual attention and perception. Patient identified common objects w/ 100% accuracy ind'ly, indicating good word retrieval abilities. Ms. Cortez linda a clock and set it to a predetermined time, including 12/13 important elements, she included 12 numbers, rotated and spaced correctly. The numbers were presented in a circular pattern. She included two hands, however, the short hand and long hand were reversed. She listened and retold a story including 14/18 important elements (norm 11-12), indicating good auditory memory and comprehension, working memory, and language output skills. She then answered questions about the story w/ 100% accuracy. The pt linda lines to connect symbols, alternating between size and shape w/ 100% accuracy, indicating adequate planning, self-monitoring, working memory, and visual attention. She listed 17 items in a concrete category, animals (norm 15-20), and 12 items in an abstract category, indicating good thought organization. The pt immediately recalled designs w/ 100% accuracy, indicating good visual discrimination and analysis, attention, and visual memory. She navigated through a simple and complex maze without difficulty, indicating intact mental flexibility and planning skills. Ms. oCrtez produced 11 novel designs, by connecting four lines between four dots.   Patient Education/Response:   Discussed plan of care and recommendations. Pt verbalized underestanding.     Written Home Exercises Provided: not at this time.  Assessment:   Dahliasantos is progressing  well towards her goals. Pt participated in an evaluation of her cognitive-linguistic skills today. She did not present w/ any cognitive-linguistic deficits. Current goals remain appropriate. Goals to be updated as necessary.   Pt prognosis is Good. Pt will continue to benefit from skilled outpatient speech and language therapy to address the deficits listed in the problem list on initial evaluation, provide pt/family education and to maximize pt's level of independence in the home and community environment.     Medical necessity is demonstrated by the following IMPAIRMENTS:  Pt exhibits difficulty in articulating necessary medical information, and her thoughts and needs to familiar and unfamiliar listeners in known and unknown contexts inhi  Barriers to Therapy: autoimmune disorder  Pt's spiritual, cultural and educational needs considered and pt agreeable to plan of care and goals.    Plan:   Continue POC with focus on motor speech deficits, and swallowing difficulties.     LARRY Caruso.   [facility]- Clinician  11/20/2018

## 2018-11-20 NOTE — PROGRESS NOTES
"Physical Therapy Evaluation    Name: Maame Cortez  Clinic Number: [id number]      Diagnosis:   Encounter Diagnoses   Name Primary?    Impaired functional mobility, balance, gait, and endurance     Muscle weakness of lower extremity      Physician: Nathanael Borja MD Vidal, Gabriel A., MD  Treatment Orders: PT Eval and Treat    Past Medical History:   Diagnosis Date    Heart palpitations     MTHFR mutation     Stroke 08/2015     Current Outpatient Medications   Medication Sig    citalopram (CELEXA) 20 MG tablet TAKE 1 TABLET (20 MG TOTAL) BY MOUTH ONCE DAILY.    gabapentin (NEURONTIN) 300 MG capsule Take 300 mg by mouth 3 (three) times daily.    methylPREDNISolone sodium succinate (SOLU-MEDROL) 1,000 mg injection Mix 1 syringe in smoothie and take by mouth x 2 days;    nicotine (NICODERM CQ) 14 mg/24 hr Place 1 patch onto the skin once daily.    predniSONE (DELTASONE) 10 MG tablet Take 1 tablet (10 mg total) by mouth once daily.     No current facility-administered medications for this visit.      Review of patient's allergies indicates:  No Known Allergies  Precautions: unknown autoimmune disorder, standard    Evaluation Date: 11/20/18  Visit # authorized: 20  Authorization period: 12/31/18    Shonda Isabel is a 38 y.o. female that presents to [facility] outpatient clinic secondary to autoimmune disorder, impaired motor control, pontine lesion.     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 Configuration nonspecific with differential to include vasculopathy with region of active demyelination or alternative inflammatory/infectious " "process not excluded. No evidence for remote hemorrhagic infarction in the left corona radiata extending along the cortical spinal tract with small area of additional encephalomalacia along the right inferior basal ganglia suggestive for additional prior infarction."  10/25/18 head and neck CTA revealed: "CTA head and neck: No significant stenosis or proximal occlusion throughout the head or neck..  CT head: Remote left basal gangliar hemorrhagic infarction again seen.  Limited evaluation of the brainstem secondary to artifact from beam hardening and CT technique however there is ill-defined regions of enhancement within the jensen with diffuse edema signal seen on MRI clinical correlation and follow-up advised expanded differential for pontine process to include rhombencephalitis or mass lesion with subacute age infarction a consideration although somewhat unusual characteristics..  Clinical correlation and follow-up the MRI with and without contrast recommended."  10/26/18 thoracic MRI revealed: "Normal MRI evaluation of the thoracic spine."  10/26/18 cervical MRI revealed: "1. Persistent edema signal identified at the level of the jensen with ill-defined central enhancement, similar when compared to the recent brain MRI.  2. Normal MRI evaluation of the cervical spinal cord specifically without signal abnormality or pathologic enhancement."  Past surgical history: abdominoplasty more than 15 years ago  Functional deficits: walking long distance, imbalance, stair ambulation, household chores  Prior level of function: requires assistance with ADLs  Occupation: not employed   Environment: 1 story home with 10 steps to enter, has WC [facility], SPC (not currently using AD), lives with  and 2 sons  No cultural or spiritual barriers identified to treatment or learning.  Patient's goals: to keep up with kids, improve walking    Objective     Observation: pleasant and cooperative    Posture Alignment : WNL  Dominant hand:  " right    ROM:   UPPER EXTREMITY--AROM/PROM  (R) UE: WNLs  (L) UE: WNLs     Lower Extremity Strength  Right LE  Left LE    Hip flexion:  5/5 Hip flexion: 5/5   Knee extension: 5/5 Knee extension: 5/5   Knee flexion: 5/5 Knee flexion: 5/5   Ankle dorsiflexion:  4/5 Ankle dorsiflexion: 5/5   Ankle plantarflexion:  5/5 Ankle plantarflexion: 5/5   Hip abduction: 4+/5 Hip abduction: 5/5   Hip adduction: 4/5 Hip adduction 5/5   Hip extension: 4+/5 Hip extension: 5/5     Functional Strength:   -30 sec sit to stand: 12   -Heel Walking: dysfunctional, non-painful    -Toe Walking: functional, non-painful    Bed mobility: independent with all    Transfers: sit <> stand with independence     Stairs: ascend/descend 5 steps with 0 HRs, reciprocal gait pattern, and mod I    DYNAMIC GAIT INDEX  1. Gait level surfaces: 2       2 Mild Impairment Walks 20', uses A.D., slower speed, mild gait deviation     2. Change in Gait Speed: 3      3 Normal Able to smoothly change walking speed without loss of balance or gait deviation. Shows a significant difference in walking speeds between normal, fast and slow speeds.     3:  Gait with Horizontal Head Turns: 3      3 Normal Performs Head turns smoothly with no change in gait.     4. Gait with vertical Head turns:  2      2 Mild impairment Performs task with slight change in gait velocity ie. Minor disruption in smooth gait path or uses walking aid.     5.  Gait and pivot turn:  3      3 Normal Pivot turns safely within 3 seconds and stops quickly with no loss of balance.     6.  Step over Obstacle: 2      2   Mild Impairment Is able to step over box but must slow down and adjust steps to clear box safely     7. Step Around Obstacles:  3      3  Normal  Is able to walk around objects safely without changing gait speed, no evidence of imbalance     8. Steps:  3      3  Normal  Alternating feet, no rail     TOTAL SCORE: 21 / 24    Balance Assessment:-Single Leg Stance:    Right : 2  seconds   Left:  15 seconds    Flexibility:    Ely's test: B WNL   Hamstring length 90/90: B WNL    Gait Assessment:   · AD used: none; Assistance: mod I; Distance: 100'    · GAIT DEVIATIONS:   Maame displays the following deviations with ambulation:    Impairments contributing to deviations: impaired motor control, R foot drop, steppage gait pattern, R knee hyperextension    Functional Limitations Reports - G Codes  CMS Impairment/Limitation/Restriction for FOTO Cerebrovascular Disorders Survey  Status Limitation G-Code CMS Severity Modifier  Intake 56% 44% Current Status CK - At least 40 percent but less than 60 percent  Predicted 70% 30% Goal Status+ CJ - At least 20 percent but less than 40 percent  +Based on FOTO predicted change score    PT Evaluation Completed? Yes  Discussed Plan of Care with patient: Yes    TREATMENT:  Maame received therapeutic exercises to develop strength and endurance, flexibility for 10 minutes including:    Elliptical x 5 min level 3  Gait part practice x 20 ea  SLS x 30 sec    HEP provided: none at this time  Instructed pt. Regarding: Proper technique with all exercises. Pt demo good understanding of the education provided. Maame demonstrated good return demonstration of activities.    Assessment     This is a 38 y.o. female referred to outpatient physical therapy and presents with a medical diagnosis of autoimmune disorder, impaired motor control, pontine lesion and demonstrates limitations as described in the problem list. Pt presents to clinic with past history of pontine stroke and R sided weakness, LE weakness, and impaired gait, balance, endurance, and functional mobility. DGI score 21/24. Pt will benefit from physcial therapy services in order to maximize functional independence. The following goals were discussed with the patient and patient is in agreement with them as to be addressed in the treatment plan.    History  Co-morbidities and personal factors that may impact  the plan of care Examination  Body Structures and Functions, activity limitations and participation restrictions that may impact the plan of care Clinical Presentation   Decision Making/ Complexity Score   Co-morbidities:     Pontine stroke    Personal Factors:     None Body Regions: LE     Body Systems: musculoskeletal (LE weakness)    Neuromuscular (impaired gait, balance, endurance, functional mobility)    Activity limitations: walking long distance, imbalance, stair ambulation, household chores    Participation Restrictions: ADLs, IADLs, domestic duties   Stable and predictable   Low     Prognosis: good    Anticipated barriers to physical therapy: none    Medical necessity is demonstrated by the following IMPAIRMENTS/PROBLEM LIST:   1) Impaired functional mobility/balance   2) LE weakness   3) Difficulty walking long distances   4) Impaired gait   5) Lack of HEP    GOALS: Short Term Goals:  4 weeks  1. Pt will be able to perform SLS for 10 seconds to indicate improved static balance.   2. Pt will be able to tolerate multi-directional LE strengthening in order to improve ability to perform household chores.  3. Pt will report 50% improvement in ability to walk long distances since start of care to indicate improved functional mobility.   4. Pt will ambulate 100' without foot drop to indicate improved gait pattern.   5. Pt to tolerate HEP to improve ROM and independence with ADL's    Long Term Goals: 8 weeks  1. Pt will increase DGI score to 22/24 to indicate improved static and dynamic balance.   2. Pt will be able to perform 2 x 10 multi-directional LE strengthening without fatigue in order to improve ability to perform household chores.  3. Pt will report 80% improvement in ability to walk long distances since start of care to indicate improved functional mobility.   4. Pt will ambulate 300' with little to no gait deviations to indicate improved gait pattern.   5. Pt to be Independent with HEP to improve ROM  and independence with ADL's    Plan     Pt will be treated by physical therapy 1-2 times a week for 8 weeks for Pt Education, HEP, therapeutic exercises, neuromuscular re-education, joint mobilizations, modalities prn to achieve established goals. Maame may at times be seen by a PTA as part of the Rehab Team. Cont PT for 8 weeks.     I certify the need for these services furnished under this plan of treatment and while under my care.______________________________ Physician/Referring Practitioner  Date of Signature

## 2018-11-20 NOTE — PLAN OF CARE
"Physical Therapy Evaluation    Name: Maame Cortez  Clinic Number: [id number]      Diagnosis:   Encounter Diagnoses   Name Primary?    Impaired functional mobility, balance, gait, and endurance     Muscle weakness of lower extremity      Physician: Nathanael Borja MD Vidal, Gabriel A., MD  Treatment Orders: PT Eval and Treat    Past Medical History:   Diagnosis Date    Heart palpitations     MTHFR mutation     Stroke 08/2015     Current Outpatient Medications   Medication Sig    citalopram (CELEXA) 20 MG tablet TAKE 1 TABLET (20 MG TOTAL) BY MOUTH ONCE DAILY.    gabapentin (NEURONTIN) 300 MG capsule Take 300 mg by mouth 3 (three) times daily.    methylPREDNISolone sodium succinate (SOLU-MEDROL) 1,000 mg injection Mix 1 syringe in smoothie and take by mouth x 2 days;    nicotine (NICODERM CQ) 14 mg/24 hr Place 1 patch onto the skin once daily.    predniSONE (DELTASONE) 10 MG tablet Take 1 tablet (10 mg total) by mouth once daily.     No current facility-administered medications for this visit.      Review of patient's allergies indicates:  No Known Allergies  Precautions: unknown autoimmune disorder, standard    Evaluation Date: 11/20/18  Visit # authorized: 20  Authorization period: 12/31/18    Shonda Isabel is a 38 y.o. female that presents to [facility] outpatient clinic secondary to autoimmune disorder, impaired motor control, pontine lesion.     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 Configuration nonspecific with differential to include vasculopathy with region of active demyelination or alternative inflammatory/infectious process " "not excluded. No evidence for remote hemorrhagic infarction in the left corona radiata extending along the cortical spinal tract with small area of additional encephalomalacia along the right inferior basal ganglia suggestive for additional prior infarction."  10/25/18 head and neck CTA revealed: "CTA head and neck: No significant stenosis or proximal occlusion throughout the head or neck..  CT head: Remote left basal gangliar hemorrhagic infarction again seen.  Limited evaluation of the brainstem secondary to artifact from beam hardening and CT technique however there is ill-defined regions of enhancement within the jensen with diffuse edema signal seen on MRI clinical correlation and follow-up advised expanded differential for pontine process to include rhombencephalitis or mass lesion with subacute age infarction a consideration although somewhat unusual characteristics..  Clinical correlation and follow-up the MRI with and without contrast recommended."  10/26/18 thoracic MRI revealed: "Normal MRI evaluation of the thoracic spine."  10/26/18 cervical MRI revealed: "1. Persistent edema signal identified at the level of the jensen with ill-defined central enhancement, similar when compared to the recent brain MRI.  2. Normal MRI evaluation of the cervical spinal cord specifically without signal abnormality or pathologic enhancement."  Past surgical history: abdominoplasty more than 15 years ago  Functional deficits: walking long distance, imbalance, stair ambulation, household chores  Prior level of function: requires assistance with ADLs  Occupation: not employed   Environment: 1 story home with 10 steps to enter, has WC [facility], SPC (not currently using AD), lives with  and 2 sons  No cultural or spiritual barriers identified to treatment or learning.  Patient's goals: to keep up with kids, improve walking    Objective     Observation: pleasant and cooperative    Posture Alignment : WNL  Dominant hand:  " right    ROM:   UPPER EXTREMITY--AROM/PROM  (R) UE: WNLs  (L) UE: WNLs     Lower Extremity Strength  Right LE  Left LE    Hip flexion:  5/5 Hip flexion: 5/5   Knee extension: 5/5 Knee extension: 5/5   Knee flexion: 5/5 Knee flexion: 5/5   Ankle dorsiflexion:  4/5 Ankle dorsiflexion: 5/5   Ankle plantarflexion:  5/5 Ankle plantarflexion: 5/5   Hip abduction: 4+/5 Hip abduction: 5/5   Hip adduction: 4/5 Hip adduction 5/5   Hip extension: 4+/5 Hip extension: 5/5     Functional Strength:   -30 sec sit to stand: 12   -Heel Walking: dysfunctional, non-painful    -Toe Walking: functional, non-painful    Bed mobility: independent with all    Transfers: sit <> stand with independence     Stairs: ascend/descend 5 steps with 0 HRs, reciprocal gait pattern, and mod I    DYNAMIC GAIT INDEX  1. Gait level surfaces: 2       2 Mild Impairment Walks 20', uses A.D., slower speed, mild gait deviation     2. Change in Gait Speed: 3      3 Normal Able to smoothly change walking speed without loss of balance or gait deviation. Shows a significant difference in walking speeds between normal, fast and slow speeds.     3:  Gait with Horizontal Head Turns: 3      3 Normal Performs Head turns smoothly with no change in gait.     4. Gait with vertical Head turns:  2      2 Mild impairment Performs task with slight change in gait velocity ie. Minor disruption in smooth gait path or uses walking aid.     5.  Gait and pivot turn:  3      3 Normal Pivot turns safely within 3 seconds and stops quickly with no loss of balance.     6.  Step over Obstacle: 2      2   Mild Impairment Is able to step over box but must slow down and adjust steps to clear box safely     7. Step Around Obstacles:  3      3  Normal  Is able to walk around objects safely without changing gait speed, no evidence of imbalance     8. Steps:  3      3  Normal  Alternating feet, no rail     TOTAL SCORE: 21 / 24    Balance Assessment:-Single Leg Stance:    Right : 2  seconds   Left:  15 seconds    Flexibility:    Ely's test: B WNL   Hamstring length 90/90: B WNL    Gait Assessment:   · AD used: none; Assistance: mod I; Distance: 100'    · GAIT DEVIATIONS:   Maame displays the following deviations with ambulation:    Impairments contributing to deviations: impaired motor control, R foot drop, steppage gait pattern, R knee hyperextension    Functional Limitations Reports - G Codes  CMS Impairment/Limitation/Restriction for FOTO Cerebrovascular Disorders Survey  Status Limitation G-Code CMS Severity Modifier  Intake 56% 44% Current Status CK - At least 40 percent but less than 60 percent  Predicted 70% 30% Goal Status+ CJ - At least 20 percent but less than 40 percent  +Based on FOTO predicted change score    PT Evaluation Completed? Yes  Discussed Plan of Care with patient: Yes    TREATMENT:  Maame received therapeutic exercises to develop strength and endurance, flexibility for 10 minutes including:    Elliptical x 5 min level 3  Gait part practice x 20 ea  SLS x 30 sec    HEP provided: none at this time  Instructed pt. Regarding: Proper technique with all exercises. Pt demo good understanding of the education provided. Maame demonstrated good return demonstration of activities.    Assessment     This is a 38 y.o. female referred to outpatient physical therapy and presents with a medical diagnosis of autoimmune disorder, impaired motor control, pontine lesion and demonstrates limitations as described in the problem list. Pt presents to clinic with past history of pontine stroke and R sided weakness, LE weakness, and impaired gait, balance, endurance, and functional mobility. DGI score 21/24. Pt will benefit from physcial therapy services in order to maximize functional independence. The following goals were discussed with the patient and patient is in agreement with them as to be addressed in the treatment plan.    History  Co-morbidities and personal factors that may impact  the plan of care Examination  Body Structures and Functions, activity limitations and participation restrictions that may impact the plan of care Clinical Presentation   Decision Making/ Complexity Score   Co-morbidities:     Pontine stroke    Personal Factors:     None Body Regions: LE     Body Systems: musculoskeletal (LE weakness)    Neuromuscular (impaired gait, balance, endurance, functional mobility)    Activity limitations: walking long distance, imbalance, stair ambulation, household chores    Participation Restrictions: ADLs, IADLs, domestic duties   Stable and predictable   Low     Prognosis: good    Anticipated barriers to physical therapy: none    Medical necessity is demonstrated by the following IMPAIRMENTS/PROBLEM LIST:   1) Impaired functional mobility/balance   2) LE weakness   3) Difficulty walking long distances   4) Impaired gait   5) Lack of HEP    GOALS: Short Term Goals:  4 weeks  1. Pt will be able to perform SLS for 10 seconds to indicate improved static balance.   2. Pt will be able to tolerate multi-directional LE strengthening in order to improve ability to perform household chores.  3. Pt will report 50% improvement in ability to walk long distances since start of care to indicate improved functional mobility.   4. Pt will ambulate 100' without foot drop to indicate improved gait pattern.   5. Pt to tolerate HEP to improve ROM and independence with ADL's    Long Term Goals: 8 weeks  1. Pt will increase DGI score to 22/24 to indicate improved static and dynamic balance.   2. Pt will be able to perform 2 x 10 multi-directional LE strengthening without fatigue in order to improve ability to perform household chores.  3. Pt will report 80% improvement in ability to walk long distances since start of care to indicate improved functional mobility.   4. Pt will ambulate 300' with little to no gait deviations to indicate improved gait pattern.   5. Pt to be Independent with HEP to improve ROM  and independence with ADL's    Plan     Pt will be treated by physical therapy 1-2 times a week for 8 weeks for Pt Education, HEP, therapeutic exercises, neuromuscular re-education, joint mobilizations, modalities prn to achieve established goals. Maame may at times be seen by a PTA as part of the Rehab Team. Cont PT for 8 weeks.     I certify the need for these services furnished under this plan of treatment and while under my care.______________________________ Physician/Referring Practitioner  Date of Signature

## 2018-11-26 ENCOUNTER — TELEPHONE (OUTPATIENT)
Dept: OPHTHALMOLOGY | Facility: CLINIC | Age: 38
End: 2018-11-26

## 2018-11-27 ENCOUNTER — CLINICAL SUPPORT (OUTPATIENT)
Dept: REHABILITATION | Facility: HOSPITAL | Age: 38
End: 2018-11-27
Payer: MEDICARE

## 2018-11-27 DIAGNOSIS — R13.12 OROPHARYNGEAL DYSPHAGIA: ICD-10-CM

## 2018-11-27 DIAGNOSIS — R83.9 CSF ABNORMAL: ICD-10-CM

## 2018-11-27 DIAGNOSIS — G93.9 PONTINE LESION: Primary | ICD-10-CM

## 2018-11-27 DIAGNOSIS — R13.10 DYSPHAGIA, UNSPECIFIED TYPE: ICD-10-CM

## 2018-11-27 DIAGNOSIS — M62.81 MUSCLE WEAKNESS OF LOWER EXTREMITY: ICD-10-CM

## 2018-11-27 DIAGNOSIS — Z74.09 IMPAIRED FUNCTIONAL MOBILITY, BALANCE, GAIT, AND ENDURANCE: ICD-10-CM

## 2018-11-27 DIAGNOSIS — D89.89 AUTOIMMUNE DISORDER: ICD-10-CM

## 2018-11-27 DIAGNOSIS — R47.1 FLACCID DYSARTHRIA: ICD-10-CM

## 2018-11-27 LAB — FUNGUS SPEC CULT: NORMAL

## 2018-11-27 PROCEDURE — 97110 THERAPEUTIC EXERCISES: CPT | Mod: PO,59

## 2018-11-27 PROCEDURE — 97112 NEUROMUSCULAR REEDUCATION: CPT | Mod: PO,59

## 2018-11-27 PROCEDURE — 92507 TX SP LANG VOICE COMM INDIV: CPT | Mod: PO

## 2018-11-27 NOTE — PROGRESS NOTES
Name: Maame Cortez  Clinic Number: [id number]  Date of Treatment: 11/27/2018   Diagnosis:   Encounter Diagnoses   Name Primary?    Impaired functional mobility, balance, gait, and endurance     Muscle weakness of lower extremity        Physician: Nathanael Borja MD    Time in: 0900  Time Out: 1000  Total Treatment Time: 60 minutes (1:1 for 37 minutes of treatment)  Last G-code: eval  Last PN: NA  Date of eval: 11/20/18  Visit #: 2/20  Auth expiration: 12/31/18  POC expiration: 1/20/19    Precautions: unknown autoimmune disorder, standard    Subjective     Maame reports no soreness following last visit. Patient reports their pain to be 0/10 on a 0-10 scale with 0 being no pain and 10 being the worst pain imaginable.    Objective     Maame received therapeutic exercises to develop strength, endurance, ROM, flexibility and posture for 35 minutes including:     Elliptical x 5 min level 3- NP  Upright bike x 8 min level 3  Sidestepping w green TB 40' x 3 laps  Leg press 60# x 30  Standing march x 30 ea  Standing hip abd x 30 ea  Step ups 6 in x 20  Prone HS curl 3# x 30    Patient participated in the following gait training for 15 minutes:     Gait part practice fwd over layla and sideways over alyla x 30 ea way ea LE  Heel walking 40' x 1.5 laps    Patient participated in the following neuromuscular re-education activities to improve Balance and Coordination for 10 minutes:    SLS x 30 sec  SL squat w UE support x 20  Qpd alt LEs 2 x 10    Written Home Exercises Provided: standing march, standing hip abd, prone HS curls, SL squat, SLS    Pt educated on importance of HEP. Pt demo good understanding of the education provided. Maame demonstrated good return demonstration of activities.     Assessment     Maame had good tolerance to treatment today with no adverse effects. Pt demonstrates instability with single leg balancing. Occasional R genu valgus noted single leg squats indicate hip abduction weakness.  Early fatigue noted with ankle DF for heal walking indicating decreased endurance. Pt with tendency for R hyperextension with ambulation. Will continue to progress gait pattern and balance to tolerance.     Pt will continue to benefit from skilled PT intervention. Medical Necessity is demonstrated by:  Unable to participate fully in daily activities, Requires skilled supervision to complete and progress HEP and Weakness.    Patient is making good progress towards established goals.    GOALS: Short Term Goals:  4 weeks  1. Pt will be able to perform SLS for 10 seconds to indicate improved static balance.   2. Pt will be able to tolerate multi-directional LE strengthening in order to improve ability to perform household chores.  3. Pt will report 50% improvement in ability to walk long distances since start of care to indicate improved functional mobility.   4. Pt will ambulate 100' without foot drop to indicate improved gait pattern.   5. Pt to tolerate HEP to improve ROM and independence with ADL's     Long Term Goals: 8 weeks  1. Pt will increase DGI score to 22/24 to indicate improved static and dynamic balance.   2. Pt will be able to perform 2 x 10 multi-directional LE strengthening without fatigue in order to improve ability to perform household chores.  3. Pt will report 80% improvement in ability to walk long distances since start of care to indicate improved functional mobility.   4. Pt will ambulate 300' with little to no gait deviations to indicate improved gait pattern.   5. Pt to be Independent with HEP to improve ROM and independence with ADL's    New/Revised goals: none at this time    Plan     Continue with established Plan of Care towards PT goals.

## 2018-11-27 NOTE — PROGRESS NOTES
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     Response to previous treatment: n/a    Pain Scale:  0/10 on VAS currently.   Pain Location: n/a  Objective:   TIMED  Procedure Min.   Cognitive Communication Therapy    45         Total Minutes: 45  Total Timed Units: 1  Total Untimed Units: 0  Charges Billed/# of units: 1    Short Term Goals: (4 weeks) Current Progress:   1. Pt will use motor speech strategies in speech hierarchy tasks w/ 90% acc ind'ly.  Pt repeated polysllabic words utilizing motor speech strategies (deep breath and slow rate) w/ 90% acc ind'ly, and 100% acc given minimum cues.  Pt repeated phrases  Utilizing motor speech strategies w/ 100% acc ind'ly.   Pt completed open-ended sentences utilizing motor speech strategies w/ 90% acc ind'ly.    2. Pt will participate in MBSS to further assess her swallowing.  Sent a note to the doctor to schedule.     3. Pt will tolerate a regular consistency diet with thin liquids w/ no reported difficulty.  Pt reported that she is still having difficulty with liquids. She stated that she frequently chokes but not every single time.    4. Pt will participate in an assessment of her cognitive-linguistic skills.  Goal Met 11/20/18   See Below   New Goal: 11/27/18  5.  Pt will complete thought organization tasks w/ 90% accuracy ind'ly.  Pt listed 10 items in a concrete category (occupations) w/n 1 minute.     Pt completed open-ended sentences with phrases w/ 100% acc ind'ly.     Pt participated in a 10 minute conversation w/o word finding difficulty.    Pt produced a word, given a definition and initial letter constraint w/ 90% acc ind'ly.    Pt reported that she exhibits greater word finding difficulties when she is at her rehab program.    New Goal: 11/27/18:  6. Pt will complete mental manipulation tasks w/ 90% accuracy ind'ly.       Pt education:  Reviewed motor speech strategies. Patient verbalized understanding.    Motor Speech Strategies:  · Speak Slowly / Pacing: Try to slow your rate of speech when talking while keeping the same intonation or sing-song quality that is natural to your voice.   · Overarticulation: Over-say all of the sounds in your words.  · Speak Loudly: make sure to project your voice so that others can hear you.  · Deep Breath: Make sure to use deep breathing to support your speech. If you do not have enough breath support, it is difficult to over-articulate, speak loudly, or speak slowly.   · Open Your Mouth: Make sure to open your mouth widely while speaking.     Written Home Exercises Provided: not at this time.  Assessment:   Maame is progressing well towards her goals. Pt exhibited increased speech intelligibility today, following cues to reduce her rate, speak louder, and overarticulate. Pt exhibited increased accuracy on thought organization tasks. New goals were added to target thought organization and mental manipulation.  Current goals remain appropriate. Goals to be updated as necessary.   Pt prognosis is Good. Pt will continue to benefit from skilled outpatient speech and language therapy to address the deficits listed in the problem list on initial evaluation, provide pt/family education and to maximize pt's level of independence in the  home and community environment.     Medical necessity is demonstrated by the following IMPAIRMENTS:  Pt exhibits difficulty in articulating necessary medical information, and her thoughts and needs to familiar and unfamiliar listeners in known and unknown contexts inhi  Barriers to Therapy: autoimmune disorder  Pt's spiritual, cultural and educational needs considered and pt agreeable to plan of care and goals.    Plan:   Continue POC with focus on motor speech deficits, and swallowing difficulties.     LARRY Caruso.   [facility]- Clinician     I certify that I was present in the room directing the student in service delivery and guiding them using my skilled judgment. As the co-signing therapist I have reviewed the students documentation and am responsible for the treatment, assessment, and plan.     ROBERT Jones., CCC-SLP, IS  Speech-Language Pathologist  11/27/2018     .

## 2018-11-30 ENCOUNTER — CLINICAL SUPPORT (OUTPATIENT)
Dept: REHABILITATION | Facility: HOSPITAL | Age: 38
End: 2018-11-30
Payer: MEDICARE

## 2018-11-30 DIAGNOSIS — R53.1 DECREASED RANGE OF MOTION WITH DECREASED STRENGTH: ICD-10-CM

## 2018-11-30 DIAGNOSIS — Z74.09 IMPAIRED FUNCTIONAL MOBILITY, BALANCE, GAIT, AND ENDURANCE: ICD-10-CM

## 2018-11-30 DIAGNOSIS — M25.60 DECREASED RANGE OF MOTION WITH DECREASED STRENGTH: ICD-10-CM

## 2018-11-30 DIAGNOSIS — R13.12 OROPHARYNGEAL DYSPHAGIA: ICD-10-CM

## 2018-11-30 DIAGNOSIS — R47.1 FLACCID DYSARTHRIA: ICD-10-CM

## 2018-11-30 DIAGNOSIS — Z78.9 IMPAIRED MOTOR CONTROL: ICD-10-CM

## 2018-11-30 DIAGNOSIS — M62.81 MUSCLE WEAKNESS OF LOWER EXTREMITY: ICD-10-CM

## 2018-11-30 DIAGNOSIS — Z74.09 IMPAIRED MOBILITY AND ADLS: ICD-10-CM

## 2018-11-30 DIAGNOSIS — Z78.9 IMPAIRED MOBILITY AND ADLS: ICD-10-CM

## 2018-11-30 PROCEDURE — 92507 TX SP LANG VOICE COMM INDIV: CPT | Mod: PO

## 2018-11-30 PROCEDURE — 97110 THERAPEUTIC EXERCISES: CPT | Mod: PO,59

## 2018-11-30 PROCEDURE — 97112 NEUROMUSCULAR REEDUCATION: CPT | Mod: PO

## 2018-11-30 NOTE — PROGRESS NOTES
"Name: Maame Cortez  Clinic Number: [id number]  Date of Treatment: 11/30/2018   Diagnosis:   Encounter Diagnoses   Name Primary?    Impaired functional mobility, balance, gait, and endurance     Muscle weakness of lower extremity        Physician: Nathanael Borja MD    Time in: 2:00  Time Out: 2;55  Total Treatment Time: 55 minutes    1:1 time: 25   Last G-code: eval  Last PN: NA  Date of eval: 11/20/18  Visit #: 3/20  Auth expiration: 12/31/18  POC expiration: 1/20/19    Precautions: unknown autoimmune disorder, standard    Subjective     Dahliana without c/o pain. Reports some chronic (R)side weakness but states that she remains as active as she can be.    Patient reports their pain to be 0/10 on a 0-10 scale with 0 being no pain and 10 being the worst pain imaginable.    Objective     Maame received therapeutic exercises to develop strength, endurance, ROM, flexibility and posture for 40 minutes including:      Ex's in Bold performed today     Elliptical x 8 min    Upright bike x 8 min level 3--NP  gastroc stretch on incline board 2 x 10  Pilates box step downs level 3 , 4 springs 2 x 10 ( cues eccentric control) (R)LE  Lateral band walk w green TB 40' x 2 laps  Shuttle Leg press (U) on (R) with 1.5 bands 3 x 10 ( cues to avoid knee "lockout")  Standing march x 30 ea  Standing hip abd x 30 ea  Step ups with contralateral hip flex ( Bosu )  x 20  supine HS curl with LE's supported on T-ball  x 30  Multi-directional slider squats ( forward, lateral and backward) x 10 with support of ski pole (B)       Patient participated in the following neuromuscular re-education activities to improve Balance and Coordination for 15 minutes:  Bosu ball weight shifts A/P and laterally with light (U) support)  SLS 3 x 30 sec on (R)  SL squat w UE support x 20  Qpd alt LEs 2 x 10  Forward and lateral stepping over hurdles x 4 trials ( SBA)  Heel walking 2 x 20 feet    Patient education; Patient educated to continue with " previously issued HEP to tolerance:      Pt educated on importance of HEP. Pt demo good understanding of the education provided. Maame demonstrated good return demonstration of activities.     Assessment     Maame had good tolerance to treatment today with no adverse effects.  She remains challenged with single leg stance activities on (R) with performance of SLS and forward/lateral stepping over cones. Did well with new multi-directional slider squat activity with ski-pole support.  Quality of mobility remains compromised by (R) side residual weakness associated with her medical status but she appears motivated with Tx efforts and participates to the best of her ability. Will continue to progress gait pattern and balance to tolerance.     Pt will continue to benefit from skilled PT intervention. Medical Necessity is demonstrated by:  Unable to participate fully in daily activities, Requires skilled supervision to complete and progress HEP and Weakness.    Patient is making good progress towards established goals.    GOALS: Short Term Goals:  4 weeks  1. Pt will be able to perform SLS for 10 seconds to indicate improved static balance.   2. Pt will be able to tolerate multi-directional LE strengthening in order to improve ability to perform household chores.  3. Pt will report 50% improvement in ability to walk long distances since start of care to indicate improved functional mobility.   4. Pt will ambulate 100' without foot drop to indicate improved gait pattern.   5. Pt to tolerate HEP to improve ROM and independence with ADL's     Long Term Goals: 8 weeks  1. Pt will increase DGI score to 22/24 to indicate improved static and dynamic balance.   2. Pt will be able to perform 2 x 10 multi-directional LE strengthening without fatigue in order to improve ability to perform household chores.  3. Pt will report 80% improvement in ability to walk long distances since start of care to indicate improved functional  mobility.   4. Pt will ambulate 300' with little to no gait deviations to indicate improved gait pattern.   5. Pt to be Independent with HEP to improve ROM and independence with ADL's    New/Revised goals: none at this time    Plan     Continue with established Plan of Care towards PT goals.

## 2018-11-30 NOTE — PROGRESS NOTES
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    Decreased range of motion with decreased strength     Impaired motor control     Impaired mobility and ADLs      Visit Date: 11/30/2018  Physician Orders: Eval and Treat   Surgical Procedure and Date: NA  Evaluation Date: 11/16/18   Insurance Authorization Period Expiration: 12/31/18  Plan of Care Certification Period: 1/11/18  Date of Return to MD: 3/15/19 with primary care     Visit # / Visits authorized: 2 / 20   Time In:1033  Time Out: 1115  Total Billable Time: 42 minutes    Precautions: Standard, unknown autoimmune disorder     Subjective     Pt reports: "Honestly, I forgot about the exercises."   she was not compliant with home exercise program given last session.   Response to previous treatment: eval only   Functional change: NA     Pain: 0/10  Location: NA     Objective   Patient seen by OT this date.  Patient received individual therapy with activities as follows:     Maame participated in therapeutic exercises to improve range of motion and strength for 42 minutes:   - UBE level 1 x 3 min forwards x 3 min reverse   - red flexbar supination and pronation x 15 reps x 2 sets each direction    - R wrist flexion and extension (2 ways) over wedge with 1# wt x 15 reps x 2 sets each   - R composite gripping with yellow therputty x 10 reps   - R composite digit extension rolls with blue alfonzo bar x 20   - R composite digit MP extension x 15 reps     Reviewed HEP provided at eval     Home Exercises and Education Provided     Education provided:    - Progress towards goals   - " POC     Written Home Exercises Provided: Patient instructed to cont prior HEP.  Exercises were reviewed and Maame was able to demonstrate them prior to the end of the session.  Maame demonstrated good  understanding of the HEP provided.     See EMR under Patient Instructions for exercises provided at initial eval.     Pt has no cultural, educational or language barriers to learning provided.    Assessment   Patient tolerated her first follow up treatment session well.  OT initiated light strengthening TherEx without any problems or adverse reactions.  She demonstrated R hand weakness and fatigue with gripping and digit extension and required increased time for completion of exercises. She demonstrated difficulty sustaining  on 1# wt during wrist flexion and extension exercises over wedge due to hand weakness.  She was able to perform weighted wrist flexion and extension through partial range of motion.  OT will continue to progress in strengthening as tolerated.  Pt would continue to benefit from skilled OT. Maame is progressing well towards her goals and there are no updates to goals at this time. Pt prognosis is Fair due to unconfirmed/unspecified autoimmune disorder diagnosis.  Pt will continue to benefit from skilled outpatient occupational therapy to address the deficits listed in the problem list on initial evaluation provide pt/family education and to maximize pt's level of independence in the home and community environment.     Anticipated barriers to occupational therapy: none     Pt's spiritual, cultural and educational needs considered and pt agreeable to plan of care and goals.    Goals:  Short Term Goals: 4 weeks   1.  Patient will be independent in performance and progression of HEP. - ongoing   2.  Patient will demonstrate improved R hand motor control and coordination to participate in 9 HPT - not met   3.  Patient will demonstrate improved R hand strength and motor control to perform baseline  pinch measures. - in progress   4.  Patient will improve R hand  strength 5-10 pounds to improve R hand gripping, carrying, holding, and moving skills for self care, , and IADLs. - in progress   5.  Patient will demonstrate knowledge and understanding of available AD/DME to improve performance and participation in ADLs such as feeding and dressing. - not met      Long Term Goals: 8 weeks   1.  Patient will be Mod I for upper body dressing using AD as needed for clothing fasteners in order to improve independence in self care skills. - not met   2.  Patient will be Mod I for lower body dressing using AD as needed for clothing fasteners, socks, and shoes in order to improve independence in self care skills.  - not met   3.  Patient will improve R hand  strength 10- 15 pounds to improve R hand gripping, carrying, holding, and moving skills for self care, , and IADLs - in progress   4.  Patient will improve R hand pinch strength 1-3 psi's from baseline measures (once captured) to improve R hand/digit strength for functional self care skills. - in progress   5.  Patient will improve R hand 9 HPT by 10-15 seconds of baseline score (one captured) to demonstrate improved R hand motor control for self care skills and other daily functional activities. - not met     Plan   Continue POC in pursuit of OT goals.   Discussed Plan of Care with patient: Yes  Updates/Grading for next session: FM activities

## 2018-11-30 NOTE — TELEPHONE ENCOUNTER
JACKIE: Pt came into the clinic on 11/20 regarding follow up and appointments. Pt was advised on the work up that was to be completed before the scheduling follow up in the [facility]. Pt and mother had verbalized understanding. Was given the number to set up with Dr. Ricketts and message was sent to staff for scheduling. Pt did not know schedule so Xray was not scheduled but number was also given and instructions for them to contact office once both appts were scheduled. I also updated defult the number in the chart. Pt stated that she doesn't have vm set up or check mychart and neither does her mom, so alternate number was provided for contact. kayla

## 2018-11-30 NOTE — PROGRESS NOTES
Outpatient Neurological Rehabilitation   Speech and Language Therapy Daily Note  Date:  11/30/2018     Start Time:  9:50  Stop Time:  10:30    Name: Maame Cortez   MRN: [id number]   Therapy Diagnosis:   Encounter Diagnoses   Name Primary?   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 12.  Response to previous treatment: n/a  Pain Scale:  0/10 on VAS currently.   Pain Location: n/a  Objective:   UNTIMED  Procedure Min.   Speech-Language-Voice Therapy    40         Total Minutes: 40  Total Timed Units: 1  Total Untimed Units: 0  Charges Billed/# of units: 1    Short Term Goals: (4 weeks) Current Progress:   1. Pt will use motor speech strategies in speech hierarchy tasks w/ 90% acc ind'ly.  Pt engaged in conversation and providing logical solutions to hypothetical problems utilizing motor speech strategies w/ 80% acc ind'ly.    2. Pt will participate in MBSS to further assess her swallowing.  Scheduled for Dec 12    3. Pt will tolerate a regular consistency diet with thin liquids w/ no reported difficulty.  Pt reported that she is still having difficulty with liquids. She stated that she frequently chokes but not every single time. No change reported today.   4. Pt will participate in an assessment of her cognitive-linguistic skills.  Goal Met 11/20/18   See Below   New Goal: 11/27/18  5. Pt will complete thought organization tasks w/ 90% accuracy ind'ly.  Pt provided logical solutions to  hypothetical problems with 80% acc ind'ly, 100% acc given min cues to expand her responses.     Assisted pt to set up her [facility] account to keep track of her appts, communicate with her physicians, etc. She was also shown how to use her phone calendar to keep track of appts and events for herself and her children.  She stated that she does not use a paper calendar or the calendar on her phone.   New Goal: 11/27/18:  6. Pt will complete mental manipulation tasks w/ 90% accuracy ind'ly.  Assisted pt to set up her [facility] account and shown the features of the faraz. She did not remember her username but was able to remember her password.        Pt education:  Discussed ways to stay organized and to remember events/appointments using external aids. Reviewed motor speech strategies. Patient verbalized understanding.    Written Home Exercises Provided: not at this time. Encouraged pt to enter her events/appts on her calendar in her phone.   Assessment:   Maame is progressing well towards her goals. Encouraged pt to use external aids to help her stay organized and remember important information. She has a MBSS scheduled for Dec. 12. Current goals remain appropriate. Goals to be updated as necessary.   Pt prognosis is Good. Pt will continue to benefit from skilled outpatient speech and language therapy to address the deficits listed in the problem list on initial evaluation, provide pt/family education and to maximize pt's level of independence in the home and community environment.     Medical necessity is demonstrated by the following IMPAIRMENTS:  Pt exhibits difficulty in articulating necessary medical information, and her thoughts and needs to familiar and unfamiliar listeners in known and unknown contexts inhi  Barriers to Therapy: autoimmune disorder  Pt's spiritual, cultural and educational needs considered and pt agreeable to plan of care and goals.    Plan:   Continue POC with focus on motor speech deficits,  executive function skills, and swallowing difficulties.     GAURI Jones, CCC-SLP, [facility]  Speech-Language Pathologist  11/30/2018     .

## 2018-11-30 NOTE — TELEPHONE ENCOUNTER
----- Message from Amalia Borja sent at 11/30/2018  2:42 PM CST -----  Contact: Pts mother Hui Ames is returning call from this office.  She can be reached at [phone]

## 2018-12-04 ENCOUNTER — CLINICAL SUPPORT (OUTPATIENT)
Dept: REHABILITATION | Facility: HOSPITAL | Age: 38
End: 2018-12-04
Payer: MEDICARE

## 2018-12-04 DIAGNOSIS — M25.60 DECREASED RANGE OF MOTION WITH DECREASED STRENGTH: ICD-10-CM

## 2018-12-04 DIAGNOSIS — Z74.09 IMPAIRED MOBILITY AND ADLS: ICD-10-CM

## 2018-12-04 DIAGNOSIS — R53.1 DECREASED RANGE OF MOTION WITH DECREASED STRENGTH: ICD-10-CM

## 2018-12-04 DIAGNOSIS — Z78.9 IMPAIRED MOTOR CONTROL: ICD-10-CM

## 2018-12-04 DIAGNOSIS — R13.12 OROPHARYNGEAL DYSPHAGIA: ICD-10-CM

## 2018-12-04 DIAGNOSIS — R47.1 FLACCID DYSARTHRIA: ICD-10-CM

## 2018-12-04 DIAGNOSIS — Z78.9 IMPAIRED MOBILITY AND ADLS: ICD-10-CM

## 2018-12-04 PROCEDURE — 97110 THERAPEUTIC EXERCISES: CPT | Mod: PO,59

## 2018-12-04 PROCEDURE — 92507 TX SP LANG VOICE COMM INDIV: CPT | Mod: PO

## 2018-12-04 PROCEDURE — 97530 THERAPEUTIC ACTIVITIES: CPT | Mod: PO

## 2018-12-04 NOTE — PROGRESS NOTES
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    Decreased range of motion with decreased strength     Impaired motor control     Impaired mobility and ADLs      Visit Date: 12/4/2018  Physician Orders: Eval and Treat   Surgical Procedure and Date: NA  Evaluation Date: 11/16/18   Insurance Authorization Period Expiration: 12/31/18  Plan of Care Certification Period: 1/11/19  Date of Return to MD: 3/15/19 with primary care     Visit # / Visits authorized: 3 / 20   Time In:1432  Time Out: 1515   Total Billable Time: 43 minutes    Precautions: Standard, unknown autoimmune disorder     Subjective     Pt reports: she is feeling well.  No new complaints.    she was not compliant with home exercise program given last session.   Response to previous treatment: positive   Functional change: NA     Pain: 0/10  Location: NA     Objective   Patient seen by OT this date.  Patient received individual therapy with activities as follows:     Maame participated in therapeutic exercises to improve range of motion and strength for 12 minutes:   - UBE level 1 x 3 min forwards x 3 min reverse   - separation of magnetic objects for light  and pinch strengthening     Patient participated in therapeutic activities to improve functional performance in the home environment for 31 minutes.  The following activities were included: (activities performed on R)   - manipulation of cups   - inserting large pegs into foam pegboard; removing pegs from foam pegboard and replacing them into container     Home Exercises and Education Provided     Education  provided:    - Progress towards goals   - POC     Written Home Exercises Provided: Patient instructed to cont prior HEP.  Exercises were reviewed and Maame was able to demonstrate them prior to the end of the session.  Maame demonstrated good  understanding of the HEP provided.     See EMR under Patient Instructions for exercises provided at initial eval.     Pt has no cultural, educational or language barriers to learning provided.    Assessment   Patient tolerated treatment session well.  She remains challenged with R hand motor control, coordination, and object manipulation activites.  She demonstrated mod-max difficulty with flexion and extension of digits to grasp and release of cups, especially when fatigued.  Patient required increased time for completion of task secondary to R hand deficits.  She demonstrates good participation in intervention exercises and activities and appears motivated to progress.  Pt would continue to benefit from skilled OT.  Maame is progressing well towards her goals and there are no updates to goals at this time. Pt prognosis is Fair due to unconfirmed/unspecified autoimmune disorder diagnosis.  Pt will continue to benefit from skilled outpatient occupational therapy to address the deficits listed in the problem list on initial evaluation provide pt/family education and to maximize pt's level of independence in the home and community environment.     Anticipated barriers to occupational therapy: none     Pt's spiritual, cultural and educational needs considered and pt agreeable to plan of care and goals.    Goals:  Short Term Goals: 4 weeks   1.  Patient will be independent in performance and progression of HEP. - ongoing   2.  Patient will demonstrate improved R hand motor control and coordination to participate in 9 HPT - in progress    3.  Patient will demonstrate improved R hand strength and motor control to perform baseline pinch measures. - in progress   4.  Patient will  improve R hand  strength 5-10 pounds to improve R hand gripping, carrying, holding, and moving skills for self care, , and IADLs. - in progress   5.  Patient will demonstrate knowledge and understanding of available AD/DME to improve performance and participation in ADLs such as feeding and dressing. - not met      Long Term Goals: 8 weeks   1.  Patient will be Mod I for upper body dressing using AD as needed for clothing fasteners in order to improve independence in self care skills. - not met   2.  Patient will be Mod I for lower body dressing using AD as needed for clothing fasteners, socks, and shoes in order to improve independence in self care skills.  - not met   3.  Patient will improve R hand  strength 10- 15 pounds to improve R hand gripping, carrying, holding, and moving skills for self care, , and IADLs - in progress   4.  Patient will improve R hand pinch strength 1-3 psi's from baseline measures (once captured) to improve R hand/digit strength for functional self care skills. - in progress   5.  Patient will improve R hand 9 HPT by 10-15 seconds of baseline score (one captured) to demonstrate improved R hand motor control for self care skills and other daily functional activities. - in progress    Plan   Continue POC in pursuit of OT goals.   Discussed Plan of Care with patient: Yes  Updates/Grading for next session: strengthening

## 2018-12-04 NOTE — PROGRESS NOTES
"Outpatient Neurological Rehabilitation   Speech and Language Therapy Daily Note  Date:  12/4/2018     Start Time:  1350  Stop Time:  1630    Name: Maame Cortez   MRN: [id number]   Therapy Diagnosis:   Encounter Diagnoses   Name Primary?    Flaccid dysarthria     Oropharyngeal dysphagia    Physician: Nathanael Borja MD  Physician Orders: St eval and treat  Medical Diagnosis: Pontine lesion G93.9, Autoimmune disorder F32.9, impaired motor control Z78.9  Visit #/ Visits Authorized: 5/ 20  Visits Cancelled: 0  Visits No Show: 0  Date of Evaluation:  11/16/18  Insurance Authorization Period: 11/16/18 to 12/31/18  Plan of Care Expiration Date:    11/16/18 to 1/11/19  Extended POC:  n/a   G-CODE   5/10    Precautions: Standard    Subjective:   Pt reports: her speech is slow and not at baseline.   Response to previous treatment: Pt put appts in her cell phone.   Pain Scale:  0/10 on VAS currently.   Pain Location: n/a  Objective:   UNTIMED  Procedure Min.   Speech-Language-Voice Therapy    40         Total Minutes: 40  Total Timed Units: 1  Total Untimed Units: 0  Charges Billed/# of units: 1    Short Term Goals: (4 weeks) Current Progress:   1. Pt will use motor speech strategies in speech hierarchy tasks w/ 90% acc ind'ly.  Pt engaged in conversation and speech tasks utilizing motor speech strategies w/ 90% acc ind'ly. However pt stated that while speaking and reading aloud, here speech is slower and not at baseline. "It's embarrassing."    She read the [place] with 157 words per minute which puts her at average speaking rate.   2. Pt will participate in MBSS to further assess her swallowing.  Scheduled for Dec 12    3. Pt will tolerate a regular consistency diet with thin liquids w/ no reported difficulty.  Pt reported that she is still having difficulty with liquids. She stated that she frequently chokes but not every single time. No change reported today.   4. Pt will participate in an assessment of " her cognitive-linguistic skills.  Goal Met 11/20/18    New Goal: 11/27/18  5. Pt will complete thought organization tasks w/ 90% accuracy ind'ly.  Pt listed 15 items in a concrete category (things seen at [place]) within one minute.     Pt listed 6 items in an abstract category (red things) within one minute.    Pt completed a deduction puzzle with 100% acc ind'ly  Goal met x1   New Goal: 11/27/18:  6. Pt will complete mental manipulation tasks w/ 90% accuracy ind'ly.  Pt repeated 3 words and 4 words in reverse order with 100% acc ind'ly given one repetition.    Pt ranked words with 100% acc ind'ly given one repetition.  Goal met x1     Pt education: Reviewed motor speech strategies ad progress. Patient verbalized understanding.    Written Home Exercises Provided: not at this time.   Assessment:   Maame is progressing well towards her goals. Speech intelligibility was good most of the time. Few moments of imprecise articulation and slower rate of speech. INcreased accuracy for mental manipulation and thought organization.  Current goals remain appropriate. Goals to be updated as necessary.   Pt prognosis is Good. Pt will continue to benefit from skilled outpatient speech and language therapy to address the deficits listed in the problem list on initial evaluation, provide pt/family education and to maximize pt's level of independence in the home and community environment.     Medical necessity is demonstrated by the following IMPAIRMENTS:  Pt exhibits difficulty in articulating necessary medical information, and her thoughts and needs to familiar and unfamiliar listeners in known and unknown contexts inhi  Barriers to Therapy: autoimmune disorder  Pt's spiritual, cultural and educational needs considered and pt agreeable to plan of care and goals.    Plan:   Continue POC with focus on motor speech deficits, executive function skills, and swallowing difficulties.     ROBERT Jones., CCC-SLP,  CBIS  Speech-Language Pathologist  12/4/2018

## 2018-12-06 ENCOUNTER — CLINICAL SUPPORT (OUTPATIENT)
Dept: REHABILITATION | Facility: HOSPITAL | Age: 38
End: 2018-12-06
Payer: MEDICARE

## 2018-12-06 ENCOUNTER — HOSPITAL ENCOUNTER (OUTPATIENT)
Dept: RADIOLOGY | Facility: HOSPITAL | Age: 38
Discharge: HOME | End: 2018-12-06
Attending: STUDENT IN AN ORGANIZED HEALTH CARE EDUCATION/TRAINING PROGRAM
Payer: MEDICARE

## 2018-12-06 DIAGNOSIS — G37.9 DEMYELINATING DISEASE OF CENTRAL NERVOUS SYSTEM, UNSPECIFIED: ICD-10-CM

## 2018-12-06 DIAGNOSIS — G93.9 PONTINE LESION: ICD-10-CM

## 2018-12-06 DIAGNOSIS — R90.89 ABNORMAL FINDING ON MRI OF BRAIN: ICD-10-CM

## 2018-12-06 DIAGNOSIS — R13.12 OROPHARYNGEAL DYSPHAGIA: ICD-10-CM

## 2018-12-06 DIAGNOSIS — R47.1 FLACCID DYSARTHRIA: ICD-10-CM

## 2018-12-06 PROCEDURE — 71046 X-RAY EXAM CHEST 2 VIEWS: CPT | Mod: 26,,, | Performed by: RADIOLOGY

## 2018-12-06 PROCEDURE — 71046 X-RAY EXAM CHEST 2 VIEWS: CPT | Mod: TC,FY

## 2018-12-06 PROCEDURE — 92507 TX SP LANG VOICE COMM INDIV: CPT | Mod: PO

## 2018-12-06 NOTE — PROGRESS NOTES
"Outpatient Neurological Rehabilitation   Speech and Language Therapy Daily Note  Date:  12/6/2018     Start Time:  1345  Stop Time:  1630    Name: Maame Cortez   MRN: [id number]   Therapy Diagnosis:   Encounter Diagnoses   Name Primary?    Flaccid dysarthria     Oropharyngeal dysphagia    Physician: Nathanael Borja MD  Physician Orders: St eval and treat  Medical Diagnosis: Pontine lesion G93.9, Autoimmune disorder F32.9, impaired motor control Z78.9  Visit #/ Visits Authorized: 5/ 20  Visits Cancelled: 0  Visits No Show: 0  Date of Evaluation:  11/16/18  Insurance Authorization Period: 11/16/18 to 12/31/18  Plan of Care Expiration Date:    11/16/18 to 1/11/19  Extended POC:  n/a   G-CODE   6/10    Precautions: Standard    Subjective:   Pt reports: her speech is getting close to baseline.  Response to previous treatment: "No I guess I am more confident about my speaking."  Pain Scale:  0/10 on VAS currently.   Pain Location: n/a  Objective:   UNTIMED  Procedure Min.   Speech-Language-Voice Therapy    45         Total Minutes: 45  Total Timed Units: 1  Total Untimed Units: 0  Charges Billed/# of units: 1    Short Term Goals: (4 weeks) Current Progress:   1. Pt will use motor speech strategies in speech hierarchy tasks w/ 90% acc ind'ly.  Pt engaged in conversation and speech tasks utilizing motor speech strategies w/ 90% acc ind'ly. However pt stated that while speaking and reading aloud, here speech is slower and not at baseline. "It's embarrassing."    She read the [place] with 157 words per minute which puts her at average speaking rate.   2. Pt will participate in MBSS to further assess her swallowing.  Scheduled for Dec 12    3. Pt will tolerate a regular consistency diet with thin liquids w/ no reported difficulty.  Pt reported that she is still having difficulty with liquids about 2 x per week.    4. Pt will participate in an assessment of her cognitive-linguistic skills.  Goal Met 11/20/18  "   New Goal: 11/27/18  5. Pt will complete thought organization tasks w/ 90% accuracy ind'ly.  Pt listed 13 items in a concrete category (cities) within one minute.     Pt listed 7 items in an abstract category (expensive things) within one minute.    Pt completed a deduction puzzle with 100% acc ind'ly  Goal met x1   New Goal: 11/27/18:  6. Pt will complete mental manipulation tasks w/ 90% accuracy ind'ly.  Pt listened to voice messages and answered questions ([facility]) with 100% acc ind'ly.      listened to voice messages and answered inference questions ([facility]) with 8  0% acc ind'ly.    Pt completed task requiring her to alternate uppercase and lowercase words and put them in alphabetical order ([facility]) with 99% aac ind'ly.    Pt completed an attention task in which she had to recall spoken words as previously stated ([facility]- Spoken Word N Back Memory) with 100% acc ind'ly.  Goal met x1     Pt education: Reviewed progress. Patient verbalized understanding.    Written Home Exercises Provided: not at this time.   Assessment:   Maame is progressing well towards her goals. Speech intelligibility was 100% most of the time. Few moments of imprecise articulation and slower rate of speech. Increased accuracy for mental manipulation and thought organization on tasks today.  Pt reported episodes of choking about 2x per day. Current goals remain appropriate. Goals to be updated as necessary.   Pt prognosis is Good. Pt will continue to benefit from skilled outpatient speech and language therapy to address the deficits listed in the problem list on initial evaluation, provide pt/family education and to maximize pt's level of independence in the home and community environment.     Medical necessity is demonstrated by the following IMPAIRMENTS:  Pt exhibits difficulty in articulating necessary medical information, and her thoughts and needs to familiar and unfamiliar listeners in known and  unknown contexts inhi  Barriers to Therapy: autoimmune disorder  Pt's spiritual, cultural and educational needs considered and pt agreeable to plan of care and goals.    Plan:   Continue POC with focus on motor speech deficits, executive function skills, and swallowing difficulties.     ROBERT Jones., CCC-SLP, CBIS  Speech-Language Pathologist  12/6/2018

## 2018-12-10 ENCOUNTER — OFFICE VISIT (OUTPATIENT)
Dept: NEUROLOGY | Facility: CLINIC | Age: 38
End: 2018-12-10
Payer: MEDICARE

## 2018-12-10 VITALS
HEART RATE: 96 BPM | DIASTOLIC BLOOD PRESSURE: 71 MMHG | WEIGHT: 130 LBS | SYSTOLIC BLOOD PRESSURE: 112 MMHG | BODY MASS INDEX: 22.2 KG/M2 | HEIGHT: 64 IN

## 2018-12-10 DIAGNOSIS — Z71.89 COUNSELING REGARDING GOALS OF CARE: ICD-10-CM

## 2018-12-10 DIAGNOSIS — G37.9 DEMYELINATING DISEASE OF CENTRAL NERVOUS SYSTEM, UNSPECIFIED: ICD-10-CM

## 2018-12-10 DIAGNOSIS — G93.9 PONTINE LESION: ICD-10-CM

## 2018-12-10 DIAGNOSIS — M35.2 NEUROLOGIC TYPE BEHCET'S SYNDROME: Primary | ICD-10-CM

## 2018-12-10 DIAGNOSIS — K12.1 ORAL ULCER: ICD-10-CM

## 2018-12-10 DIAGNOSIS — K13.79 RECURRENT ORAL ULCERS: ICD-10-CM

## 2018-12-10 DIAGNOSIS — Z86.69: ICD-10-CM

## 2018-12-10 DIAGNOSIS — R90.89 ABNORMAL FINDING ON MRI OF BRAIN: ICD-10-CM

## 2018-12-10 DIAGNOSIS — N76.6 GENITAL ULCER, FEMALE: ICD-10-CM

## 2018-12-10 PROCEDURE — 99999 PR PBB SHADOW E&M-EST. PATIENT-LVL IV: CPT | Mod: PBBFAC,,, | Performed by: PSYCHIATRY & NEUROLOGY

## 2018-12-10 PROCEDURE — 99215 OFFICE O/P EST HI 40 MIN: CPT | Mod: S$GLB,,, | Performed by: PSYCHIATRY & NEUROLOGY

## 2018-12-10 PROCEDURE — 3008F BODY MASS INDEX DOCD: CPT | Mod: CPTII,S$GLB,, | Performed by: PSYCHIATRY & NEUROLOGY

## 2018-12-10 RX ORDER — PREDNISONE 10 MG/1
10 TABLET ORAL DAILY
Qty: 30 TABLET | Refills: 3 | Status: SHIPPED | OUTPATIENT
Start: 2018-12-10 | End: 2019-04-23 | Stop reason: SDUPTHER

## 2018-12-10 NOTE — Clinical Note
"Hey there, pt will see you later this month; you saw her in 2015 after a stroke like episode.  She returned to hospital in October with diffuse inflammation of jensen.  Dr. Ander Daley (neuroradiologist) suspects entire presentation suggests Neuro-Behcet's.  She had episode of unilateral (right) visual loss that took place few weeks after her initial "stroke like" episode in 2015 and saw you.  Could you please clarify what this may have been?  I think you suspected retinal artery occlusion, but not sure if objective data supported?  Could it have been uveitis? (her son has uveitis). Could it have been ON?  Anyway, she sees you later this month, and your input will help process of achieving unifying diagnosis. Thanks,Esthela"

## 2018-12-10 NOTE — Clinical Note
I do not see any rheumatology appointment on the books Daniela; do you know the status?  Needs to be seen in next 3 weeks (before f/u with me) ; thanks

## 2018-12-10 NOTE — PROGRESS NOTES
"Subjective:       Patient ID: Maame Cortez is a 38 y.o. female who presents today for a hospital follow-up clinic visit for a neuro-inflammatory disorder.  She is accompanied by her mother.  She comes in to review labs and new imaging.      HPI:  · Feels like walking isn't as "sturdy" since discharge.  · Pt states dysarthria is worse and has not had improvement since steroids.  Mom believes dysarthria has improved (although not back to prior baseline)  · She currently does have an oral ulcer (see pic).  She states she has had these frequently over the past several years.  Has had one genital ulcer for which she saw GYN.  Does not have frequent skin infections.       Prior History  She originally [place] [facility] [place]/ worsened dysarthria, dysphagia, and headache, initially thought to be secondary to a pontine stroke.  After further evaluation, thought this pontine lesion was less likely to be a stroke given significant surrounding edema, diffusion restriction, and central ring contrast enhancement.  Subsequent workup included LP, notable for lymphocytic pleocytosis.  Concerned for NMO vs neuro-behcet's vs sarcoid.  She was given a course of 3 days of solumedrol and discharged.  She presents 2 days later for follow-up.    She previously was diagnosed with a L basal ganglia stroke in 2015.  She was started on aggrenox and rosuvastatin afterwards for secondary stroke prevention.  States when she left the hospital in 2015 she was in a wheelchair due to the severity of her weakness, but this has since improved (inpt rehab) and she is now able to ambulate without assistance w/ residual R hemiparesis.  During this time period, she also developed worsened vision loss on the R eye.  Subsequently evaluated by Dr. Ricketts who was concerned for R ischemic optic neuropathy.      In 9/2017, evaluated by vascular neurology for dizziness, auditory hallucinations, lip numbness/tingling, thought to be related to " migraine with aura.  These symptoms gradually resolved over a period of weeks.  CTA completed at that time was unremarkable, no MR imaging.     Reports a history of oral ulcers (inner lower lip) approximately Q3 months.  She reports a history of 1 genital ulcer years ago, for which she thinks she might have been given medicine with subsequent improvement, but is uncertain.    No FMH of autoimmune disorders or similar symptoms.  Maternal grandmother with history of breast cancer diagnosed in 60s.  No personal history of heat intolerance, malignancy, nights sweats, fever/chills.      She has a history of significant alcohol use (1 bottle wine/night), last drink ~10/22/18.  No history of recreational drug use.        SOCIAL HISTORY  Social History     Tobacco Use    Smoking status: Former Smoker     Packs/day: 0.50     Years: 8.00     Pack years: 4.00     Last attempt to quit: 8/21/2015     Years since quitting: 3.3    Smokeless tobacco: Never Used    Tobacco comment: quit 8/8/2015   Substance Use Topics    Alcohol use: Yes     Alcohol/week: 0.0 oz     Comment: Social    Drug use: No     Living arrangements - the patient lives with their spouse, and kids          Objective:        25 foot timed walk: 5.4 s without assist;     MS: intact  CN: moderate dysarthria (new since October)   MOTOR: right spastic hemiparesis (old since stroke 2015)  GAIT: right footdrop, spastic circumduction (old since stroke 2015)      Imaging:   CXR  No evidence of prior sarcoidosis    Diagnostic Results:     Brain Imaging   10/25/18 MRI Brain w/ and w/o: Per independent resident review and interpretation,  Ring enhancing-lesion with surrounding T2 flair edema with abnormal diffusion restriction in b/l jensen.                    Vessel Imaging   10/25/18 CTA Head and neck: Per independent resident review and interpretation,  No large-vessel occlusion, hemodynamically-significant stenosis, nor aneurysm visualized.     Spine  "Imaging  10/26/18 MRI C and T spine w/ and w/o: Per independent resident review and interpretation,  No abnormal enhancement within C and T spines.  Enhancing pontine lesion noted, better evaluated on prior brain MRIs.                Pathology  10/25/18 CSF Cytology: Per report,  "-Negative for malignant cells.  -Mild mononuclear pleocytosis including lymphocytes and monocytes."      Labs:       Positive/Abnormal Labs  CRP (10.7; normal range 0-8.2)  CSF WBC: lymphocytic pleocytosis (101, 107)    Negative/normal labs  HIV  VDRL (CSF)  RPR (serum)  IL-2 Receptor  ACE (serum and CSF)  MS profile/OCBs  CSF glucose, protein  M Tuberculosis DNA (CSF)  HSV, WNV, VZV, Enterovirus (CSF)  Flow cytometry (CSF)  SPEP  UPEP  Immunoglobulins  ESR  Acute hepatitis Panel  Lyme Ab  HLA phenotype negative for B51    Labs I  NMO --negative  Quantiferon gold negative      Lab Visit on 12/06/2018   Component Date Value    NIL 12/06/2018 0.040     TB1 - Nil 12/06/2018 0.060     TB2 - Nil 12/06/2018 0.120     Mitogen - Nil 12/06/2018 9.720     TB Gold Plus 12/06/2018 Negative    Admission on 10/24/2018, Discharged on 10/28/2018   No results displayed because visit has over 200 results.      Admission on 10/23/2018, Discharged on 10/23/2018   Component Date Value    POC Preg Test, Ur 10/23/2018 Negative      Acceptab* 10/23/2018 Yes     WBC 10/23/2018 16.34*    RBC 10/23/2018 4.19     Hemoglobin 10/23/2018 13.1     Hematocrit 10/23/2018 41.4     MCV 10/23/2018 99*    MCH 10/23/2018 31.3*    MCHC 10/23/2018 31.6*    RDW 10/23/2018 12.5     Platelets 10/23/2018 420*    MPV 10/23/2018 9.6     Immature Granulocytes 10/23/2018 0.6*    Gran # (ANC) 10/23/2018 14.5*    Immature Grans (Abs) 10/23/2018 0.09*    Lymph # 10/23/2018 1.1     Mono # 10/23/2018 0.6     Eos # 10/23/2018 0.0     Baso # 10/23/2018 0.04     nRBC 10/23/2018 0     Gran% 10/23/2018 88.6*    Lymph% 10/23/2018 6.9*    Mono% 10/23/2018 " 3.7*    Eosinophil% 10/23/2018 0.0     Basophil% 10/23/2018 0.2     Differential Method 10/23/2018 Automated     Sodium 10/23/2018 139     Potassium 10/23/2018 4.0     Chloride 10/23/2018 103     CO2 10/23/2018 26     Glucose 10/23/2018 118*    BUN, Bld 10/23/2018 7     Creatinine 10/23/2018 0.7     Calcium 10/23/2018 9.9     Total Protein 10/23/2018 7.8     Albumin 10/23/2018 3.9     Total Bilirubin 10/23/2018 0.2     Alkaline Phosphatase 10/23/2018 82     AST 10/23/2018 23     ALT 10/23/2018 30     Anion Gap 10/23/2018 10     eGFR if African American 10/23/2018 >60.0     eGFR if non  Amer* 10/23/2018 >60.0          Diagnosis/Assessment/Plan:    1. Neuroinflammatory Disorder  · Assessment: Unclear etiology.  Most likely autoimmune disorder with a relapsing/remitting course based on history so far.  Differential diagnoses include NMO and neuro-behcet's (NBD).  While she does not currently meet the criteria for Behcet's under the ICBD criteria published in 2014, remain concerned for NBD based on imaging characteristics.    · continue prednisone 10 mg Qdaily  · F/u with Dr. Ricketts -> Prior optic neuritis vs optic ischemic neuropathy?  Help in coordinating OCT; does any evidence in eye exam support Neuro-Behcet's specifically  · Refer to rheumatology for further consideration of likelihood of Neurobehcet's; consider steroid sparing agent  · Repeat MRI brain    F/u with me in 1 mo        International Team for the Revision of the International Criteria for Behcet's Disease. (2014). The International Criteria for Behcet's Disease (ICBD): A collaborative study of 27 countries on the sensitivity and specificity of the new criteria. Journal of the  Academy of Dermatology and Venereology, 28, 338-347. doi:10.1111/jdv.24636    DARVIN Garcia, TURNER Canela, JOSE Acosta, DARVIN Cordoba, DREW Byrne., Gina, C. S., . . . Al-DREW Delgadillo. (2014). Diagnosis and management of  Neuro-Behçets disease: International consensus recommendations. Journal of Neurology, 261(9), 3329-8122. doi:10.1007/d90549-928-6813-1      Over 50% of this 40 minute visit was spent in direct face to face counseling of the patient about neuroinflammatory disorder, DMT considerations, and symptom management.       Problem List Items Addressed This Visit        Unprioritized    Pontine lesion    Relevant Medications    predniSONE (DELTASONE) 10 MG tablet    Other Relevant Orders    Miscellaneous Sendout Test anti-MOG    Ambulatory Referral to Rheumatology    MRI Brain Demyelinating W W/O Contrast      Other Visit Diagnoses     Neurologic type Behcet's syndrome    -  Primary    Relevant Orders    Ambulatory Referral to Rheumatology    Demyelinating disease of central nervous system, unspecified        Relevant Medications    predniSONE (DELTASONE) 10 MG tablet    Other Relevant Orders    Miscellaneous Sendout Test anti-MOG    Ambulatory Referral to Rheumatology    MRI Brain Demyelinating W W/O Contrast    Abnormal finding on MRI of brain        Relevant Medications    predniSONE (DELTASONE) 10 MG tablet    Other Relevant Orders    Miscellaneous Sendout Test anti-MOG    Ambulatory Referral to Rheumatology    MRI Brain Demyelinating W W/O Contrast    Oral ulcer        Relevant Orders    Ambulatory Referral to Rheumatology    MRI Brain Demyelinating W W/O Contrast    Genital ulcer, female        Relevant Orders    Ambulatory Referral to Rheumatology    MRI Brain Demyelinating W W/O Contrast    Recurrent oral ulcers        Relevant Orders    Ambulatory Referral to Rheumatology    MRI Brain Demyelinating W W/O Contrast    History of sudden visual loss        Relevant Orders    Ambulatory Referral to Rheumatology    MRI Brain Demyelinating W W/O Contrast

## 2018-12-11 ENCOUNTER — TELEPHONE (OUTPATIENT)
Dept: RADIOLOGY | Facility: HOSPITAL | Age: 38
End: 2018-12-11

## 2018-12-11 ENCOUNTER — DOCUMENTATION ONLY (OUTPATIENT)
Dept: REHABILITATION | Facility: HOSPITAL | Age: 38
End: 2018-12-11

## 2018-12-11 DIAGNOSIS — R47.1 FLACCID DYSARTHRIA: ICD-10-CM

## 2018-12-11 NOTE — PROGRESS NOTES
No Show Note/Documentation    Patient: Maame Cortez  Date of Session: 12/11/2018  Diagnosis:   Encounter Diagnosis   Name Primary?    Flaccid dysarthria       MRN: [id number]    Maame Cortez did not attend her  scheduled therapy appointment today. She did not call to cancel nor reschedule. This is the 1st appointment that she has not attended. Next appointment is scheduled for Thursday 12/13 and will follow up with patient at that time. No charges have been posted today.     Letty Pineda M.S. CCC-SLP  Speech Language Pathologist     12/11/2018

## 2018-12-12 ENCOUNTER — CLINICAL SUPPORT (OUTPATIENT)
Dept: SPEECH THERAPY | Facility: HOSPITAL | Age: 38
End: 2018-12-12
Payer: MEDICARE

## 2018-12-12 ENCOUNTER — HOSPITAL ENCOUNTER (OUTPATIENT)
Dept: RADIOLOGY | Facility: HOSPITAL | Age: 38
Discharge: HOME | End: 2018-12-12
Attending: STUDENT IN AN ORGANIZED HEALTH CARE EDUCATION/TRAINING PROGRAM
Payer: MEDICARE

## 2018-12-12 DIAGNOSIS — G93.9 PONTINE LESION: ICD-10-CM

## 2018-12-12 DIAGNOSIS — R13.10 DYSPHAGIA, UNSPECIFIED TYPE: ICD-10-CM

## 2018-12-12 DIAGNOSIS — D89.89 AUTOIMMUNE DISORDER: ICD-10-CM

## 2018-12-12 DIAGNOSIS — R83.9 CSF ABNORMAL: ICD-10-CM

## 2018-12-12 DIAGNOSIS — R13.12 DYSPHAGIA, OROPHARYNGEAL: Primary | ICD-10-CM

## 2018-12-12 PROCEDURE — 92611 MOTION FLUOROSCOPY/SWALLOW: CPT | Mod: GN | Performed by: SPEECH-LANGUAGE PATHOLOGIST

## 2018-12-12 PROCEDURE — G8997 SWALLOW GOAL STATUS: HCPCS | Mod: CI | Performed by: SPEECH-LANGUAGE PATHOLOGIST

## 2018-12-12 PROCEDURE — G8996 SWALLOW CURRENT STATUS: HCPCS | Mod: CI | Performed by: SPEECH-LANGUAGE PATHOLOGIST

## 2018-12-12 PROCEDURE — 74230 X-RAY XM SWLNG FUNCJ C+: CPT | Mod: 26,,, | Performed by: RADIOLOGY

## 2018-12-12 PROCEDURE — 74230 X-RAY XM SWLNG FUNCJ C+: CPT | Mod: TC

## 2018-12-12 PROCEDURE — G8998 SWALLOW D/C STATUS: HCPCS | Mod: CI | Performed by: SPEECH-LANGUAGE PATHOLOGIST

## 2018-12-12 NOTE — PROGRESS NOTES
"Referring provider: Dr. Nathanael Borja  Reason for visit:  Modified barium swallow study (CPT 07296)    Report Summary   --Date: 12/12/2018  --Purpose: to evaluate anatomy and physiology of the oropharyngeal swallow, to determine effectiveness of rehabilitation strategies, and to determine diet consistency and intervention recommendations.   --Diet recommendations: Thin liquids and regular consistency, as tolerated     Subjective / History    Maame Cortez is a 38 y.o. female referred by Dr. Borja for Modified Barium Swallow Study (81469).  She is currently on a regular consistency diet with no restrictions.  Mrs. Cortez was diagnosed with a left basal ganglia stroke in 2015, and reported having another "event" in October of 2018.  She states that since then, she senses aspiration on liquids ~5x/week, but is very careful swallowing since the first time she experienced the sensation of aspirating.  She denies any pattern to when it happens, reports that a straw seems to make it better, and denies any issues with purees, solids or pills.  She also denies any recent pneumonia.    Past Medical History:   Diagnosis Date    Heart palpitations     MTHFR mutation     Stroke 08/2015     Current Outpatient Medications on File Prior to Visit   Medication Sig Dispense Refill    citalopram (CELEXA) 20 MG tablet TAKE 1 TABLET (20 MG TOTAL) BY MOUTH ONCE DAILY. 90 tablet 1    gabapentin (NEURONTIN) 300 MG capsule Take 300 mg by mouth 3 (three) times daily.      nicotine (NICODERM CQ) 14 mg/24 hr Place 1 patch onto the skin once daily.  0    predniSONE (DELTASONE) 10 MG tablet Take 1 tablet (10 mg total) by mouth once daily. 30 tablet 3     No current facility-administered medications on file prior to visit.        Objective   Lateral videofluorographic views were obtained. The radiologist and speech pathologist were present for the entire procedure.     Consistencies assessed / method of administration  Thin liquid/5 " mL  Thin liquid/cup sip  Thin liquid/sequential cup sips  Applesauce/tsp  Pudding/tsp  Cracker  Barium tablet w/ water    Oral phase  - Grossly WNL with adequate/timely lip closure, tongue control during bolus hold, bolus preparation, and bolus transport/lingual motion.  Little to no oral residue.      Pharyngeal phase  - severely delayed initiation with premature spillage to the pyriforms and valleculae on liquids from the side of a cup and from a straw on 5mL, single cup sip and consecutive cup sips, resulting in flash penetration x5 following laryngeal contraction    - adequate soft palate elevation  - reduced laryngeal elevation and anterior hyoid excursion  - adequate tongue base retraction  - complete inversion of epiglottis  - adequate laryngeal vestibular closure  - adequate pharyngeal stripping wave  - No pharyngeal residue   - adequate PE segment opening      Strategies/therapeutic interventions attempted  Small sips and/or with hold in oral cavity prior to initiation of swallow.  Strategies were not effective in decreasing/eliminating risk for delayed initiation/pre-mature spillage to pyriforms and valleculae which resulted in penetration during the study and could result in aspiration (though not observed on this study).     Rosenbek Penetration-Aspiration Scale (Rosenbek et al 1996)  Best Trial: 1. Contrast does not enter airway.   Worst Trial: 2. Contrast enters airway but remains above TVF, no residue.       Assessment / Impressions   The results of this MBSS indicate that patient demonstrates mild swallowing dysfunction c/b characterized by premature spillage of thin liquids to the pyriforms with flash laryngeal penetration on pharyngeal contraction for subsequent swallow.  Strategies to limit premature spillage (small sips, consciously holding a bolus in the oral cavity until initiating the swallow)  were not successful as once she was ready to swallow, Ms. Cortez continued to allow similar volumes  of premature spillage to the pyriforms.  This may indicate a change in sensation in addition to the change in oromotor function she exhibited after her recent neurological event.  Prognosis for improvement of swallowing with therapy is good.      The results of this MBSS indicate that patient demonstrates oropharyngeal swallowing function appropriate for a full PO diet w/o significant restriction.  No aspiration observed.     Recommendations / POC   -Diet: recommend regular consistency and thin liquids, as tolerated   -Safe swallowing strategies: recommend small sips, attempting to hold liquid bolus in oral cavity until initiation of swallow   -Consider incorporating dysphagia therapy with Mrs. Rizo, including strategies to hold liquid bolus in oral cavity before initiation of swallow, and including use of FEES for biofeedback, if available at site.  If FEES are warranted and not available, suggest follow up at main clinic. Also consider incorporating exercises to address decreased hyolaryngeal elevation.  -Contact clinician or referring provider for repeat MBSS if swallowing status changes or worsens      G-Codes for Swallowing  Current status:  - CI  Projected status:   - CI  Discharge status:   -  CI

## 2018-12-14 NOTE — PLAN OF CARE
Assessment / Impressions   The results of this MBSS indicate that patient demonstrates mild swallowing dysfunction c/b characterized by premature spillage of thin liquids to the pyriforms with flash laryngeal penetration on pharyngeal contraction for subsequent swallow.  Strategies to limit premature spillage (small sips, consciously holding a bolus in the oral cavity until initiating the swallow)  were not successful as once she was ready to swallow, Ms. Cortez continued to allow similar volumes of premature spillage to the pyriforms.  This may indicate a change in sensation in addition to the change in oromotor function she exhibited after her recent neurological event.  Prognosis for improvement of swallowing with therapy is good.       The results of this MBSS indicate that patient demonstrates oropharyngeal swallowing function appropriate for a full PO diet w/o significant restriction.  No aspiration observed.      Recommendations / POC   -Diet: recommend regular consistency and thin liquids, as tolerated   -Safe swallowing strategies: recommend small sips, attempting to hold liquid bolus in oral cavity until initiation of swallow   -Consider incorporating dysphagia therapy with Mrs. Rizo, including strategies to hold liquid bolus in oral cavity before initiation of swallow, and including use of FEES for biofeedback, if available at site.  If FEES are warranted and not available, suggest follow up at main clinic. Also consider incorporating exercises to address decreased hyolaryngeal elevation.  -Contact clinician or referring provider for repeat MBSS if swallowing status changes or worsens

## 2018-12-18 ENCOUNTER — CLINICAL SUPPORT (OUTPATIENT)
Dept: REHABILITATION | Facility: HOSPITAL | Age: 38
End: 2018-12-18
Payer: MEDICARE

## 2018-12-18 DIAGNOSIS — Z74.09 IMPAIRED FUNCTIONAL MOBILITY, BALANCE, GAIT, AND ENDURANCE: ICD-10-CM

## 2018-12-18 DIAGNOSIS — M62.81 MUSCLE WEAKNESS OF LOWER EXTREMITY: ICD-10-CM

## 2018-12-18 PROCEDURE — 97110 THERAPEUTIC EXERCISES: CPT | Mod: PO

## 2018-12-18 PROCEDURE — G8979 MOBILITY GOAL STATUS: HCPCS | Mod: CJ,PO

## 2018-12-18 PROCEDURE — 97112 NEUROMUSCULAR REEDUCATION: CPT | Mod: PO

## 2018-12-18 PROCEDURE — G8978 MOBILITY CURRENT STATUS: HCPCS | Mod: CK,PO

## 2018-12-18 NOTE — PROGRESS NOTES
"Name: Maame Cortez  Clinic Number: [id number]  Date of Treatment: 12/18/2018   Diagnosis:   Encounter Diagnoses   Name Primary?    Impaired functional mobility, balance, gait, and endurance     Muscle weakness of lower extremity        Physician: Nathanael Borja MD    Time in: 1104  Time Out: 1158  Total Treatment Time: 54 minutes    1:1 time: 54 minutes  Last G-code/PN: 12/18/18 (ivist 4)  Date of eval: 11/20/18  Visit #: 4/20  Auth expiration: 12/31/18  POC expiration: 1/20/19    Precautions: unknown autoimmune disorder, standard    Subjective     Maame reports no new complaints. She was sick last week but is better now. 20-30% improvement in ability to walk long distances since start of care. Patient reports their pain to be 0/10 on a 0-10 scale with 0 being no pain and 10 being the worst pain imaginable.    Objective     Taken 12/28/18:  SLS: R 22 sec    Maame received therapeutic exercises to develop strength, endurance, ROM, flexibility and posture for 44 minutes including:     Ex's in Bold performed today     Elliptical x 8 min    Upright bike x 8 min level 3--NP  Gastroc stretch on incline board 3 x 30 sec  +SL bridge on R x 20  Pilates box step downs level 4, 4 springs 2 x 10 ( cues eccentric control) (R)LE  Lateral band walk w green TB 40' x 2 laps  Shuttle Leg press (U) on (R) with 1.5 bands 3 x 10 ( cues to avoid knee "lockout")  Standing march x 30 ea  Standing hip abd x 20 ea  Step ups with contralateral hip flex 6 in x 20  Supine HS curl with LE's supported on T-ball  x 30  Multi-directional slider squats (forward, lateral and backward) x 10 with support of ski pole (B)   +Ankle DF w orange TB x 20  +Prone HS curl 2.5# x 20    Patient participated in the following neuromuscular re-education activities to improve Balance and Coordination for 10 minutes:  Bosu ball weight shifts A/P and laterally with light (U) support)  SLS 3 x 30 sec on (R)  SL squat w UE support x 20  +Bird-dog x 10 " ea  Forward and lateral stepping over hurdles x 4 trials ( SBA)  Heel walking 2 x 20 feet    Patient education; Patient educated to continue with previously issued HEP to tolerance:      Pt educated on importance of HEP. Pt demo good understanding of the education provided. Maame demonstrated good return demonstration of activities.     Functional Limitation Report- G-CODE:  CMS Impairment/Limitation/Restriction for FOTO Cerebrovascular Disorders Survey  Status Limitation G-Code CMS Severity Modifier  Intake 56% 44%  Predicted 70% 30% Goal Status+ CJ - At least 20 percent but less than 40 percent  12/18/2018 50% 50% Current Status CK - At least 40 percent but less than 60 percent  +Based on FOTO predicted change score     Assessment     Maame was re-assessed today with 4/5 STGs being met indicating improvements in static balance, tolerance for HEP and LE strengthening, and gait pattern since start of care. She continues with difficulty walking long distances, LE weakness, and impaired gait, balance, endurance, and functional mobility. Pt could benefit from continued physical therapy services to address deficits.     She had good tolerance to treatment today with no adverse effects. Appropriate exercise-induced fatigue achieved by end of session. She continues with R sided hip weakness with tendency for valgus collapse with therapeutic exercises. Pt required manual and verbal cueing. She was challenged with bird-dog emphasizing core weakness and multi-directional strengthening.     Pt will continue to benefit from skilled PT intervention. Medical Necessity is demonstrated by:  Unable to participate fully in daily activities, Requires skilled supervision to complete and progress HEP and Weakness.    Patient is making good progress towards established goals.    GOALS: Short Term Goals:  4 weeks  1. Pt will be able to perform SLS for 10 seconds to indicate improved static balance.- met 12/18/18   2. Pt will be able to  tolerate multi-directional LE strengthening in order to improve ability to perform household chores.- met 12/18/18   3. Pt will report 50% improvement in ability to walk long distances since start of care to indicate improved functional mobility.- in progress   4. Pt will ambulate 100' without foot drop to indicate improved gait pattern.- met 12/18/18    5. Pt to tolerate HEP to improve ROM and independence with ADL's- met 12/18/18      Long Term Goals: 8 weeks  1. Pt will increase DGI score to 22/24 to indicate improved static and dynamic balance.   2. Pt will be able to perform 2 x 10 multi-directional LE strengthening without fatigue in order to improve ability to perform household chores.  3. Pt will report 80% improvement in ability to walk long distances since start of care to indicate improved functional mobility.   4. Pt will ambulate 300' with little to no gait deviations to indicate improved gait pattern.   5. Pt to be Independent with HEP to improve ROM and independence with ADL's    New/Revised goals: none at this time    Plan     Continue with established Plan of Care towards PT goals.

## 2018-12-20 ENCOUNTER — LAB VISIT (OUTPATIENT)
Dept: LAB | Facility: HOSPITAL | Age: 38
End: 2018-12-20
Payer: MEDICARE

## 2018-12-20 ENCOUNTER — OFFICE VISIT (OUTPATIENT)
Dept: RHEUMATOLOGY | Facility: CLINIC | Age: 38
End: 2018-12-20
Payer: MEDICARE

## 2018-12-20 VITALS
BODY MASS INDEX: 22.53 KG/M2 | SYSTOLIC BLOOD PRESSURE: 107 MMHG | HEART RATE: 75 BPM | WEIGHT: 132 LBS | DIASTOLIC BLOOD PRESSURE: 68 MMHG | HEIGHT: 64 IN

## 2018-12-20 DIAGNOSIS — Z11.4 ENCOUNTER FOR SCREENING FOR HIV: ICD-10-CM

## 2018-12-20 DIAGNOSIS — G37.9 DEMYELINATING CHANGES IN BRAIN: Primary | ICD-10-CM

## 2018-12-20 DIAGNOSIS — K12.0 ORAL APHTHOUS ULCER: ICD-10-CM

## 2018-12-20 DIAGNOSIS — G37.9 DEMYELINATING CHANGES IN BRAIN: ICD-10-CM

## 2018-12-20 LAB
C3 SERPL-MCNC: 110 MG/DL
C4 SERPL-MCNC: 39 MG/DL
HBV CORE AB SERPL QL IA: NEGATIVE
HBV SURFACE AB SER-ACNC: POSITIVE M[IU]/ML
HBV SURFACE AG SERPL QL IA: NEGATIVE
HCV AB SERPL QL IA: NEGATIVE
HEPATITIS A ANTIBODY, IGG: NEGATIVE
HIV 1+2 AB+HIV1 P24 AG SERPL QL IA: NEGATIVE

## 2018-12-20 PROCEDURE — 86235 NUCLEAR ANTIGEN ANTIBODY: CPT | Mod: 59

## 2018-12-20 PROCEDURE — 86787 VARICELLA-ZOSTER ANTIBODY: CPT

## 2018-12-20 PROCEDURE — 86778 TOXOPLASMA ANTIBODY IGM: CPT

## 2018-12-20 PROCEDURE — 86147 CARDIOLIPIN ANTIBODY EA IG: CPT

## 2018-12-20 PROCEDURE — 86235 NUCLEAR ANTIGEN ANTIBODY: CPT

## 2018-12-20 PROCEDURE — 86146 BETA-2 GLYCOPROTEIN ANTIBODY: CPT

## 2018-12-20 PROCEDURE — 99999 PR PBB SHADOW E&M-EST. PATIENT-LVL III: CPT | Mod: PBBFAC,,, | Performed by: INTERNAL MEDICINE

## 2018-12-20 PROCEDURE — 82595 ASSAY OF CRYOGLOBULIN: CPT

## 2018-12-20 PROCEDURE — 86706 HEP B SURFACE ANTIBODY: CPT

## 2018-12-20 PROCEDURE — 99214 OFFICE O/P EST MOD 30 MIN: CPT | Mod: S$GLB,,, | Performed by: INTERNAL MEDICINE

## 2018-12-20 PROCEDURE — 86160 COMPLEMENT ANTIGEN: CPT | Mod: 59

## 2018-12-20 PROCEDURE — 86682 HELMINTH ANTIBODY: CPT

## 2018-12-20 PROCEDURE — 86777 TOXOPLASMA ANTIBODY: CPT

## 2018-12-20 PROCEDURE — 85598 HEXAGNAL PHOSPH PLTLT NEUTRL: CPT

## 2018-12-20 PROCEDURE — 86160 COMPLEMENT ANTIGEN: CPT

## 2018-12-20 PROCEDURE — 3008F BODY MASS INDEX DOCD: CPT | Mod: CPTII,S$GLB,, | Performed by: INTERNAL MEDICINE

## 2018-12-20 PROCEDURE — 81372 HLA I TYPING COMPLETE LR: CPT | Mod: PO

## 2018-12-20 PROCEDURE — 86704 HEP B CORE ANTIBODY TOTAL: CPT

## 2018-12-20 PROCEDURE — 83516 IMMUNOASSAY NONANTIBODY: CPT | Mod: 59

## 2018-12-20 PROCEDURE — 87340 HEPATITIS B SURFACE AG IA: CPT

## 2018-12-20 PROCEDURE — 83516 IMMUNOASSAY NONANTIBODY: CPT

## 2018-12-20 PROCEDURE — 85613 RUSSELL VIPER VENOM DILUTED: CPT

## 2018-12-20 PROCEDURE — 86703 HIV-1/HIV-2 1 RESULT ANTBDY: CPT

## 2018-12-20 PROCEDURE — 86255 FLUORESCENT ANTIBODY SCREEN: CPT

## 2018-12-20 PROCEDURE — 86592 SYPHILIS TEST NON-TREP QUAL: CPT

## 2018-12-20 PROCEDURE — 86803 HEPATITIS C AB TEST: CPT

## 2018-12-20 PROCEDURE — 86790 VIRUS ANTIBODY NOS: CPT

## 2018-12-20 RX ORDER — CITALOPRAM 40 MG/1
TABLET, FILM COATED ORAL
Refills: 0 | COMMUNITY
Start: 2018-11-01 | End: 2019-06-15

## 2018-12-20 NOTE — PROGRESS NOTES
Subjective:       Patient ID: Maame Cortez is a 38 y.o. female.    Chief Complaint: No chief complaint on file.    HPI   Ms. Cortez is a 38 year old female with past medical history of MTHFR mutation, stroke in 2015 which recent hospitalization for dysarthria here for hospital follow up for possible neuro-bechets     2015 - patient present with acute onset of R sided weakness including R facial droop.  MRI at that time was concerning for acute ischemic stroke off the R lacune and possible old stroke.  Workup at that time revealed MTHFR mutation (Later reviewed, son has the same mutation).  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  She's been on aggrenox and statin for secondary stroke prevention.   She continues to have residual R (upper and lower) side and R central vision deficits from the stroke.      Oct 22 - patient developed acute onset of severe posterior headache.  Patient thought she was experiencing migraines but nothing was alleviating the symptoms.  She presented to the ED and was discharge home.  She continues to have worsening headaches, associated symptoms include nausea/vomiting, dizziness, and dysarthria. MRI at time showed new infarction of the jensen that appears to be edematous and swollen.  Repeat imaging done on Oct 24 showed evolving diffused edema signal within the pones with ill-defined central enhancement.    LP was performed and showed +WBC (lymphocytic pleocytosis) suggestive of neuro inflammatory disorder.      She was seen by rheumatology as inpatient who did not think she met criteria for Behchets disease but did think she had neuroinflmatory disorder. She was given 1g Solumedrol x3 days and then has been on 10mg prednisone.    She does have aphthous ulcers, painful, used to get them once a month. Has had 3-4 outbreaks this year. Mostly in upper and lower lip. Although she has had them  on buccal mucousa.  Genital ulcers just once in her life. Denies fever, chills, weight changes, night sweats, scarring alopecia,  eye pain/redness, dry eyes, dry mouth,rash, photosensitivity, raynauds, distal hand ulcers chest pain, shortness of breath, abd pain, constipation, diarrhea, bloody diarrhea, joint pain, swelling, tenderness, muscle weakness,  misscarriages, blood clot/lung clots.       Review of Systems   Constitutional: Negative for activity change, appetite change and fever.   HENT: Positive for mouth sores.         Negative hair loss    Eyes: Negative for pain and redness.   Respiratory: Negative for chest tightness and shortness of breath.    Cardiovascular: Negative for chest pain and leg swelling.   Gastrointestinal: Negative for abdominal pain, blood in stool, constipation and diarrhea.   Endocrine: Negative for cold intolerance.   Genitourinary: Negative for dysuria.   Musculoskeletal: Negative for arthralgias, back pain and neck stiffness.   Skin: Negative for rash.   Neurological: Negative for numbness and headaches.         Past Medical History:   Diagnosis Date    Heart palpitations     MTHFR mutation     Stroke 2015       Past Surgical History:   Procedure Laterality Date    BELT ABDOMINOPLASTY          AK TRANSCRAN DOPP INTRACRAN, EMBOLI W/INJ  2015         WISDOM TOOTH EXTRACTION         Family History   Problem Relation Age of Onset    Breast cancer Maternal Grandmother 70    Stroke Maternal Grandmother     Birth defects Maternal Grandmother         Breast Cancer    Birth defects Paternal Grandfather 80        Colon/Prostate Ca?    Cancer Neg Hx     Colon cancer Neg Hx     Diabetes Neg Hx     Eclampsia Neg Hx     Hypertension Neg Hx     Miscarriages / Stillbirths Neg Hx     Ovarian cancer Neg Hx      labor Neg Hx        Social History     Socioeconomic History    Marital status:      Spouse name: None    Number of children: 3    Years of  "education: None    Highest education level: None   Social Needs    Financial resource strain: None    Food insecurity - worry: None    Food insecurity - inability: None    Transportation needs - medical: None    Transportation needs - non-medical: None   Occupational History    None   Tobacco Use    Smoking status: Former Smoker     Packs/day: 0.50     Years: 8.00     Pack years: 4.00     Last attempt to quit: 8/21/2015     Years since quitting: 3.3    Smokeless tobacco: Never Used    Tobacco comment: quit 8/8/2015   Substance and Sexual Activity    Alcohol use: Yes     Alcohol/week: 0.0 oz     Comment: Social    Drug use: No    Sexual activity: Yes     Partners: Male     Birth control/protection: None     Comment:     Other Topics Concern    None   Social History Narrative    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 30 tablet 3     No current facility-administered medications for this visit.        Review of patient's allergies indicates:  No Known Allergies      Objective:   /68   Pulse 75   Ht 5' 4" (1.626 m)   Wt 59.9 kg (132 lb)   BMI 22.66 kg/m²      Physical Exam   Constitutional: She is oriented to person, place, and time and well-developed, well-nourished, and in no distress. No distress.   HENT:   Head: Normocephalic and atraumatic.   Right Ear: External ear normal.   Left Ear: External ear normal.   Nose: Nose normal.   Mouth/Throat: Oropharynx is clear and moist. No oropharyngeal exudate.   Eyes: Conjunctivae and EOM are normal. Pupils are equal, round, and reactive to light. No scleral icterus.   Neck: Normal range of motion. Neck supple.   Cardiovascular: Normal rate, regular rhythm and normal heart sounds.    No " murmur heard.  Pulmonary/Chest: Effort normal and breath sounds normal. No respiratory distress. She has no wheezes.   Abdominal: Soft. Bowel sounds are normal. She exhibits no distension. There is no tenderness.   Lymphadenopathy:     She has no cervical adenopathy.   Neurological: She is alert and oriented to person, place, and time.   RUE 4/5, LUE 5/5   Skin: Skin is warm and dry. No rash noted. She is not diaphoretic.     Musculoskeletal: Normal range of motion.   No large or small joint synovitis            Assessment:       1. Demyelinating changes in brain    2. Oral aphthous ulcer    3. Encounter for screening for HIV             Plan:       Ms. Cortez is a 38 year old female with past medical history of MTHFR mutation, stroke in 2015 with R ischemic optic neuropathy  With  recent hospitalization for dysarthria here for hospital follow up for neuro-inflammatory disorder. Neurology has been closely following patient and think that this is still of unclear etiology although their differential included neuro-behcets and NMO. Appreciate neurologies help in this case. At this time patient does not meet criteria for Behcets, we will further work her up prior to starting steroid sparing agent such as cytoxan.   -HLA B51 pending   -will order APLS labs, c3/c4, SSA/SSB, ANCA/MPO?PR3/cryo  -will need to discuss with neurology about their thoughts about possible brain biopsy for definitive diagnosis.       Patient seen and discussed with Dr. Peter GAVIN in 1 mos     Tyler العلي MD  Rheumatology PGY 4

## 2018-12-21 LAB
CARDIOLIPIN IGG SER IA-ACNC: <9.4 GPL
CARDIOLIPIN IGM SER IA-ACNC: <9.4 MPL
LA PPP-IMP: NEGATIVE
PROTEINASE3 IGG SER-ACNC: <0.2 U
RPR SER QL: NORMAL
STRONGYLOIDES ANTIBODY IGG: NEGATIVE

## 2018-12-22 LAB
VARICELLA INTERPRETATION: POSITIVE
VARICELLA ZOSTER IGG: 4.11 ISR

## 2018-12-24 LAB
ANCA AB TITR SER IF: NORMAL TITER
ANTI-SSA ANTIBODY: 0.29 EU
ANTI-SSA INTERPRETATION: NEGATIVE
ANTI-SSB ANTIBODY: 0 EU
ANTI-SSB INTERPRETATION: NEGATIVE
APTT HEX PL PPP: NEGATIVE S
MYELOPEROXIDASE AB SER-ACNC: 2 UNITS
P-ANCA TITR SER IF: NORMAL TITER
T GONDII IGM SER-ACNC: <3 AU/ML

## 2018-12-26 LAB
T GONDII IGG SER QL IA: NORMAL
T GONDII IGG SERPL IA-ACNC: <5 IU/ML

## 2018-12-27 ENCOUNTER — DOCUMENTATION ONLY (OUTPATIENT)
Dept: REHABILITATION | Facility: HOSPITAL | Age: 38
End: 2018-12-27

## 2018-12-27 LAB
ACID FAST MOD KINY STN SPEC: NORMAL
B2 GLYCOPROT1 IGA SER QL: <9 SAU
B2 GLYCOPROT1 IGG SER QL: <9 SGU
B2 GLYCOPROT1 IGM SER QL: <9 SMU
MYCOBACTERIUM SPEC QL CULT: NORMAL

## 2018-12-27 NOTE — PROGRESS NOTES
No Show Note/Documentation    Patient: Maame Cortez  Date of Session: 12/27/2018  Diagnosis: No diagnosis found.  MRN: [id number]    Maame Cortez did not attend his/her scheduled therapy appointment today. Shedid not call to cancel nor reschedule. This is the 2nd  appointment that she has not attended. Next appointment is scheduled for 1/3/19 and will follow up with patient at that time. No charges have been posted today.     ROBERT Jones., CCC-SLP, CBIS  Speech-Language Pathologist  12/27/2018

## 2018-12-28 ENCOUNTER — INITIAL CONSULT (OUTPATIENT)
Dept: OPHTHALMOLOGY | Facility: CLINIC | Age: 38
End: 2018-12-28
Payer: MEDICARE

## 2018-12-28 VITALS — HEART RATE: 89 BPM | DIASTOLIC BLOOD PRESSURE: 78 MMHG | SYSTOLIC BLOOD PRESSURE: 108 MMHG

## 2018-12-28 DIAGNOSIS — H43.811 VITREOUS DEGENERATION OF RIGHT EYE: Primary | ICD-10-CM

## 2018-12-28 DIAGNOSIS — H47.011 ISCHEMIC OPTIC NEUROPATHY OF RIGHT EYE: ICD-10-CM

## 2018-12-28 PROCEDURE — 92235 FLUORESCEIN ANGRPH MLTIFRAME: CPT | Mod: S$GLB,,, | Performed by: OPHTHALMOLOGY

## 2018-12-28 PROCEDURE — 99999 PR PBB SHADOW E&M-EST. PATIENT-LVL III: CPT | Mod: PBBFAC,,, | Performed by: OPHTHALMOLOGY

## 2018-12-28 PROCEDURE — 92014 COMPRE OPH EXAM EST PT 1/>: CPT | Mod: S$GLB,,, | Performed by: OPHTHALMOLOGY

## 2018-12-28 PROCEDURE — 92134 CPTRZ OPH DX IMG PST SGM RTA: CPT | Mod: S$GLB,,, | Performed by: OPHTHALMOLOGY

## 2018-12-28 PROCEDURE — 92226 PR SPECIAL EYE EXAM, SUBSEQUENT: CPT | Mod: RT,S$GLB,, | Performed by: OPHTHALMOLOGY

## 2018-12-28 NOTE — LETTER
December 28, 2018      Nathanael Borja MD  [address]  [facility] [address]           [facility]hthal[facility]  [address]  [facility] [address]  Phone: [phone]  Fax: [phone]          Patient: Maame Cortez   MR Number: [id number]   YOB: 1980   Date of Visit: 12/28/2018       Dear Dr. Nathanael Borja:    Thank you for referring Maame Cortez to me for evaluation. Attached you will find relevant portions of my assessment and plan of care.    If you have questions, please do not hesitate to call me. I look forward to following Maame Cortez along with you.    Sincerely,    PRATIK Ricketts MD    Enclosure  CC:  No Recipients    If you would like to receive this communication electronically, please contact [email or web address] or [phone] to request more information on [employer] access.    For providers and/or their staff who would like to refer a patient to [facility], please contact us through our one-stop-shop provider referral line, [facility], at [phone].    If you feel you have received this communication in error or would no longer like to receive these types of communications, please e-mail [email or web address]

## 2018-12-28 NOTE — PROGRESS NOTES
HPI     Concern for NMO vs neuro-behcet's per Dr. Esthela Butt. Seen by Dr. Butt 3 weeks ago   DLS 09/24/2015 Dr. Ricketts     Last eye exam 2 years ago and received glasses does not remember what  Wears cls and glasses wearing cls today Denies sleeping in cls no change in va noticed since last eye exam still has central vision loss in OD from before.   Denies pain   (-)Flashes (-)Floaters  (-)Photophobia  (-)Glare    No gtts     1. Neuroinflammatory disorder NOS  Considering Neuro-Behcet's , NMP, sarcoid...    No ocular stimata of any of the above.  Normal FA - no signs of vasculitis or chorioretinitis/uveitis    Prior scotoma OD following CVA  ?Temporal pallor in past - but no ONH thinning or progressiion     No retinal vascular occlusion     No significant thrombophilia  No HTN  TTE NL in past       Retina PRN

## 2018-12-28 NOTE — PATIENT INSTRUCTIONS
Fluorescein Angiography  Fluorescein angiography is an eye test. It is done to look at the back of your eye, including:  · The blood vessels in your eye  · The layer of tissue at the back of your eye (the retina)  · The center of your retina (the macula)  · The optic nerve  This test can diagnose diseases found in these areas. It can also diagnose other conditions that affect these areas. To do this test, a dye called fluorescein is shot (injected) into your arm. The dye goes into your bloodstream and up into the blood vessels in your eyes. A special camera is then used to take images (angiograms) of your eyes.     A fluorescein angiogram of the retina   Getting ready for your test  Tell your healthcare provider if you:  · Are pregnant or think you may be pregnant  · Are breastfeeding  · Have a history of severe allergic reactions, including to X-ray dye or other medicines  · Have kidney problems  Tell your provider about any medicines you are taking. You may need to stop taking all or some of these before the test. This includes:  · All prescription medicines  · Over-the-counter medicines such as aspirin or ibuprofen  · Street drugs  · Herbs, vitamins, and other supplements  You should arrange for an adult family member or friend to drive you home after your test. Your vision will be blurry for up to 12 hours.  Follow any other instructions from your healthcare provider.  During your test  · You are given eye drops to enlarge (dilate) your pupils.  · You then sit in front of a special camera. You place your chin on the chin rest and look into the camera.  · Images are taken of your eyes, one eye at a time.  · Fluorescein dye is then injected into your arm. The lights in the room are turned off. You may have mild nausea. You may have a warm feeling in your arm or upper body. Tell your provider if your skin feels itchy or if you are having trouble breathing. If so, you could be having an allergic reaction to the  dye.  · More pictures of your eyes are taken over 15 to 30 minutes. The camera shines a bright light into your eyes. Try to keep your head still and your eyes open.  · When enough images have been taken, the test is over.  After your test  Your vision will be blurry for up to 4 to 12 hours. This is because of your dilated pupils. Your eye will be more sensitive to light for up to 12 hours. You may want to wear sunglasses during this time. Do not drive if your vision is very blurry. You may also find it uncomfortable to read. Your skin may look yellow for a few hours. This is from the dye. Your urine will be bright yellow or orange for 24 to 48 hours after the test.     Risks and possible complications  All procedures have some risks. Possible risks of fluorescein angiography include:  · Upset stomach (nausea) and vomiting  · Leaking dye around the injection site that causes pain and swelling  · Metallic taste in your mouth  · Infection at injection site  · Allergic reaction to the dye  · Dry mouth or too much saliva  · Faster heart rate  · Sweating  · Lower back pain   © 2646-4387 [employer]. [address] [place] [address]. All rights reserved. This information is not intended as a substitute for professional medical care. Always follow your healthcare professional's instructions.

## 2018-12-31 ENCOUNTER — HOSPITAL ENCOUNTER (OUTPATIENT)
Dept: RADIOLOGY | Facility: HOSPITAL | Age: 38
Discharge: HOME | End: 2018-12-31
Attending: PSYCHIATRY & NEUROLOGY
Payer: MEDICARE

## 2018-12-31 DIAGNOSIS — Z86.69: ICD-10-CM

## 2018-12-31 DIAGNOSIS — G37.9 DEMYELINATING DISEASE OF CENTRAL NERVOUS SYSTEM, UNSPECIFIED: ICD-10-CM

## 2018-12-31 DIAGNOSIS — R90.89 ABNORMAL FINDING ON MRI OF BRAIN: ICD-10-CM

## 2018-12-31 DIAGNOSIS — G93.9 PONTINE LESION: ICD-10-CM

## 2018-12-31 DIAGNOSIS — K13.79 RECURRENT ORAL ULCERS: ICD-10-CM

## 2018-12-31 DIAGNOSIS — K12.1 ORAL ULCER: ICD-10-CM

## 2018-12-31 DIAGNOSIS — N76.6 GENITAL ULCER, FEMALE: ICD-10-CM

## 2018-12-31 LAB — CRYOGLOB SER QL: NORMAL

## 2018-12-31 PROCEDURE — 70553 MRI BRAIN STEM W/O & W/DYE: CPT | Mod: 26,,, | Performed by: RADIOLOGY

## 2018-12-31 PROCEDURE — 70553 MRI BRAIN DEMYELINATING W/ WO CONTRAST: ICD-10-PCS | Mod: 26,,, | Performed by: RADIOLOGY

## 2018-12-31 PROCEDURE — A9585 GADOBUTROL INJECTION: HCPCS | Performed by: PSYCHIATRY & NEUROLOGY

## 2018-12-31 PROCEDURE — 25500020 PHARM REV CODE 255: Performed by: PSYCHIATRY & NEUROLOGY

## 2018-12-31 PROCEDURE — 70553 MRI BRAIN STEM W/O & W/DYE: CPT | Mod: TC

## 2018-12-31 RX ORDER — GADOBUTROL 604.72 MG/ML
6 INJECTION INTRAVENOUS
Status: COMPLETED | OUTPATIENT
Start: 2018-12-31 | End: 2018-12-31

## 2018-12-31 RX ADMIN — GADOBUTROL 6 ML: 604.72 INJECTION INTRAVENOUS at 12:12

## 2019-01-03 ENCOUNTER — DOCUMENTATION ONLY (OUTPATIENT)
Dept: REHABILITATION | Facility: HOSPITAL | Age: 39
End: 2019-01-03

## 2019-01-03 NOTE — PROGRESS NOTES
Patient: Maame Cortez  Date of Session: 1/3/2019  Diagnosis: No diagnosis found.  MRN: [id number]    Maame Cortez no-showed today's appointment; appointment was scheduled for 2:30 pm.  Pt did not call to cancel or reschedule today's appointment. This is the 5th appointment that she did not attend. OT will contact pt in regards to attendance and continuation of therapy services.  No charges have been posted today.

## 2019-01-03 NOTE — PROGRESS NOTES
No Show Note/Documentation    Patient: Maame Cortez  Date of Session: 1/3/2019  Diagnosis: No diagnosis found.  MRN: [id number]    Maame Cortez did not attend her scheduled therapy appointment today. She did not call to cancel nor reschedule. This is the 3rd appointment that she has not attended. Next appointment has not been scheduled with patient at this time. No charges have been posted today.    ROBERT Jones., CCC-SLP, CBIS  Speech-Language Pathologist  1/3/2019

## 2019-01-16 ENCOUNTER — OFFICE VISIT (OUTPATIENT)
Dept: NEUROLOGY | Facility: CLINIC | Age: 39
End: 2019-01-16
Payer: MEDICARE

## 2019-01-16 VITALS
BODY MASS INDEX: 23.06 KG/M2 | HEIGHT: 64 IN | SYSTOLIC BLOOD PRESSURE: 110 MMHG | WEIGHT: 135.06 LBS | DIASTOLIC BLOOD PRESSURE: 74 MMHG

## 2019-01-16 DIAGNOSIS — Z71.89 COUNSELING REGARDING GOALS OF CARE: ICD-10-CM

## 2019-01-16 DIAGNOSIS — G93.9 PONTINE LESION: ICD-10-CM

## 2019-01-16 DIAGNOSIS — Z86.69: ICD-10-CM

## 2019-01-16 DIAGNOSIS — R83.9 CSF ABNORMAL: ICD-10-CM

## 2019-01-16 DIAGNOSIS — M35.2 NEUROLOGIC TYPE BEHCET'S SYNDROME: ICD-10-CM

## 2019-01-16 DIAGNOSIS — R90.89 ABNORMAL FINDING ON MRI OF BRAIN: Primary | ICD-10-CM

## 2019-01-16 PROCEDURE — 3008F BODY MASS INDEX DOCD: CPT | Mod: CPTII,S$GLB,, | Performed by: PSYCHIATRY & NEUROLOGY

## 2019-01-16 PROCEDURE — 99999 PR PBB SHADOW E&M-EST. PATIENT-LVL III: ICD-10-PCS | Mod: PBBFAC,,, | Performed by: PSYCHIATRY & NEUROLOGY

## 2019-01-16 PROCEDURE — 99214 OFFICE O/P EST MOD 30 MIN: CPT | Mod: S$GLB,,, | Performed by: PSYCHIATRY & NEUROLOGY

## 2019-01-16 PROCEDURE — 3008F PR BODY MASS INDEX (BMI) DOCUMENTED: ICD-10-PCS | Mod: CPTII,S$GLB,, | Performed by: PSYCHIATRY & NEUROLOGY

## 2019-01-16 PROCEDURE — 99999 PR PBB SHADOW E&M-EST. PATIENT-LVL III: CPT | Mod: PBBFAC,,, | Performed by: PSYCHIATRY & NEUROLOGY

## 2019-01-16 PROCEDURE — 99214 PR OFFICE/OUTPT VISIT, EST, LEVL IV, 30-39 MIN: ICD-10-PCS | Mod: S$GLB,,, | Performed by: PSYCHIATRY & NEUROLOGY

## 2019-01-16 NOTE — PROGRESS NOTES
"Subjective:       Patient ID: Maame Cortez is a 38 y.o. female who presents today for routine clinic visit for a neuro-inflammatory disorder.  She is accompanied by her mother.  She's here to review MRI. Has seen rheumatology;    Taking prednisone 10mg/day  Feels the dysarthria is improved.     SOCIAL HISTORY  Social History     Tobacco Use    Smoking status: Former Smoker     Packs/day: 0.50     Years: 8.00     Pack years: 4.00     Last attempt to quit: 8/21/2015     Years since quitting: 3.4    Smokeless tobacco: Never Used    Tobacco comment: quit 8/8/2015   Substance Use Topics    Alcohol use: Yes     Alcohol/week: 0.0 oz     Comment: Social    Drug use: No     Living arrangements - the patient lives with their spouse, and kids            Imaging:   CXR  No evidence of prior sarcoidosis    Diagnostic Results:     Brain Imaging   12/31/2018--significant improvement in appearance of pontine inflammatory lesions     Pathology  10/25/18 CSF Cytology: Per report,  "-Negative for malignant cells.  -Mild mononuclear pleocytosis including lymphocytes and monocytes."      Labs:       Positive/Abnormal Labs  CRP (10.7; normal range 0-8.2)  CSF WBC: lymphocytic pleocytosis (101, 107)    Negative/normal labs  HIV  VDRL (CSF)  RPR (serum)  IL-2 Receptor  ACE (serum and CSF)  MS profile/OCBs  CSF glucose, protein  M Tuberculosis DNA (CSF)  HSV, WNV, VZV, Enterovirus (CSF)  Flow cytometry (CSF)  SPEP  UPEP  Immunoglobulins  ESR  Acute hepatitis Panel  Lyme Ab  HLA phenotype negative for B51    Labs I  NMO --negative  Quantiferon gold negative      Lab Visit on 12/20/2018   Component Date Value    NIL 12/20/2018 0.030     TB1 - Nil 12/20/2018 0.080     TB2 - Nil 12/20/2018 0.240     Mitogen - Nil 12/20/2018 >10.000     TB Gold Plus 12/20/2018 Negative    Lab Visit on 12/20/2018   Component Date Value    HIV 1/2 Ag/Ab 12/20/2018 Negative     Hepatitis B Surface Ag 12/20/2018 Negative     Hep B Core Total Ab " 12/20/2018 Negative     Hep B S Ab 12/20/2018 Positive*    Hepatitis C Ab 12/20/2018 Negative     Hepatitis A Antibody IgG 12/20/2018 Negative     Strongyloides Ab IgG 12/20/2018 Negative     RPR 12/20/2018 Non-reactive     Varicella IgG 12/20/2018 4.11*    Varicella Interpretation 12/20/2018 Positive*    Complement (C-3) 12/20/2018 110     Complement (C-4) 12/20/2018 39     Anti-SSA Antibody 12/20/2018 0.29     Anti-SSA Interpretation 12/20/2018 Negative     Anti-SSB Antibody 12/20/2018 0.00     Anti-SSB Interpretation 12/20/2018 Negative     Cytoplasmic Neutrophilic* 12/20/2018 <1:20     Perinuclear (P-ANCA) 12/20/2018 <1:20     MPO 12/20/2018 2     ANCA Proteinase 3 12/20/2018 <0.2     Cryoglobulin, Qualitative 12/20/2018 Absent     DRVVT, Lupus Anticoagula* 12/20/2018 Negative     Beta-2 Glyco 1 IgG 12/20/2018 <9     Beta-2 Glyco 1 IgM 12/20/2018 <9     Beta-2 Glyco 1 IgA 12/20/2018 <9     APA Isotype IgG 12/20/2018 <9.40     APA Isotype IgM 12/20/2018 <9.40     Hex Phosph Neut Test 12/20/2018 Negative     Toxoplasma IgM 12/20/2018 <3.0     Toxoplasma gondii IGG 12/20/2018 <5.0     Toxoplasma IGG Interpret* 12/20/2018 Non-reactive    Lab Visit on 12/10/2018   Component Date Value    MOG-IgG1 12/10/2018 Negative    Lab Visit on 12/06/2018   Component Date Value    NIL 12/06/2018 0.040     TB1 - Nil 12/06/2018 0.060     TB2 - Nil 12/06/2018 0.120     Mitogen - Nil 12/06/2018 9.720     TB Gold Plus 12/06/2018 Negative          Diagnosis/Assessment/Plan:    1. Relapsing Neuroinflammatory Disorder    Autoimmune disorder with a relapsing/remitting course based on history so far.  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  This is not MS.    · continue prednisone 10 mg  Qdaily  · Would consider anti-B cell therapy based on case literature listed below; she will f/u with rheumatology next week, and then I'll see her again to make final decision on DMT      J Clin Neurosci. 2016 Oct;32:139-41. doi: 10.1016/j.jocn.2016.03.020. Epub 2016 Jun 16.  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  Mai J1, Jamar K2, Carisa M1, Clark Z1, White D3.    Int J Rheum Dis. 2010 Aug;13(3):246-52. doi: 10.1111/[email or web address].x.  Rituximab in intractable ocular lesions of Behcet's disease; randomized single-blind control study ( study).Jenae MARISCAL, Jarret H, Mamadou M, Abelino B, Abdelrahman F, Es A, Lorena C, Rex M, Scot T, Sha N.      Over 50% of this 30 minute visit was spent in direct face to face counseling of the patient about neuroinflammatory disorder, DMT considerations, and symptom management.     Problem List Items Addressed This Visit        Unprioritized    Pontine lesion    CSF abnormal      Other Visit Diagnoses     Abnormal finding on MRI of brain    -  Primary    Neurologic type Behcet's syndrome        History of sudden visual loss        Counseling regarding goals of care

## 2019-01-22 PROBLEM — M35.2: Status: ACTIVE | Noted: 2019-01-22

## 2019-01-22 PROBLEM — Z86.69 HISTORY OF SUDDEN VISUAL LOSS: Status: ACTIVE | Noted: 2019-01-22

## 2019-01-22 PROBLEM — R90.89 ABNORMAL FINDING ON MRI OF BRAIN: Status: ACTIVE | Noted: 2019-01-22

## 2019-01-24 ENCOUNTER — OFFICE VISIT (OUTPATIENT)
Dept: RHEUMATOLOGY | Facility: CLINIC | Age: 39
End: 2019-01-24
Payer: MEDICARE

## 2019-01-24 VITALS
SYSTOLIC BLOOD PRESSURE: 114 MMHG | HEIGHT: 64 IN | WEIGHT: 135 LBS | HEART RATE: 86 BPM | DIASTOLIC BLOOD PRESSURE: 76 MMHG | BODY MASS INDEX: 23.05 KG/M2

## 2019-01-24 DIAGNOSIS — G37.9 DEMYELINATING CHANGES IN BRAIN: Primary | ICD-10-CM

## 2019-01-24 DIAGNOSIS — K12.0 ORAL APHTHOUS ULCER: ICD-10-CM

## 2019-01-24 PROCEDURE — 99214 OFFICE O/P EST MOD 30 MIN: CPT | Mod: GC,S$GLB,, | Performed by: STUDENT IN AN ORGANIZED HEALTH CARE EDUCATION/TRAINING PROGRAM

## 2019-01-24 PROCEDURE — 99214 PR OFFICE/OUTPT VISIT, EST, LEVL IV, 30-39 MIN: ICD-10-PCS | Mod: GC,S$GLB,, | Performed by: STUDENT IN AN ORGANIZED HEALTH CARE EDUCATION/TRAINING PROGRAM

## 2019-01-24 PROCEDURE — 99999 PR PBB SHADOW E&M-EST. PATIENT-LVL III: ICD-10-PCS | Mod: PBBFAC,GC,, | Performed by: STUDENT IN AN ORGANIZED HEALTH CARE EDUCATION/TRAINING PROGRAM

## 2019-01-24 PROCEDURE — 3008F BODY MASS INDEX DOCD: CPT | Mod: CPTII,GC,S$GLB, | Performed by: STUDENT IN AN ORGANIZED HEALTH CARE EDUCATION/TRAINING PROGRAM

## 2019-01-24 PROCEDURE — 3008F PR BODY MASS INDEX (BMI) DOCUMENTED: ICD-10-PCS | Mod: CPTII,GC,S$GLB, | Performed by: STUDENT IN AN ORGANIZED HEALTH CARE EDUCATION/TRAINING PROGRAM

## 2019-01-24 PROCEDURE — 99999 PR PBB SHADOW E&M-EST. PATIENT-LVL III: CPT | Mod: PBBFAC,GC,, | Performed by: STUDENT IN AN ORGANIZED HEALTH CARE EDUCATION/TRAINING PROGRAM

## 2019-01-24 NOTE — PROGRESS NOTES
Subjective:       Patient ID: Maame Cortez is a 38 y.o. female.    Chief Complaint: No chief complaint on file.  Follow up on behchets     HPI   Ms. Cortez is a 38 year old female with past medical history of MTHFR mutation, stroke in 2015 which recent hospitalization for dysarthria here for hospital follow up for possible neuro-bechets     Initial history:    2015 - patient present with acute onset of R sided weakness including R facial droop.  MRI at that time was concerning for acute ischemic stroke off the R lacune and possible old stroke.  Workup at that time revealed MTHFR mutation (Later reviewed, son has the same mutation).  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  She's been on aggrenox and statin for secondary stroke prevention.   She continues to have residual R (upper and lower) side and R central vision deficits from the stroke.      Oct 22 - patient developed acute onset of severe posterior headache.  Patient thought she was experiencing migraines but nothing was alleviating the symptoms.  She presented to the ED and was discharge home.  She continues to have worsening headaches, associated symptoms include nausea/vomiting, dizziness, and dysarthria. MRI at time showed new infarction of the jensen that appears to be edematous and swollen.  Repeat imaging done on Oct 24 showed evolving diffused edema signal within the pones with ill-defined central enhancement.    LP was performed and showed +WBC (lymphocytic pleocytosis) suggestive of neuro inflammatory disorder.      She was seen by rheumatology as inpatient who did not think she met criteria for Behchets disease but did think she had neuroinflmatory disorder. She was given 1g Solumedrol x3 days and then has been on 10mg prednisone.    She does have aphthous ulcers, painful, used to get them once a month. Has had 3-4 outbreaks this year. Mostly in  upper and lower lip. Although she has had them on buccal mucousa.  Genital ulcers just once in her life. Denies fever, chills, weight changes, night sweats, scarring alopecia,  eye pain/redness, dry eyes, dry mouth,rash, photosensitivity, raynauds, distal hand ulcers chest pain, shortness of breath, abd pain, constipation, diarrhea, bloody diarrhea, joint pain, swelling, tenderness, muscle weakness,  misscarriages, blood clot/lung clots.     Interval history:  Stopped PT/OT and speech therapy in December. Denies any headaches. Speech is about the same as well as strength. No mouth ulcers, or genital ulcers. Currently on Prednisone 10mg daily. No fevers, rashes, raynaud's, photosensitivity, joint pain.       Review of Systems   Constitutional: Negative for activity change, appetite change and fever.   HENT: Negative for mouth sores.         Negative hair loss    Eyes: Negative for pain and redness.   Respiratory: Negative for chest tightness and shortness of breath.    Cardiovascular: Negative for chest pain and leg swelling.   Gastrointestinal: Negative for abdominal pain, blood in stool, constipation and diarrhea.   Endocrine: Negative for cold intolerance.   Genitourinary: Negative for dysuria.   Musculoskeletal: Negative for arthralgias, back pain and neck stiffness.   Skin: Negative for rash.   Neurological: Negative for numbness and headaches.         Past Medical History:   Diagnosis Date    Heart palpitations     MTHFR mutation     Stroke 08/2015       Past Surgical History:   Procedure Laterality Date    BELT ABDOMINOPLASTY      2001    AK TRANSCRAN DOPP INTRACRAN, EMBOLI W/INJ  8/11/2015         WISDOM TOOTH EXTRACTION         Family History   Problem Relation Age of Onset    Breast cancer Maternal Grandmother 70    Stroke Maternal Grandmother     Birth defects Maternal Grandmother         Breast Cancer    Birth defects Paternal Grandfather 80        Colon/Prostate Ca?    Cancer Neg Hx     Colon  "cancer Neg Hx     Diabetes Neg Hx     Eclampsia Neg Hx     Hypertension Neg Hx     Miscarriages / Stillbirths Neg Hx     Ovarian cancer Neg Hx      labor Neg Hx        Social History     Socioeconomic History    Marital status:      Spouse name: None    Number of children: 3    Years of education: None    Highest education level: None   Social Needs    Financial resource strain: None    Food insecurity - worry: None    Food insecurity - inability: None    Transportation needs - medical: None    Transportation needs - non-medical: None   Occupational History    None   Tobacco Use    Smoking status: Former Smoker     Packs/day: 0.50     Years: 8.00     Pack years: 4.00     Last attempt to quit: 2015     Years since quitting: 3.4    Smokeless tobacco: Never Used    Tobacco comment: quit 2015   Substance and Sexual Activity    Alcohol use: Yes     Alcohol/week: 0.0 oz     Comment: Social    Drug use: No    Sexual activity: Yes     Partners: Male     Birth control/protection: None     Comment:     Other Topics Concern    None   Social History Narrative    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 30 tablet 3     No current facility-administered medications for this visit.        Review of patient's allergies indicates:  No Known Allergies      Objective:   /76   Pulse 86   Ht 5' 4" (1.626 m)   Wt 61.2 kg (135 lb)   BMI 23.17 kg/m²      Physical Exam   Constitutional: She is oriented to person, place, and time and well-developed, well-nourished, and in no distress. No distress.   HENT:   Head: Normocephalic and atraumatic.   Right Ear: External ear normal.   Left Ear: External ear normal.   Nose: Nose " normal.   Mouth/Throat: Oropharynx is clear and moist. No oropharyngeal exudate.   Eyes: Conjunctivae and EOM are normal. Pupils are equal, round, and reactive to light. No scleral icterus.   Neck: Normal range of motion. Neck supple.   Cardiovascular: Normal rate, regular rhythm and normal heart sounds.    No murmur heard.  Pulmonary/Chest: Effort normal and breath sounds normal. No respiratory distress. She has no wheezes.   Abdominal: Soft. Bowel sounds are normal. She exhibits no distension. There is no tenderness.   Lymphadenopathy:     She has no cervical adenopathy.   Neurological: She is alert and oriented to person, place, and time.   RUE 4/5, LUE 5/5  Degreased  in right hand    Skin: Skin is warm and dry. No rash noted. She is not diaphoretic.     Musculoskeletal: Normal range of motion.   No large or small joint synovitis            10/2018: MRI thoracic: normal   10/2018: MRI cervical thoracic:1. Persistent edema signal identified at the level of the jensen with ill-defined central enhancement, similar when compared to the recent brain MRI. Normal MRI evaluation of the cervical spinal cord specifically without signal abnormality or pathologic enhancement.  10/2018: MRI brain:Evolving diffuse edema signal within the jensen with ill-defined central enhancement.  Configuration nonspecific with differential to include vasculopathy with region of active demyelination or alternative inflammatory/infectious process not excluded.No evidence for remote hemorrhagic infarction in the left corona radiata extending along the cortical spinal tract with small area of additional encephalomalacia along the right inferior basal ganglia suggestive for additional prior infarction.  10/2018 CTA head and neck:CTA head and neck: No significant stenosis or proximal occlusion throughout the head or neck..     12/2018 MRI brain:Continue though significantly reduced edema signal and enhancement within the central jensen.  No  "evidence for new signal abnormality or enhancement. Michael is superimposed with moderate sized region of encephalomalacia and prior hemorrhage within the left corona radiata extending to the cerebral peduncle.while this remains nonspecific differential consideration to include underlying vasculopathy No evidence for new lesion or increased enhancing lesion to suggest disease worsening.  Clinical correlation and continued follow-up advised.       Assessment:       1. Demyelinating changes in brain    2. Oral aphthous ulcer            Plan:       Ms. Cortez is a 38 year old female with past medical history of MTHFR mutation, stroke in 2015 with R ischemic optic neuropathy (now thought to be demyelinating disease) with hospitalization in Oct 2018 for dysarthria here for follow up for neuro-inflammatory disorder. Patient has been closely followed by neurology and due to her relapsing remitting course, characteristic brain stem lesions (David), frequent oral ulcer in past they believe patient has Neuro Behcets. According to Radha et al 2013, patient has probable neurobehcets disease due to her having " Neurological syndrome as in definite NBD, with systemic BD features but not satisfying ISG criteria." There have been no RCT for the management of NBD. Radha et al 2013 mentions treatment with Azathioprine, cellcept, MTX, cyclophosphamide, and Infliximab. Cynthia et al 2018 shows that "acute attacks of parenchymal involvement should be treated with high dose glucocorticoids followed by slow tapering, together with immunosuppressive therapy such has azathioprine. Cyclosporine should be avoided. Monoclonal anti TNF antibodies should be considered in severe diseases or in refractory patients." Duncan et al 2018 quoted a retrospective study in which patients who used cyclophosphamide had a trend for a lower relapse rate compared with azathioprine during the first year. In a case report written by Gordo " cyclophosphamide 1g x6 sessions per month and 2 additional trimonthly pulses with complete neurological recovery after 12 mos of follow up. There is also a study which shows symptomatic as well as radiographic improvement with patients with neuro behcet's resistant to steroids and cyclophosphamide with infliximab therapy.     Past treatment:  -received IV solumedrol x3 days in Oct 2018   -was weaned off steroids and is currently on Prednisone 10mg daily     Prior workup:  -LP showing CSF with wbx 107 (65% lymphocytes), protein 40, glucose 46  -ROB negative, SSA/SSB negative, normal c3/c4, normal immunoglobulins, SPEP Decreased total protein. No paraprotein bands are detected  -HLA B51 negative, negative APLS labs, negative ANCA/MPO/PR3/cryo  -QTB negative, toxoplasma, Hep A, C negative, HIV negative, strongyloides and RPR negative, Hep B surface antibody positive, VZV igG positive  -Imaging :10/2018: MRI thoracic: normal   10/2018: MRI cervical thoracic:1. Persistent edema signal identified at the level of the jensen with ill-defined central enhancement, similar when compared to the recent brain MRI. Normal MRI evaluation of the cervical spinal cord specifically without signal abnormality or pathologic enhancement.  10/2018: MRI brain:Evolving diffuse edema signal within the jensen with ill-defined central enhancement.  Configuration nonspecific with differential to include vasculopathy with region of active demyelination or alternative inflammatory/infectious process not excluded.No evidence for remote hemorrhagic infarction in the left corona radiata extending along the cortical spinal tract with small area of additional encephalomalacia along the right inferior basal ganglia suggestive for additional prior infarction.  10/2018 CTA head and neck:CTA head and neck: No significant stenosis or proximal occlusion throughout the head or neck..   12/2018 MRI brain:Continue though significantly reduced edema signal and  "enhancement within the central jensen.  No evidence for new signal abnormality or enhancement. Michael is superimposed with moderate sized region of encephalomalacia and prior hemorrhage within the left corona radiata extending to the cerebral peduncle.while this remains nonspecific differential consideration to include underlying vasculopathy No evidence for new lesion or increased enhancing lesion to suggest disease worsening.  Clinical correlation and continued follow-up advised.    Further plan:  Based on our literature review we feel that patient is young female with 2 episodes of neuro demyelination. Given this we would like to further prevent any episodes and recommend using Cytoxan 1g monthly for 3 mos. Patient has 3 kids and is not wanting any more. We would then transition to Azathioprine after completion of 3 rounds of cytoxan. If for some reason there is progression or relapse on this regimen we would consider Infliximab. We would recommend vaccinations prior to start of cytoxan.   Plan was further discussed with patient and her mother. Patient does not want vaccinations (Her son has autism and she is against vaccinations). She also would like to further do some reading on cyclophosphamide prior to her decision. She is scheduled to see Neurology Jan 30th. We will see her back on Jan 31st. All of her and her mothers questions were addressed. Handouts on cytoxan and azathioprine given.   -if patient agrees to plan will need to order TPMT    References:  Lizett Garcia et al. Diagnosis and management of Neuro-Behçet's disease: international consensus recommendations Journal of neurology vol. 261,9 (2013): 1662-76.    Shweta Marie et al."2018 update of the EULAR recommendations for the management of Behçet's syndrome" [place] [facility].  2018 Kaleb;77(6):808-818.    Oren Song et al. "Management of major organ involvement of Behçet's syndrome: a systematic review for update of the EULAR recommendations." " "Rheumatology ([place]). 2018 Dec 1;57(12):3813-8884.    Mary HAQ et al. "Rapid, Complete and Sustained Response to Corticosteroids and Pulse Cyclophosphamide Therapy in a Patient with Behçet and Central Nervous Disease." [place] ([place]). 2012 Dec; 7(4): 348-351.    Yumiko Corea et al. "Efficacy of Anti-TNF? in Severe and Refractory Neuro-Behcet Disease: An Observational Study." Medicine ([place]). 2016 Kaleb;95(23):e3557.      Patient seen and discussed with Dr. Green    RTJONY in 1 week     Tyler العلي MD  Rheumatology PGY 4     "

## 2019-01-30 ENCOUNTER — OFFICE VISIT (OUTPATIENT)
Dept: NEUROLOGY | Facility: CLINIC | Age: 39
End: 2019-01-30
Payer: MEDICARE

## 2019-01-30 VITALS
DIASTOLIC BLOOD PRESSURE: 83 MMHG | HEART RATE: 102 BPM | BODY MASS INDEX: 23.45 KG/M2 | SYSTOLIC BLOOD PRESSURE: 115 MMHG | WEIGHT: 137.38 LBS | HEIGHT: 64 IN

## 2019-01-30 DIAGNOSIS — Z71.89 COUNSELING REGARDING GOALS OF CARE: ICD-10-CM

## 2019-01-30 DIAGNOSIS — M35.2 BEHCET'S SYNDROME, NEUROLOGIC TYPE: Primary | ICD-10-CM

## 2019-01-30 DIAGNOSIS — D84.9 IMMUNOSUPPRESSION: ICD-10-CM

## 2019-01-30 PROCEDURE — 99215 PR OFFICE/OUTPT VISIT, EST, LEVL V, 40-54 MIN: ICD-10-PCS | Mod: S$GLB,,, | Performed by: PSYCHIATRY & NEUROLOGY

## 2019-01-30 PROCEDURE — 3008F BODY MASS INDEX DOCD: CPT | Mod: CPTII,S$GLB,, | Performed by: PSYCHIATRY & NEUROLOGY

## 2019-01-30 PROCEDURE — 99215 OFFICE O/P EST HI 40 MIN: CPT | Mod: S$GLB,,, | Performed by: PSYCHIATRY & NEUROLOGY

## 2019-01-30 PROCEDURE — 3008F PR BODY MASS INDEX (BMI) DOCUMENTED: ICD-10-PCS | Mod: CPTII,S$GLB,, | Performed by: PSYCHIATRY & NEUROLOGY

## 2019-01-30 PROCEDURE — 99999 PR PBB SHADOW E&M-EST. PATIENT-LVL III: ICD-10-PCS | Mod: PBBFAC,,, | Performed by: PSYCHIATRY & NEUROLOGY

## 2019-01-30 PROCEDURE — 99999 PR PBB SHADOW E&M-EST. PATIENT-LVL III: CPT | Mod: PBBFAC,,, | Performed by: PSYCHIATRY & NEUROLOGY

## 2019-01-30 NOTE — PROGRESS NOTES
Subjective:       Patient ID: Maame Cortez is a 38 y.o. female who presents today for routine clinic visit for Neuro-Behcet's.  She is accompanied by her mother.  She saw rheumatology last week who recommended pulse Cytoxan x 3 mo followed by daily Imuran.  It was recommended to her to get vaccinations from ID clinic prior to this, but pt is reluctant to do this b/c she fears side effects of vaccines.  She has a son with autism, and feels he regressed after his 2 year vaccines.      She is feeling neurologically stable, and feels the dysarthria continues to be stable;  She is open to the recommendations of rheumatology, but seeks my advice.  She wonders whether she should wait until summer when her children are out of school and and less likely to be sick and infect her.     SOCIAL HISTORY  Social History     Tobacco Use    Smoking status: Former Smoker     Packs/day: 0.50     Years: 8.00     Pack years: 4.00     Last attempt to quit: 8/21/2015     Years since quitting: 3.4    Smokeless tobacco: Never Used    Tobacco comment: quit 8/8/2015   Substance Use Topics    Alcohol use: Yes     Alcohol/week: 0.0 oz     Comment: Social    Drug use: No     Living arrangements - the patient lives with their spouse, and kids            Lab Visit on 12/20/2018   Component Date Value    NIL 12/20/2018 0.030     TB1 - Nil 12/20/2018 0.080     TB2 - Nil 12/20/2018 0.240     Mitogen - Nil 12/20/2018 >10.000     TB Gold Plus 12/20/2018 Negative    Lab Visit on 12/20/2018   Component Date Value    HIV 1/2 Ag/Ab 12/20/2018 Negative     Hepatitis B Surface Ag 12/20/2018 Negative     Hep B Core Total Ab 12/20/2018 Negative     Hep B S Ab 12/20/2018 Positive*    Hepatitis C Ab 12/20/2018 Negative     Hepatitis A Antibody IgG 12/20/2018 Negative     Strongyloides Ab IgG 12/20/2018 Negative     RPR 12/20/2018 Non-reactive     Varicella IgG 12/20/2018 4.11*    Varicella Interpretation 12/20/2018 Positive*     Complement (C-3) 12/20/2018 110     Complement (C-4) 12/20/2018 39     Anti-SSA Antibody 12/20/2018 0.29     Anti-SSA Interpretation 12/20/2018 Negative     Anti-SSB Antibody 12/20/2018 0.00     Anti-SSB Interpretation 12/20/2018 Negative     Cytoplasmic Neutrophilic* 12/20/2018 <1:20     Perinuclear (P-ANCA) 12/20/2018 <1:20     MPO 12/20/2018 2     ANCA Proteinase 3 12/20/2018 <0.2     Cryoglobulin, Qualitative 12/20/2018 Absent     DRVVT, Lupus Anticoagula* 12/20/2018 Negative     Beta-2 Glyco 1 IgG 12/20/2018 <9     Beta-2 Glyco 1 IgM 12/20/2018 <9     Beta-2 Glyco 1 IgA 12/20/2018 <9     APA Isotype IgG 12/20/2018 <9.40     APA Isotype IgM 12/20/2018 <9.40     Hex Phosph Neut Test 12/20/2018 Negative     Toxoplasma IgM 12/20/2018 <3.0     Toxoplasma gondii IGG 12/20/2018 <5.0     Toxoplasma IGG Interpret* 12/20/2018 Non-reactive          Diagnosis/Assessment/Plan:    1. Neurological Behcet's : I agree with rheumatology recommendations for immunotherapy.  Given severity of episode last October, I feel that 3 months of pulse Cytoxan is indicated, and that daily Imuran is reasonable;  As for the vaccination issue, as long as the patient understands that she is at greater risk of infection by opting not to receive vaccines, I feel it is okay to proceed with recommended immunotherapy.  All questions answered today;   Will plan to repeat MRI brain 6 mo after start of immunotherapy;     F/u with me in 3 months;      Over 50% of this 40 minute visit was spent in direct face to face counseling of the patient about Neuro-behcet's, DMT considerations, and symptom management.     Problem List Items Addressed This Visit        1 - High    Behcet's syndrome, neurologic type - Primary      Other Visit Diagnoses     Counseling regarding goals of care        Immunosuppression

## 2019-01-31 ENCOUNTER — TELEPHONE (OUTPATIENT)
Dept: RHEUMATOLOGY | Facility: CLINIC | Age: 39
End: 2019-01-31

## 2019-01-31 NOTE — TELEPHONE ENCOUNTER
Called patient. Talked to patients mother. She states that patient had to cancel her appt today. She will call to reschedule. Patients mom had a few questions about cytoxan which were addressed.

## 2019-02-25 ENCOUNTER — DOCUMENTATION ONLY (OUTPATIENT)
Dept: REHABILITATION | Facility: HOSPITAL | Age: 39
End: 2019-02-25

## 2019-02-25 PROBLEM — R13.10 DYSPHAGIA: Status: RESOLVED | Noted: 2018-11-19 | Resolved: 2019-02-25

## 2019-02-25 PROBLEM — R47.1 FLACCID DYSARTHRIA: Status: RESOLVED | Noted: 2018-11-19 | Resolved: 2019-02-25

## 2019-02-25 NOTE — PROGRESS NOTES
Outpatient Therapy Discharge Summary     Name: Maame Cortez  Clinic Number: [id number]    Therapy Diagnosis: Dysarthria, Dysphagia  Physician: Dr. Esthela Butt/Dr. Nathanael Borja    Physician Orders: ST evaluate and treat  Medical Diagnosis: Pontine lesion, autoimmune disorder  Evaluation Date: 11/16/18      Date of Last visit: 12/6/18  Total Visits Received: 5/20  Cancelled Visits: 1  No Show Visits: 2    Assessment    Goals: Based on the last therapy session  1. Pt will use motor speech strategies in speech hierarchy tasks w/ 90% acc ind'ly. Goal met x 1  2. Pt will participate in MBSS to further assess her swallowing. Goal met  3. Pt will tolerate a regular consistency diet with thin liquids w/ no reported difficulty. Goal met x 1  4. Pt will participate in an assessment of her cognitive-linguistic skills. Goal met x 1   5. Pt will complete thought organization tasks w/ 90% accuracy ind'ly.Goal met x 1  6. Pt will complete mental manipulation tasks w/ 90% accuracy ind'ly. Goal met x 1    Discharge reason: Patient has not attended therapy since 12/6/18.    Plan   This patient is discharged from Speech Therapy    ROBERT Jones., CCC-SLP, CBIS  Speech-Language Pathologist

## 2019-03-15 ENCOUNTER — OFFICE VISIT (OUTPATIENT)
Dept: INTERNAL MEDICINE | Facility: CLINIC | Age: 39
End: 2019-03-15
Payer: MEDICARE

## 2019-03-15 ENCOUNTER — LAB VISIT (OUTPATIENT)
Dept: LAB | Facility: HOSPITAL | Age: 39
End: 2019-03-15
Attending: INTERNAL MEDICINE
Payer: MEDICARE

## 2019-03-15 VITALS
DIASTOLIC BLOOD PRESSURE: 70 MMHG | OXYGEN SATURATION: 99 % | SYSTOLIC BLOOD PRESSURE: 102 MMHG | WEIGHT: 133.19 LBS | TEMPERATURE: 98 F | HEIGHT: 64 IN | BODY MASS INDEX: 22.74 KG/M2 | HEART RATE: 88 BPM

## 2019-03-15 DIAGNOSIS — Z12.39 BREAST CANCER SCREENING: Primary | ICD-10-CM

## 2019-03-15 DIAGNOSIS — E04.2 MULTIPLE THYROID NODULES: ICD-10-CM

## 2019-03-15 DIAGNOSIS — M35.2 BEHCET'S SYNDROME, NEUROLOGIC TYPE: ICD-10-CM

## 2019-03-15 DIAGNOSIS — Z79.899 HIGH RISK MEDICATION USE: ICD-10-CM

## 2019-03-15 DIAGNOSIS — Z00.00 ANNUAL PHYSICAL EXAM: ICD-10-CM

## 2019-03-15 DIAGNOSIS — F32.A DEPRESSION, UNSPECIFIED DEPRESSION TYPE: ICD-10-CM

## 2019-03-15 DIAGNOSIS — H66.91 RIGHT OTITIS MEDIA, UNSPECIFIED OTITIS MEDIA TYPE: ICD-10-CM

## 2019-03-15 DIAGNOSIS — Z72.0 TOBACCO ABUSE: ICD-10-CM

## 2019-03-15 LAB
ESTIMATED AVG GLUCOSE: 103 MG/DL
HBA1C MFR BLD HPLC: 5.2 %

## 2019-03-15 PROCEDURE — 99999 PR PBB SHADOW E&M-EST. PATIENT-LVL V: ICD-10-PCS | Mod: PBBFAC,HCNC,, | Performed by: INTERNAL MEDICINE

## 2019-03-15 PROCEDURE — 99395 PREV VISIT EST AGE 18-39: CPT | Mod: HCNC,S$GLB,, | Performed by: INTERNAL MEDICINE

## 2019-03-15 PROCEDURE — 99999 PR PBB SHADOW E&M-EST. PATIENT-LVL V: CPT | Mod: PBBFAC,HCNC,, | Performed by: INTERNAL MEDICINE

## 2019-03-15 PROCEDURE — 3008F BODY MASS INDEX DOCD: CPT | Mod: HCNC,CPTII,S$GLB, | Performed by: INTERNAL MEDICINE

## 2019-03-15 PROCEDURE — 83036 HEMOGLOBIN GLYCOSYLATED A1C: CPT | Mod: HCNC

## 2019-03-15 PROCEDURE — 36415 COLL VENOUS BLD VENIPUNCTURE: CPT | Mod: HCNC,PO

## 2019-03-15 PROCEDURE — 3008F PR BODY MASS INDEX (BMI) DOCUMENTED: ICD-10-PCS | Mod: HCNC,CPTII,S$GLB, | Performed by: INTERNAL MEDICINE

## 2019-03-15 PROCEDURE — 99395 PR PREVENTIVE VISIT,EST,18-39: ICD-10-PCS | Mod: HCNC,S$GLB,, | Performed by: INTERNAL MEDICINE

## 2019-03-15 RX ORDER — BUPROPION HYDROCHLORIDE 150 MG/1
150 TABLET ORAL DAILY
Qty: 30 TABLET | Refills: 0 | Status: SHIPPED | OUTPATIENT
Start: 2019-03-15 | End: 2019-05-25 | Stop reason: SDUPTHER

## 2019-03-15 RX ORDER — BUPROPION HYDROCHLORIDE 300 MG/1
300 TABLET ORAL DAILY
Qty: 30 TABLET | Refills: 3 | Status: SHIPPED | OUTPATIENT
Start: 2019-03-15 | End: 2019-05-08 | Stop reason: DRUGHIGH

## 2019-03-15 RX ORDER — AZITHROMYCIN 250 MG/1
TABLET, FILM COATED ORAL
Qty: 6 TABLET | Refills: 0 | Status: SHIPPED | OUTPATIENT
Start: 2019-03-15 | End: 2019-03-20

## 2019-03-15 NOTE — PATIENT INSTRUCTIONS
Medication Change:  #1-Taper Celexa        -Take 1/2 tab x 1 week        -Stop Celexa x 5 days  #2-Start Wellbutrin XR 150mg at 1 daily

## 2019-03-16 ENCOUNTER — TELEPHONE (OUTPATIENT)
Dept: INTERNAL MEDICINE | Facility: CLINIC | Age: 39
End: 2019-03-16

## 2019-03-16 NOTE — PROGRESS NOTES
Maame is a very sweet 38-year-old female, known to myself, who presented to   clinic yesterday with her mother, Hui, for followup of hospitalization and   neuro-rheumatology workup.    HISTORY OF PRESENT ILLNESS:  Maame presents status post her admission to the   hospital in October.  She was diagnosed with what was felt to be a   neuro-inflammatory disease.  She has been seeing Dr. Butt in Neurology who has   diagnosed her with Behcet syndrome, neurological type.  She has as well been   seen in Rheumatology by Dr. Green and Dr. العلي for confirmation and further   recommendations.  She notes that she was recommended to consider the possibility   of Cytoxan infusions for three months, which she is still considering, has not   made a decision on yet.  She notes she is following very closely with Dr. Butt   and has been taking 10 mg of prednisone daily, which per the most recent MRI   has showed improvement with decreased cerebral inflammation.  She notes that she   does have a little bit of a cough, which is clear.  This started after her son   was diagnosed with the flu now one week ago.  She has had some prior subjective   fevers.  No recent fevers.  No body aches, but does have a cough, a little   congestion.  She notes that she continues to smoke, but is only smoking a third   of a pack of cigarettes daily.  She would like to quit this, seems to be more of   a habit, especially an oral habit she notes.    PAST MEDICAL, SURGICAL, SOCIAL HISTORY:  Please see as thoroughly stated in EPIC   chart, which has been reviewed.    PHYSICAL EXAMINATION:  VITAL SIGNS:  With weight 133 pounds, blood pressure 102/70, pulse 88, pulse ox   99%.  GENERAL:  The patient looks comfortable.  HEENT:  Shows mild right-sided facial weakness.  The patient continues with her   right-sided upper and lower extremity weakness, worse in the right lower   extremity and also in the distal right upper extremity.  Sinuses nontender.     Retropharynx shows erythema, no exudates.  Right TM shows erythema.  Left TM   clear.  NECK:  Supple.  Negative for masses.  Positive for some symmetrical thyromegaly.    Positive for shotty lymphadenopathy in the right anterior cervical lymph node   chain.  LUNGS:  Clear bilaterally.  HEART:  Regular rate and rhythm without arrhythmias, murmurs, gallops.  ABDOMEN:  Soft, nontender.  EXTREMITIES:  Negative for edema.    ASSESSMENT AND PLAN:  1.  Chronic glucocorticoid therapy for treatment of present neurological Behcet   syndrome.  A.  We will order hemoglobin A1c.  B.  I have recommended continued monitoring of blood pressure.  2.  History of multinodular thyroid goiter, last ultrasound greater than one   year ago.  A.  We will check TSH.  B.  We will order thyroid ultrasound with phone review.  3.  Neuro-inflammatory disorder consistent with neurological Behcet syndrome.  A.  We will follow along as per Dr. Butt and Rheumatology, Dr. Green.  4.  Right otitis media.  A.  Zithromax Z-VANESSA to be taken as directed, especially given steroid therapy.  5.  Positive tobacco use, I have discussed at length the importance of   cessation.  A.  I have discussed tapering Celexa 40 mg tablet to half a tablet daily for one   week, then to stop the Celexa x5 days.  B.  We will initiate Wellbutrin to start at the Wellbutrin  mg one a day   for the first month.  C.  After the first month of therapy as long as the patient is doing well, we   will increase this to Wellbutrin  mg one a day.  6.  Health maintenance.  A.  Schedule mammogram.  B.  Phone review after lab, thyroid ultrasound, mammogram.  C.  Return to clinic in three to four months for followup Wellbutrin therapy, go   from there or see sooner if needed.      FMS/HN  dd: 03/16/2019 13:43:26 (CDT)  td: 03/17/2019 01:19:21 (CDT)  Doc ID   #[id number]  Job ID #[id number]    CC:     This office note has been dictated.

## 2019-03-16 NOTE — TELEPHONE ENCOUNTER
Sharonda, please call pt's mother, Hui to schedule pt a RTC Appt in 3-4 months for f/u Wellbutrin therapy.  Thanks

## 2019-03-17 ENCOUNTER — TELEPHONE (OUTPATIENT)
Dept: INTERNAL MEDICINE | Facility: CLINIC | Age: 39
End: 2019-03-17

## 2019-03-17 ENCOUNTER — PATIENT MESSAGE (OUTPATIENT)
Dept: INTERNAL MEDICINE | Facility: CLINIC | Age: 39
End: 2019-03-17

## 2019-03-17 NOTE — TELEPHONE ENCOUNTER
Joseph/Pauline, please call and let Maame know that her HgbA1C(3/15) was normal-no evidence of diabetes.  Thanks

## 2019-04-09 ENCOUNTER — TELEPHONE (OUTPATIENT)
Dept: INTERNAL MEDICINE | Facility: CLINIC | Age: 39
End: 2019-04-09

## 2019-04-09 NOTE — TELEPHONE ENCOUNTER
----- Message from Wilmer Becker sent at 4/9/2019 11:18 AM CDT -----  Contact: Patient [phone]  Medical advice    She said she wants to go back to taking the medicine she was taking before she started taking the buPROPion (WELLBUTRIN XL) 150 MG TB24 tablet because, she thinks it's causing mood swings.    Pharmacy: [address] [phone] (Phone) [phone] (Fax)    Thank you

## 2019-04-09 NOTE — TELEPHONE ENCOUNTER
Spoke with Dahliasantos, who has been on Wellbutrin [id number] now x 1-2 weeks.  She is a little teary/emotional but no SI/HI or severe sx.  I've recommended she give it until 5/1 and will f/u then when she's been on the Wellbutrin x about 4 weeks. I also spoke with Hui, pt's mom, to make sure she's aware of what's going on and calls if any problems/worsening.

## 2019-04-23 DIAGNOSIS — G93.9 PONTINE LESION: ICD-10-CM

## 2019-04-23 DIAGNOSIS — G37.9 DEMYELINATING DISEASE OF CENTRAL NERVOUS SYSTEM, UNSPECIFIED: ICD-10-CM

## 2019-04-23 DIAGNOSIS — R90.89 ABNORMAL FINDING ON MRI OF BRAIN: ICD-10-CM

## 2019-04-29 RX ORDER — PREDNISONE 10 MG/1
10 TABLET ORAL DAILY
Qty: 30 TABLET | Refills: 3 | Status: SHIPPED | OUTPATIENT
Start: 2019-04-29 | End: 2019-10-08 | Stop reason: SDUPTHER

## 2019-05-08 ENCOUNTER — LAB VISIT (OUTPATIENT)
Dept: LAB | Facility: HOSPITAL | Age: 39
End: 2019-05-08
Attending: PSYCHIATRY & NEUROLOGY
Payer: MEDICARE

## 2019-05-08 ENCOUNTER — OFFICE VISIT (OUTPATIENT)
Dept: NEUROLOGY | Facility: CLINIC | Age: 39
End: 2019-05-08
Payer: MEDICARE

## 2019-05-08 VITALS
HEIGHT: 64 IN | SYSTOLIC BLOOD PRESSURE: 107 MMHG | BODY MASS INDEX: 23.28 KG/M2 | DIASTOLIC BLOOD PRESSURE: 82 MMHG | WEIGHT: 136.38 LBS | HEART RATE: 101 BPM

## 2019-05-08 DIAGNOSIS — M35.2 BEHCET'S DISEASE: ICD-10-CM

## 2019-05-08 DIAGNOSIS — M35.2 BEHCET'S SYNDROME, NEUROLOGIC TYPE: ICD-10-CM

## 2019-05-08 DIAGNOSIS — G81.91 RIGHT HEMIPARESIS: ICD-10-CM

## 2019-05-08 DIAGNOSIS — Z74.09 IMPAIRED MOBILITY AND ADLS: ICD-10-CM

## 2019-05-08 DIAGNOSIS — M35.2 BEHCET'S DISEASE: Primary | ICD-10-CM

## 2019-05-08 DIAGNOSIS — Z78.9 IMPAIRED MOBILITY AND ADLS: ICD-10-CM

## 2019-05-08 DIAGNOSIS — R26.9 ABNORMALITY OF GAIT AS LATE EFFECT OF CEREBROVASCULAR ACCIDENT (CVA): ICD-10-CM

## 2019-05-08 DIAGNOSIS — I69.398 ABNORMALITY OF GAIT AS LATE EFFECT OF CEREBROVASCULAR ACCIDENT (CVA): ICD-10-CM

## 2019-05-08 PROCEDURE — 3008F PR BODY MASS INDEX (BMI) DOCUMENTED: ICD-10-PCS | Mod: HCNC,CPTII,S$GLB, | Performed by: PSYCHIATRY & NEUROLOGY

## 2019-05-08 PROCEDURE — 99999 PR PBB SHADOW E&M-EST. PATIENT-LVL IV: ICD-10-PCS | Mod: PBBFAC,HCNC,, | Performed by: PSYCHIATRY & NEUROLOGY

## 2019-05-08 PROCEDURE — 99215 PR OFFICE/OUTPT VISIT, EST, LEVL V, 40-54 MIN: ICD-10-PCS | Mod: HCNC,S$GLB,, | Performed by: PSYCHIATRY & NEUROLOGY

## 2019-05-08 PROCEDURE — 3008F BODY MASS INDEX DOCD: CPT | Mod: HCNC,CPTII,S$GLB, | Performed by: PSYCHIATRY & NEUROLOGY

## 2019-05-08 PROCEDURE — 36415 COLL VENOUS BLD VENIPUNCTURE: CPT | Mod: HCNC

## 2019-05-08 PROCEDURE — 81479 UNLISTED MOLECULAR PATHOLOGY: CPT | Mod: HCNC

## 2019-05-08 PROCEDURE — 99999 PR PBB SHADOW E&M-EST. PATIENT-LVL IV: CPT | Mod: PBBFAC,HCNC,, | Performed by: PSYCHIATRY & NEUROLOGY

## 2019-05-08 PROCEDURE — 99215 OFFICE O/P EST HI 40 MIN: CPT | Mod: HCNC,S$GLB,, | Performed by: PSYCHIATRY & NEUROLOGY

## 2019-05-08 NOTE — PROGRESS NOTES
Subjective:       Patient ID: Maame Cortez is a 38 y.o. female who presents today for routine clinic visit for Neuro-Behcet's.  She is accompanied by her mother.      She is feeling stable;  She has not proceeded with either the pulse Cytoxan or the Imuran as recommended by rheumatology.   She continues on prednisone 20mg/day  She feels the slurred speech that she experienced in relapse of October 2018 has resolved ;    SOCIAL HISTORY  Social History     Tobacco Use    Smoking status: Former Smoker     Packs/day: 0.50     Years: 8.00     Pack years: 4.00     Last attempt to quit: 8/21/2015     Years since quitting: 3.7    Smokeless tobacco: Never Used    Tobacco comment: quit 8/8/2015   Substance Use Topics    Alcohol use: Yes     Alcohol/week: 0.0 oz     Comment: Social    Drug use: No     Living arrangements - the patient lives with their spouse, and kids     PHYSICAL EXAM  25 foot walk: 4.4 seconds; mild spasticity  MS: intact  CN:dysarthria improved--mild now; no QIANA;  MOTOR: right spastic 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   Latest known visit with results is:   Lab Visit on 03/15/2019   Component Date Value    Hemoglobin A1C 03/15/2019 5.2     Estimated Avg Glucose 03/15/2019 103          Diagnosis/Assessment/Plan:    1. Neurological Behcet's : I continue to agree with rheumatology recommendations for immunotherapy.  She is very concerned about risks of immunotherapy; In my view the risks of untreated disease outweighs the risk of immunotherapy and I explained this to pt and she expressed understanding;  She requests a second opinion in rheumatology which we will facilitate.       F/u with me in 3 months;      Over 50% of this 40 minute visit was spent in direct face to face counseling of the patient about Neuro-behcet's, DMT considerations, and symptom management.      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

## 2019-05-10 LAB
NUDT15 GENOTYPE: NORMAL
NUDT15 PHENOTYPE: NORMAL
TPMT ADDITIONAL INFORMATION: NORMAL
TPMT DISCLAIMER: NORMAL
TPMT GENOTYPE RESULT: NORMAL
TPMT INTERPRETATION: NORMAL
TPMT METHOD: NORMAL
TPMT PHENOTYPE: NORMAL
TPMT REVIEWED BY: NORMAL

## 2019-05-25 DIAGNOSIS — Z72.0 TOBACCO ABUSE: ICD-10-CM

## 2019-05-26 RX ORDER — BUPROPION HYDROCHLORIDE 150 MG/1
150 TABLET ORAL DAILY
Qty: 30 TABLET | Refills: 4 | Status: SHIPPED | OUTPATIENT
Start: 2019-05-26 | End: 2019-06-15

## 2019-06-11 ENCOUNTER — PATIENT MESSAGE (OUTPATIENT)
Dept: RHEUMATOLOGY | Facility: CLINIC | Age: 39
End: 2019-06-11

## 2019-06-11 ENCOUNTER — TELEPHONE (OUTPATIENT)
Dept: RHEUMATOLOGY | Facility: CLINIC | Age: 39
End: 2019-06-11

## 2019-06-14 ENCOUNTER — TELEPHONE (OUTPATIENT)
Dept: INTERNAL MEDICINE | Facility: CLINIC | Age: 39
End: 2019-06-14

## 2019-06-14 DIAGNOSIS — F41.8 MIXED ANXIETY AND DEPRESSIVE DISORDER: Primary | ICD-10-CM

## 2019-06-14 NOTE — TELEPHONE ENCOUNTER
----- Message from Kate Hernandez sent at 6/14/2019 11:45 AM CDT -----  Contact: self [phone]  Patient is calling to speak with someone regarding asking if she can change her medication buPROPion (WELLBUTRIN XL) 150 MG TB24 tablet to back to citalopram (CELEXA) 20 MG tablet  Which she was taking before, she states the Wellbutrin is not working. Her preferred pharmacy is [facility] please call her back and advise    Thank you

## 2019-06-15 RX ORDER — CITALOPRAM 20 MG/1
20 TABLET, FILM COATED ORAL DAILY
Qty: 30 TABLET | Refills: 6 | Status: SHIPPED | OUTPATIENT
Start: 2019-06-15 | End: 2019-07-26 | Stop reason: SDUPTHER

## 2019-06-15 NOTE — TELEPHONE ENCOUNTER
Pauline/oJseph, please call Maame and let her know I called in the Celexa 20mg tab. She can stop the Wellbutrin and restart the Celexa in it's place.  Thanks

## 2019-07-26 ENCOUNTER — LAB VISIT (OUTPATIENT)
Dept: LAB | Facility: HOSPITAL | Age: 39
End: 2019-07-26
Attending: INTERNAL MEDICINE
Payer: MEDICARE

## 2019-07-26 ENCOUNTER — OFFICE VISIT (OUTPATIENT)
Dept: INTERNAL MEDICINE | Facility: CLINIC | Age: 39
End: 2019-07-26
Payer: MEDICARE

## 2019-07-26 VITALS
HEART RATE: 98 BPM | OXYGEN SATURATION: 98 % | BODY MASS INDEX: 23.04 KG/M2 | WEIGHT: 134.94 LBS | SYSTOLIC BLOOD PRESSURE: 100 MMHG | DIASTOLIC BLOOD PRESSURE: 64 MMHG | HEIGHT: 64 IN

## 2019-07-26 DIAGNOSIS — Z79.899 HIGH RISK MEDICATION USE: Primary | ICD-10-CM

## 2019-07-26 DIAGNOSIS — Z72.0 TOBACCO ABUSE: ICD-10-CM

## 2019-07-26 DIAGNOSIS — F41.8 MIXED ANXIETY AND DEPRESSIVE DISORDER: ICD-10-CM

## 2019-07-26 DIAGNOSIS — Z79.899 HIGH RISK MEDICATION USE: ICD-10-CM

## 2019-07-26 LAB
ANION GAP SERPL CALC-SCNC: 8 MMOL/L (ref 8–16)
BUN SERPL-MCNC: 13 MG/DL (ref 6–20)
CALCIUM SERPL-MCNC: 10.5 MG/DL (ref 8.7–10.5)
CHLORIDE SERPL-SCNC: 103 MMOL/L (ref 95–110)
CO2 SERPL-SCNC: 29 MMOL/L (ref 23–29)
CREAT SERPL-MCNC: 0.7 MG/DL (ref 0.5–1.4)
EST. GFR  (AFRICAN AMERICAN): >60 ML/MIN/1.73 M^2
EST. GFR  (NON AFRICAN AMERICAN): >60 ML/MIN/1.73 M^2
ESTIMATED AVG GLUCOSE: 94 MG/DL (ref 68–131)
GLUCOSE SERPL-MCNC: 69 MG/DL (ref 70–110)
HBA1C MFR BLD HPLC: 4.9 % (ref 4–5.6)
POTASSIUM SERPL-SCNC: 3.9 MMOL/L (ref 3.5–5.1)
SODIUM SERPL-SCNC: 140 MMOL/L (ref 136–145)

## 2019-07-26 PROCEDURE — 3008F BODY MASS INDEX DOCD: CPT | Mod: HCNC,CPTII,S$GLB, | Performed by: INTERNAL MEDICINE

## 2019-07-26 PROCEDURE — 99999 PR PBB SHADOW E&M-EST. PATIENT-LVL III: CPT | Mod: PBBFAC,HCNC,, | Performed by: INTERNAL MEDICINE

## 2019-07-26 PROCEDURE — 99214 OFFICE O/P EST MOD 30 MIN: CPT | Mod: HCNC,S$GLB,, | Performed by: INTERNAL MEDICINE

## 2019-07-26 PROCEDURE — 80048 BASIC METABOLIC PNL TOTAL CA: CPT | Mod: HCNC

## 2019-07-26 PROCEDURE — 83036 HEMOGLOBIN GLYCOSYLATED A1C: CPT | Mod: HCNC

## 2019-07-26 PROCEDURE — 3008F PR BODY MASS INDEX (BMI) DOCUMENTED: ICD-10-PCS | Mod: HCNC,CPTII,S$GLB, | Performed by: INTERNAL MEDICINE

## 2019-07-26 PROCEDURE — 99214 PR OFFICE/OUTPT VISIT, EST, LEVL IV, 30-39 MIN: ICD-10-PCS | Mod: HCNC,S$GLB,, | Performed by: INTERNAL MEDICINE

## 2019-07-26 PROCEDURE — 36415 COLL VENOUS BLD VENIPUNCTURE: CPT | Mod: HCNC,PO

## 2019-07-26 PROCEDURE — 99999 PR PBB SHADOW E&M-EST. PATIENT-LVL III: ICD-10-PCS | Mod: PBBFAC,HCNC,, | Performed by: INTERNAL MEDICINE

## 2019-07-26 RX ORDER — CITALOPRAM 20 MG/1
20 TABLET, FILM COATED ORAL DAILY
Qty: 30 TABLET | Refills: 6 | Status: SHIPPED | OUTPATIENT
Start: 2019-07-26 | End: 2020-08-11

## 2019-07-26 NOTE — PROGRESS NOTES
Subjective:       Patient ID: Maame Cortez is a 38 y.o. female.    Chief Complaint:   Follow-up; Depression; and Chronic Steroid Therapy    HPI: Maame today for f/u anxiety/depresion. She did not do well on wellbutrin tx(depression worsened) so is back on  celexa 20mg daily and depression/anxiety doing better/at baseline. She continues to smoke about 1/3 pack daily; she is not ready to quite yet.  She continues on prednisone 10mg daily and awaits a 2nd opinion in Rheumatology in reference to her Bechet's diagnosis and recommended treatment.  She also continues to follow with Neurology Dr. Butt    Past Medical, Surgical, Social History: Please see as stated in Epic chart which has been reviewed.    Current Outpatient Medications   Medication Sig Dispense Refill    citalopram (CELEXA) 20 MG tablet Take 1 tablet (20 mg total) by mouth once daily. Take Celexa 20mg tab daily in place of Wellbutrin 30 tablet 6    predniSONE (DELTASONE) 10 MG tablet TAKE 1 TABLET (10 MG TOTAL) BY MOUTH ONCE DAILY. 30 tablet 3     No current facility-administered medications for this visit.        Review of Systems   Respiratory: Negative for chest tightness, shortness of breath and wheezing.    Cardiovascular: Negative for chest pain and leg swelling.   Gastrointestinal: Negative for abdominal pain, blood in stool and constipation.   Musculoskeletal: Negative.    Skin: Negative.    Psychiatric/Behavioral: Negative for dysphoric mood. The patient is not nervous/anxious.        Objective:      Lab Results   Component Value Date    WBC 12.44 10/28/2018    HGB 11.6 (L) 10/28/2018    HCT 36.5 (L) 10/28/2018     (H) 10/28/2018    CHOL 139 03/31/2017    TRIG 87 03/31/2017    HDL 50 03/31/2017    ALT 26 10/28/2018    AST 19 10/28/2018     07/26/2019    K 3.9 07/26/2019     07/26/2019    CREATININE 0.7 07/26/2019    BUN 13 07/26/2019    CO2 29 07/26/2019    TSH 1.029 10/25/2018    INR 0.9 10/25/2018    HGBA1C 4.9  "07/26/2019     Physical Exam   Constitutional: She appears well-developed and well-nourished.   Cardiovascular: Normal rate, regular rhythm and normal heart sounds.   Pulmonary/Chest: Effort normal and breath sounds normal.   Musculoskeletal: She exhibits no edema.   Neurological:   Mild Left sided upper/lower extremity weakness continues  Resulting in abnormal gait   Skin: Skin is warm and dry.   Vitals reviewed.        Vital Signs  Pulse: 98  SpO2: 98 %  BP: 100/64  BP Location: Right arm  Patient Position: Sitting  Pain Score: 0-No pain  Height and Weight  Height: 5' 4" (162.6 cm)  Weight: 61.2 kg (134 lb 14.7 oz)  BSA (Calculated - sq m): 1.66 sq meters  BMI (Calculated): 23.2  Weight in (lb) to have BMI = 25: 145.3]    Assessment:       1. High risk medication use    2. Mixed anxiety and depressive disorder    3. Tobacco abuse        Plan:     Health Maintenance   Topic Date Due    TETANUS VACCINE  11/06/1998    Influenza Vaccine  08/01/2019    Pap Smear with HPV Cotest  05/10/2020    Lipid Panel  Completed        Maame was seen today for follow-up, depression and chronic steroid therapy.    Diagnoses and all orders for this visit:    High risk medication use/chronic steroid tx  -     Hemoglobin A1c; Future  -     Basic metabolic panel; Future    Mixed anxiety and depressive disorder/controlled  -     Continue citalopram (CELEXA) 20 MG tablet; Take 1 tablet (20 mg total) by mouth once daily. Take Celexa 20mg tab daily in place of Wellbutrin    Tobacco abuse        -      Have encouraged Cessation of such    Bechet's Syndrome        -      Rheumatology appt(2bd opinion) pending with Dr Villatoro        -      Follow along with Neurology/Dr Butt    Health maintenance        -      return to clinic x 6 months with Gynecologic exam/Pap to be performed then  "

## 2019-07-26 NOTE — PROGRESS NOTES
Subjective:     Patient ID: Maame Cortez is a 38 y.o. female here for a second opinion on treatment for neurobehcets.    Chief Complaint: No chief complaint on file.       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     She had a L basal ganglia stroke in 2015 with residual weakness that improved w residual R hemiparesis.She also developed visual loss in OD w concern about ischemic optic neuropathy.  In 2017 she reported dizziness, auditory hallucinations & lip numbness & tingling c/w migraine w aura. CTA was unremarkable.    In 10/18 she was admitted again with gradual progressive posterior HA and dysarthria. MRI w bilateral pontine lesion with other findings; CSF w lymphocytic pleocytosis suggestive of a neuroinflammatory disorder. She was treated with solumedrol 1 g/d x 3 followed by prednisone 10 mg/d which she is still on.       Consideration of Behcet's is based on a hx of recurrent oral ulcers & a single episode of genital ulcer by hx.  There is no uveitis (but concern about ischemic optic neuropathy), e nodosum type lesions or other skin lesions or pathergy. She denies AM stiffness, joint pain, joint swelling, Raynaud's, dysphagia, tight skin, alopecia, oral ulcers, sicca symptoms, pleurisy, pericarditis, photosensitivity, skin rashes, miscarriages, thromboses.    Patient has significant co morbidities including alcohol abuse, cigarette smoking, depression. She has a known MTHFR mutation         Current Outpatient Medications   Medication Sig Dispense Refill    citalopram (CELEXA) 20 MG tablet Take 1 tablet (20 mg total) by mouth once daily. Take Celexa 20mg tab daily in place of Wellbutrin 30 tablet 6    predniSONE (DELTASONE) 10 MG tablet TAKE 1 TABLET (10 MG TOTAL) BY MOUTH ONCE DAILY. 30 tablet 3     No current facility-administered medications for  this visit.          Review of patient's allergies indicates:  No Known Allergies    Review of Systems   Constitutional: Negative.  Negative for diaphoresis, fatigue and fever.   HENT: Positive for mouth sores and trouble swallowing. Negative for sore throat and tinnitus.    Eyes: Negative.  Negative for visual disturbance.   Respiratory: Negative.  Negative for cough, choking, chest tightness and shortness of breath.    Cardiovascular: Negative for chest pain, palpitations and leg swelling.   Gastrointestinal: Negative for abdominal distention, abdominal pain, blood in stool, constipation, diarrhea, nausea and vomiting.   Genitourinary: Negative for frequency, hematuria and menstrual problem.   Musculoskeletal: Negative.  Negative for back pain, joint swelling, myalgias, neck pain and neck stiffness.   Skin: Negative.  Negative for rash.   Neurological: Negative.  Negative for dizziness, syncope, weakness, light-headedness and numbness.   Hematological: Negative for adenopathy. Does not bruise/bleed easily.   Psychiatric/Behavioral: Negative.  Negative for dysphoric mood and sleep disturbance. The patient is not nervous/anxious.        Past Medical History:   Diagnosis Date    Heart palpitations     MTHFR mutation     Stroke 2015       Past Surgical History:   Procedure Laterality Date    BELT ABDOMINOPLASTY          CT TRANSCRAN DOPP INTRACRAN, EMBOLI W/INJ  2015         WISDOM TOOTH EXTRACTION         Family History   Problem Relation Age of Onset    Breast cancer Maternal Grandmother 70    Stroke Maternal Grandmother     Birth defects Maternal Grandmother         Breast Cancer    Birth defects Paternal Grandfather 80        Colon/Prostate Ca?    Cancer Neg Hx     Colon cancer Neg Hx     Diabetes Neg Hx     Eclampsia Neg Hx     Hypertension Neg Hx     Miscarriages / Stillbirths Neg Hx     Ovarian cancer Neg Hx      labor Neg Hx        Social History     Tobacco Use    Smoking  "status: Former Smoker     Packs/day: 0.50     Years: 8.00     Pack years: 4.00     Last attempt to quit: 8/21/2015     Years since quitting: 3.9    Smokeless tobacco: Never Used    Tobacco comment: quit 8/8/2015   Substance Use Topics    Alcohol use: Yes     Alcohol/week: 0.0 oz     Comment: Social    Drug use: No       Objective:   /84   Pulse 78   Ht 5' 4" (1.626 m)   Wt 63.9 kg (140 lb 14 oz)   LMP 07/12/2019   BMI 24.18 kg/m²   Accompanied by her mom.  Physical Exam   Vitals reviewed.  Constitutional: She is oriented to person, place, and time and well-developed, well-nourished, and in no distress. No distress.   HENT:   Head: Normocephalic and atraumatic.   Mouth/Throat: Oropharynx is clear and moist. No oropharyngeal exudate.   No facial rashes  Parotids not enlarged  No oral ulcers.     Eyes: Conjunctivae and EOM are normal. Pupils are equal, round, and reactive to light. Right eye exhibits no discharge. Left eye exhibits no discharge. No scleral icterus.   Neck: Neck supple. No JVD present. No tracheal deviation present. No thyromegaly present.   Cardiovascular: Normal rate, regular rhythm, normal heart sounds and intact distal pulses.  Exam reveals no gallop and no friction rub.    No murmur heard.  Pulmonary/Chest: Effort normal and breath sounds normal. No respiratory distress. She has no wheezes. She has no rales. She exhibits no tenderness.   Abdominal: Soft. Bowel sounds are normal. She exhibits no distension and no mass. There is no splenomegaly or hepatomegaly. There is no tenderness. There is no rebound and no guarding.   Lymphadenopathy:     She has no cervical adenopathy.        Right: No inguinal adenopathy present.        Left: No inguinal adenopathy present.   Neurological: She is alert and oriented to person, place, and time. She has normal reflexes. No cranial nerve deficit. Gait normal.   Proximal and distal muscle strength 5/5.   Skin: Skin is warm and dry. No rash noted. She " is not diaphoretic.     Psychiatric: Mood, memory, affect and judgment normal.   Musculoskeletal: Normal range of motion. She exhibits no edema or tenderness.   Cspine FROM no tenderness  Tspine FROM no tenderness  Lspine FROM no tenderness.  TMJ: bilateral crepitus  Shoulders: FROM; no synovitis;  Elbows: FROM; no synovitis; no tophi or nodules  Wrists: FROM; no synovitis;    MCPs: FROM; no synovitis; no metacarpalgia;  ok;  PIPs:FROM; no synovitis;   DIPs: FROM; no synovitis;   HIPS: FROM  Knees: FROM; no synovitis; no instability;  Ankles: FROM: no synovitis   Toes: ok;                12/20/18: SSA/SSB neg; ANCA neg; C3 & C4 ok; cryo neg;  LAC/aCLA/wyoj1irh neg;   10/26/18: ESR 33 (36); Hg 11.6; Ht 36.5; hi indices; plts 375; CMP alb. 3.3; UA 1+ ob; 26 RBCs; ;  Assessment:   Demyelinating disorder   S/P L basal ganglia stroke 2015 w ensuing R hemiparesis & visual loss OD   S/P posterior HA, dysarthria & CSF lymphocytic pleocytosis    MRI bilateral pontine lesions, etc    S/P solumedrol 1 g/d x 3 f/u by po prednisone 10 mg/d.  Hx of recurrent oral ulcers & a single genital ulcer remotely    Plan:   Discussed difficulty in establishing a diagnosis of Behcet's especially when criteria are not met.  Nevertheless, given the severity of the presentations, it would seem reasonable to rx for a neuroinflammatory disorder if Dr. Butt feels it may prevent further problems.   Discussed pros & cons of treating with CTX followed by azathioprine.  Side effects and toxicities of CTX and azathioprine reviewed.  Written info provided on CTX.  Patient was referred to [facility] (Kyrie Myers MD) for additional information.  Patient is to f/u with Peri Butt and Peter.

## 2019-07-29 ENCOUNTER — TELEPHONE (OUTPATIENT)
Dept: INTERNAL MEDICINE | Facility: CLINIC | Age: 39
End: 2019-07-29

## 2019-07-29 NOTE — TELEPHONE ENCOUNTER
Joseph/Uma, please call Maame and let her know that her lab(7/26) with HgbA1C and BMP was normal.  Thanks

## 2019-07-30 ENCOUNTER — OFFICE VISIT (OUTPATIENT)
Dept: RHEUMATOLOGY | Facility: CLINIC | Age: 39
End: 2019-07-30
Payer: MEDICARE

## 2019-07-30 VITALS
WEIGHT: 140.88 LBS | HEIGHT: 64 IN | HEART RATE: 78 BPM | SYSTOLIC BLOOD PRESSURE: 114 MMHG | DIASTOLIC BLOOD PRESSURE: 84 MMHG | BODY MASS INDEX: 24.05 KG/M2

## 2019-07-30 DIAGNOSIS — Z79.52 LONG TERM (CURRENT) USE OF SYSTEMIC STEROIDS: ICD-10-CM

## 2019-07-30 DIAGNOSIS — G37.9 DEMYELINATING CHANGES IN BRAIN: Primary | ICD-10-CM

## 2019-07-30 DIAGNOSIS — Z55.9 EDUCATIONAL CIRCUMSTANCE: ICD-10-CM

## 2019-07-30 DIAGNOSIS — Z87.19 HISTORY OF ORAL APHTHOUS ULCERS: ICD-10-CM

## 2019-07-30 PROCEDURE — 99214 PR OFFICE/OUTPT VISIT, EST, LEVL IV, 30-39 MIN: ICD-10-PCS | Mod: HCNC,S$GLB,, | Performed by: INTERNAL MEDICINE

## 2019-07-30 PROCEDURE — 3008F BODY MASS INDEX DOCD: CPT | Mod: HCNC,CPTII,S$GLB, | Performed by: INTERNAL MEDICINE

## 2019-07-30 PROCEDURE — 99999 PR PBB SHADOW E&M-EST. PATIENT-LVL III: CPT | Mod: PBBFAC,HCNC,, | Performed by: INTERNAL MEDICINE

## 2019-07-30 PROCEDURE — 99214 OFFICE O/P EST MOD 30 MIN: CPT | Mod: HCNC,S$GLB,, | Performed by: INTERNAL MEDICINE

## 2019-07-30 PROCEDURE — 99999 PR PBB SHADOW E&M-EST. PATIENT-LVL III: ICD-10-PCS | Mod: PBBFAC,HCNC,, | Performed by: INTERNAL MEDICINE

## 2019-07-30 PROCEDURE — 3008F PR BODY MASS INDEX (BMI) DOCUMENTED: ICD-10-PCS | Mod: HCNC,CPTII,S$GLB, | Performed by: INTERNAL MEDICINE

## 2019-07-30 SDOH — SOCIAL DETERMINANTS OF HEALTH (SDOH): PROBLEMS RELATED TO EDUCATION AND LITERACY, UNSPECIFIED: Z55.9

## 2019-07-30 ASSESSMENT — ROUTINE ASSESSMENT OF PATIENT INDEX DATA (RAPID3)
PAIN SCORE: 0
AM STIFFNESS SCORE: 0, NO
PATIENT GLOBAL ASSESSMENT SCORE: 0
MDHAQ FUNCTION SCORE: .3
PSYCHOLOGICAL DISTRESS SCORE: 0
TOTAL RAPID3 SCORE: .33
FATIGUE SCORE: 0

## 2019-07-30 NOTE — PATIENT INSTRUCTIONS
Denver Myers MD is a world expert on Behcet's Disease..    Cyclophosphamide injection  What is this medicine?  CYCLOPHOSPHAMIDE (sye kloe RONNIE fa mide) is a chemotherapy drug. It slows the growth of cancer cells. This medicine is used to treat many types of cancer like lymphoma, myeloma, leukemia, breast cancer, and ovarian cancer, to name a few.  How should I use this medicine?  This drug is usually given as an injection into a vein or muscle or by infusion into a vein. It is administered in a hospital or clinic by a specially trained health care professional.  Talk to your pediatrician regarding the use of this medicine in children. Special care may be needed.  What side effects may I notice from receiving this medicine?  Side effects that you should report to your doctor or health care professional as soon as possible:  · allergic reactions like skin rash, itching or hives, swelling of the face, lips, or tongue  · low blood counts - this medicine may decrease the number of white blood cells, red blood cells and platelets. You may be at increased risk for infections and bleeding.  · signs of infection - fever or chills, cough, sore throat, pain or difficulty passing urine  · signs of decreased platelets or bleeding - bruising, pinpoint red spots on the skin, black, tarry stools, blood in the urine  · signs of decreased red blood cells - unusually weak or tired, fainting spells, lightheadedness  · breathing problems  · dark urine  · dizziness  · palpitations  · swelling of the ankles, feet, hands  · trouble passing urine or change in the amount of urine  · weight gain  · yellowing of the eyes or skin  Side effects that usually do not require medical attention (report to your doctor or health care professional if they continue or are bothersome):  · changes in nail or skin color  · hair loss  · missed menstrual periods  · mouth sores  · nausea, vomiting  What may interact with this medicine?  This medicine may  interact with the following medications:  · amiodarone  · amphotericin B  · azathioprine  · certain antiviral medicines for HIV or AIDS such as protease inhibitors (e.g., indinavir, ritonavir) and zidovudine  · certain blood pressure medications such as benazepril, captopril, enalapril, fosinopril, lisinopril, moexipril, monopril, perindopril, quinapril, ramipril, trandolapril  · certain cancer medications such as anthracyclines (e.g., daunorubicin, doxorubicin), busulfan, cytarabine, paclitaxel, pentostatin, tamoxifen, trastuzumab  · certain diuretics such as chlorothiazide, chlorthalidone, hydrochlorothiazide, indapamide, metolazone  · certain medicines that treat or prevent blood clots like warfarin  · certain muscle relaxants such as succinylcholine  · cyclosporine  · etanercept  · indomethacin  · medicines to increase blood counts like filgrastim, pegfilgrastim, sargramostim  · medicines used as general anesthesia  · metronidazole  · natalizumab  What if I miss a dose?  It is important not to miss your dose. Call your doctor or health care professional if you are unable to keep an appointment.  Where should I keep my medicine?  This drug is given in a hospital or clinic and will not be stored at home.  What should I tell my health care provider before I take this medicine?  They need to know if you have any of these conditions:  · blood disorders  · history of other chemotherapy  · infection  · kidney disease  · liver disease  · recent or ongoing radiation therapy  · tumors in the bone marrow  · an unusual or allergic reaction to cyclophosphamide, other chemotherapy, other medicines, foods, dyes, or preservatives  · pregnant or trying to get pregnant  · breast-feeding  What should I watch for while using this medicine?  Visit your doctor for checks on your progress. This drug may make you feel generally unwell. This is not uncommon, as chemotherapy can affect healthy cells as well as cancer cells. Report any  side effects. Continue your course of treatment even though you feel ill unless your doctor tells you to stop.  Drink water or other fluids as directed. Urinate often, even at night.  In some cases, you may be given additional medicines to help with side effects. Follow all directions for their use.  Call your doctor or health care professional for advice if you get a fever, chills or sore throat, or other symptoms of a cold or flu. Do not treat yourself. This drug decreases your body's ability to fight infections. Try to avoid being around people who are sick.  This medicine may increase your risk to bruise or bleed. Call your doctor or health care professional if you notice any unusual bleeding.  Be careful brushing and flossing your teeth or using a toothpick because you may get an infection or bleed more easily. If you have any dental work done, tell your dentist you are receiving this medicine.  You may get drowsy or dizzy. Do not drive, use machinery, or do anything that needs mental alertness until you know how this medicine affects you.  Do not become pregnant while taking this medicine or for 1 year after stopping it. Women should inform their doctor if they wish to become pregnant or think they might be pregnant. Men should not father a child while taking this medicine and for 4 months after stopping it. There is a potential for serious side effects to an unborn child. Talk to your health care professional or pharmacist for more information. Do not breast-feed an infant while taking this medicine.  This medicine may interfere with the ability to have a child. This medicine has caused ovarian failure in some women. This medicine has caused reduced sperm counts in some men. You should talk with your doctor or health care professional if you are concerned about your fertility.  If you are going to have surgery, tell your doctor or health care professional that you have taken this medicine.  NOTE:This sheet is  a summary. It may not cover all possible information. If you have questions about this medicine, talk to your doctor, pharmacist, or health care provider. Copyright© 2017 [employer]

## 2019-10-08 DIAGNOSIS — G37.9 DEMYELINATING DISEASE OF CENTRAL NERVOUS SYSTEM, UNSPECIFIED: ICD-10-CM

## 2019-10-08 DIAGNOSIS — G93.9 PONTINE LESION: ICD-10-CM

## 2019-10-08 DIAGNOSIS — R90.89 ABNORMAL FINDING ON MRI OF BRAIN: ICD-10-CM

## 2019-10-16 RX ORDER — PREDNISONE 10 MG/1
10 TABLET ORAL DAILY
Qty: 30 TABLET | Refills: 3 | Status: SHIPPED | OUTPATIENT
Start: 2019-10-16 | End: 2020-04-15

## 2020-01-29 ENCOUNTER — PES CALL (OUTPATIENT)
Dept: ADMINISTRATIVE | Facility: CLINIC | Age: 40
End: 2020-01-29

## 2020-04-14 DIAGNOSIS — R90.89 ABNORMAL FINDING ON MRI OF BRAIN: ICD-10-CM

## 2020-04-14 DIAGNOSIS — G37.9 DEMYELINATING DISEASE OF CENTRAL NERVOUS SYSTEM, UNSPECIFIED: ICD-10-CM

## 2020-04-14 DIAGNOSIS — G93.9 PONTINE LESION: ICD-10-CM

## 2020-04-15 ENCOUNTER — TELEPHONE (OUTPATIENT)
Dept: NEUROLOGY | Facility: CLINIC | Age: 40
End: 2020-04-15

## 2020-04-15 RX ORDER — PREDNISONE 10 MG/1
10 TABLET ORAL DAILY
Qty: 30 TABLET | Refills: 0 | Status: SHIPPED | OUTPATIENT
Start: 2020-04-15 | End: 2020-05-18

## 2020-04-15 NOTE — TELEPHONE ENCOUNTER
----- Message from Kelli Sethi RN sent at 4/15/2020  4:54 PM CDT -----  Contact: Pt. [phone]      ----- Message -----  From: Vicki Marinelli  Sent: 4/15/2020   4:50 PM CDT  To: Filomena RUSSELL Staff    The patient is returning Andreea's call. Please contact the patient to discuss further.

## 2020-04-16 ENCOUNTER — TELEPHONE (OUTPATIENT)
Dept: NEUROLOGY | Facility: CLINIC | Age: 40
End: 2020-04-16

## 2020-04-16 NOTE — TELEPHONE ENCOUNTER
Spoke with patient and informed her that her prednisone is for a 1 month supply without refills. Scheduled patient for a VV with Dr. Butt. Patient verbalized that she will become MyChart active prior to her appointment.

## 2020-04-16 NOTE — TELEPHONE ENCOUNTER
----- Message from Kelli Sethi RN sent at 4/16/2020  1:26 PM CDT -----  Contact: pt       ----- Message -----  From: Marylou Lopez  Sent: 4/16/2020   1:00 PM CDT  To: Filomena RUSSELL Staff    Pt is returning the nurses phone call can you please call pt at [phone].    SHEREEN

## 2020-04-20 ENCOUNTER — PATIENT MESSAGE (OUTPATIENT)
Dept: NEUROLOGY | Facility: CLINIC | Age: 40
End: 2020-04-20

## 2020-04-20 ENCOUNTER — TELEPHONE (OUTPATIENT)
Dept: NEUROLOGY | Facility: CLINIC | Age: 40
End: 2020-04-20

## 2020-04-20 NOTE — TELEPHONE ENCOUNTER
----- Message from Kelli Sethi RN sent at 4/20/2020  1:18 PM CDT -----  Did you call her?  ----- Message -----  From: Marta Matthew  Sent: 4/20/2020  12:50 PM CDT  To: Filomena RUSSELL Staff    Please call pt at [phone]    Patient is returning staff call     Thank you

## 2020-04-20 NOTE — TELEPHONE ENCOUNTER
Spoke with patient and she stated she was not returning a call. [facility]

## 2020-04-21 ENCOUNTER — OFFICE VISIT (OUTPATIENT)
Dept: NEUROLOGY | Facility: CLINIC | Age: 40
End: 2020-04-21
Payer: MEDICARE

## 2020-04-21 DIAGNOSIS — R26.9 ABNORMALITY OF GAIT AS LATE EFFECT OF CEREBROVASCULAR ACCIDENT (CVA): ICD-10-CM

## 2020-04-21 DIAGNOSIS — Z74.09 IMPAIRED MOBILITY AND ADLS: ICD-10-CM

## 2020-04-21 DIAGNOSIS — G35 MS (MULTIPLE SCLEROSIS): Primary | ICD-10-CM

## 2020-04-21 DIAGNOSIS — M35.2 BEHCET'S DISEASE: ICD-10-CM

## 2020-04-21 DIAGNOSIS — G81.91 RIGHT HEMIPARESIS: ICD-10-CM

## 2020-04-21 DIAGNOSIS — M35.2 BEHCET'S SYNDROME, NEUROLOGIC TYPE: ICD-10-CM

## 2020-04-21 DIAGNOSIS — Z78.9 IMPAIRED MOBILITY AND ADLS: ICD-10-CM

## 2020-04-21 DIAGNOSIS — I69.398 ABNORMALITY OF GAIT AS LATE EFFECT OF CEREBROVASCULAR ACCIDENT (CVA): ICD-10-CM

## 2020-04-21 PROCEDURE — 99214 OFFICE O/P EST MOD 30 MIN: CPT | Mod: HCNC,95,, | Performed by: PSYCHIATRY & NEUROLOGY

## 2020-04-21 PROCEDURE — 99499 RISK ADDL DX/OHS AUDIT: ICD-10-PCS | Mod: HCNC,95,, | Performed by: PSYCHIATRY & NEUROLOGY

## 2020-04-21 PROCEDURE — 99214 PR OFFICE/OUTPT VISIT, EST, LEVL IV, 30-39 MIN: ICD-10-PCS | Mod: HCNC,95,, | Performed by: PSYCHIATRY & NEUROLOGY

## 2020-04-21 PROCEDURE — 99499 UNLISTED E&M SERVICE: CPT | Mod: HCNC,95,, | Performed by: PSYCHIATRY & NEUROLOGY

## 2020-04-21 NOTE — PROGRESS NOTES
Subjective:       Patient ID: Maame Cortez is a 39 y.o. female who presents today for routine clinic visit for Neuro-Behcet's.      I last saw her in May of 2019.  She has been on prednisone 10mg /day for the past year, and lowered it to 5mg/day in February.  She is feeling neurologically stable;  She is willing now to go on the Imuran, but not interested in starting pule cytoxan;       SOCIAL HISTORY  Social History     Tobacco Use    Smoking status: Former Smoker     Packs/day: 0.50     Years: 8.00     Pack years: 4.00     Last attempt to quit: 2015     Years since quittin.6    Smokeless tobacco: Never Used    Tobacco comment: quit 2015   Substance Use Topics    Alcohol use: Yes     Alcohol/week: 0.0 standard drinks     Comment: Social    Drug use: No     Living arrangements - the patient lives with their spouse, and kids     PHYSICAL EXAM  25 foot walk: 4.4 seconds; mild spasticity  MS: intact  CN:dysarthria improved--mild now; no QIANA;  MOTOR: right spastic hemiparesis (old since stroke )  GAIT: right footdrop, spastic circumduction (old since stroke )       No visits with results within 7 Week(s) from this visit.   Latest known visit with results is:   Lab Visit on 2019   Component Date Value    Hemoglobin A1C 2019 4.9     Estimated Avg Glucose 2019 94     Sodium 2019 140     Potassium 2019 3.9     Chloride 2019 103     CO2 2019 29     Glucose 2019 69*    BUN, Bld 2019 13     Creatinine 2019 0.7     Calcium 2019 10.5     Anion Gap 2019 8     eGFR if African American 2019 >60.0     eGFR if non  Amer* 2019 >60.0          Diagnosis/Assessment/Plan:    1Neurological Behcet's : I continue to agree with rheumatology recommendations for immunotherapy.  She is very concerned about risks of immunotherapy; In my view the risks of untreated disease outweighs the risk of immunotherapy and  I explained this to pt and she expressed understanding    She has agreed to start Imuran at this juncture; will start at 50mg/day x 1 week then increase to 100mg/day .LFT/CBC this week, at one week, and in 8 weeks after start.  The patient was counseled about the risks associated with immune suppressive therapy, including a higher risk of serious infections and malignancy, as well as the importance of avoiding all live virus vaccines while on immune suppressive medication.   F/u with me in 3mo            Over 50% of this 30 minute visit was spent in direct face to face counseling of the patient about Neuro-behcet's, DMT considerations, and symptom management.     Problem List Items Addressed This Visit        1 - High    Right hemiparesis    Behcet's syndrome, neurologic type       2     Impaired mobility and ADLs       3     Abnormality of gait as late effect of cerebrovascular accident (CVA)      Other Visit Diagnoses     MS (multiple sclerosis)    -  Primary    Behcet's disease        Relevant Medications    azaTHIOprine (IMURAN) 50 mg Tab    Other Relevant Orders    CBC auto differential    Hepatic function panel

## 2020-04-21 NOTE — Clinical Note
Doris please schedule LFT/CBC 3 times over next couple of months at the [facility] location: 1. This week2. End of next week3. In 8 weeks; F/u with me in 3mo

## 2020-04-24 ENCOUNTER — PATIENT MESSAGE (OUTPATIENT)
Dept: NEUROLOGY | Facility: CLINIC | Age: 40
End: 2020-04-24

## 2020-04-24 ENCOUNTER — LAB VISIT (OUTPATIENT)
Dept: LAB | Facility: HOSPITAL | Age: 40
End: 2020-04-24
Attending: PSYCHIATRY & NEUROLOGY
Payer: MEDICARE

## 2020-04-24 DIAGNOSIS — M35.2 BEHCET'S DISEASE: ICD-10-CM

## 2020-04-24 LAB
ALBUMIN SERPL BCP-MCNC: 3.6 G/DL (ref 3.5–5.2)
ALP SERPL-CCNC: 82 U/L (ref 55–135)
ALT SERPL W/O P-5'-P-CCNC: 27 U/L (ref 10–44)
AST SERPL-CCNC: 25 U/L (ref 10–40)
BASOPHILS # BLD AUTO: 0.05 K/UL (ref 0–0.2)
BASOPHILS NFR BLD: 0.6 % (ref 0–1.9)
BILIRUB DIRECT SERPL-MCNC: 0.1 MG/DL (ref 0.1–0.3)
BILIRUB SERPL-MCNC: 0.2 MG/DL (ref 0.1–1)
DIFFERENTIAL METHOD: ABNORMAL
EOSINOPHIL # BLD AUTO: 0.1 K/UL (ref 0–0.5)
EOSINOPHIL NFR BLD: 1.2 % (ref 0–8)
ERYTHROCYTE [DISTWIDTH] IN BLOOD BY AUTOMATED COUNT: 13.4 % (ref 11.5–14.5)
HCT VFR BLD AUTO: 41.6 % (ref 37–48.5)
HGB BLD-MCNC: 13 G/DL (ref 12–16)
IMM GRANULOCYTES # BLD AUTO: 0.08 K/UL (ref 0–0.04)
IMM GRANULOCYTES NFR BLD AUTO: 0.9 % (ref 0–0.5)
LYMPHOCYTES # BLD AUTO: 2.5 K/UL (ref 1–4.8)
LYMPHOCYTES NFR BLD: 27.7 % (ref 18–48)
MCH RBC QN AUTO: 31.6 PG (ref 27–31)
MCHC RBC AUTO-ENTMCNC: 31.3 G/DL (ref 32–36)
MCV RBC AUTO: 101 FL (ref 82–98)
MONOCYTES # BLD AUTO: 0.6 K/UL (ref 0.3–1)
MONOCYTES NFR BLD: 6.6 % (ref 4–15)
NEUTROPHILS # BLD AUTO: 5.7 K/UL (ref 1.8–7.7)
NEUTROPHILS NFR BLD: 63 % (ref 38–73)
NRBC BLD-RTO: 0 /100 WBC
PLATELET # BLD AUTO: 358 K/UL (ref 150–350)
PMV BLD AUTO: 9.4 FL (ref 9.2–12.9)
PROT SERPL-MCNC: 7 G/DL (ref 6–8.4)
RBC # BLD AUTO: 4.11 M/UL (ref 4–5.4)
WBC # BLD AUTO: 9.04 K/UL (ref 3.9–12.7)

## 2020-04-24 PROCEDURE — 36415 COLL VENOUS BLD VENIPUNCTURE: CPT | Mod: HCNC,PO

## 2020-04-24 PROCEDURE — 80076 HEPATIC FUNCTION PANEL: CPT | Mod: HCNC

## 2020-04-24 PROCEDURE — 85025 COMPLETE CBC W/AUTO DIFF WBC: CPT | Mod: HCNC

## 2020-04-27 ENCOUNTER — PATIENT MESSAGE (OUTPATIENT)
Dept: NEUROLOGY | Facility: CLINIC | Age: 40
End: 2020-04-27

## 2020-04-28 RX ORDER — AZATHIOPRINE 50 MG/1
50 TABLET ORAL DAILY
Qty: 60 TABLET | Refills: 2 | Status: SHIPPED | OUTPATIENT
Start: 2020-04-28 | End: 2020-08-04 | Stop reason: SDUPTHER

## 2020-05-01 ENCOUNTER — LAB VISIT (OUTPATIENT)
Dept: LAB | Facility: HOSPITAL | Age: 40
End: 2020-05-01
Attending: PSYCHIATRY & NEUROLOGY
Payer: MEDICARE

## 2020-05-01 DIAGNOSIS — M35.2 BEHCET'S DISEASE: ICD-10-CM

## 2020-05-01 LAB
ALBUMIN SERPL BCP-MCNC: 3.7 G/DL (ref 3.5–5.2)
ALP SERPL-CCNC: 82 U/L (ref 55–135)
ALT SERPL W/O P-5'-P-CCNC: 35 U/L (ref 10–44)
AST SERPL-CCNC: 24 U/L (ref 10–40)
BASOPHILS # BLD AUTO: 0.06 K/UL (ref 0–0.2)
BASOPHILS NFR BLD: 0.6 % (ref 0–1.9)
BILIRUB DIRECT SERPL-MCNC: 0.2 MG/DL (ref 0.1–0.3)
BILIRUB SERPL-MCNC: 0.3 MG/DL (ref 0.1–1)
DIFFERENTIAL METHOD: ABNORMAL
EOSINOPHIL # BLD AUTO: 0.1 K/UL (ref 0–0.5)
EOSINOPHIL NFR BLD: 0.7 % (ref 0–8)
ERYTHROCYTE [DISTWIDTH] IN BLOOD BY AUTOMATED COUNT: 13.2 % (ref 11.5–14.5)
HCT VFR BLD AUTO: 41.9 % (ref 37–48.5)
HGB BLD-MCNC: 12.8 G/DL (ref 12–16)
IMM GRANULOCYTES # BLD AUTO: 0.05 K/UL (ref 0–0.04)
IMM GRANULOCYTES NFR BLD AUTO: 0.5 % (ref 0–0.5)
LYMPHOCYTES # BLD AUTO: 2.5 K/UL (ref 1–4.8)
LYMPHOCYTES NFR BLD: 26.8 % (ref 18–48)
MCH RBC QN AUTO: 31 PG (ref 27–31)
MCHC RBC AUTO-ENTMCNC: 30.5 G/DL (ref 32–36)
MCV RBC AUTO: 102 FL (ref 82–98)
MONOCYTES # BLD AUTO: 0.6 K/UL (ref 0.3–1)
MONOCYTES NFR BLD: 6 % (ref 4–15)
NEUTROPHILS # BLD AUTO: 6.2 K/UL (ref 1.8–7.7)
NEUTROPHILS NFR BLD: 65.4 % (ref 38–73)
NRBC BLD-RTO: 0 /100 WBC
PLATELET # BLD AUTO: 383 K/UL (ref 150–350)
PMV BLD AUTO: 9.5 FL (ref 9.2–12.9)
PROT SERPL-MCNC: 7 G/DL (ref 6–8.4)
RBC # BLD AUTO: 4.13 M/UL (ref 4–5.4)
WBC # BLD AUTO: 9.41 K/UL (ref 3.9–12.7)

## 2020-05-01 PROCEDURE — 36415 COLL VENOUS BLD VENIPUNCTURE: CPT | Mod: HCNC,PO

## 2020-05-01 PROCEDURE — 80076 HEPATIC FUNCTION PANEL: CPT | Mod: HCNC

## 2020-05-01 PROCEDURE — 85025 COMPLETE CBC W/AUTO DIFF WBC: CPT | Mod: HCNC

## 2020-05-18 DIAGNOSIS — G93.9 PONTINE LESION: ICD-10-CM

## 2020-05-18 DIAGNOSIS — G37.9 DEMYELINATING DISEASE OF CENTRAL NERVOUS SYSTEM, UNSPECIFIED: ICD-10-CM

## 2020-05-18 DIAGNOSIS — R90.89 ABNORMAL FINDING ON MRI OF BRAIN: ICD-10-CM

## 2020-05-18 RX ORDER — PREDNISONE 10 MG/1
10 TABLET ORAL DAILY
Qty: 30 TABLET | Refills: 0 | Status: SHIPPED | OUTPATIENT
Start: 2020-05-18 | End: 2020-11-16

## 2020-06-18 ENCOUNTER — TELEPHONE (OUTPATIENT)
Dept: INTERNAL MEDICINE | Facility: CLINIC | Age: 40
End: 2020-06-18

## 2020-06-19 ENCOUNTER — LAB VISIT (OUTPATIENT)
Dept: LAB | Facility: HOSPITAL | Age: 40
End: 2020-06-19
Attending: PSYCHIATRY & NEUROLOGY
Payer: MEDICARE

## 2020-06-19 DIAGNOSIS — M35.2 BEHCET'S DISEASE: ICD-10-CM

## 2020-06-19 LAB
ALBUMIN SERPL BCP-MCNC: 3.5 G/DL (ref 3.5–5.2)
ALP SERPL-CCNC: 72 U/L (ref 55–135)
ALT SERPL W/O P-5'-P-CCNC: 26 U/L (ref 10–44)
AST SERPL-CCNC: 32 U/L (ref 10–40)
BASOPHILS # BLD AUTO: 0.06 K/UL (ref 0–0.2)
BASOPHILS NFR BLD: 0.8 % (ref 0–1.9)
BILIRUB DIRECT SERPL-MCNC: 0.1 MG/DL (ref 0.1–0.3)
BILIRUB SERPL-MCNC: 0.3 MG/DL (ref 0.1–1)
DIFFERENTIAL METHOD: ABNORMAL
EOSINOPHIL # BLD AUTO: 0.1 K/UL (ref 0–0.5)
EOSINOPHIL NFR BLD: 0.6 % (ref 0–8)
ERYTHROCYTE [DISTWIDTH] IN BLOOD BY AUTOMATED COUNT: 14.3 % (ref 11.5–14.5)
HCT VFR BLD AUTO: 40.6 % (ref 37–48.5)
HGB BLD-MCNC: 12.2 G/DL (ref 12–16)
IMM GRANULOCYTES # BLD AUTO: 0.03 K/UL (ref 0–0.04)
IMM GRANULOCYTES NFR BLD AUTO: 0.4 % (ref 0–0.5)
LYMPHOCYTES # BLD AUTO: 2.1 K/UL (ref 1–4.8)
LYMPHOCYTES NFR BLD: 26.4 % (ref 18–48)
MCH RBC QN AUTO: 31.3 PG (ref 27–31)
MCHC RBC AUTO-ENTMCNC: 30 G/DL (ref 32–36)
MCV RBC AUTO: 104 FL (ref 82–98)
MONOCYTES # BLD AUTO: 0.6 K/UL (ref 0.3–1)
MONOCYTES NFR BLD: 7.2 % (ref 4–15)
NEUTROPHILS # BLD AUTO: 5 K/UL (ref 1.8–7.7)
NEUTROPHILS NFR BLD: 64.6 % (ref 38–73)
NRBC BLD-RTO: 0 /100 WBC
PLATELET # BLD AUTO: 465 K/UL (ref 150–350)
PMV BLD AUTO: 9.6 FL (ref 9.2–12.9)
PROT SERPL-MCNC: 6.6 G/DL (ref 6–8.4)
RBC # BLD AUTO: 3.9 M/UL (ref 4–5.4)
WBC # BLD AUTO: 7.8 K/UL (ref 3.9–12.7)

## 2020-06-19 PROCEDURE — 36415 COLL VENOUS BLD VENIPUNCTURE: CPT | Mod: HCNC,PO

## 2020-06-19 PROCEDURE — 85025 COMPLETE CBC W/AUTO DIFF WBC: CPT | Mod: HCNC

## 2020-06-19 PROCEDURE — 80076 HEPATIC FUNCTION PANEL: CPT | Mod: HCNC

## 2020-07-09 ENCOUNTER — TELEPHONE (OUTPATIENT)
Dept: INTERNAL MEDICINE | Facility: CLINIC | Age: 40
End: 2020-07-09

## 2020-07-09 NOTE — TELEPHONE ENCOUNTER
----- Message from Marylou Dinero sent at 7/9/2020  4:07 PM CDT -----  Contact: Pt-- [phone]  Type:  Patient Returning Call    Who Called:Pt    Who Left Message for Patient:Didi    Does the patient know what this is regarding?: yes    Would the patient rather a call back or a response via [facility]? Call    Best Call Back Number: [phone]

## 2020-07-31 NOTE — SUBJECTIVE & OBJECTIVE
Neurologic Chief Complaint: Pontine Lesion    Subjective:     Interval History: Patient is seen for follow-up neurological assessment and treatment recommendations:     Headache resolved after LP yesterday.  Discussed with neuroradiology, suspicious for neuro-behcets.      HPI, Past Medical, Family, and Social History remains the same as documented in the initial encounter.     Review of Systems   Constitutional: Negative for fever.   Eyes: Positive for visual disturbance.   Respiratory: Negative for shortness of breath.    Cardiovascular: Negative for chest pain.   Gastrointestinal: Negative for abdominal pain.   Genitourinary: Negative for dysuria.   Skin: Negative for rash.   Neurological: Positive for speech difficulty, weakness and headaches. Negative for numbness.   Psychiatric/Behavioral: Negative for confusion.     Scheduled Meds:   citalopram  20 mg Oral Daily    dipyridamole-aspirin 200-25 mg  1 capsule Oral BID    gabapentin  300 mg Oral TID    heparin (porcine)  5,000 Units Subcutaneous Q8H    nicotine  1 patch Transdermal Daily    rosuvastatin  20 mg Oral Daily    sodium chloride 0.9%  3 mL Intravenous Q8H     Continuous Infusions:    PRN Meds:acetaminophen, labetalol, ondansetron, polyethylene glycol    Objective:     Vital Signs (Most Recent):  Temp: 97.6 °F (36.4 °C) (10/26/18 0427)  Pulse: 78 (10/26/18 0901)  Resp: 15 (10/26/18 0901)  BP: (!) 111/58 (10/26/18 0901)  SpO2: 95 % (10/26/18 0901)  BP Location: Right arm    Vital Signs Range (Last 24H):  Temp:  [96.7 °F (35.9 °C)-98.7 °F (37.1 °C)]   Pulse:  [64-94]   Resp:  [14-22]   BP: ()/(51-73)   SpO2:  [92 %-100 %]   BP Location: Right arm    Physical Exam   Constitutional: She is oriented to person, place, and time. She appears well-developed. No distress.   HENT:   Head: Normocephalic.   No oral ulcers noted   Eyes: EOM are normal. Pupils are equal, round, and reactive to light.   Cardiovascular: Normal rate, regular rhythm and  normal heart sounds. Exam reveals no friction rub.   No murmur heard.  Pulmonary/Chest: Effort normal and breath sounds normal. No stridor. No respiratory distress.   Abdominal: Soft. Bowel sounds are normal. She exhibits no distension. There is no tenderness.   Musculoskeletal: She exhibits no edema.   Neurological: She is oriented to person, place, and time.   Skin: Skin is warm and dry.        Neurological Exam:   LOC: alert  Attention Span: Good   Language: No aphasia  Articulation: Dysarthria  Orientation: Person, Place, Time   Visual Fields: Full  EOM (CN III, IV, VI): Full/intact  Pupils (CN II, III): PERRL  Facial Sensation (CN V): Normal  Facial Movement (CN VII): Lower facial weakness on the Right  Motor: 5/5 in BUE, 4+/5 RLE, 5/5 LLE  Cebellar: finger/nose intact b/l  Sensation: to light touch intact in BUE and BLE  Tone: Increased on RUE    Color plates: 2/17 on R (although limited by decreased visual acuity), 17/17 on L    Laboratory:  Recent Results (from the past 24 hour(s))   CSF cell count with differential    Collection Time: 10/25/18  2:39 PM   Result Value Ref Range    Heme Aliquot 1.0 mL    Appearance, CSF Clear Clear    Color, CSF Colorless Colorless    WBC,  (H) 0 - 5 /cu mm    RBC, CSF 9 (A) 0 /cu mm    Segmented Neutrophils, CSF 2 0 - 6 %    Lymphs, CSF 65 40 - 80 %    Mono/Macrophage, CSF 33 15 - 45 %   Protein, CSF    Collection Time: 10/25/18  2:39 PM   Result Value Ref Range    Protein, CSF 40 15 - 40 mg/dL   Glucose, CSF    Collection Time: 10/25/18  2:39 PM   Result Value Ref Range    Glucose, CSF 46 40 - 70 mg/dL   CSF cell count with differential    Collection Time: 10/25/18  2:39 PM   Result Value Ref Range    Heme Aliquot 1.0 mL    Appearance, CSF Clear Clear    Color, CSF Colorless Colorless    WBC,  (H) 0 - 5 /cu mm    RBC, CSF 2 (A) 0 /cu mm    Segmented Neutrophils, CSF 3 0 - 6 %    Lymphs, CSF 59 40 - 80 %    Mono/Macrophage, CSF 38 15 - 45 %   CSF culture     Collection Time: 10/25/18  2:40 PM   Result Value Ref Range    CSF CULTURE No Growth to date     Gram Stain Result Cytospin indicates:     Gram Stain Result Rare WBC's     Gram Stain Result No organisms seen    Connie Ink (CSF)    Collection Time: 10/25/18  2:40 PM   Result Value Ref Range    Connie Ink No encapsulated yeast seen    M. tuberculosis DNA by PCR [facility]; Cerebrospinal Fluid    Collection Time: 10/25/18  2:41 PM   Result Value Ref Range    MTB Specimen Source Cerebrospinal Fluid    Toxoplasma Gondii/PCR, Non-blood Cerebrospinal Fluid    Collection Time: 10/25/18  2:41 PM   Result Value Ref Range    T. gondii Source Cerebrospinal Fluid    Varicella Zoster By Rapid Pcr Cerebrospinal Fluid    Collection Time: 10/25/18  2:42 PM   Result Value Ref Range    VZV by PCR Source Cerebrospinal Fluid    MS Profile Blood Collection    Collection Time: 10/25/18  3:28 PM   Result Value Ref Range    Blood Collection for MS Profile See MS Panel Result when available    CBC auto differential    Collection Time: 10/26/18  6:20 AM   Result Value Ref Range    WBC 6.80 3.90 - 12.70 K/uL    RBC 3.44 (L) 4.00 - 5.40 M/uL    Hemoglobin 10.7 (L) 12.0 - 16.0 g/dL    Hematocrit 34.6 (L) 37.0 - 48.5 %     (H) 82 - 98 fL    MCH 31.1 (H) 27.0 - 31.0 pg    MCHC 30.9 (L) 32.0 - 36.0 g/dL    RDW 12.6 11.5 - 14.5 %    Platelets 345 150 - 350 K/uL    MPV 9.8 9.2 - 12.9 fL    Immature Granulocytes 0.3 0.0 - 0.5 %    Gran # (ANC) 4.3 1.8 - 7.7 K/uL    Immature Grans (Abs) 0.02 0.00 - 0.04 K/uL    Lymph # 1.8 1.0 - 4.8 K/uL    Mono # 0.7 0.3 - 1.0 K/uL    Eos # 0.0 0.0 - 0.5 K/uL    Baso # 0.02 0.00 - 0.20 K/uL    nRBC 0 0 /100 WBC    Gran% 62.5 38.0 - 73.0 %    Lymph% 26.6 18.0 - 48.0 %    Mono% 9.9 4.0 - 15.0 %    Eosinophil% 0.4 0.0 - 8.0 %    Basophil% 0.3 0.0 - 1.9 %    Differential Method Automated    Comprehensive metabolic panel    Collection Time: 10/26/18  6:20 AM   Result Value Ref Range    Sodium 139 136 - 145 mmol/L     Potassium 4.0 3.5 - 5.1 mmol/L    Chloride 108 95 - 110 mmol/L    CO2 26 23 - 29 mmol/L    Glucose 80 70 - 110 mg/dL    BUN, Bld 11 6 - 20 mg/dL    Creatinine 0.5 0.5 - 1.4 mg/dL    Calcium 8.5 (L) 8.7 - 10.5 mg/dL    Total Protein 5.5 (L) 6.0 - 8.4 g/dL    Albumin 2.8 (L) 3.5 - 5.2 g/dL    Total Bilirubin 0.2 0.1 - 1.0 mg/dL    Alkaline Phosphatase 55 55 - 135 U/L    AST 21 10 - 40 U/L    ALT 23 10 - 44 U/L    Anion Gap 5 (L) 8 - 16 mmol/L    eGFR if African American >60.0 >60 mL/min/1.73 m^2    eGFR if non African American >60.0 >60 mL/min/1.73 m^2   Magnesium    Collection Time: 10/26/18  6:20 AM   Result Value Ref Range    Magnesium 1.8 1.6 - 2.6 mg/dL   Phosphorus    Collection Time: 10/26/18  6:20 AM   Result Value Ref Range    Phosphorus 2.8 2.7 - 4.5 mg/dL   Vitamin B12    Collection Time: 10/26/18  8:52 AM   Result Value Ref Range    Vitamin B-12 543 210 - 950 pg/mL   Folate    Collection Time: 10/26/18  8:52 AM   Result Value Ref Range    Folate 6.6 4.0 - 24.0 ng/mL   Homocysteine, serum    Collection Time: 10/26/18  8:52 AM   Result Value Ref Range    Homocysteine 4.9 4.0 - 15.5 umol/L         Diagnostic Results     Brain Imaging   10/25/18 MRI Brain w/ and w/o: Per independent resident review and interpretation,  Ring enhancing-lesion with surrounding T2 flair edema with abnormal diffusion restriction in b/l jensen.                Vessel Imaging   10/25/18 CTA Head and neck: Per independent resident review and interpretation,  No large-vessel occlusion, hemodynamically-significant stenosis, nor aneurysm visualized.    Cardiac Imaging   10/25/18 TTE: Per report,  LA wnl  EF 60-65%  No wall motion abnormalities  Diastolic function normal         Carried

## 2020-08-04 ENCOUNTER — OFFICE VISIT (OUTPATIENT)
Dept: NEUROLOGY | Facility: CLINIC | Age: 40
End: 2020-08-04
Payer: MEDICARE

## 2020-08-04 DIAGNOSIS — I69.398 ABNORMALITY OF GAIT AS LATE EFFECT OF CEREBROVASCULAR ACCIDENT (CVA): ICD-10-CM

## 2020-08-04 DIAGNOSIS — M35.2 NEUROLOGIC TYPE BEHCET'S SYNDROME: ICD-10-CM

## 2020-08-04 DIAGNOSIS — D84.9 IMMUNOSUPPRESSION: ICD-10-CM

## 2020-08-04 DIAGNOSIS — Z71.89 COUNSELING REGARDING GOALS OF CARE: Primary | ICD-10-CM

## 2020-08-04 DIAGNOSIS — R26.9 ABNORMALITY OF GAIT AS LATE EFFECT OF CEREBROVASCULAR ACCIDENT (CVA): ICD-10-CM

## 2020-08-04 DIAGNOSIS — M35.2 BEHCET'S DISEASE: ICD-10-CM

## 2020-08-04 PROCEDURE — 99214 OFFICE O/P EST MOD 30 MIN: CPT | Mod: 95,,, | Performed by: PSYCHIATRY & NEUROLOGY

## 2020-08-04 PROCEDURE — 99214 PR OFFICE/OUTPT VISIT, EST, LEVL IV, 30-39 MIN: ICD-10-PCS | Mod: 95,,, | Performed by: PSYCHIATRY & NEUROLOGY

## 2020-08-04 PROCEDURE — 99499 RISK ADDL DX/OHS AUDIT: ICD-10-PCS | Mod: 95,,, | Performed by: PSYCHIATRY & NEUROLOGY

## 2020-08-04 PROCEDURE — 99499 UNLISTED E&M SERVICE: CPT | Mod: 95,,, | Performed by: PSYCHIATRY & NEUROLOGY

## 2020-08-04 RX ORDER — AZATHIOPRINE 100 MG/1
100 TABLET ORAL DAILY
Qty: 30 TABLET | Refills: 3 | Status: SHIPPED | OUTPATIENT
Start: 2020-08-04 | End: 2020-12-07 | Stop reason: SDUPTHER

## 2020-08-04 NOTE — PROGRESS NOTES
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       Patient ID: Maame Cortez is a 39 y.o. female who presents today for routine clinic visit for Neuro-Behcet's.      Since late April, she's been taking Imuran 50mg/day.  Has not increased it to 100 b/c of confusion with prescription.  No side effects.  Continues on prednisone 5mg / day.      She denies any new neurologic symptoms    SOCIAL HISTORY  Social History     Tobacco Use    Smoking status: Former Smoker     Packs/day: 0.50     Years: 8.00     Pack years: 4.00     Quit date: 2015     Years since quittin.9    Smokeless tobacco: Never Used    Tobacco comment: quit 2015   Substance Use Topics    Alcohol use: Yes     Alcohol/week: 0.0 standard drinks     Comment: Social    Drug use: No     Living arrangements - the patient lives with their spouse, and kids    REVIEW OF SYMPTOMS 2020   Do you feel abnormally tired on most days? No   Do you feel you generally sleep well? Yes   Do you have difficulty controlling your bladder?  No   Do you have difficulty controlling your bowels?  No   Do you have frequent muscle cramps, tightness or spasms in your limbs?  No   Do you have new visual symptoms?  No   Do you have worsening difficulty with your memory or thinking? No   Do you have worsening symptoms of anxiety or depression?  No   For patients who walk, Do you have more difficulty walking?  No   Have you  "fallen since your last visit?  Yes----"I don't remember"   For patients who use wheelchairs: Do you have any skin wounds or breakdown? No   Do you have difficulty using your hands?  Yes--unchanged   Do you have shooting or burning pain? No   Do you have difficulty with sexual function?  No   If you are sexually active, are you using birth control? Y/N  N/A No   Do you often choke when swallowing liquids or solid food?  No   Do you experience worsening symptoms when overheated? No   Do you need any new equipment such as a wheelchair, walker or shower chair? No   Do you receive co-pay financial assistance for your principal MS medicine? No   Would you be interested in participating in an MS research trial in the future? No   For patients on Gilenya, Tecfidera, Aubagio, Rituxan, Ocrevus, Tysabri, Lemtrada or Methotrexate, are you aware that you should NOT receive live virus vaccines?  Not Applicable   Do you feel you have adequate family/friend support?  Yes   Do you have health insurance?   Yes   Are you currently employed? No   Do you receive SSDI/SSI?  Yes   Do you use marijuana or cannabis products? No   Have you been diagnosed with a urinary tract infection since your last visit here? No   Have you been diagnosed with a respiratory tract infection since your last visit here? No   Have you been to the emergency room since your last visit here? No   Have you been hospitalized since your last visit here?  No              PHYSICAL EXAM  MS: intact  CN:dysarthria improved--mild now; no QIANA;  MOTOR: right spastic hemiparesis (old since stroke 2015)  GAIT: right footdrop, spastic circumduction (old since stroke 2015)       Lab Visit on 06/19/2020   Component Date Value    WBC 06/19/2020 7.80     RBC 06/19/2020 3.90*    Hemoglobin 06/19/2020 12.2     Hematocrit 06/19/2020 40.6     Mean Corpuscular Volume 06/19/2020 104*    Mean Corpuscular Hemoglo* 06/19/2020 31.3*    Mean Corpuscular Hemoglo* 06/19/2020 30.0*    " RDW 06/19/2020 14.3     Platelets 06/19/2020 465*    MPV 06/19/2020 9.6     Immature Granulocytes 06/19/2020 0.4     Gran # (ANC) 06/19/2020 5.0     Immature Grans (Abs) 06/19/2020 0.03     Lymph # 06/19/2020 2.1     Mono # 06/19/2020 0.6     Eos # 06/19/2020 0.1     Baso # 06/19/2020 0.06     nRBC 06/19/2020 0     Gran% 06/19/2020 64.6     Lymph% 06/19/2020 26.4     Mono% 06/19/2020 7.2     Eosinophil% 06/19/2020 0.6     Basophil% 06/19/2020 0.8     Differential Method 06/19/2020 Automated     Total Protein 06/19/2020 6.6     Albumin 06/19/2020 3.5     Total Bilirubin 06/19/2020 0.3     Bilirubin, Direct 06/19/2020 0.1     AST 06/19/2020 32     ALT 06/19/2020 26     Alkaline Phosphatase 06/19/2020 72          Diagnosis/Assessment/Plan:   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     Problem List Items Addressed This Visit        1 - High    Neurologic type Behcet's syndrome       3     Abnormality of gait as late effect of cerebrovascular accident (CVA)       Unprioritized    Immunosuppression      Other Visit Diagnoses     Counseling regarding goals of care    -  Primary    Behcet's disease        Relevant Medications    azaTHIOprine (IMURAN) 100 mg tablet    Other Relevant Orders    CBC auto differential    Hepatic function panel

## 2020-08-11 PROBLEM — D84.9 IMMUNOSUPPRESSION: Status: ACTIVE | Noted: 2020-08-11

## 2020-09-07 PROBLEM — Z79.899 LONG-TERM USE OF HIGH-RISK MEDICATION: Status: ACTIVE | Noted: 2020-09-07

## 2020-09-07 PROBLEM — R51.9 HEADACHE: Status: RESOLVED | Noted: 2017-09-29 | Resolved: 2020-09-07

## 2020-09-07 PROBLEM — D75.839 THROMBOCYTOSIS: Status: ACTIVE | Noted: 2020-09-07

## 2020-09-07 PROBLEM — E04.2 MULTINODULAR THYROID: Status: ACTIVE | Noted: 2020-09-07

## 2020-09-07 PROBLEM — Z91.81 RISK FOR FALLS: Status: ACTIVE | Noted: 2020-09-07

## 2020-09-08 PROBLEM — Z91.89 AT HIGH RISK FOR INFECTION: Status: ACTIVE | Noted: 2020-09-08

## 2020-09-08 PROBLEM — Z79.52 LONG TERM (CURRENT) USE OF SYSTEMIC STEROIDS: Status: ACTIVE | Noted: 2020-09-08

## 2020-10-01 ENCOUNTER — LAB VISIT (OUTPATIENT)
Dept: LAB | Facility: HOSPITAL | Age: 40
End: 2020-10-01
Attending: PSYCHIATRY & NEUROLOGY
Payer: MEDICARE

## 2020-10-01 ENCOUNTER — OFFICE VISIT (OUTPATIENT)
Dept: INTERNAL MEDICINE | Facility: CLINIC | Age: 40
End: 2020-10-01
Payer: MEDICARE

## 2020-10-01 VITALS
DIASTOLIC BLOOD PRESSURE: 78 MMHG | SYSTOLIC BLOOD PRESSURE: 104 MMHG | WEIGHT: 141.31 LBS | BODY MASS INDEX: 24.13 KG/M2 | HEART RATE: 88 BPM | HEIGHT: 64 IN

## 2020-10-01 DIAGNOSIS — Z91.89 AT HIGH RISK FOR INFECTION: ICD-10-CM

## 2020-10-01 DIAGNOSIS — G81.91 RIGHT HEMIPARESIS: ICD-10-CM

## 2020-10-01 DIAGNOSIS — I63.50 RIGHT PONTINE STROKE: ICD-10-CM

## 2020-10-01 DIAGNOSIS — D84.9 IMMUNOSUPPRESSION: ICD-10-CM

## 2020-10-01 DIAGNOSIS — D89.89 AUTOIMMUNE DISORDER: ICD-10-CM

## 2020-10-01 DIAGNOSIS — R47.1 DYSARTHRIA: ICD-10-CM

## 2020-10-01 DIAGNOSIS — F32.5 MAJOR DEPRESSIVE DISORDER WITH SINGLE EPISODE, IN REMISSION: ICD-10-CM

## 2020-10-01 DIAGNOSIS — Z78.9 IMPAIRED MOBILITY AND ADLS: ICD-10-CM

## 2020-10-01 DIAGNOSIS — D75.839 THROMBOCYTOSIS: ICD-10-CM

## 2020-10-01 DIAGNOSIS — E04.2 MULTINODULAR THYROID: ICD-10-CM

## 2020-10-01 DIAGNOSIS — Z72.0 TOBACCO ABUSE: ICD-10-CM

## 2020-10-01 DIAGNOSIS — Z15.89 MTHFR MUTATION: ICD-10-CM

## 2020-10-01 DIAGNOSIS — Z74.09 IMPAIRED MOBILITY AND ADLS: ICD-10-CM

## 2020-10-01 DIAGNOSIS — R29.810 FACIAL DROOP: ICD-10-CM

## 2020-10-01 DIAGNOSIS — I69.30 HISTORY OF CVA WITH RESIDUAL DEFICIT: ICD-10-CM

## 2020-10-01 DIAGNOSIS — E78.5 HYPERLIPIDEMIA, UNSPECIFIED HYPERLIPIDEMIA TYPE: ICD-10-CM

## 2020-10-01 DIAGNOSIS — M35.2 NEUROLOGIC TYPE BEHCET'S SYNDROME: ICD-10-CM

## 2020-10-01 DIAGNOSIS — I69.319 COGNITIVE DEFICIT FOLLOWING CEREBROVASCULAR ACCIDENT (CVA): ICD-10-CM

## 2020-10-01 DIAGNOSIS — I69.398 ABNORMALITY OF GAIT AS LATE EFFECT OF CEREBROVASCULAR ACCIDENT (CVA): ICD-10-CM

## 2020-10-01 DIAGNOSIS — M35.2 BEHCET'S DISEASE: ICD-10-CM

## 2020-10-01 DIAGNOSIS — R26.9 ABNORMALITY OF GAIT AS LATE EFFECT OF CEREBROVASCULAR ACCIDENT (CVA): ICD-10-CM

## 2020-10-01 DIAGNOSIS — Z00.00 ENCOUNTER FOR PREVENTIVE HEALTH EXAMINATION: Primary | ICD-10-CM

## 2020-10-01 DIAGNOSIS — Z79.52 LONG TERM (CURRENT) USE OF SYSTEMIC STEROIDS: ICD-10-CM

## 2020-10-01 DIAGNOSIS — H47.011 ISCHEMIC OPTIC NEUROPATHY OF RIGHT EYE: ICD-10-CM

## 2020-10-01 DIAGNOSIS — Z78.9 ALCOHOL USE: ICD-10-CM

## 2020-10-01 LAB
ALBUMIN SERPL BCP-MCNC: 3.8 G/DL (ref 3.5–5.2)
ALP SERPL-CCNC: 59 U/L (ref 55–135)
ALT SERPL W/O P-5'-P-CCNC: 16 U/L (ref 10–44)
AST SERPL-CCNC: 18 U/L (ref 10–40)
BASOPHILS # BLD AUTO: 0.02 K/UL (ref 0–0.2)
BASOPHILS NFR BLD: 0.3 % (ref 0–1.9)
BILIRUB DIRECT SERPL-MCNC: 0.2 MG/DL (ref 0.1–0.3)
BILIRUB SERPL-MCNC: 0.4 MG/DL (ref 0.1–1)
DIFFERENTIAL METHOD: ABNORMAL
EOSINOPHIL # BLD AUTO: 0.1 K/UL (ref 0–0.5)
EOSINOPHIL NFR BLD: 1.1 % (ref 0–8)
ERYTHROCYTE [DISTWIDTH] IN BLOOD BY AUTOMATED COUNT: 15.1 % (ref 11.5–14.5)
HCT VFR BLD AUTO: 36.7 % (ref 37–48.5)
HGB BLD-MCNC: 11.5 G/DL (ref 12–16)
IMM GRANULOCYTES # BLD AUTO: 0.04 K/UL (ref 0–0.04)
IMM GRANULOCYTES NFR BLD AUTO: 0.6 % (ref 0–0.5)
LYMPHOCYTES # BLD AUTO: 1.3 K/UL (ref 1–4.8)
LYMPHOCYTES NFR BLD: 21.3 % (ref 18–48)
MCH RBC QN AUTO: 33.5 PG (ref 27–31)
MCHC RBC AUTO-ENTMCNC: 31.3 G/DL (ref 32–36)
MCV RBC AUTO: 107 FL (ref 82–98)
MONOCYTES # BLD AUTO: 0.3 K/UL (ref 0.3–1)
MONOCYTES NFR BLD: 5.4 % (ref 4–15)
NEUTROPHILS # BLD AUTO: 4.5 K/UL (ref 1.8–7.7)
NEUTROPHILS NFR BLD: 71.3 % (ref 38–73)
NRBC BLD-RTO: 0 /100 WBC
PLATELET # BLD AUTO: 424 K/UL (ref 150–350)
PMV BLD AUTO: 9.6 FL (ref 9.2–12.9)
PROT SERPL-MCNC: 6.8 G/DL (ref 6–8.4)
RBC # BLD AUTO: 3.43 M/UL (ref 4–5.4)
WBC # BLD AUTO: 6.28 K/UL (ref 3.9–12.7)

## 2020-10-01 PROCEDURE — 85025 COMPLETE CBC W/AUTO DIFF WBC: CPT | Mod: HCNC

## 2020-10-01 PROCEDURE — 99499 RISK ADDL DX/OHS AUDIT: ICD-10-PCS | Mod: S$GLB,,, | Performed by: NURSE PRACTITIONER

## 2020-10-01 PROCEDURE — 80076 HEPATIC FUNCTION PANEL: CPT | Mod: HCNC

## 2020-10-01 PROCEDURE — 99999 PR PBB SHADOW E&M-EST. PATIENT-LVL IV: CPT | Mod: PBBFAC,HCNC,, | Performed by: NURSE PRACTITIONER

## 2020-10-01 PROCEDURE — 99999 PR PBB SHADOW E&M-EST. PATIENT-LVL IV: ICD-10-PCS | Mod: PBBFAC,HCNC,, | Performed by: NURSE PRACTITIONER

## 2020-10-01 PROCEDURE — 36415 COLL VENOUS BLD VENIPUNCTURE: CPT | Mod: HCNC

## 2020-10-01 PROCEDURE — G0439 PR MEDICARE ANNUAL WELLNESS SUBSEQUENT VISIT: ICD-10-PCS | Mod: HCNC,S$GLB,, | Performed by: NURSE PRACTITIONER

## 2020-10-01 PROCEDURE — 99499 UNLISTED E&M SERVICE: CPT | Mod: S$GLB,,, | Performed by: NURSE PRACTITIONER

## 2020-10-01 PROCEDURE — G0439 PPPS, SUBSEQ VISIT: HCPCS | Mod: HCNC,S$GLB,, | Performed by: NURSE PRACTITIONER

## 2020-10-01 NOTE — PROGRESS NOTES
"  Maame Cortez presented for a  Medicare AWV and comprehensive Health Risk Assessment today. The following components were reviewed and updated:    · Medical history  · Family History  · Social history  · Allergies and Current Medications  · Health Risk Assessment  · Health Maintenance  · Care Team     ** See Completed Assessments for Annual Wellness Visit within the encounter summary.**         The following assessments were completed:  · Living Situation  · CAGE  · Depression Screening  · Timed Get Up and Go  · Whisper Test  · Cognitive Function Screening  · Nutrition Screening  · ADL Screening  · PAQ Screening        Vitals:    10/01/20 1120   BP: 104/78   BP Location: Left arm   Patient Position: Sitting   Pulse: 88   Weight: 64.1 kg (141 lb 5 oz)   Height: 5' 4" (1.626 m)     Body mass index is 24.26 kg/m².     Physical Exam  Constitutional:       General: She is not in acute distress.     Appearance: She is well-developed. She is not ill-appearing or diaphoretic.   HENT:      Head: Normocephalic and atraumatic.   Eyes:      General: No scleral icterus.     Conjunctiva/sclera: Conjunctivae normal.   Neck:      Musculoskeletal: Normal range of motion and neck supple.   Cardiovascular:      Rate and Rhythm: Normal rate and regular rhythm.      Pulses: Normal pulses.      Heart sounds: Normal heart sounds.   Pulmonary:      Effort: Pulmonary effort is normal. No respiratory distress.      Breath sounds: Normal breath sounds.   Abdominal:      General: There is no distension.   Musculoskeletal: Normal range of motion.   Skin:     General: Skin is warm and dry.   Neurological:      Mental Status: She is alert and oriented to person, place, and time.      Comments: Right sided weakness noted   Psychiatric:         Behavior: Behavior normal.           Diagnoses and health risks identified today and associated recommendations/orders:    1. Encounter for preventive health examination  Assessment completed  Preventive " health recommendations reviewed  Declines vaccines  Follow-up with gynecology recommended, follow-up with PCP recommended    2. Neurologic type Behcet's syndrome  Stable.   Controlled with current medical therapy  Followed by neurology.     3. Autoimmune disorder  Stable.   Controlled with current medical therapy  Evaluated by rheumatology and genetics  Followed by PCP and Neurology.     4. MTHFR mutation  Stable.   Evaluated by Genetics  Followed by PCP.     5. History of CVA with residual deficit- 2015  Stable.   Controlled with current medical therapy  Followed by neurology.     6. history of Right pontine stroke  Stable.   Controlled with current medical therapy  Followed by neurology.     7. Cognitive deficit following cerebrovascular accident (CVA)  Stable.   Controlled with current medical therapy  Followed by neurology.     8. 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   Followed by neurology.     9. Right hemiparesis  Stable.   Followed by neurology.     10. Dysarthria  Stable.   Followed by neurology      11. Facial droop  Stable.   Followed by neurology      12. Ischemic optic neuropathy of right eye  Stable.   Followed by neurology and ophthalmology    13. Major depressive disorder with single episode, in remission  Stable.   Controlled with current medical therapy  Followed by PCP.     14. Alcohol use  Recommended patient cup back on alcohol use.  Offered resources for alcohol cessation.  Patient declined at this time  Followed by PCP.     15. At high risk for infection-immunosuppresson- Imuran, prednisone  Stable.   Followed by PCP and Neurology.     16. Immunosuppression  Stable.   Followed by PCP and Neurology.     17. Long term (current) use of systemic steroids  Stable.   Followed by PCP and Neurology.     18. Thrombocytosis  Stable.   Followed by PCP.     19. Hyperlipidemia, unspecified hyperlipidemia type  Per patient does not have high cholesterol.  Doctors  want cholesterol lower due to history of stroke  Followed by PCP.     20. Multinodular thyroid- 2/23/2018 thyroid neck U/S  Stable.   Followed by PCP.     21. Impaired mobility and ADLs  Stable.   No longer needs assistive devices  Followed by PCP.     22. Tobacco abuse  Smoking cessation encouraged.  Followed by PCP.     Provided Rainna with a 5-10 year written screening schedule and personal prevention plan. Recommendations were developed using the USPSTF age appropriate recommendations. Education, counseling, and referrals were provided as needed. After Visit Summary printed and given to patient which includes a list of additional screenings\tests needed.    Follow up in about 1 year (around 10/1/2021) for your routine health assessment visit.    Liza Guzman NP

## 2020-10-01 NOTE — PATIENT INSTRUCTIONS
Labs today for Dr. Butt    Schedule follow up appointments with Dr. Butt for December and a follow up visit with Dr. Gupta.  Can schedule at the check out desk or by calling [phone]      Counseling and Referral of Other Preventative  (Italic type indicates deductible and co-insurance are waived)    Patient Name: Maame Cortez  Today's Date: 10/1/2020    Health Maintenance       Date Due Completion Date    TETANUS VACCINE 11/06/1998 ---    Aspirin/Antiplatelet Therapy 11/06/1998 ---    Pneumococcal Vaccine (Medium Risk) (1 of 1 - PPSV23) 11/06/1999 ---    Cervical Cancer Screening 05/10/2020 5/10/2017    Influenza Vaccine (1) 08/01/2020 ---        No orders of the defined types were placed in this encounter.    The following information is provided to all patients.  This information is to help you find resources for any of the problems found today that may be affecting your health:                Living healthy guide: www.[facility].[place].[facility]      Understanding Diabetes: www.diabetes.org      Eating healthy: www.cdc.gov/healthyweight      CDC home safety checklist: www.cdc.gov/steadi/patient.html      Agency on Aging: www.goea.[place].[facility]      Alcoholics anonymous (AA): www.aa.org      Physical Activity: www.raphael.nih.gov/sx3opgi      Tobacco use: www.quitwithusla.org

## 2020-11-02 ENCOUNTER — PATIENT MESSAGE (OUTPATIENT)
Dept: PSYCHIATRY | Facility: CLINIC | Age: 40
End: 2020-11-02

## 2020-12-04 ENCOUNTER — PATIENT OUTREACH (OUTPATIENT)
Dept: ADMINISTRATIVE | Facility: OTHER | Age: 40
End: 2020-12-04

## 2020-12-04 DIAGNOSIS — Z12.31 ENCOUNTER FOR SCREENING MAMMOGRAM FOR MALIGNANT NEOPLASM OF BREAST: Primary | ICD-10-CM

## 2020-12-04 NOTE — PROGRESS NOTES
Care Everywhere: updated  Immunization: updated, links delay   Health Maintenance: updated  Media Review:review for outside mammogram report   Legacy Review:   Order placed: mammogram   Upcoming appts:

## 2020-12-07 ENCOUNTER — OFFICE VISIT (OUTPATIENT)
Dept: NEUROLOGY | Facility: CLINIC | Age: 40
End: 2020-12-07
Payer: MEDICARE

## 2020-12-07 ENCOUNTER — LAB VISIT (OUTPATIENT)
Dept: LAB | Facility: HOSPITAL | Age: 40
End: 2020-12-07
Attending: PSYCHIATRY & NEUROLOGY
Payer: MEDICARE

## 2020-12-07 VITALS
DIASTOLIC BLOOD PRESSURE: 73 MMHG | HEIGHT: 64 IN | TEMPERATURE: 97 F | BODY MASS INDEX: 23.58 KG/M2 | WEIGHT: 138.13 LBS | SYSTOLIC BLOOD PRESSURE: 117 MMHG | HEART RATE: 73 BPM

## 2020-12-07 DIAGNOSIS — M35.2 BEHCET'S SYNDROME, NEUROLOGIC TYPE: Primary | ICD-10-CM

## 2020-12-07 DIAGNOSIS — I69.398 ABNORMALITY OF GAIT AS LATE EFFECT OF CEREBROVASCULAR ACCIDENT (CVA): ICD-10-CM

## 2020-12-07 DIAGNOSIS — D84.9 IMMUNOSUPPRESSION: ICD-10-CM

## 2020-12-07 DIAGNOSIS — R26.9 ABNORMALITY OF GAIT AS LATE EFFECT OF CEREBROVASCULAR ACCIDENT (CVA): ICD-10-CM

## 2020-12-07 DIAGNOSIS — Z71.89 COUNSELING REGARDING GOALS OF CARE: ICD-10-CM

## 2020-12-07 DIAGNOSIS — M35.2 BEHCET'S DISEASE: ICD-10-CM

## 2020-12-07 DIAGNOSIS — M35.2 BEHCET'S SYNDROME, NEUROLOGIC TYPE: ICD-10-CM

## 2020-12-07 LAB
ALBUMIN SERPL BCP-MCNC: 3.6 G/DL (ref 3.5–5.2)
ALP SERPL-CCNC: 62 U/L (ref 55–135)
ALT SERPL W/O P-5'-P-CCNC: 20 U/L (ref 10–44)
ANION GAP SERPL CALC-SCNC: 8 MMOL/L (ref 8–16)
AST SERPL-CCNC: 21 U/L (ref 10–40)
BASOPHILS # BLD AUTO: 0.02 K/UL (ref 0–0.2)
BASOPHILS NFR BLD: 0.4 % (ref 0–1.9)
BILIRUB SERPL-MCNC: 0.5 MG/DL (ref 0.1–1)
BUN SERPL-MCNC: 12 MG/DL (ref 6–20)
CALCIUM SERPL-MCNC: 9.2 MG/DL (ref 8.7–10.5)
CHLORIDE SERPL-SCNC: 102 MMOL/L (ref 95–110)
CO2 SERPL-SCNC: 30 MMOL/L (ref 23–29)
CREAT SERPL-MCNC: 0.7 MG/DL (ref 0.5–1.4)
DIFFERENTIAL METHOD: ABNORMAL
EOSINOPHIL # BLD AUTO: 0.1 K/UL (ref 0–0.5)
EOSINOPHIL NFR BLD: 0.9 % (ref 0–8)
ERYTHROCYTE [DISTWIDTH] IN BLOOD BY AUTOMATED COUNT: 16.1 % (ref 11.5–14.5)
EST. GFR  (AFRICAN AMERICAN): >60 ML/MIN/1.73 M^2
EST. GFR  (NON AFRICAN AMERICAN): >60 ML/MIN/1.73 M^2
GLUCOSE SERPL-MCNC: 74 MG/DL (ref 70–110)
HCT VFR BLD AUTO: 35.6 % (ref 37–48.5)
HGB BLD-MCNC: 11.3 G/DL (ref 12–16)
IMM GRANULOCYTES # BLD AUTO: 0.03 K/UL (ref 0–0.04)
IMM GRANULOCYTES NFR BLD AUTO: 0.5 % (ref 0–0.5)
LYMPHOCYTES # BLD AUTO: 1.8 K/UL (ref 1–4.8)
LYMPHOCYTES NFR BLD: 31.5 % (ref 18–48)
MCH RBC QN AUTO: 35.5 PG (ref 27–31)
MCHC RBC AUTO-ENTMCNC: 31.7 G/DL (ref 32–36)
MCV RBC AUTO: 112 FL (ref 82–98)
MONOCYTES # BLD AUTO: 0.4 K/UL (ref 0.3–1)
MONOCYTES NFR BLD: 7.3 % (ref 4–15)
NEUTROPHILS # BLD AUTO: 3.3 K/UL (ref 1.8–7.7)
NEUTROPHILS NFR BLD: 59.4 % (ref 38–73)
NRBC BLD-RTO: 0 /100 WBC
PLATELET # BLD AUTO: 371 K/UL (ref 150–350)
PMV BLD AUTO: 9 FL (ref 9.2–12.9)
POTASSIUM SERPL-SCNC: 3.9 MMOL/L (ref 3.5–5.1)
PROT SERPL-MCNC: 6.7 G/DL (ref 6–8.4)
RBC # BLD AUTO: 3.18 M/UL (ref 4–5.4)
SODIUM SERPL-SCNC: 140 MMOL/L (ref 136–145)
WBC # BLD AUTO: 5.59 K/UL (ref 3.9–12.7)

## 2020-12-07 PROCEDURE — 99999 PR PBB SHADOW E&M-EST. PATIENT-LVL III: CPT | Mod: PBBFAC,HCNC,, | Performed by: PSYCHIATRY & NEUROLOGY

## 2020-12-07 PROCEDURE — 85025 COMPLETE CBC W/AUTO DIFF WBC: CPT | Mod: HCNC

## 2020-12-07 PROCEDURE — 99999 PR PBB SHADOW E&M-EST. PATIENT-LVL III: ICD-10-PCS | Mod: PBBFAC,HCNC,, | Performed by: PSYCHIATRY & NEUROLOGY

## 2020-12-07 PROCEDURE — 99215 PR OFFICE/OUTPT VISIT, EST, LEVL V, 40-54 MIN: ICD-10-PCS | Mod: HCNC,S$GLB,, | Performed by: PSYCHIATRY & NEUROLOGY

## 2020-12-07 PROCEDURE — 1126F AMNT PAIN NOTED NONE PRSNT: CPT | Mod: HCNC,S$GLB,, | Performed by: PSYCHIATRY & NEUROLOGY

## 2020-12-07 PROCEDURE — 36415 COLL VENOUS BLD VENIPUNCTURE: CPT | Mod: HCNC

## 2020-12-07 PROCEDURE — 3008F PR BODY MASS INDEX (BMI) DOCUMENTED: ICD-10-PCS | Mod: HCNC,CPTII,S$GLB, | Performed by: PSYCHIATRY & NEUROLOGY

## 2020-12-07 PROCEDURE — 3008F BODY MASS INDEX DOCD: CPT | Mod: HCNC,CPTII,S$GLB, | Performed by: PSYCHIATRY & NEUROLOGY

## 2020-12-07 PROCEDURE — 80053 COMPREHEN METABOLIC PANEL: CPT | Mod: HCNC

## 2020-12-07 PROCEDURE — 99215 OFFICE O/P EST HI 40 MIN: CPT | Mod: HCNC,S$GLB,, | Performed by: PSYCHIATRY & NEUROLOGY

## 2020-12-07 PROCEDURE — 1126F PR PAIN SEVERITY QUANTIFIED, NO PAIN PRESENT: ICD-10-PCS | Mod: HCNC,S$GLB,, | Performed by: PSYCHIATRY & NEUROLOGY

## 2020-12-07 RX ORDER — AZATHIOPRINE 100 MG/1
100 TABLET ORAL DAILY
Qty: 30 TABLET | Refills: 3 | Status: SHIPPED | OUTPATIENT
Start: 2020-12-07 | End: 2021-02-28

## 2020-12-07 RX ORDER — PREDNISONE 1 MG/1
TABLET ORAL
Qty: 360 TABLET | Refills: 1 | Status: SHIPPED | OUTPATIENT
Start: 2020-12-07 | End: 2021-11-01

## 2020-12-07 NOTE — PROGRESS NOTES
Subjective:       Patient ID: Maame Cortez is a 40 y.o. female who presents today for a routine clinic visit for Neuro-Behcet's    HPI:  · DMT: Imuran 100mg /day;  Taking prednisone 5mg/day.   · Side effects from DMT? No  · She denies any new neurologic symptoms   · No symptoms of relapse;    · Bladder is okay;   · No falls;     SOCIAL HISTORY  Social History     Tobacco Use    Smoking status: Current Every Day Smoker     Packs/day: 0.50     Years: 8.00     Pack years: 4.00     Last attempt to quit: 2015     Years since quittin.3    Smokeless tobacco: Never Used    Tobacco comment: 1 ppd   Substance Use Topics    Alcohol use: Yes     Alcohol/week: 0.0 standard drinks     Comment: 3 glasses of wine daily    Drug use: No     Living arrangements - the patient lives with their family.  Employment: full time homemaker          Objective:        1. 25 foot timed walk: 6.5s without assist  Neurologic Exam    MS: intact  CN:dysarthria improved--mild now; no QIANA;  MOTOR: right spastic hemiparesis (old since stroke )  GAIT: right footdrop, spastic circumduction (old since stroke )        Imaging:       Results for orders placed during the hospital encounter of 18   MRI Brain Demyelinating W W/O Contrast    Impression Continue though significantly reduced edema signal and enhancement within the central jesnen.  No evidence for new signal abnormality or enhancement.  This is superimposed with moderate sized region of encephalomalacia and prior hemorrhage within the left corona radiata extending to the cerebral peduncle.    While this remains nonspecific differential consideration to include underlying vasculopathy    No evidence for new lesion or increased enhancing lesion to suggest disease worsening.  Clinical correlation and continued follow-up advised.      Electronically signed by: Griffin Reynolds DO  Date:    2018  Time:    12:47     Results for orders placed during the hospital  encounter of 10/24/18   MRI Cervical Spine Demyelinating W W/O Contrast    Impression 1. Persistent edema signal identified at the level of the jensen with ill-defined central enhancement, similar when compared to the recent brain MRI.  2. Normal MRI evaluation of the cervical spinal cord specifically without signal abnormality or pathologic enhancement.      Electronically signed by: Armando Oh MD  Date:    10/26/2018  Time:    22:43     Results for orders placed during the hospital encounter of 10/24/18   MRI Thoracic Spine Demyelinating W W/O Contrast    Impression Normal MRI evaluation of the thoracic spine.      Electronically signed by: Armando Oh MD  Date:    10/26/2018  Time:    22:56       Labs:     Lab Results   Component Value Date    [id number] 27 (L) 03/31/2017    [id number] 19 (L) 10/29/2015     No results found for: JCVINDEX, JCVANTIBODY  No results found for: RA4NENKC, ABSOLUTECD3, DF6YVVFL, ABSOLUTECD8, DR4MOAKG, ABSOLUTECD4, LABCD48  Lab Results   Component Value Date    WBC 6.28 10/01/2020    RBC 3.43 (L) 10/01/2020    HGB 11.5 (L) 10/01/2020    HCT 36.7 (L) 10/01/2020     (H) 10/01/2020    MCH 33.5 (H) 10/01/2020    MCHC 31.3 (L) 10/01/2020    RDW 15.1 (H) 10/01/2020     (H) 10/01/2020    MPV 9.6 10/01/2020    GRAN 4.5 10/01/2020    GRAN 71.3 10/01/2020    LYMPH 1.3 10/01/2020    LYMPH 21.3 10/01/2020    MONO 0.3 10/01/2020    MONO 5.4 10/01/2020    EOS 0.1 10/01/2020    BASO 0.02 10/01/2020    EOSINOPHIL 1.1 10/01/2020    BASOPHIL 0.3 10/01/2020     Sodium   Date Value Ref Range Status   07/26/2019 140 136 - 145 mmol/L Final     Potassium   Date Value Ref Range Status   07/26/2019 3.9 3.5 - 5.1 mmol/L Final     Chloride   Date Value Ref Range Status   07/26/2019 103 95 - 110 mmol/L Final     CO2   Date Value Ref Range Status   07/26/2019 29 23 - 29 mmol/L Final     Glucose   Date Value Ref Range Status   07/26/2019 69 (L) 70 - 110 mg/dL Final     BUN   Date Value Ref  Range Status   07/26/2019 13 6 - 20 mg/dL Final     Creatinine   Date Value Ref Range Status   07/26/2019 0.7 0.5 - 1.4 mg/dL Final     Calcium   Date Value Ref Range Status   07/26/2019 10.5 8.7 - 10.5 mg/dL Final     Total Protein   Date Value Ref Range Status   10/01/2020 6.8 6.0 - 8.4 g/dL Final     Albumin   Date Value Ref Range Status   10/01/2020 3.8 3.5 - 5.2 g/dL Final     Total Bilirubin   Date Value Ref Range Status   10/01/2020 0.4 0.1 - 1.0 mg/dL Final     Comment:     For infants and newborns, interpretation of results should be based  on gestational age, weight and in agreement with clinical  observations.  Premature Infant recommended reference ranges:  Up to 24 hours.............<8.0 mg/dL  Up to 48 hours............<12.0 mg/dL  3-5 days..................<15.0 mg/dL  6-29 days.................<15.0 mg/dL       Alkaline Phosphatase   Date Value Ref Range Status   10/01/2020 59 55 - 135 U/L Final     AST   Date Value Ref Range Status   10/01/2020 18 10 - 40 U/L Final     ALT   Date Value Ref Range Status   10/01/2020 16 10 - 44 U/L Final     Anion Gap   Date Value Ref Range Status   07/26/2019 8 8 - 16 mmol/L Final     eGFR if    Date Value Ref Range Status   07/26/2019 >60.0 >60 mL/min/1.73 m^2 Final     eGFR if non    Date Value Ref Range Status   07/26/2019 >60.0 >60 mL/min/1.73 m^2 Final     Comment:     Calculation used to obtain the estimated glomerular filtration  rate (eGFR) is the CKD-EPI equation.                    Diagnosis/Assessment/Plan:   1. Neurologic Behcet's--pt is clinically stable and tolerating azathioprine.  Will check labs today and in 3 mo; will slowly lower prednisone by 1mg every two months as tolerated till d/c, starting now;    F/u with me in May    Our visit today lasted 40 minutes, and 100% of this time was spent face to face with the patient. Over 50% of this visit included discussion of the treatment plan/medication changes/symptom  management/exam findings/imaging results/coordination of care. The patient agrees with the plan of care.         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

## 2021-03-04 ENCOUNTER — PATIENT MESSAGE (OUTPATIENT)
Dept: NEUROLOGY | Facility: CLINIC | Age: 41
End: 2021-03-04

## 2021-03-04 ENCOUNTER — TELEPHONE (OUTPATIENT)
Dept: NEUROLOGY | Facility: CLINIC | Age: 41
End: 2021-03-04

## 2021-03-04 NOTE — TELEPHONE ENCOUNTER
----- Message from Vicki Marinelli sent at 3/4/2021  4:40 PM CST -----  Contact: [phone] (M  The patient would like to speak to someone regarding scheduling. Please contact the patient to discuss further.

## 2021-03-05 ENCOUNTER — LAB VISIT (OUTPATIENT)
Dept: LAB | Facility: HOSPITAL | Age: 41
End: 2021-03-05
Attending: PSYCHIATRY & NEUROLOGY
Payer: MEDICARE

## 2021-03-05 DIAGNOSIS — M35.2 BEHCET'S SYNDROME, NEUROLOGIC TYPE: ICD-10-CM

## 2021-03-05 LAB
ALBUMIN SERPL BCP-MCNC: 4.1 G/DL (ref 3.5–5.2)
ALP SERPL-CCNC: 60 U/L (ref 55–135)
ALT SERPL W/O P-5'-P-CCNC: 28 U/L (ref 10–44)
ANION GAP SERPL CALC-SCNC: 7 MMOL/L (ref 8–16)
AST SERPL-CCNC: 30 U/L (ref 10–40)
BASOPHILS # BLD AUTO: 0.02 K/UL (ref 0–0.2)
BASOPHILS NFR BLD: 0.5 % (ref 0–1.9)
BILIRUB SERPL-MCNC: 0.4 MG/DL (ref 0.1–1)
BUN SERPL-MCNC: 10 MG/DL (ref 6–20)
CALCIUM SERPL-MCNC: 9.6 MG/DL (ref 8.7–10.5)
CHLORIDE SERPL-SCNC: 102 MMOL/L (ref 95–110)
CO2 SERPL-SCNC: 31 MMOL/L (ref 23–29)
CREAT SERPL-MCNC: 0.7 MG/DL (ref 0.5–1.4)
DIFFERENTIAL METHOD: ABNORMAL
EOSINOPHIL # BLD AUTO: 0 K/UL (ref 0–0.5)
EOSINOPHIL NFR BLD: 0.7 % (ref 0–8)
ERYTHROCYTE [DISTWIDTH] IN BLOOD BY AUTOMATED COUNT: 17.2 % (ref 11.5–14.5)
EST. GFR  (AFRICAN AMERICAN): >60 ML/MIN/1.73 M^2
EST. GFR  (NON AFRICAN AMERICAN): >60 ML/MIN/1.73 M^2
GLUCOSE SERPL-MCNC: 91 MG/DL (ref 70–110)
HCT VFR BLD AUTO: 35.3 % (ref 37–48.5)
HGB BLD-MCNC: 11.4 G/DL (ref 12–16)
IMM GRANULOCYTES # BLD AUTO: 0.02 K/UL (ref 0–0.04)
IMM GRANULOCYTES NFR BLD AUTO: 0.5 % (ref 0–0.5)
LYMPHOCYTES # BLD AUTO: 0.9 K/UL (ref 1–4.8)
LYMPHOCYTES NFR BLD: 21.9 % (ref 18–48)
MCH RBC QN AUTO: 38.9 PG (ref 27–31)
MCHC RBC AUTO-ENTMCNC: 32.3 G/DL (ref 32–36)
MCV RBC AUTO: 121 FL (ref 82–98)
MONOCYTES # BLD AUTO: 0.5 K/UL (ref 0.3–1)
MONOCYTES NFR BLD: 11.9 % (ref 4–15)
NEUTROPHILS # BLD AUTO: 2.7 K/UL (ref 1.8–7.7)
NEUTROPHILS NFR BLD: 64.5 % (ref 38–73)
NRBC BLD-RTO: 0 /100 WBC
PLATELET # BLD AUTO: 372 K/UL (ref 150–350)
PMV BLD AUTO: 9.1 FL (ref 9.2–12.9)
POTASSIUM SERPL-SCNC: 4.7 MMOL/L (ref 3.5–5.1)
PROT SERPL-MCNC: 7.3 G/DL (ref 6–8.4)
RBC # BLD AUTO: 2.93 M/UL (ref 4–5.4)
SODIUM SERPL-SCNC: 140 MMOL/L (ref 136–145)
WBC # BLD AUTO: 4.11 K/UL (ref 3.9–12.7)

## 2021-03-05 PROCEDURE — 36415 COLL VENOUS BLD VENIPUNCTURE: CPT | Mod: PO | Performed by: PSYCHIATRY & NEUROLOGY

## 2021-03-05 PROCEDURE — 80053 COMPREHEN METABOLIC PANEL: CPT | Performed by: PSYCHIATRY & NEUROLOGY

## 2021-03-05 PROCEDURE — 85025 COMPLETE CBC W/AUTO DIFF WBC: CPT | Performed by: PSYCHIATRY & NEUROLOGY

## 2021-03-08 ENCOUNTER — PATIENT MESSAGE (OUTPATIENT)
Dept: NEUROLOGY | Facility: CLINIC | Age: 41
End: 2021-03-08

## 2021-03-08 DIAGNOSIS — D53.9 MACROCYTIC ANEMIA: Primary | ICD-10-CM

## 2021-03-08 DIAGNOSIS — D84.821 IMMUNOSUPPRESSION DUE TO DRUG THERAPY: ICD-10-CM

## 2021-03-08 DIAGNOSIS — Z79.899 IMMUNOSUPPRESSION DUE TO DRUG THERAPY: ICD-10-CM

## 2021-03-09 ENCOUNTER — TELEPHONE (OUTPATIENT)
Dept: NEUROLOGY | Facility: CLINIC | Age: 41
End: 2021-03-09

## 2021-03-12 ENCOUNTER — TELEPHONE (OUTPATIENT)
Dept: HEMATOLOGY/ONCOLOGY | Facility: CLINIC | Age: 41
End: 2021-03-12

## 2021-03-15 ENCOUNTER — TELEPHONE (OUTPATIENT)
Dept: HEMATOLOGY/ONCOLOGY | Facility: CLINIC | Age: 41
End: 2021-03-15

## 2021-03-16 ENCOUNTER — TELEPHONE (OUTPATIENT)
Dept: HEMATOLOGY/ONCOLOGY | Facility: CLINIC | Age: 41
End: 2021-03-16

## 2021-03-23 ENCOUNTER — PES CALL (OUTPATIENT)
Dept: ADMINISTRATIVE | Facility: CLINIC | Age: 41
End: 2021-03-23

## 2021-03-31 ENCOUNTER — OFFICE VISIT (OUTPATIENT)
Dept: HEMATOLOGY/ONCOLOGY | Facility: CLINIC | Age: 41
End: 2021-03-31
Payer: MEDICARE

## 2021-03-31 DIAGNOSIS — D53.9 MACROCYTIC ANEMIA: ICD-10-CM

## 2021-03-31 DIAGNOSIS — Z79.899 IMMUNOSUPPRESSION DUE TO DRUG THERAPY: ICD-10-CM

## 2021-03-31 DIAGNOSIS — D84.821 IMMUNOSUPPRESSION DUE TO DRUG THERAPY: ICD-10-CM

## 2021-03-31 PROCEDURE — 99204 PR OFFICE/OUTPT VISIT, NEW, LEVL IV, 45-59 MIN: ICD-10-PCS | Mod: 95,,, | Performed by: INTERNAL MEDICINE

## 2021-03-31 PROCEDURE — 99204 OFFICE O/P NEW MOD 45 MIN: CPT | Mod: 95,,, | Performed by: INTERNAL MEDICINE

## 2021-04-05 ENCOUNTER — PATIENT MESSAGE (OUTPATIENT)
Dept: ADMINISTRATIVE | Facility: HOSPITAL | Age: 41
End: 2021-04-05

## 2021-04-07 ENCOUNTER — LAB VISIT (OUTPATIENT)
Dept: LAB | Facility: HOSPITAL | Age: 41
End: 2021-04-07
Attending: INTERNAL MEDICINE
Payer: MEDICARE

## 2021-04-07 DIAGNOSIS — D53.9 MACROCYTIC ANEMIA: ICD-10-CM

## 2021-04-07 DIAGNOSIS — D84.821 IMMUNOSUPPRESSION DUE TO DRUG THERAPY: ICD-10-CM

## 2021-04-07 DIAGNOSIS — Z79.899 IMMUNOSUPPRESSION DUE TO DRUG THERAPY: ICD-10-CM

## 2021-04-07 LAB
BASOPHILS # BLD AUTO: 0.02 K/UL (ref 0–0.2)
BASOPHILS NFR BLD: 0.4 % (ref 0–1.9)
DIFFERENTIAL METHOD: ABNORMAL
EOSINOPHIL # BLD AUTO: 0 K/UL (ref 0–0.5)
EOSINOPHIL NFR BLD: 0.4 % (ref 0–8)
ERYTHROCYTE [DISTWIDTH] IN BLOOD BY AUTOMATED COUNT: 16.2 % (ref 11.5–14.5)
HCT VFR BLD AUTO: 38.4 % (ref 37–48.5)
HGB BLD-MCNC: 12.5 G/DL (ref 12–16)
IMM GRANULOCYTES # BLD AUTO: 0.05 K/UL (ref 0–0.04)
IMM GRANULOCYTES NFR BLD AUTO: 0.9 % (ref 0–0.5)
LYMPHOCYTES # BLD AUTO: 1.2 K/UL (ref 1–4.8)
LYMPHOCYTES NFR BLD: 21.4 % (ref 18–48)
MCH RBC QN AUTO: 39.7 PG (ref 27–31)
MCHC RBC AUTO-ENTMCNC: 32.6 G/DL (ref 32–36)
MCV RBC AUTO: 122 FL (ref 82–98)
MONOCYTES # BLD AUTO: 0.5 K/UL (ref 0.3–1)
MONOCYTES NFR BLD: 9.9 % (ref 4–15)
NEUTROPHILS # BLD AUTO: 3.7 K/UL (ref 1.8–7.7)
NEUTROPHILS NFR BLD: 67 % (ref 38–73)
NRBC BLD-RTO: 0 /100 WBC
PATH REV BLD -IMP: NORMAL
PLATELET # BLD AUTO: 297 K/UL (ref 150–450)
PMV BLD AUTO: 9.5 FL (ref 9.2–12.9)
RBC # BLD AUTO: 3.15 M/UL (ref 4–5.4)
RETICS/RBC NFR AUTO: 2.8 % (ref 0.5–2.5)
WBC # BLD AUTO: 5.48 K/UL (ref 3.9–12.7)

## 2021-04-07 PROCEDURE — 85060 BLOOD SMEAR INTERPRETATION: CPT | Mod: ,,, | Performed by: PATHOLOGY

## 2021-04-07 PROCEDURE — 85060 PATHOLOGIST REVIEW: ICD-10-PCS | Mod: ,,, | Performed by: PATHOLOGY

## 2021-04-07 PROCEDURE — 84439 ASSAY OF FREE THYROXINE: CPT | Performed by: INTERNAL MEDICINE

## 2021-04-07 PROCEDURE — 85045 AUTOMATED RETICULOCYTE COUNT: CPT | Performed by: INTERNAL MEDICINE

## 2021-04-07 PROCEDURE — 83010 ASSAY OF HAPTOGLOBIN QUANT: CPT | Performed by: INTERNAL MEDICINE

## 2021-04-07 PROCEDURE — 82746 ASSAY OF FOLIC ACID SERUM: CPT | Performed by: INTERNAL MEDICINE

## 2021-04-07 PROCEDURE — 82607 VITAMIN B-12: CPT | Performed by: INTERNAL MEDICINE

## 2021-04-07 PROCEDURE — 85025 COMPLETE CBC W/AUTO DIFF WBC: CPT | Performed by: INTERNAL MEDICINE

## 2021-04-07 PROCEDURE — 84443 ASSAY THYROID STIM HORMONE: CPT | Performed by: INTERNAL MEDICINE

## 2021-04-07 PROCEDURE — 83615 LACTATE (LD) (LDH) ENZYME: CPT | Performed by: INTERNAL MEDICINE

## 2021-04-07 PROCEDURE — 36415 COLL VENOUS BLD VENIPUNCTURE: CPT | Mod: PO | Performed by: INTERNAL MEDICINE

## 2021-04-08 LAB
FOLATE SERPL-MCNC: 8.8 NG/ML (ref 4–24)
HAPTOGLOB SERPL-MCNC: 194 MG/DL (ref 30–250)
LDH SERPL L TO P-CCNC: 205 U/L (ref 110–260)
PATH REV BLD -IMP: NORMAL
T4 FREE SERPL-MCNC: 0.82 NG/DL (ref 0.71–1.51)
TSH SERPL DL<=0.005 MIU/L-ACNC: 0.79 UIU/ML (ref 0.4–4)
VIT B12 SERPL-MCNC: 397 PG/ML (ref 210–950)

## 2021-04-09 ENCOUNTER — OFFICE VISIT (OUTPATIENT)
Dept: HEMATOLOGY/ONCOLOGY | Facility: CLINIC | Age: 41
End: 2021-04-09
Payer: MEDICARE

## 2021-04-09 DIAGNOSIS — E53.8 B12 DEFICIENCY: Primary | ICD-10-CM

## 2021-04-09 DIAGNOSIS — D53.9 MACROCYTIC ANEMIA: ICD-10-CM

## 2021-04-09 PROCEDURE — 99214 OFFICE O/P EST MOD 30 MIN: CPT | Mod: 95,,, | Performed by: INTERNAL MEDICINE

## 2021-04-09 PROCEDURE — 99214 PR OFFICE/OUTPT VISIT, EST, LEVL IV, 30-39 MIN: ICD-10-PCS | Mod: 95,,, | Performed by: INTERNAL MEDICINE

## 2021-05-18 ENCOUNTER — PATIENT OUTREACH (OUTPATIENT)
Dept: ADMINISTRATIVE | Facility: OTHER | Age: 41
End: 2021-05-18

## 2021-05-19 ENCOUNTER — LAB VISIT (OUTPATIENT)
Dept: LAB | Facility: HOSPITAL | Age: 41
End: 2021-05-19
Attending: INTERNAL MEDICINE
Payer: MEDICARE

## 2021-05-19 ENCOUNTER — OFFICE VISIT (OUTPATIENT)
Dept: NEUROLOGY | Facility: CLINIC | Age: 41
End: 2021-05-19
Payer: MEDICARE

## 2021-05-19 ENCOUNTER — PATIENT MESSAGE (OUTPATIENT)
Dept: NEUROLOGY | Facility: CLINIC | Age: 41
End: 2021-05-19

## 2021-05-19 VITALS
SYSTOLIC BLOOD PRESSURE: 102 MMHG | HEART RATE: 88 BPM | WEIGHT: 134.56 LBS | DIASTOLIC BLOOD PRESSURE: 72 MMHG | BODY MASS INDEX: 22.97 KG/M2 | HEIGHT: 64 IN

## 2021-05-19 DIAGNOSIS — D84.821 IMMUNOSUPPRESSION DUE TO DRUG THERAPY: ICD-10-CM

## 2021-05-19 DIAGNOSIS — Z79.899 IMMUNOSUPPRESSION DUE TO DRUG THERAPY: ICD-10-CM

## 2021-05-19 DIAGNOSIS — G81.91 RIGHT HEMIPARESIS: ICD-10-CM

## 2021-05-19 DIAGNOSIS — I69.398 ABNORMALITY OF GAIT AS LATE EFFECT OF CEREBROVASCULAR ACCIDENT (CVA): ICD-10-CM

## 2021-05-19 DIAGNOSIS — E53.8 B12 DEFICIENCY: ICD-10-CM

## 2021-05-19 DIAGNOSIS — R26.9 ABNORMALITY OF GAIT AS LATE EFFECT OF CEREBROVASCULAR ACCIDENT (CVA): ICD-10-CM

## 2021-05-19 DIAGNOSIS — M62.838 MUSCLE SPASM: ICD-10-CM

## 2021-05-19 DIAGNOSIS — M35.2 NEUROLOGIC TYPE BEHCET'S SYNDROME: ICD-10-CM

## 2021-05-19 DIAGNOSIS — D53.9 MACROCYTIC ANEMIA: ICD-10-CM

## 2021-05-19 DIAGNOSIS — M35.2 NEUROLOGIC TYPE BEHCET'S SYNDROME: Primary | ICD-10-CM

## 2021-05-19 DIAGNOSIS — Z71.89 COUNSELING REGARDING GOALS OF CARE: ICD-10-CM

## 2021-05-19 LAB
BASOPHILS # BLD AUTO: 0.02 K/UL (ref 0–0.2)
BASOPHILS NFR BLD: 0.4 % (ref 0–1.9)
DIFFERENTIAL METHOD: ABNORMAL
EOSINOPHIL # BLD AUTO: 0 K/UL (ref 0–0.5)
EOSINOPHIL NFR BLD: 0.7 % (ref 0–8)
ERYTHROCYTE [DISTWIDTH] IN BLOOD BY AUTOMATED COUNT: 12.6 % (ref 11.5–14.5)
HBV CORE AB SERPL QL IA: NEGATIVE
HBV SURFACE AB SER-ACNC: POSITIVE M[IU]/ML
HBV SURFACE AG SERPL QL IA: NEGATIVE
HCT VFR BLD AUTO: 39.9 % (ref 37–48.5)
HGB BLD-MCNC: 13 G/DL (ref 12–16)
IMM GRANULOCYTES # BLD AUTO: 0.02 K/UL (ref 0–0.04)
IMM GRANULOCYTES NFR BLD AUTO: 0.4 % (ref 0–0.5)
LYMPHOCYTES # BLD AUTO: 1.1 K/UL (ref 1–4.8)
LYMPHOCYTES NFR BLD: 20.2 % (ref 18–48)
MCH RBC QN AUTO: 37.7 PG (ref 27–31)
MCHC RBC AUTO-ENTMCNC: 32.6 G/DL (ref 32–36)
MCV RBC AUTO: 116 FL (ref 82–98)
MONOCYTES # BLD AUTO: 0.5 K/UL (ref 0.3–1)
MONOCYTES NFR BLD: 9.3 % (ref 4–15)
NEUTROPHILS # BLD AUTO: 3.7 K/UL (ref 1.8–7.7)
NEUTROPHILS NFR BLD: 69 % (ref 38–73)
NRBC BLD-RTO: 0 /100 WBC
PLATELET # BLD AUTO: 266 K/UL (ref 150–450)
PMV BLD AUTO: 9.6 FL (ref 9.2–12.9)
RBC # BLD AUTO: 3.45 M/UL (ref 4–5.4)
VIT B12 SERPL-MCNC: 566 PG/ML (ref 210–950)
WBC # BLD AUTO: 5.35 K/UL (ref 3.9–12.7)

## 2021-05-19 PROCEDURE — 99499 UNLISTED E&M SERVICE: CPT | Mod: S$GLB,,, | Performed by: PSYCHIATRY & NEUROLOGY

## 2021-05-19 PROCEDURE — 85025 COMPLETE CBC W/AUTO DIFF WBC: CPT | Performed by: INTERNAL MEDICINE

## 2021-05-19 PROCEDURE — 36415 COLL VENOUS BLD VENIPUNCTURE: CPT | Performed by: INTERNAL MEDICINE

## 2021-05-19 PROCEDURE — 99215 OFFICE O/P EST HI 40 MIN: CPT | Mod: S$GLB,,, | Performed by: PSYCHIATRY & NEUROLOGY

## 2021-05-19 PROCEDURE — 99999 PR PBB SHADOW E&M-EST. PATIENT-LVL III: ICD-10-PCS | Mod: PBBFAC,,, | Performed by: PSYCHIATRY & NEUROLOGY

## 2021-05-19 PROCEDURE — 86704 HEP B CORE ANTIBODY TOTAL: CPT | Performed by: PSYCHIATRY & NEUROLOGY

## 2021-05-19 PROCEDURE — 1126F PR PAIN SEVERITY QUANTIFIED, NO PAIN PRESENT: ICD-10-PCS | Mod: S$GLB,,, | Performed by: PSYCHIATRY & NEUROLOGY

## 2021-05-19 PROCEDURE — 86706 HEP B SURFACE ANTIBODY: CPT | Performed by: PSYCHIATRY & NEUROLOGY

## 2021-05-19 PROCEDURE — 99499 RISK ADDL DX/OHS AUDIT: ICD-10-PCS | Mod: S$GLB,,, | Performed by: PSYCHIATRY & NEUROLOGY

## 2021-05-19 PROCEDURE — 99215 PR OFFICE/OUTPT VISIT, EST, LEVL V, 40-54 MIN: ICD-10-PCS | Mod: S$GLB,,, | Performed by: PSYCHIATRY & NEUROLOGY

## 2021-05-19 PROCEDURE — 82607 VITAMIN B-12: CPT | Performed by: INTERNAL MEDICINE

## 2021-05-19 PROCEDURE — 3008F BODY MASS INDEX DOCD: CPT | Mod: CPTII,S$GLB,, | Performed by: PSYCHIATRY & NEUROLOGY

## 2021-05-19 PROCEDURE — 1126F AMNT PAIN NOTED NONE PRSNT: CPT | Mod: S$GLB,,, | Performed by: PSYCHIATRY & NEUROLOGY

## 2021-05-19 PROCEDURE — 87340 HEPATITIS B SURFACE AG IA: CPT | Performed by: PSYCHIATRY & NEUROLOGY

## 2021-05-19 PROCEDURE — 99999 PR PBB SHADOW E&M-EST. PATIENT-LVL III: CPT | Mod: PBBFAC,,, | Performed by: PSYCHIATRY & NEUROLOGY

## 2021-05-19 PROCEDURE — 3008F PR BODY MASS INDEX (BMI) DOCUMENTED: ICD-10-PCS | Mod: CPTII,S$GLB,, | Performed by: PSYCHIATRY & NEUROLOGY

## 2021-05-19 RX ORDER — BACLOFEN 10 MG/1
TABLET ORAL
Qty: 90 TABLET | Refills: 6 | Status: SHIPPED | OUTPATIENT
Start: 2021-05-19 | End: 2022-03-07

## 2021-05-21 ENCOUNTER — HOSPITAL ENCOUNTER (OUTPATIENT)
Dept: RADIOLOGY | Facility: HOSPITAL | Age: 41
Discharge: HOME | End: 2021-05-21
Attending: INTERNAL MEDICINE
Payer: MEDICARE

## 2021-05-21 DIAGNOSIS — Z12.31 ENCOUNTER FOR SCREENING MAMMOGRAM FOR MALIGNANT NEOPLASM OF BREAST: ICD-10-CM

## 2021-05-21 PROCEDURE — 77063 BREAST TOMOSYNTHESIS BI: CPT | Mod: 26,,, | Performed by: RADIOLOGY

## 2021-05-21 PROCEDURE — 77067 SCR MAMMO BI INCL CAD: CPT | Mod: TC,PO

## 2021-05-21 PROCEDURE — 77067 SCR MAMMO BI INCL CAD: CPT | Mod: 26,,, | Performed by: RADIOLOGY

## 2021-05-21 PROCEDURE — 77063 MAMMO DIGITAL SCREENING BILAT WITH TOMO: ICD-10-PCS | Mod: 26,,, | Performed by: RADIOLOGY

## 2021-05-21 PROCEDURE — 77067 MAMMO DIGITAL SCREENING BILAT WITH TOMO: ICD-10-PCS | Mod: 26,,, | Performed by: RADIOLOGY

## 2021-05-24 ENCOUNTER — TELEPHONE (OUTPATIENT)
Dept: RADIOLOGY | Facility: HOSPITAL | Age: 41
End: 2021-05-24

## 2021-05-31 ENCOUNTER — PATIENT MESSAGE (OUTPATIENT)
Dept: PSYCHIATRY | Facility: CLINIC | Age: 41
End: 2021-05-31

## 2021-06-01 ENCOUNTER — TELEPHONE (OUTPATIENT)
Dept: RADIOLOGY | Facility: HOSPITAL | Age: 41
End: 2021-06-01

## 2021-06-03 ENCOUNTER — PES CALL (OUTPATIENT)
Dept: ADMINISTRATIVE | Facility: CLINIC | Age: 41
End: 2021-06-03

## 2021-06-04 ENCOUNTER — HOSPITAL ENCOUNTER (OUTPATIENT)
Dept: RADIOLOGY | Facility: HOSPITAL | Age: 41
Discharge: HOME | End: 2021-06-04
Attending: INTERNAL MEDICINE
Payer: MEDICARE

## 2021-06-04 DIAGNOSIS — R92.8 ABNORMAL MAMMOGRAM: ICD-10-CM

## 2021-06-04 PROCEDURE — 76642 ULTRASOUND BREAST LIMITED: CPT | Mod: 26,RT,, | Performed by: RADIOLOGY

## 2021-06-04 PROCEDURE — 77061 BREAST TOMOSYNTHESIS UNI: CPT | Mod: TC,RT

## 2021-06-04 PROCEDURE — 76642 US BREAST RIGHT LIMITED: ICD-10-PCS | Mod: 26,RT,, | Performed by: RADIOLOGY

## 2021-06-04 PROCEDURE — 76642 ULTRASOUND BREAST LIMITED: CPT | Mod: TC,RT

## 2021-06-04 PROCEDURE — 77061 MAMMO DIGITAL DIAGNOSTIC RIGHT WITH TOMO: ICD-10-PCS | Mod: 26,RT,, | Performed by: RADIOLOGY

## 2021-06-04 PROCEDURE — 77061 BREAST TOMOSYNTHESIS UNI: CPT | Mod: 26,RT,, | Performed by: RADIOLOGY

## 2021-06-04 PROCEDURE — 77065 DX MAMMO INCL CAD UNI: CPT | Mod: 26,RT,, | Performed by: RADIOLOGY

## 2021-06-04 PROCEDURE — 77065 MAMMO DIGITAL DIAGNOSTIC RIGHT WITH TOMO: ICD-10-PCS | Mod: 26,RT,, | Performed by: RADIOLOGY

## 2021-06-07 ENCOUNTER — TELEPHONE (OUTPATIENT)
Dept: RADIOLOGY | Facility: HOSPITAL | Age: 41
End: 2021-06-07

## 2021-06-08 ENCOUNTER — TELEPHONE (OUTPATIENT)
Dept: RADIOLOGY | Facility: HOSPITAL | Age: 41
End: 2021-06-08

## 2021-06-10 ENCOUNTER — TELEPHONE (OUTPATIENT)
Dept: RADIOLOGY | Facility: HOSPITAL | Age: 41
End: 2021-06-10

## 2021-07-02 ENCOUNTER — HOSPITAL ENCOUNTER (OUTPATIENT)
Dept: RADIOLOGY | Facility: HOSPITAL | Age: 41
Discharge: HOME | End: 2021-07-02
Attending: INTERNAL MEDICINE
Payer: MEDICARE

## 2021-07-02 DIAGNOSIS — R92.8 ABNORMAL MAMMOGRAM: ICD-10-CM

## 2021-07-02 PROCEDURE — 19083 BX BREAST 1ST LESION US IMAG: CPT | Mod: RT,,, | Performed by: RADIOLOGY

## 2021-07-02 PROCEDURE — 88305 TISSUE EXAM BY PATHOLOGIST: ICD-10-PCS | Mod: 26,,, | Performed by: PATHOLOGY

## 2021-07-02 PROCEDURE — 19083 BX BREAST 1ST LESION US IMAG: CPT | Mod: RT

## 2021-07-02 PROCEDURE — 88305 TISSUE EXAM BY PATHOLOGIST: CPT | Performed by: PATHOLOGY

## 2021-07-02 PROCEDURE — 19083 US BREAST BIOPSY WITH IMAGING 1ST SITE RIGHT: ICD-10-PCS | Mod: RT,,, | Performed by: RADIOLOGY

## 2021-07-02 PROCEDURE — 27200937 US BREAST BIOPSY WITH IMAGING 1ST SITE RIGHT

## 2021-07-02 PROCEDURE — 77065 DX MAMMO INCL CAD UNI: CPT | Mod: TC,RT

## 2021-07-02 PROCEDURE — 77065 DX MAMMO INCL CAD UNI: CPT | Mod: 26,RT,, | Performed by: RADIOLOGY

## 2021-07-02 PROCEDURE — 88305 TISSUE EXAM BY PATHOLOGIST: CPT | Mod: 26,,, | Performed by: PATHOLOGY

## 2021-07-02 PROCEDURE — 25000003 PHARM REV CODE 250: Performed by: INTERNAL MEDICINE

## 2021-07-02 PROCEDURE — 77065 MAMMO DIGITAL DIAGNOSTIC RIGHT: ICD-10-PCS | Mod: 26,RT,, | Performed by: RADIOLOGY

## 2021-07-02 RX ORDER — LIDOCAINE HYDROCHLORIDE AND EPINEPHRINE 20; 10 MG/ML; UG/ML
10 INJECTION, SOLUTION INFILTRATION; PERINEURAL ONCE
Status: COMPLETED | OUTPATIENT
Start: 2021-07-02 | End: 2021-07-02

## 2021-07-02 RX ORDER — LIDOCAINE HYDROCHLORIDE 10 MG/ML
3 INJECTION, SOLUTION EPIDURAL; INFILTRATION; INTRACAUDAL; PERINEURAL ONCE
Status: DISCONTINUED | OUTPATIENT
Start: 2021-07-02 | End: 2021-07-02

## 2021-07-02 RX ORDER — LIDOCAINE HYDROCHLORIDE 10 MG/ML
3 INJECTION, SOLUTION EPIDURAL; INFILTRATION; INTRACAUDAL; PERINEURAL ONCE
Status: DISCONTINUED | OUTPATIENT
Start: 2021-07-02 | End: 2021-07-03 | Stop reason: HOSPADM

## 2021-07-02 RX ADMIN — LIDOCAINE HYDROCHLORIDE AND EPINEPHRINE 10 ML: 20; 10 INJECTION, SOLUTION INFILTRATION; PERINEURAL at 01:07

## 2021-07-06 LAB
FINAL PATHOLOGIC DIAGNOSIS: NORMAL
GROSS: NORMAL
Lab: NORMAL

## 2021-07-07 ENCOUNTER — TELEPHONE (OUTPATIENT)
Dept: SURGERY | Facility: CLINIC | Age: 41
End: 2021-07-07

## 2021-07-23 ENCOUNTER — PATIENT OUTREACH (OUTPATIENT)
Dept: ADMINISTRATIVE | Facility: HOSPITAL | Age: 41
End: 2021-07-23

## 2021-08-23 ENCOUNTER — PES CALL (OUTPATIENT)
Dept: ADMINISTRATIVE | Facility: CLINIC | Age: 41
End: 2021-08-23

## 2021-09-01 ENCOUNTER — PATIENT MESSAGE (OUTPATIENT)
Dept: PSYCHIATRY | Facility: CLINIC | Age: 41
End: 2021-09-01

## 2021-09-02 ENCOUNTER — PATIENT MESSAGE (OUTPATIENT)
Dept: PSYCHIATRY | Facility: CLINIC | Age: 41
End: 2021-09-02

## 2021-09-04 ENCOUNTER — PATIENT MESSAGE (OUTPATIENT)
Dept: NEUROLOGY | Facility: CLINIC | Age: 41
End: 2021-09-04

## 2021-09-07 ENCOUNTER — TELEPHONE (OUTPATIENT)
Dept: NEUROLOGY | Facility: CLINIC | Age: 41
End: 2021-09-07

## 2021-09-09 ENCOUNTER — TELEPHONE (OUTPATIENT)
Dept: NEUROLOGY | Facility: CLINIC | Age: 41
End: 2021-09-09

## 2021-10-05 ENCOUNTER — PATIENT MESSAGE (OUTPATIENT)
Dept: ADMINISTRATIVE | Facility: HOSPITAL | Age: 41
End: 2021-10-05

## 2021-11-01 ENCOUNTER — OFFICE VISIT (OUTPATIENT)
Dept: NEUROLOGY | Facility: CLINIC | Age: 41
End: 2021-11-01
Payer: MEDICARE

## 2021-11-01 ENCOUNTER — LAB VISIT (OUTPATIENT)
Dept: LAB | Facility: HOSPITAL | Age: 41
End: 2021-11-01
Attending: PSYCHIATRY & NEUROLOGY
Payer: MEDICARE

## 2021-11-01 VITALS
DIASTOLIC BLOOD PRESSURE: 77 MMHG | HEART RATE: 97 BPM | BODY MASS INDEX: 23.46 KG/M2 | WEIGHT: 137.44 LBS | HEIGHT: 64 IN | SYSTOLIC BLOOD PRESSURE: 112 MMHG

## 2021-11-01 DIAGNOSIS — D84.821 IMMUNOSUPPRESSION DUE TO DRUG THERAPY: ICD-10-CM

## 2021-11-01 DIAGNOSIS — Z79.899 IMMUNOSUPPRESSION DUE TO DRUG THERAPY: ICD-10-CM

## 2021-11-01 DIAGNOSIS — M35.2 BEHCET'S SYNDROME, NEUROLOGIC TYPE: ICD-10-CM

## 2021-11-01 DIAGNOSIS — I69.398 ABNORMALITY OF GAIT AS LATE EFFECT OF CEREBROVASCULAR ACCIDENT (CVA): ICD-10-CM

## 2021-11-01 DIAGNOSIS — M35.2 BEHCET'S SYNDROME, NEUROLOGIC TYPE: Primary | ICD-10-CM

## 2021-11-01 DIAGNOSIS — M35.2 NEUROLOGIC TYPE BEHCET'S SYNDROME: ICD-10-CM

## 2021-11-01 DIAGNOSIS — R26.9 ABNORMALITY OF GAIT AS LATE EFFECT OF CEREBROVASCULAR ACCIDENT (CVA): ICD-10-CM

## 2021-11-01 DIAGNOSIS — Z71.89 COUNSELING REGARDING GOALS OF CARE: ICD-10-CM

## 2021-11-01 PROCEDURE — 36415 COLL VENOUS BLD VENIPUNCTURE: CPT | Mod: HCNC | Performed by: PSYCHIATRY & NEUROLOGY

## 2021-11-01 PROCEDURE — 3008F BODY MASS INDEX DOCD: CPT | Mod: HCNC,CPTII,S$GLB, | Performed by: PSYCHIATRY & NEUROLOGY

## 2021-11-01 PROCEDURE — 86480 TB TEST CELL IMMUN MEASURE: CPT | Mod: HCNC | Performed by: PSYCHIATRY & NEUROLOGY

## 2021-11-01 PROCEDURE — 3008F PR BODY MASS INDEX (BMI) DOCUMENTED: ICD-10-PCS | Mod: HCNC,CPTII,S$GLB, | Performed by: PSYCHIATRY & NEUROLOGY

## 2021-11-01 PROCEDURE — 99499 UNLISTED E&M SERVICE: CPT | Mod: S$GLB,,, | Performed by: PSYCHIATRY & NEUROLOGY

## 2021-11-01 PROCEDURE — 1160F RVW MEDS BY RX/DR IN RCRD: CPT | Mod: HCNC,CPTII,S$GLB, | Performed by: PSYCHIATRY & NEUROLOGY

## 2021-11-01 PROCEDURE — 3078F DIAST BP <80 MM HG: CPT | Mod: HCNC,CPTII,S$GLB, | Performed by: PSYCHIATRY & NEUROLOGY

## 2021-11-01 PROCEDURE — 99499 RISK ADDL DX/OHS AUDIT: ICD-10-PCS | Mod: S$GLB,,, | Performed by: PSYCHIATRY & NEUROLOGY

## 2021-11-01 PROCEDURE — 3074F PR MOST RECENT SYSTOLIC BLOOD PRESSURE < 130 MM HG: ICD-10-PCS | Mod: HCNC,CPTII,S$GLB, | Performed by: PSYCHIATRY & NEUROLOGY

## 2021-11-01 PROCEDURE — 1159F PR MEDICATION LIST DOCUMENTED IN MEDICAL RECORD: ICD-10-PCS | Mod: HCNC,CPTII,S$GLB, | Performed by: PSYCHIATRY & NEUROLOGY

## 2021-11-01 PROCEDURE — 3074F SYST BP LT 130 MM HG: CPT | Mod: HCNC,CPTII,S$GLB, | Performed by: PSYCHIATRY & NEUROLOGY

## 2021-11-01 PROCEDURE — 1159F MED LIST DOCD IN RCRD: CPT | Mod: HCNC,CPTII,S$GLB, | Performed by: PSYCHIATRY & NEUROLOGY

## 2021-11-01 PROCEDURE — 99215 PR OFFICE/OUTPT VISIT, EST, LEVL V, 40-54 MIN: ICD-10-PCS | Mod: HCNC,S$GLB,, | Performed by: PSYCHIATRY & NEUROLOGY

## 2021-11-01 PROCEDURE — 99999 PR PBB SHADOW E&M-EST. PATIENT-LVL III: CPT | Mod: PBBFAC,HCNC,, | Performed by: PSYCHIATRY & NEUROLOGY

## 2021-11-01 PROCEDURE — 99215 OFFICE O/P EST HI 40 MIN: CPT | Mod: HCNC,S$GLB,, | Performed by: PSYCHIATRY & NEUROLOGY

## 2021-11-01 PROCEDURE — 99999 PR PBB SHADOW E&M-EST. PATIENT-LVL III: ICD-10-PCS | Mod: PBBFAC,HCNC,, | Performed by: PSYCHIATRY & NEUROLOGY

## 2021-11-01 PROCEDURE — 3078F PR MOST RECENT DIASTOLIC BLOOD PRESSURE < 80 MM HG: ICD-10-PCS | Mod: HCNC,CPTII,S$GLB, | Performed by: PSYCHIATRY & NEUROLOGY

## 2021-11-01 PROCEDURE — 1160F PR REVIEW ALL MEDS BY PRESCRIBER/CLIN PHARMACIST DOCUMENTED: ICD-10-PCS | Mod: HCNC,CPTII,S$GLB, | Performed by: PSYCHIATRY & NEUROLOGY

## 2021-11-02 LAB
GAMMA INTERFERON BACKGROUND BLD IA-ACNC: 0.03 IU/ML
M TB IFN-G CD4+ BCKGRND COR BLD-ACNC: 0 IU/ML
MITOGEN IGNF BCKGRD COR BLD-ACNC: >10 IU/ML
TB GOLD PLUS: NEGATIVE
TB2 - NIL: 0 IU/ML

## 2021-11-09 ENCOUNTER — TELEPHONE (OUTPATIENT)
Dept: NEUROLOGY | Facility: CLINIC | Age: 41
End: 2021-11-09

## 2021-11-09 NOTE — TELEPHONE ENCOUNTER
Placed call to pt to discuss location for Remicade. Unable to leave VM as it is not accepting messages at this time.

## 2021-11-09 NOTE — TELEPHONE ENCOUNTER
----- Message from Esthela Butt MD sent at 11/1/2021 12:09 PM CDT -----  Team, I'd want to start her on Remicade  5mg/Kg every 8 weeks IV.      ----- Message from Esthela Butt MD sent at 11/1/2021 12:28 PM CDT -----  Consent signed

## 2021-11-10 RX ORDER — EPINEPHRINE 0.3 MG/.3ML
0.3 INJECTION SUBCUTANEOUS ONCE AS NEEDED
Status: CANCELLED | OUTPATIENT
Start: 2021-11-10

## 2021-11-10 RX ORDER — DIPHENHYDRAMINE HYDROCHLORIDE 50 MG/ML
25 INJECTION INTRAMUSCULAR; INTRAVENOUS
Status: CANCELLED | OUTPATIENT
Start: 2021-11-10

## 2021-11-10 RX ORDER — ACETAMINOPHEN 325 MG/1
650 TABLET ORAL
Status: CANCELLED | OUTPATIENT
Start: 2021-11-10

## 2021-11-10 RX ORDER — HEPARIN 100 UNIT/ML
500 SYRINGE INTRAVENOUS
Status: CANCELLED | OUTPATIENT
Start: 2021-11-10

## 2021-11-10 RX ORDER — IPRATROPIUM BROMIDE AND ALBUTEROL SULFATE 2.5; .5 MG/3ML; MG/3ML
3 SOLUTION RESPIRATORY (INHALATION)
Status: CANCELLED | OUTPATIENT
Start: 2021-11-10

## 2021-11-10 RX ORDER — DIPHENHYDRAMINE HYDROCHLORIDE 50 MG/ML
50 INJECTION INTRAMUSCULAR; INTRAVENOUS ONCE AS NEEDED
Status: CANCELLED | OUTPATIENT
Start: 2021-11-10

## 2021-11-10 RX ORDER — SODIUM CHLORIDE 0.9 % (FLUSH) 0.9 %
10 SYRINGE (ML) INJECTION
Status: CANCELLED | OUTPATIENT
Start: 2021-11-10

## 2021-11-12 NOTE — TELEPHONE ENCOUNTER
Spoke with pt who received a call from [facility] today to schedule her Remicade but her phone was acting us. [facility] staff will call her again to schedule.

## 2021-12-21 ENCOUNTER — PATIENT MESSAGE (OUTPATIENT)
Dept: NEUROLOGY | Facility: CLINIC | Age: 41
End: 2021-12-21
Payer: MEDICARE

## 2022-01-26 ENCOUNTER — PATIENT MESSAGE (OUTPATIENT)
Dept: ADMINISTRATIVE | Facility: HOSPITAL | Age: 42
End: 2022-01-26
Payer: MEDICARE

## 2022-03-07 ENCOUNTER — OFFICE VISIT (OUTPATIENT)
Dept: NEUROLOGY | Facility: CLINIC | Age: 42
End: 2022-03-07
Payer: MEDICARE

## 2022-03-07 VITALS
SYSTOLIC BLOOD PRESSURE: 107 MMHG | HEART RATE: 113 BPM | HEIGHT: 64 IN | WEIGHT: 134.56 LBS | DIASTOLIC BLOOD PRESSURE: 78 MMHG | BODY MASS INDEX: 22.97 KG/M2

## 2022-03-07 DIAGNOSIS — Z79.899 IMMUNOSUPPRESSION DUE TO DRUG THERAPY: ICD-10-CM

## 2022-03-07 DIAGNOSIS — F43.20 ADJUSTMENT DISORDER, UNSPECIFIED TYPE: ICD-10-CM

## 2022-03-07 DIAGNOSIS — M35.2 NEUROLOGIC TYPE BEHCET'S SYNDROME: Primary | ICD-10-CM

## 2022-03-07 DIAGNOSIS — Z71.89 COUNSELING REGARDING GOALS OF CARE: ICD-10-CM

## 2022-03-07 DIAGNOSIS — F41.8 MIXED ANXIETY AND DEPRESSIVE DISORDER: ICD-10-CM

## 2022-03-07 DIAGNOSIS — D84.821 IMMUNOSUPPRESSION DUE TO DRUG THERAPY: ICD-10-CM

## 2022-03-07 PROCEDURE — 3074F PR MOST RECENT SYSTOLIC BLOOD PRESSURE < 130 MM HG: ICD-10-PCS | Mod: HCNC,CPTII,S$GLB, | Performed by: PSYCHIATRY & NEUROLOGY

## 2022-03-07 PROCEDURE — 3078F DIAST BP <80 MM HG: CPT | Mod: HCNC,CPTII,S$GLB, | Performed by: PSYCHIATRY & NEUROLOGY

## 2022-03-07 PROCEDURE — 1159F PR MEDICATION LIST DOCUMENTED IN MEDICAL RECORD: ICD-10-PCS | Mod: HCNC,CPTII,S$GLB, | Performed by: PSYCHIATRY & NEUROLOGY

## 2022-03-07 PROCEDURE — 1160F RVW MEDS BY RX/DR IN RCRD: CPT | Mod: HCNC,CPTII,S$GLB, | Performed by: PSYCHIATRY & NEUROLOGY

## 2022-03-07 PROCEDURE — 1160F PR REVIEW ALL MEDS BY PRESCRIBER/CLIN PHARMACIST DOCUMENTED: ICD-10-PCS | Mod: HCNC,CPTII,S$GLB, | Performed by: PSYCHIATRY & NEUROLOGY

## 2022-03-07 PROCEDURE — 99499 UNLISTED E&M SERVICE: CPT | Mod: HCNC,S$GLB,, | Performed by: PSYCHIATRY & NEUROLOGY

## 2022-03-07 PROCEDURE — 3078F PR MOST RECENT DIASTOLIC BLOOD PRESSURE < 80 MM HG: ICD-10-PCS | Mod: HCNC,CPTII,S$GLB, | Performed by: PSYCHIATRY & NEUROLOGY

## 2022-03-07 PROCEDURE — 99999 PR PBB SHADOW E&M-EST. PATIENT-LVL III: ICD-10-PCS | Mod: PBBFAC,HCNC,, | Performed by: PSYCHIATRY & NEUROLOGY

## 2022-03-07 PROCEDURE — 1159F MED LIST DOCD IN RCRD: CPT | Mod: HCNC,CPTII,S$GLB, | Performed by: PSYCHIATRY & NEUROLOGY

## 2022-03-07 PROCEDURE — 3008F PR BODY MASS INDEX (BMI) DOCUMENTED: ICD-10-PCS | Mod: HCNC,CPTII,S$GLB, | Performed by: PSYCHIATRY & NEUROLOGY

## 2022-03-07 PROCEDURE — 99999 PR PBB SHADOW E&M-EST. PATIENT-LVL III: CPT | Mod: PBBFAC,HCNC,, | Performed by: PSYCHIATRY & NEUROLOGY

## 2022-03-07 PROCEDURE — 99215 OFFICE O/P EST HI 40 MIN: CPT | Mod: HCNC,S$GLB,, | Performed by: PSYCHIATRY & NEUROLOGY

## 2022-03-07 PROCEDURE — 3008F BODY MASS INDEX DOCD: CPT | Mod: HCNC,CPTII,S$GLB, | Performed by: PSYCHIATRY & NEUROLOGY

## 2022-03-07 PROCEDURE — 99499 RISK ADDL DX/OHS AUDIT: ICD-10-PCS | Mod: HCNC,S$GLB,, | Performed by: PSYCHIATRY & NEUROLOGY

## 2022-03-07 PROCEDURE — 3074F SYST BP LT 130 MM HG: CPT | Mod: HCNC,CPTII,S$GLB, | Performed by: PSYCHIATRY & NEUROLOGY

## 2022-03-07 PROCEDURE — 99215 PR OFFICE/OUTPT VISIT, EST, LEVL V, 40-54 MIN: ICD-10-PCS | Mod: HCNC,S$GLB,, | Performed by: PSYCHIATRY & NEUROLOGY

## 2022-03-07 RX ORDER — CITALOPRAM 20 MG/1
20 TABLET, FILM COATED ORAL DAILY
Qty: 30 TABLET | Refills: 3 | Status: SHIPPED | OUTPATIENT
Start: 2022-03-07 | End: 2022-07-17

## 2022-03-07 NOTE — PROGRESS NOTES
Subjective:       Patient ID: Maame Cortez is a 41 y.o. female who presents today for a routine clinic visit for Neurologic Behcet's     HPI:  · DMT: Remicade   · Side effects from DMT? No  · Taking vitamin D3 as recommended?  · Pt reports stopping Celexa in .    · Feeling sad right now since returning from [place] trip yesterday; she brought her 12 year old autistic son and is overwhelmed  · Feeling neurologically stable.    · Smokes about 8 cigarettes /day.   · Drinks 2-3 glasses of wine/ night.     SOCIAL HISTORY  Social History     Tobacco Use    Smoking status: Current Every Day Smoker     Packs/day: 0.50     Years: 8.00     Pack years: 4.00     Last attempt to quit: 2015     Years since quittin.5    Smokeless tobacco: Never Used    Tobacco comment: 1 ppd   Substance Use Topics    Alcohol use: Yes     Alcohol/week: 0.0 standard drinks     Comment: 3 glasses of wine daily    Drug use: No     Living arrangements - the patient lives with their family.  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  No evidence for new signal abnormality or enhancement.  This is superimposed with moderate sized region of encephalomalacia and prior hemorrhage within the left corona radiata extending to the cerebral peduncle.    While this remains nonspecific differential consideration to include underlying vasculopathy    No evidence for new lesion or increased enhancing lesion to suggest disease worsening.  Clinical correlation and continued follow-up advised.      Electronically signed by: Griffin Reynolds DO  Date:    2018  Time:    12:47    Results for orders placed  during the hospital encounter of 10/24/18    MRI Cervical Spine Demyelinating W W/O Contrast    Impression  1. Persistent edema signal identified at the level of the jensen with ill-defined central enhancement, similar when compared to the recent brain MRI.  2. Normal MRI evaluation of the cervical spinal cord specifically without signal abnormality or pathologic enhancement.      Electronically signed by: Armando Oh MD  Date:    10/26/2018  Time:    22:43    Results for orders placed during the hospital encounter of 10/24/18    MRI Thoracic Spine Demyelinating W W/O Contrast    Impression  Normal MRI evaluation of the thoracic spine.      Electronically signed by: Armando Oh MD  Date:    10/26/2018  Time:    22:56      Labs:     Lab Results   Component Value Date    [id number] 27 (L) 03/31/2017    [id number] 19 (L) 10/29/2015     No results found for: JCVINDEX, JCVANTIBODY  No results found for: CN9LMVAO, ABSOLUTECD3, AY5YVSZU, ABSOLUTECD8, RP6AALXJ, ABSOLUTECD4, LABCD48  Lab Results   Component Value Date    WBC 7.70 11/01/2021    RBC 4.01 11/01/2021    HGB 13.5 11/01/2021    HCT 41.1 11/01/2021     (H) 11/01/2021    MCH 33.7 (H) 11/01/2021    MCHC 32.8 11/01/2021    RDW 13.0 11/01/2021     11/01/2021    MPV 9.4 11/01/2021    GRAN 5.5 11/01/2021    GRAN 71.6 11/01/2021    LYMPH 1.6 11/01/2021    LYMPH 20.6 11/01/2021    MONO 0.5 11/01/2021    MONO 6.4 11/01/2021    EOS 0.0 11/01/2021    BASO 0.04 11/01/2021    EOSINOPHIL 0.5 11/01/2021    BASOPHIL 0.5 11/01/2021     Sodium   Date Value Ref Range Status   03/05/2021 140 136 - 145 mmol/L Final     Potassium   Date Value Ref Range Status   03/05/2021 4.7 3.5 - 5.1 mmol/L Final     Chloride   Date Value Ref Range Status   03/05/2021 102 95 - 110 mmol/L Final     CO2   Date Value Ref Range Status   03/05/2021 31 (H) 23 - 29 mmol/L Final     Glucose   Date Value Ref Range Status   03/05/2021 91 70 - 110 mg/dL Final     BUN   Date Value Ref  Range Status   03/05/2021 10 6 - 20 mg/dL Final     Creatinine   Date Value Ref Range Status   03/05/2021 0.7 0.5 - 1.4 mg/dL Final     Calcium   Date Value Ref Range Status   03/05/2021 9.6 8.7 - 10.5 mg/dL Final     Total Protein   Date Value Ref Range Status   11/01/2021 7.3 6.0 - 8.4 g/dL Final     Albumin   Date Value Ref Range Status   11/01/2021 4.1 3.5 - 5.2 g/dL Final     Total Bilirubin   Date Value Ref Range Status   11/01/2021 0.5 0.1 - 1.0 mg/dL Final     Comment:     For infants and newborns, interpretation of results should be based  on gestational age, weight and in agreement with clinical  observations.    Premature Infant recommended reference ranges:  Up to 24 hours.............<8.0 mg/dL  Up to 48 hours............<12.0 mg/dL  3-5 days..................<15.0 mg/dL  6-29 days.................<15.0 mg/dL       Alkaline Phosphatase   Date Value Ref Range Status   11/01/2021 90 55 - 135 U/L Final     AST   Date Value Ref Range Status   11/01/2021 44 (H) 10 - 40 U/L Final     ALT   Date Value Ref Range Status   11/01/2021 48 (H) 10 - 44 U/L Final     Anion Gap   Date Value Ref Range Status   03/05/2021 7 (L) 8 - 16 mmol/L Final     eGFR if    Date Value Ref Range Status   03/05/2021 >60.0 >60 mL/min/1.73 m^2 Final     eGFR if non    Date Value Ref Range Status   03/05/2021 >60.0 >60 mL/min/1.73 m^2 Final     Comment:     Calculation used to obtain the estimated glomerular filtration  rate (eGFR) is the CKD-EPI equation.              Diagnosis/Assessment/Plan:    1.  Neuro-behcet's        Assessment:Pt clinically stable and tolerating Remicade        Disease Modifying Therapies: Will proceed with infliximab 5mg/kg IV every 8 weeks.  Labs in May.   2.   Symptom Assessment / Management   Depression / Adjustment disorder--will restart Celexa; Refer to our  team for counseling     F/u 6 mo              Problem List Items Addressed This Visit        1 - High     Neurologic type Behcet's syndrome - Primary    Relevant Orders    Quantiferon Gold TB    CBC Auto Differential    Hepatitis B Core Antibody, Total    Hepatitis B Surface Antigen      Other Visit Diagnoses     Adjustment disorder, unspecified type        Relevant Orders    Ambulatory referral/consult to Multiple Sclerosis Social Work    Mixed anxiety and depressive disorder        Relevant Medications    citalopram (CELEXA) 20 MG tablet    Immunosuppression due to drug therapy        Counseling regarding goals of care

## 2022-03-14 ENCOUNTER — PATIENT MESSAGE (OUTPATIENT)
Dept: NEUROLOGY | Facility: CLINIC | Age: 42
End: 2022-03-14
Payer: MEDICARE

## 2022-04-12 ENCOUNTER — PATIENT MESSAGE (OUTPATIENT)
Dept: NEUROLOGY | Facility: CLINIC | Age: 42
End: 2022-04-12
Payer: MEDICARE

## 2022-04-12 DIAGNOSIS — M35.2 BEHCET'S SYNDROME, NEUROLOGIC TYPE: Primary | ICD-10-CM

## 2022-04-13 NOTE — TELEPHONE ENCOUNTER
Spoke with Dr. Butt and reviewed pt's symptoms. Dr. Butt would like to get a stat mri of the brain. Left VM for pt.

## 2022-04-14 ENCOUNTER — HOSPITAL ENCOUNTER (OUTPATIENT)
Dept: RADIOLOGY | Facility: HOSPITAL | Age: 42
Discharge: HOME | End: 2022-04-14
Attending: PSYCHIATRY & NEUROLOGY
Payer: MEDICARE

## 2022-04-14 DIAGNOSIS — M35.2 BEHCET'S SYNDROME, NEUROLOGIC TYPE: ICD-10-CM

## 2022-04-14 PROCEDURE — 70553 MRI BRAIN DEMYELINATING W/ WO CONTRAST: ICD-10-PCS | Mod: 26,,, | Performed by: RADIOLOGY

## 2022-04-14 PROCEDURE — A9585 GADOBUTROL INJECTION: HCPCS | Performed by: PSYCHIATRY & NEUROLOGY

## 2022-04-14 PROCEDURE — 70553 MRI BRAIN STEM W/O & W/DYE: CPT | Mod: 26,,, | Performed by: RADIOLOGY

## 2022-04-14 PROCEDURE — 25500020 PHARM REV CODE 255: Performed by: PSYCHIATRY & NEUROLOGY

## 2022-04-14 PROCEDURE — 70553 MRI BRAIN STEM W/O & W/DYE: CPT | Mod: TC

## 2022-04-14 RX ORDER — GADOBUTROL 604.72 MG/ML
6 INJECTION INTRAVENOUS
Status: COMPLETED | OUTPATIENT
Start: 2022-04-14 | End: 2022-04-14

## 2022-04-14 RX ADMIN — GADOBUTROL 6 ML: 604.72 INJECTION INTRAVENOUS at 02:04

## 2022-06-14 ENCOUNTER — OFFICE VISIT (OUTPATIENT)
Dept: DERMATOLOGY | Facility: CLINIC | Age: 42
End: 2022-06-14
Payer: MEDICARE

## 2022-06-14 DIAGNOSIS — L98.9 DISEASE OF SKIN AND SUBCUTANEOUS TISSUE: Primary | ICD-10-CM

## 2022-06-14 PROCEDURE — 99203 OFFICE O/P NEW LOW 30 MIN: CPT | Mod: 95,,, | Performed by: DERMATOLOGY

## 2022-06-14 PROCEDURE — 1160F RVW MEDS BY RX/DR IN RCRD: CPT | Mod: CPTII,95,, | Performed by: DERMATOLOGY

## 2022-06-14 PROCEDURE — 1160F PR REVIEW ALL MEDS BY PRESCRIBER/CLIN PHARMACIST DOCUMENTED: ICD-10-PCS | Mod: CPTII,95,, | Performed by: DERMATOLOGY

## 2022-06-14 PROCEDURE — 99203 PR OFFICE/OUTPT VISIT, NEW, LEVL III, 30-44 MIN: ICD-10-PCS | Mod: 95,,, | Performed by: DERMATOLOGY

## 2022-06-14 PROCEDURE — 1159F PR MEDICATION LIST DOCUMENTED IN MEDICAL RECORD: ICD-10-PCS | Mod: CPTII,95,, | Performed by: DERMATOLOGY

## 2022-06-14 PROCEDURE — 1159F MED LIST DOCD IN RCRD: CPT | Mod: CPTII,95,, | Performed by: DERMATOLOGY

## 2022-06-14 RX ORDER — TRIAMCINOLONE ACETONIDE 1 MG/G
CREAM TOPICAL 2 TIMES DAILY
Qty: 28.4 G | Refills: 0 | Status: SHIPPED | OUTPATIENT
Start: 2022-06-14

## 2022-06-14 RX ORDER — KETOCONAZOLE 20 MG/G
CREAM TOPICAL
Qty: 30 G | Refills: 0 | Status: SHIPPED | OUTPATIENT
Start: 2022-06-14

## 2022-06-14 NOTE — PROGRESS NOTES
Subjective:       Patient ID:  Maame Cortez is a 41 y.o. female who presents for No chief complaint on file.    The patient location is: [place]  The chief complaint leading to consultation is: rash    Visit type: audiovisual    Face to Face time with patient: 5  8 minutes of total time spent on the encounter, which includes face to face time and non-face to face time preparing to see the patient (eg, review of tests), Obtaining and/or reviewing separately obtained history, Documenting clinical information in the electronic or other health record, Independently interpreting results (not separately reported) and communicating results to the patient/family/caregiver, or Care coordination (not separately reported).         Each patient to whom he or she provides medical services by telemedicine is:  (1) informed of the relationship between the physician and patient and the respective role of any other health care provider with respect to management of the patient; and (2) notified that he or she may decline to receive medical services by telemedicine and may withdraw from such care at any time.    Notes:     History of Present Illness: The patient presents with chief complaint of rash.  Location: under arms  Duration: 2 weeks  Signs/Symptoms: red itchy    Prior treatments:   Coconut oil  Lotion with tea tree oil    Uses all natural deodorants - notes she tried a new one a few days before the rash started and thinks this triggered it        Review of Systems   Skin: Positive for itching and rash.        Objective:    Physical Exam   Constitutional: She appears well-developed and well-nourished. No distress.   Neurological: She is alert and oriented to person, place, and time. She is not disoriented.   Psychiatric: She has a normal mood and affect.   Skin:   Areas Examined (abnormalities noted in diagram):   Chest / Axilla Inspection Performed              Diagram Legend     Erythematous scaling macule/papule c/w  actinic keratosis       Vascular papule c/w angioma      Pigmented verrucoid papule/plaque c/w seborrheic keratosis      Yellow umbilicated papule c/w sebaceous hyperplasia      Irregularly shaped tan macule c/w lentigo     1-2 mm smooth white papules consistent with Milia      Movable subcutaneous cyst with punctum c/w epidermal inclusion cyst      Subcutaneous movable cyst c/w pilar cyst      Firm pink to brown papule c/w dermatofibroma      Pedunculated fleshy papule(s) c/w skin tag(s)      Evenly pigmented macule c/w junctional nevus     Mildly variegated pigmented, slightly irregular-bordered macule c/w mildly atypical nevus      Flesh colored to evenly pigmented papule c/w intradermal nevus       Pink pearly papule/plaque c/w basal cell carcinoma      Erythematous hyperkeratotic cursted plaque c/w SCC      Surgical scar with no sign of skin cancer recurrence      Open and closed comedones      Inflammatory papules and pustules      Verrucoid papule consistent consistent with wart     Erythematous eczematous patches and plaques     Dystrophic onycholytic nail with subungual debris c/w onychomycosis     Umbilicated papule    Erythematous-base heme-crusted tan verrucoid plaque consistent with inflamed seborrheic keratosis     Erythematous Silvery Scaling Plaque c/w Psoriasis     See annotation              Assessment / Plan:        Dermatitis  - ACD vs intertrigo vs other. Trial of topicals below, if no improvement in 2-4 weeks, recommend in person eval  - recommend Vanicream brand deodorant  -     ketoconazole (NIZORAL) 2 % cream; AAA bid  Dispense: 30 g; Refill: 0  -     triamcinolone acetonide 0.1% (KENALOG) 0.1 % cream; Apply topically 2 (two) times daily. To affected areas for 2-4 weeks  Dispense: 28.4 g; Refill: 0    Side effect profile reviewed for topical steroids including atrophy, telangiectasia and striae development with prolonged usage.  Patient acknowledged understanding.             RTC 4 weeks if  no improvement for in person eval        LOS NUMBER AND COMPLEXITY OF PROBLEMS    COMPLEXITY OF DATA RISK TOTAL TIME (m)   42557  43395 [] 1 self-limited or minor problem [x] Minimal to none [] No treatment recommended or patient to monitor. Reassurance.  15-29  10-19   18056  25933 Low  [] 2 or more self limited or minor problems  [] 1 stable chronic illness  [x] 1 acute, uncomplicated illness or injury Limited (2)  [] Prior external notes from each unique source  [] Review result of each unique test  [] Order each unique test  OR [] Independent historian Low  []  OTC medications   []  Discussed/Decision for minor skin surgery (no risk factors) 30-44  20-29   22555  44078 Moderate  []  1 or more chronic unstable illness (not at goal or progression or exacerbation) or SE of treatment  []  2 or more stable chronic illnesses  []  1 acute illness with systemic symptoms  []  1 acute complicated injury  []  1 undiagnosed new problem with uncertain prognosis Moderate (1/3 below)  []  3 or more data items        *Now includes independent historian  []  Independent interpretation of a test  []  Discuss management/test with another provider Moderate  [x]  Prescription drug mgmt  []  Discussed/Decision for Minor surgery with risk factors  []  Mgmt limited by social determinates 45-59  30-39   53061  04336 High  []  1 or more chronic illness with severe exacerbation, progression or SE of treatment  []  1 acute or chronic illness/injury that poses a threat to life or bodily function Extensive (2/3 below)  []  3 or more data items        *Now includes independent historian.  []  Independent interpretation of a test  []  Discuss management/test with another provider High  []  Major surgery with risk discussed  []  Drug therapy requiring intensive monitoring for toxicity  []  Hospitalization  []  Decision for DNR 60-74  40-54

## 2022-06-17 ENCOUNTER — OFFICE VISIT (OUTPATIENT)
Dept: ORTHOPEDICS | Facility: CLINIC | Age: 42
End: 2022-06-17
Payer: MEDICARE

## 2022-06-17 ENCOUNTER — HOSPITAL ENCOUNTER (OUTPATIENT)
Dept: RADIOLOGY | Facility: HOSPITAL | Age: 42
Discharge: HOME | End: 2022-06-17
Attending: PHYSICIAN ASSISTANT
Payer: MEDICARE

## 2022-06-17 VITALS
SYSTOLIC BLOOD PRESSURE: 134 MMHG | HEART RATE: 103 BPM | BODY MASS INDEX: 21.78 KG/M2 | WEIGHT: 127.56 LBS | HEIGHT: 64 IN | DIASTOLIC BLOOD PRESSURE: 89 MMHG

## 2022-06-17 DIAGNOSIS — M25.561 RIGHT KNEE PAIN, UNSPECIFIED CHRONICITY: ICD-10-CM

## 2022-06-17 DIAGNOSIS — M25.561 RIGHT KNEE PAIN, UNSPECIFIED CHRONICITY: Primary | ICD-10-CM

## 2022-06-17 PROCEDURE — 1160F PR REVIEW ALL MEDS BY PRESCRIBER/CLIN PHARMACIST DOCUMENTED: ICD-10-PCS | Mod: CPTII,S$GLB,, | Performed by: PHYSICIAN ASSISTANT

## 2022-06-17 PROCEDURE — 3079F PR MOST RECENT DIASTOLIC BLOOD PRESSURE 80-89 MM HG: ICD-10-PCS | Mod: CPTII,S$GLB,, | Performed by: PHYSICIAN ASSISTANT

## 2022-06-17 PROCEDURE — 73562 XR KNEE ORTHO RIGHT: ICD-10-PCS | Mod: 26,RT,, | Performed by: RADIOLOGY

## 2022-06-17 PROCEDURE — 73562 X-RAY EXAM OF KNEE 3: CPT | Mod: TC,RT

## 2022-06-17 PROCEDURE — 99999 PR PBB SHADOW E&M-EST. PATIENT-LVL III: ICD-10-PCS | Mod: PBBFAC,,, | Performed by: PHYSICIAN ASSISTANT

## 2022-06-17 PROCEDURE — 3008F PR BODY MASS INDEX (BMI) DOCUMENTED: ICD-10-PCS | Mod: CPTII,S$GLB,, | Performed by: PHYSICIAN ASSISTANT

## 2022-06-17 PROCEDURE — 1159F PR MEDICATION LIST DOCUMENTED IN MEDICAL RECORD: ICD-10-PCS | Mod: CPTII,S$GLB,, | Performed by: PHYSICIAN ASSISTANT

## 2022-06-17 PROCEDURE — 1160F RVW MEDS BY RX/DR IN RCRD: CPT | Mod: CPTII,S$GLB,, | Performed by: PHYSICIAN ASSISTANT

## 2022-06-17 PROCEDURE — 73560 X-RAY EXAM OF KNEE 1 OR 2: CPT | Mod: TC,LT

## 2022-06-17 PROCEDURE — 99999 PR PBB SHADOW E&M-EST. PATIENT-LVL III: CPT | Mod: PBBFAC,,, | Performed by: PHYSICIAN ASSISTANT

## 2022-06-17 PROCEDURE — 3075F PR MOST RECENT SYSTOLIC BLOOD PRESS GE 130-139MM HG: ICD-10-PCS | Mod: CPTII,S$GLB,, | Performed by: PHYSICIAN ASSISTANT

## 2022-06-17 PROCEDURE — 3075F SYST BP GE 130 - 139MM HG: CPT | Mod: CPTII,S$GLB,, | Performed by: PHYSICIAN ASSISTANT

## 2022-06-17 PROCEDURE — 73562 X-RAY EXAM OF KNEE 3: CPT | Mod: 26,RT,, | Performed by: RADIOLOGY

## 2022-06-17 PROCEDURE — 73560 XR KNEE ORTHO RIGHT: ICD-10-PCS | Mod: 26,LT,, | Performed by: RADIOLOGY

## 2022-06-17 PROCEDURE — 3008F BODY MASS INDEX DOCD: CPT | Mod: CPTII,S$GLB,, | Performed by: PHYSICIAN ASSISTANT

## 2022-06-17 PROCEDURE — 20610 PR DRAIN/INJECT LARGE JOINT/BURSA: ICD-10-PCS | Mod: RT,S$GLB,, | Performed by: PHYSICIAN ASSISTANT

## 2022-06-17 PROCEDURE — 99203 OFFICE O/P NEW LOW 30 MIN: CPT | Mod: 25,S$GLB,, | Performed by: PHYSICIAN ASSISTANT

## 2022-06-17 PROCEDURE — 20610 DRAIN/INJ JOINT/BURSA W/O US: CPT | Mod: RT,S$GLB,, | Performed by: PHYSICIAN ASSISTANT

## 2022-06-17 PROCEDURE — 99203 PR OFFICE/OUTPT VISIT, NEW, LEVL III, 30-44 MIN: ICD-10-PCS | Mod: 25,S$GLB,, | Performed by: PHYSICIAN ASSISTANT

## 2022-06-17 PROCEDURE — 3079F DIAST BP 80-89 MM HG: CPT | Mod: CPTII,S$GLB,, | Performed by: PHYSICIAN ASSISTANT

## 2022-06-17 PROCEDURE — 73560 X-RAY EXAM OF KNEE 1 OR 2: CPT | Mod: 26,LT,, | Performed by: RADIOLOGY

## 2022-06-17 PROCEDURE — 1159F MED LIST DOCD IN RCRD: CPT | Mod: CPTII,S$GLB,, | Performed by: PHYSICIAN ASSISTANT

## 2022-06-17 RX ORDER — BETAMETHASONE SODIUM PHOSPHATE AND BETAMETHASONE ACETATE 3; 3 MG/ML; MG/ML
6 INJECTION, SUSPENSION INTRA-ARTICULAR; INTRALESIONAL; INTRAMUSCULAR; SOFT TISSUE
Status: COMPLETED | OUTPATIENT
Start: 2022-06-17 | End: 2022-06-17

## 2022-06-17 RX ADMIN — BETAMETHASONE SODIUM PHOSPHATE AND BETAMETHASONE ACETATE 6 MG: 3; 3 INJECTION, SUSPENSION INTRA-ARTICULAR; INTRALESIONAL; INTRAMUSCULAR; SOFT TISSUE at 03:06

## 2022-06-17 NOTE — PROGRESS NOTES
SUBJECTIVE:     Chief Complaint & History of Present Illness:  Maame Cortez is a New patient 41 y.o. female who is seen here today with a complaint of    Chief Complaint   Patient presents with    Right Knee - Pain    .  Patient is here today for evaluation treatment sudden onset pain tenderness to medial aspect of the right knee for the past 1-2 weeks.  Does not remember a specific trauma or injury but does have a popping sensation when she rises from seated positions in the right knee.  Generally goes away after 1-2 minutes.  One episode few weeks ago where this sensation has persisted.  She does have mild swelling in the knee primarily medial compartment.  Not had any formal treatment to this point.  On a scale of 1-10, with 10 being worst pain imaginable, he rates this pain as 3 on good days and 7 on bad days.  she describes the pain as tender.    Review of patient's allergies indicates:  No Known Allergies      Current Outpatient Medications   Medication Sig Dispense Refill    citalopram (CELEXA) 20 MG tablet Take 1 tablet (20 mg total) by mouth once daily. 30 tablet 3    infliximab (REMICADE IV) Inject into the vein.      ketoconazole (NIZORAL) 2 % cream AAA bid 30 g 0    triamcinolone acetonide 0.1% (KENALOG) 0.1 % cream Apply topically 2 (two) times daily. To affected areas for 2-4 weeks 28.4 g 0     No current facility-administered medications for this visit.       Past Medical History:   Diagnosis Date    Behcet's syndrome, neurologic type     F/U with Neuro/Dr Butt and Rheumatology    H/O right breast biopsy 07/02/2021    Benign    Heart palpitations     Mixed anxiety and depressive disorder     MTHFR mutation     Multinodular thyroid     Stroke 08/2015    Residual Right Spastic Hemiparesis    Tobacco abuse        Past Surgical History:   Procedure Laterality Date    BELT ABDOMINOPLASTY      2001    MA TRANSCRAN DOPP INTRACRAN, EMBOLI W/INJ  8/11/2015         WISDOM TOOTH  "EXTRACTION         Vital Signs (Most Recent)  Vitals:    06/17/22 1450   BP: 134/89   Pulse: 103           Review of Systems:  ROS:  Constitutional: no fever or chills, Positive long-term use of systemic steroids impaired mobility and ADLs, chronic fatigue  Eyes: no visual changes  ENT: no nasal congestion or sore throat  Respiratory: no cough or shortness of breath  Cardiovascular: no chest pain or palpitations, Positive for heart palpitations  Gastrointestinal: no nausea or vomiting, tolerating diet  Genitourinary: no hematuria or dysuria  Integument/Breast: no rash or pruritis, History of right breast biopsy  Hematologic/Lymphatic: no easy bruising or lymphadenopathy, Positive Behcet'ssyndrome, or immune disorder immunosuppression, thrombocytosis  Musculoskeletal: no arthralgias or myalgias  Neurological: no seizures or tremors, Positive cerebral thrombosis with cerebral infarction, facial droop, history of CVA (2015), right monie (, cognitive deficits secondary to CVA, coordination impairment, impaired motor control CSF abnormality history of sudden vision loss  Behavioral/Psych: no auditory or visual hallucinations, Positive for major depressive disorder, mixed anxiety and depressive disorder  Endocrine: no heat or cold intolerance                OBJECTIVE:     PHYSICAL EXAM:  Height: 5' 4" (162.6 cm) Weight: 57.9 kg (127 lb 8.6 oz), General Appearance: Well nourished, well developed, in no acute distress.  Neurological: Mood & affect are normal.    right  Knee Exam:  Knee Range of Motion:0-125 degrees flexion   Effusion:  Mild medial  Condition of skin:intact  Location of tenderness:Medial joint line   Strength:5 of 5  Stability:  Lachman: stable, LCL: stable, MCL: stable, PCL: stable and posteromedial (dial): stable  Varus /Valgus stress:  normal  Isabela:   negative/negative    left  Knee Exam:  Knee Range of Motion:0-130 degrees flexion   Effusion:none  Condition of skin:intact  Location of tenderness:None "   Strength:5 of 5  Stability:  stable to testing  Varus /Valgus stress:  normal  Isabela:   negative/negative      Hip Examination:  full painless range of motion, without tenderness    RADIOGRAPHS:  X-rays taken today films reviewed by me demonstrate no evidence of fracture dislocation or about the knee joint spaces well maintained no advanced degenerative joint disease    ASSESSMENT/PLAN:       ICD-10-CM ICD-9-CM   1. Right knee pain, unspecified chronicity  M25.561 719.46       Plan: We discussed with the patient at length all the different treatment options available for  the knee including anti-inflammatories, acetaminophen, rest, ice, knee strengthening exercise, occasional cortisone injections for temporary relief, Viscosupplimentation injections, arthroscopic debridement osteotomy, and finally knee arthroplasty.   Will proceed with therapeutic diagnostic cortisone Injection of the right knee       The injection site was identified and the skin was prepared with a betadine solution. The   right  knee was injected with 1 ml of Celestone and 5 ml Lidocaine under sterile technique. Maame Cortez tolerated the procedure well, she was advised to rest the knee today, ice and elevation. she did receive immediate relief of the pain in and about his knee she was told this would be short lived and is secondary to the lidocaine. she may have an increase in his discomfort tonight followed by steady improvement over the next several days. I may take 1-3 weeks following the injection to get the full benefit of the medication.  I will see her back in 3-6 months. Sooner if he has any problems or concerns.

## 2022-06-21 ENCOUNTER — PATIENT MESSAGE (OUTPATIENT)
Dept: ORTHOPEDICS | Facility: CLINIC | Age: 42
End: 2022-06-21
Payer: MEDICARE

## 2022-06-22 ENCOUNTER — TELEPHONE (OUTPATIENT)
Dept: INTERNAL MEDICINE | Facility: CLINIC | Age: 42
End: 2022-06-22
Payer: MEDICARE

## 2022-06-22 ENCOUNTER — PATIENT MESSAGE (OUTPATIENT)
Dept: INTERNAL MEDICINE | Facility: CLINIC | Age: 42
End: 2022-06-22
Payer: MEDICARE

## 2022-10-20 ENCOUNTER — TELEPHONE (OUTPATIENT)
Dept: NEUROLOGY | Facility: CLINIC | Age: 42
End: 2022-10-20
Payer: MEDICARE

## 2022-11-25 ENCOUNTER — PES CALL (OUTPATIENT)
Dept: ADMINISTRATIVE | Facility: CLINIC | Age: 42
End: 2022-11-25
Payer: MEDICARE

## 2022-12-09 ENCOUNTER — TELEPHONE (OUTPATIENT)
Dept: NEUROLOGY | Facility: CLINIC | Age: 42
End: 2022-12-09

## 2022-12-15 RX ORDER — ACETAMINOPHEN 325 MG/1
650 TABLET ORAL
Status: CANCELLED | OUTPATIENT
Start: 2022-12-15

## 2022-12-15 RX ORDER — DIPHENHYDRAMINE HYDROCHLORIDE 50 MG/ML
25 INJECTION INTRAMUSCULAR; INTRAVENOUS
Status: CANCELLED | OUTPATIENT
Start: 2022-12-15

## 2022-12-15 RX ORDER — IPRATROPIUM BROMIDE AND ALBUTEROL SULFATE 2.5; .5 MG/3ML; MG/3ML
3 SOLUTION RESPIRATORY (INHALATION)
Status: CANCELLED | OUTPATIENT
Start: 2022-12-15

## 2022-12-15 RX ORDER — SODIUM CHLORIDE 0.9 % (FLUSH) 0.9 %
10 SYRINGE (ML) INJECTION
Status: CANCELLED | OUTPATIENT
Start: 2022-12-15

## 2022-12-15 RX ORDER — DIPHENHYDRAMINE HYDROCHLORIDE 50 MG/ML
50 INJECTION INTRAMUSCULAR; INTRAVENOUS ONCE AS NEEDED
Status: CANCELLED | OUTPATIENT
Start: 2022-12-15

## 2022-12-15 RX ORDER — EPINEPHRINE 0.3 MG/.3ML
0.3 INJECTION SUBCUTANEOUS ONCE AS NEEDED
Status: CANCELLED | OUTPATIENT
Start: 2022-12-15

## 2022-12-15 RX ORDER — HEPARIN 100 UNIT/ML
500 SYRINGE INTRAVENOUS
Status: CANCELLED | OUTPATIENT
Start: 2022-12-15

## 2022-12-19 NOTE — HPI
Maame Cortez is a 36 y.o. female with PMHx of stroke who presented to the ED with HA, intermittent nausea, dizziness, and photophobia. Patient reports having difficulty ambulating with changes in gait. Her headache began last night and has been worsening. She also noted tingling of her lips and visual disturbances. Patient is having trouble focusing her vision due to her dizziness. Patient denies new weakness and new speech difficulties.             
denies

## 2023-02-06 ENCOUNTER — OFFICE VISIT (OUTPATIENT)
Dept: NEUROLOGY | Facility: CLINIC | Age: 43
End: 2023-02-06
Payer: MEDICARE

## 2023-02-06 ENCOUNTER — LAB VISIT (OUTPATIENT)
Dept: LAB | Facility: HOSPITAL | Age: 43
End: 2023-02-06
Attending: PSYCHIATRY & NEUROLOGY
Payer: MEDICARE

## 2023-02-06 VITALS
DIASTOLIC BLOOD PRESSURE: 80 MMHG | HEART RATE: 120 BPM | BODY MASS INDEX: 24.35 KG/M2 | HEIGHT: 64 IN | SYSTOLIC BLOOD PRESSURE: 120 MMHG | WEIGHT: 142.63 LBS

## 2023-02-06 DIAGNOSIS — M35.2 BEHCET'S DISEASE: ICD-10-CM

## 2023-02-06 DIAGNOSIS — R26.9 GAIT DISTURBANCE: ICD-10-CM

## 2023-02-06 DIAGNOSIS — D84.821 IMMUNOSUPPRESSION DUE TO DRUG THERAPY: ICD-10-CM

## 2023-02-06 DIAGNOSIS — Z71.89 COUNSELING REGARDING GOALS OF CARE: ICD-10-CM

## 2023-02-06 DIAGNOSIS — G81.91 RIGHT HEMIPARESIS: ICD-10-CM

## 2023-02-06 DIAGNOSIS — M35.2 BEHCET'S SYNDROME, NEUROLOGIC TYPE: ICD-10-CM

## 2023-02-06 DIAGNOSIS — I69.398 ABNORMALITY OF GAIT AS LATE EFFECT OF CEREBROVASCULAR ACCIDENT (CVA): ICD-10-CM

## 2023-02-06 DIAGNOSIS — M35.2 BEHCET'S DISEASE: Primary | ICD-10-CM

## 2023-02-06 DIAGNOSIS — R26.9 ABNORMALITY OF GAIT AS LATE EFFECT OF CEREBROVASCULAR ACCIDENT (CVA): ICD-10-CM

## 2023-02-06 DIAGNOSIS — Z79.899 IMMUNOSUPPRESSION DUE TO DRUG THERAPY: ICD-10-CM

## 2023-02-06 DIAGNOSIS — M35.2 NEUROLOGIC TYPE BEHCET'S SYNDROME: ICD-10-CM

## 2023-02-06 LAB
ALBUMIN SERPL BCP-MCNC: 4.1 G/DL (ref 3.5–5.2)
ALP SERPL-CCNC: 78 U/L (ref 55–135)
ALT SERPL W/O P-5'-P-CCNC: 36 U/L (ref 10–44)
ANION GAP SERPL CALC-SCNC: 10 MMOL/L (ref 8–16)
AST SERPL-CCNC: 29 U/L (ref 10–40)
BASOPHILS # BLD AUTO: 0.05 K/UL (ref 0–0.2)
BASOPHILS NFR BLD: 0.6 % (ref 0–1.9)
BILIRUB SERPL-MCNC: 0.3 MG/DL (ref 0.1–1)
BUN SERPL-MCNC: 13 MG/DL (ref 6–20)
CALCIUM SERPL-MCNC: 9.4 MG/DL (ref 8.7–10.5)
CHLORIDE SERPL-SCNC: 105 MMOL/L (ref 95–110)
CO2 SERPL-SCNC: 24 MMOL/L (ref 23–29)
CREAT SERPL-MCNC: 0.7 MG/DL (ref 0.5–1.4)
DIFFERENTIAL METHOD: ABNORMAL
EOSINOPHIL # BLD AUTO: 0.1 K/UL (ref 0–0.5)
EOSINOPHIL NFR BLD: 0.6 % (ref 0–8)
ERYTHROCYTE [DISTWIDTH] IN BLOOD BY AUTOMATED COUNT: 13 % (ref 11.5–14.5)
EST. GFR  (NO RACE VARIABLE): >60 ML/MIN/1.73 M^2
GLUCOSE SERPL-MCNC: 88 MG/DL (ref 70–110)
HBV SURFACE AB SER-ACNC: 795.07 MIU/ML
HBV SURFACE AB SER-ACNC: REACTIVE M[IU]/ML
HBV SURFACE AG SERPL QL IA: NORMAL
HCT VFR BLD AUTO: 42.7 % (ref 37–48.5)
HGB BLD-MCNC: 13.7 G/DL (ref 12–16)
IMM GRANULOCYTES # BLD AUTO: 0.04 K/UL (ref 0–0.04)
IMM GRANULOCYTES NFR BLD AUTO: 0.4 % (ref 0–0.5)
LYMPHOCYTES # BLD AUTO: 1.9 K/UL (ref 1–4.8)
LYMPHOCYTES NFR BLD: 21.2 % (ref 18–48)
MCH RBC QN AUTO: 32.8 PG (ref 27–31)
MCHC RBC AUTO-ENTMCNC: 32.1 G/DL (ref 32–36)
MCV RBC AUTO: 102 FL (ref 82–98)
MONOCYTES # BLD AUTO: 0.7 K/UL (ref 0.3–1)
MONOCYTES NFR BLD: 8.2 % (ref 4–15)
NEUTROPHILS # BLD AUTO: 6.2 K/UL (ref 1.8–7.7)
NEUTROPHILS NFR BLD: 69 % (ref 38–73)
NRBC BLD-RTO: 0 /100 WBC
PLATELET # BLD AUTO: 322 K/UL (ref 150–450)
PMV BLD AUTO: 9.7 FL (ref 9.2–12.9)
POTASSIUM SERPL-SCNC: 4.3 MMOL/L (ref 3.5–5.1)
PROT SERPL-MCNC: 7.4 G/DL (ref 6–8.4)
RBC # BLD AUTO: 4.18 M/UL (ref 4–5.4)
SODIUM SERPL-SCNC: 139 MMOL/L (ref 136–145)
WBC # BLD AUTO: 9.02 K/UL (ref 3.9–12.7)

## 2023-02-06 PROCEDURE — 3008F PR BODY MASS INDEX (BMI) DOCUMENTED: ICD-10-PCS | Mod: HCNC,CPTII,S$GLB, | Performed by: PSYCHIATRY & NEUROLOGY

## 2023-02-06 PROCEDURE — 99999 PR PBB SHADOW E&M-EST. PATIENT-LVL III: CPT | Mod: PBBFAC,HCNC,, | Performed by: PSYCHIATRY & NEUROLOGY

## 2023-02-06 PROCEDURE — 3079F DIAST BP 80-89 MM HG: CPT | Mod: HCNC,CPTII,S$GLB, | Performed by: PSYCHIATRY & NEUROLOGY

## 2023-02-06 PROCEDURE — 99999 PR PBB SHADOW E&M-EST. PATIENT-LVL III: ICD-10-PCS | Mod: PBBFAC,HCNC,, | Performed by: PSYCHIATRY & NEUROLOGY

## 2023-02-06 PROCEDURE — 1160F RVW MEDS BY RX/DR IN RCRD: CPT | Mod: HCNC,CPTII,S$GLB, | Performed by: PSYCHIATRY & NEUROLOGY

## 2023-02-06 PROCEDURE — 87340 HEPATITIS B SURFACE AG IA: CPT | Mod: HCNC | Performed by: PSYCHIATRY & NEUROLOGY

## 2023-02-06 PROCEDURE — 80053 COMPREHEN METABOLIC PANEL: CPT | Mod: HCNC | Performed by: PSYCHIATRY & NEUROLOGY

## 2023-02-06 PROCEDURE — 99215 PR OFFICE/OUTPT VISIT, EST, LEVL V, 40-54 MIN: ICD-10-PCS | Mod: HCNC,S$GLB,, | Performed by: PSYCHIATRY & NEUROLOGY

## 2023-02-06 PROCEDURE — 3079F PR MOST RECENT DIASTOLIC BLOOD PRESSURE 80-89 MM HG: ICD-10-PCS | Mod: HCNC,CPTII,S$GLB, | Performed by: PSYCHIATRY & NEUROLOGY

## 2023-02-06 PROCEDURE — 1160F PR REVIEW ALL MEDS BY PRESCRIBER/CLIN PHARMACIST DOCUMENTED: ICD-10-PCS | Mod: HCNC,CPTII,S$GLB, | Performed by: PSYCHIATRY & NEUROLOGY

## 2023-02-06 PROCEDURE — 1159F MED LIST DOCD IN RCRD: CPT | Mod: HCNC,CPTII,S$GLB, | Performed by: PSYCHIATRY & NEUROLOGY

## 2023-02-06 PROCEDURE — 3008F BODY MASS INDEX DOCD: CPT | Mod: HCNC,CPTII,S$GLB, | Performed by: PSYCHIATRY & NEUROLOGY

## 2023-02-06 PROCEDURE — 86706 HEP B SURFACE ANTIBODY: CPT | Mod: 91,HCNC | Performed by: PSYCHIATRY & NEUROLOGY

## 2023-02-06 PROCEDURE — 1159F PR MEDICATION LIST DOCUMENTED IN MEDICAL RECORD: ICD-10-PCS | Mod: HCNC,CPTII,S$GLB, | Performed by: PSYCHIATRY & NEUROLOGY

## 2023-02-06 PROCEDURE — 99215 OFFICE O/P EST HI 40 MIN: CPT | Mod: HCNC,S$GLB,, | Performed by: PSYCHIATRY & NEUROLOGY

## 2023-02-06 PROCEDURE — 3074F SYST BP LT 130 MM HG: CPT | Mod: HCNC,CPTII,S$GLB, | Performed by: PSYCHIATRY & NEUROLOGY

## 2023-02-06 PROCEDURE — 85025 COMPLETE CBC W/AUTO DIFF WBC: CPT | Mod: HCNC | Performed by: PSYCHIATRY & NEUROLOGY

## 2023-02-06 PROCEDURE — 99499 UNLISTED E&M SERVICE: CPT | Mod: S$GLB,,, | Performed by: PSYCHIATRY & NEUROLOGY

## 2023-02-06 PROCEDURE — 36415 COLL VENOUS BLD VENIPUNCTURE: CPT | Mod: HCNC | Performed by: PSYCHIATRY & NEUROLOGY

## 2023-02-06 PROCEDURE — 3074F PR MOST RECENT SYSTOLIC BLOOD PRESSURE < 130 MM HG: ICD-10-PCS | Mod: HCNC,CPTII,S$GLB, | Performed by: PSYCHIATRY & NEUROLOGY

## 2023-02-06 NOTE — PROGRESS NOTES
Subjective:       Patient ID: Maame Cortez is a 42 y.o. female who presents today for a routine clinic visit for NeuroBehcet's    HPI:  DMT: Remicade every 8 weeks   Side effects from DMT? No --   Asking about LDN   Mood stable on Celexa   Walk feels stable;     SOCIAL HISTORY  Social History     Tobacco Use    Smoking status: Every Day     Packs/day: 0.50     Years: 8.00     Pack years: 4.00     Types: Cigarettes     Last attempt to quit: 2015     Years since quittin.4    Smokeless tobacco: Never    Tobacco comments:     1 ppd   Substance Use Topics    Alcohol use: Yes     Alcohol/week: 0.0 standard drinks     Comment: 3 glasses of wine daily    Drug use: No     Living arrangements - the patient lives with their family.  Employment ; SSDI, Medicare;     REVIEW OF SYMPTOMS 2023   Do you feel abnormally tired on most days? No   Do you feel you generally sleep well? Yes   Do you have difficulty controlling your bladder?  No   Do you have difficulty controlling your bowels?  No   Do you have frequent muscle cramps, tightness or spasms in your limbs?  No   Do you have new visual symptoms?  No   Do you have worsening difficulty with your memory or thinking? No   Do you have worsening symptoms of anxiety or depression?  No   For patients who walk, Do you have more difficulty walking?  No   Have you fallen since your last visit?  No   For patients who use wheelchairs: Do you have any skin wounds or breakdown? No   Do you have difficulty using your hands?  No   Do you have shooting or burning pain? No   Do you have difficulty with sexual function?  No   If you are sexually active, are you using birth control? Y/N  N/A Yes   Do you often choke when swallowing liquids or solid food?  No   Do you experience worsening symptoms when overheated? No   Do you need any new equipment such as a wheelchair, walker or shower chair? No   Do you receive co-pay financial assistance for your principal MS medicine? No    Would you be interested in participating in an MS research trial in the future? No   For patients on Gilenya, Tecfidera, Aubagio, Rituxan, Ocrevus, Tysabri, Lemtrada or Methotrexate, are you aware that you should NOT receive live virus vaccines?  Not Applicable   Do you feel you have adequate family/friend support?  Yes   Do you have health insurance?   Yes   Are you currently employed? No   Do you receive SSDI/SSI?  Yes   Do you use marijuana or cannabis products? No   Have you been diagnosed with a urinary tract infection since your last visit here? No   Have you been diagnosed with a respiratory tract infection since your last visit here? No   Have you been to the emergency room since your last visit here? No   Have you been hospitalized since your last visit here?  No                 Objective:        Neurologic Exam        MS: intact  CN: EOMI, no dysarthria  MOTOR: right spastic hemiparesis (old since stroke 2015)  GAIT: right footdrop, spastic circumduction (old since stroke 2015)    Imaging:       Results for orders placed during the hospital encounter of 04/14/22    MRI Brain Demyelinating W W/O Contrast    Impression  1. No acute intracranial process.  No detrimental change.  2. Remote lacunar magic infarct in the left lateral basal ganglia and adjacent internal capsule and lateral margin of the thalamus.  No acute hemorrhage is detected.  See above comments.  3. Small remote lacunar infarct in the central jensen.  4. Right sphenoid sinus disease.      Electronically signed by: Delvin Jackson  Date:    04/14/2022  Time:    15:51    Results for orders placed during the hospital encounter of 10/24/18    MRI Cervical Spine Demyelinating W W/O Contrast    Impression  1. Persistent edema signal identified at the level of the jensen with ill-defined central enhancement, similar when compared to the recent brain MRI.  2. Normal MRI evaluation of the cervical spinal cord specifically without signal abnormality or  pathologic enhancement.      Electronically signed by: Armando Oh MD  Date:    10/26/2018  Time:    22:43    Results for orders placed during the hospital encounter of 10/24/18    MRI Thoracic Spine Demyelinating W W/O Contrast    Impression  Normal MRI evaluation of the thoracic spine.      Electronically signed by: Armando Oh MD  Date:    10/26/2018  Time:    22:56      Labs:     Lab Results   Component Value Date    [id number] 27 (L) 03/31/2017    [id number] 19 (L) 10/29/2015     No results found for: JCVINDEX, JCVANTIBODY  No results found for: YT8VXQNQ, ABSOLUTECD3, YX0LIBSX, ABSOLUTECD8, YU1GPOFC, ABSOLUTECD4, LABCD48  Lab Results   Component Value Date    WBC 9.02 02/06/2023    RBC 4.18 02/06/2023    HGB 13.7 02/06/2023    HCT 42.7 02/06/2023     (H) 02/06/2023    MCH 32.8 (H) 02/06/2023    MCHC 32.1 02/06/2023    RDW 13.0 02/06/2023     02/06/2023    MPV 9.7 02/06/2023    GRAN 6.2 02/06/2023    GRAN 69.0 02/06/2023    LYMPH 1.9 02/06/2023    LYMPH 21.2 02/06/2023    MONO 0.7 02/06/2023    MONO 8.2 02/06/2023    EOS 0.1 02/06/2023    BASO 0.05 02/06/2023    EOSINOPHIL 0.6 02/06/2023    BASOPHIL 0.6 02/06/2023     Sodium   Date Value Ref Range Status   02/06/2023 139 136 - 145 mmol/L Final     Potassium   Date Value Ref Range Status   02/06/2023 4.3 3.5 - 5.1 mmol/L Final     Chloride   Date Value Ref Range Status   02/06/2023 105 95 - 110 mmol/L Final     CO2   Date Value Ref Range Status   02/06/2023 24 23 - 29 mmol/L Final     Glucose   Date Value Ref Range Status   02/06/2023 88 70 - 110 mg/dL Final     BUN   Date Value Ref Range Status   02/06/2023 13 6 - 20 mg/dL Final     Creatinine   Date Value Ref Range Status   02/06/2023 0.7 0.5 - 1.4 mg/dL Final     Calcium   Date Value Ref Range Status   02/06/2023 9.4 8.7 - 10.5 mg/dL Final     Total Protein   Date Value Ref Range Status   02/06/2023 7.4 6.0 - 8.4 g/dL Final     Albumin   Date Value Ref Range Status   02/06/2023 4.1  3.5 - 5.2 g/dL Final     Total Bilirubin   Date Value Ref Range Status   02/06/2023 0.3 0.1 - 1.0 mg/dL Final     Comment:     For infants and newborns, interpretation of results should be based  on gestational age, weight and in agreement with clinical  observations.    Premature Infant recommended reference ranges:  Up to 24 hours.............<8.0 mg/dL  Up to 48 hours............<12.0 mg/dL  3-5 days..................<15.0 mg/dL  6-29 days.................<15.0 mg/dL       Alkaline Phosphatase   Date Value Ref Range Status   02/06/2023 78 55 - 135 U/L Final     AST   Date Value Ref Range Status   02/06/2023 29 10 - 40 U/L Final     ALT   Date Value Ref Range Status   02/06/2023 36 10 - 44 U/L Final     Anion Gap   Date Value Ref Range Status   02/06/2023 10 8 - 16 mmol/L Final     eGFR if    Date Value Ref Range Status   03/05/2021 >60.0 >60 mL/min/1.73 m^2 Final     eGFR if non    Date Value Ref Range Status   03/05/2021 >60.0 >60 mL/min/1.73 m^2 Final     Comment:     Calculation used to obtain the estimated glomerular filtration  rate (eGFR) is the CKD-EPI equation.        Diagnosis/Assessment/Plan:   1.  Neuro-behcet's        Assessment:Pt clinically stable and tolerating Remicade        Disease Modifying Therapies: continue infliximab 5mg/kg IV every 8 weeks.  Labs today and in 6 mo    2.   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 (CVA)     Other Visit Diagnoses       Behcet's disease    -  Primary    Relevant Orders    CBC Auto Differential (Completed)    Hepatitis B Surface Antigen (Completed)    Hepatitis B Surface Ab, Qualitative (Completed)    Comprehensive Metabolic Panel (Completed)    Ambulatory referral/consult to  Physical/Occupational Therapy    CBC Auto Differential    Gait disturbance        Relevant Orders    Ambulatory referral/consult to Physical/Occupational Therapy    Counseling regarding goals of care        Behcet's syndrome, neurologic type        Immunosuppression due to drug therapy                I spent a total of 40 minutes on the day of the visit.This includes face to face time and non-face to face time preparing to see the patient (eg, review of tests), obtaining and/or reviewing separately obtained history, documenting clinical information in the electronic or other health record, independently interpreting results and communicating results to the patient/family/caregiver, or care coordinator.

## 2023-02-07 ENCOUNTER — CLINICAL SUPPORT (OUTPATIENT)
Dept: REHABILITATION | Facility: HOSPITAL | Age: 43
End: 2023-02-07
Attending: PSYCHIATRY & NEUROLOGY
Payer: MEDICARE

## 2023-02-07 DIAGNOSIS — R29.898 ANKLE WEAKNESS: ICD-10-CM

## 2023-02-07 DIAGNOSIS — R26.9 GAIT ABNORMALITY: ICD-10-CM

## 2023-02-07 DIAGNOSIS — R26.9 GAIT DISTURBANCE: ICD-10-CM

## 2023-02-07 DIAGNOSIS — M35.2 BEHCET'S DISEASE: ICD-10-CM

## 2023-02-07 PROCEDURE — 97110 THERAPEUTIC EXERCISES: CPT | Mod: HCNC,PO

## 2023-02-07 PROCEDURE — 97161 PT EVAL LOW COMPLEX 20 MIN: CPT | Mod: HCNC,PO

## 2023-02-08 NOTE — PLAN OF CARE
"  OUTPATIENT NEUROLOGICAL REHABILITATION  PHYSICAL THERAPY EVALUATION      Name: Maame Cortez  Clinic Number: [id number]    Diagnosis:   Encounter Diagnoses   Name Primary?    Behcet's disease     Gait disturbance     Gait abnormality     Ankle weakness      Physician: Esthela Butt MD  Physician Orders: PT Eval and Treat - "Eval and treat; Please consider right AFO -- not 100% sure she'd benefit, but would be helpful to try one"  Medical Diagnosis from Referral: Behcet's disease [M35.2], Gait disturbance [R26.9]  Evaluation Date: 2/7/2023  Authorization Period Expiration: 2/6/24  Plan of Care Expiration: 5/2/23  Plan of Care Certification Period: 2/7/23 - 5/2/23  Visit # / Visits authorized: 1/1    Time In: 1532  Time Out: 1617  Total Appointment Time: 45 minutes    Precautions: Fall, standard      History     PT Diagnosis: gait abnormality    History of Present Illness: Maame is a 42 y.o. female that presents to [facility] secondary to Behcet's disease.     Subjective     Date of onset: hx of CVA 2015  History of current condition: Maame reports: her neurologist suggested she be seen by PT to see if she was appropriate for an AFO due to drop foot post-CVA in 2015, R-sided deficits. She had a recent meniscus injury (few months ago) that has improved. Occasional tripping but no falls. She would like to gain more strength in her leg.     Medical History:   Past Medical History:   Diagnosis Date    Behcet's syndrome, neurologic type     F/U with Neuro/Dr Butt and Rheumatology    H/O right breast biopsy 07/02/2021    Benign    Heart palpitations     Mixed anxiety and depressive disorder     MTHFR mutation     Multinodular thyroid     Stroke 08/2015    Residual Right Spastic Hemiparesis    Tobacco abuse        Surgical History:   Maame Cortez  has a past surgical history that includes [place] tooth extraction; Belt abdominoplasty; and pr transcran dopp intracran, emboli " w/inj (8/11/2015).    Medications:   Maame has a current medication list which includes the following prescription(s): citalopram, infliximab, ketoconazole, and triamcinolone acetonide 0.1%.    Allergies:   Review of patient's allergies indicates:  No Known Allergies     Imaging: see Epic imaging    Prior Therapy: yes here several years ago  Social History: lives with their family  Occupation: disabled    Falls in the past year: none  Place of Residence (Steps/Adaptations/Levels): 2 story home, no issues navigating  Prior = Current Level of Function: ambulates without AD, independent with all functional mobility/ADLs, unable to write with dominant hand due to residual CVA deficits; frequent tripping reported due to drop-foot  DME owned:  Straight cane    Pain:  Current 0/10, worst 0/10, best 0/10   Location: N/A     Patients goals: strengthen R leg, determine appropriateness for AFO      Objective     Patient's mobility presenting to therapy evaluation: walks  Follows commands: 100% of time   Speech: no deficits    Mental status: alert, oriented to person, place, and time, normal mood, behavior, speech, dress, motor activity, and thought processes  Appearance: Casually dressed and Well groomed  Behavior:  calm, cooperative, and adequate rapport can be established  Attention Span and Concentration:  Normal    Dominant hand:  right     Sensation:   - Light Touch: Intact         Tone: NT    Coordination: NT     ROM:   UPPER EXTREMITIES  (R) UE: limited mobility in shoulder, wrist  (L) UE: WNL       LOWER EXTREMITIES  (R) LE Hip: WNL   Knee: WNL   Ankle: WNL    (L) LE: Hip: WNL   Knee: WNL   Ankle: WNL    Strength: MMT grades below:     Lower Extremity Strength  Right LE  Eval 02/07/2023 Left LE Eval 02/07/2023   Hip Flexion: 5/5 Hip Flexion: 5/5   Hip Extension:  4+/5 Hip Extension: 5/5   Hip Abduction: 4+/5 Hip Abduction: 5/5   Hip Adduction: 5/5 Hip Adduction 5/5   Knee Extension: 4/5 Knee Extension: 4+/5   Knee  "Flexion: 5/5 Knee Flexion: 5/5   Ankle Dorsiflexion: 3+/5 Ankle Dorsiflexion: 4/5   Ankle Plantarflexion: 4-/5 Ankle Plantarflexion: 5/5   Ankle Inversion: 4-/5 Ankle Inversion: 5/5   Ankle Eversion: 4/5 Ankle Eversion: 5/5         BETH SENSORY TESTING:  (P= Pass, F= Fail; note any sway; hold each position for 30")  Condition 1: (firm surface/Romberg/EO) P  Condition 2: (firm surface/Romberg/EC) P  Condition 3: (firm surface/tandem/EO) P bilaterally   Condition 4: (firm surface/tandem/EC) F 7 sec R foot fwd, F 2 sec L foot fwd  Condition 5: (soft surface/Romberg/EO) P  Condition 6: (soft surface/Romberg/EC) P, mod sway     Evaluation   R SLS 3, 4, 2 sec trials for avg = 3 sec  (<10 sec = HIGH FALL RISK)   L SLS 30 sec  (<10 sec = HIGH FALL RISK)   30 sec chair rise 11.5 completed no arms   5 x sit<>stand 13 seconds no arms   TUG 6 seconds with no AD   SSWS 1.0 m/sec (6m/6s) with no AD   Fast walking speed 1.5 m/sec (6m/4s) with no AD     Independent Community Ambulator > 1.4 m/s   Mod I Community Ambulator 1.2-1.4 m/s   SPV/SBA Community Ambulator 0.8-1.2 m/s   Limited Community Ambulator 0.4-0.8 m/s   Household Ambulator < 0.4 m/s       30 second chair rise below average score:   Age  Men  Women  60-64  <14  <12  65-69  <12  <11  70-74  <12  <10  75-79  <11  <10  80-84  <10  <9  85-89  <8  <8  90-94  <7  <4  A below average score indicates a risk for fall.    5 x sit<>stand  >12 sec= fall risk for general elderly  >16 sec= fall risk for Parkinson's disease  >10 sec= balance/vestibular dysfunction (<59 y/o)  >14.2 sec= balance/vestibular dysfunction (>59 y/o)  >12 sec= fall risk for CVA    GAIT ASSESSMENT  - AD used: No Assistive Device  - Assistance: INDEPENDENT  - Distance: community distances    Observed Gait Deviations:  Maame displays the following deviations with ambulation:  Abnormal, Circumducted, and Drop-foot, decreased step length, decreased toe-to-floor clearance, and poor eversion with R swing phase, R " hip ER with swing phase, similar to steppage gait, R knee hyperextension in stance, increased r knee flexion and hip flexion in swing phase     Impairments contributing to deviations/impairments: impaired balance and decreased strength    Endurance Deficit: not formally assessed    Education provided re: role of PT, goals for PT, scheduling, attendance policy - pt verbalized understanding.      Maame verbalized good understanding of education provided.   Pt has no cultural, educational or language barriers to learning provided.      TREATMENT: 10 minutes of therapeutic exercise provided with demonstration and education for form:  - 1 x 10 of each of the following for HEP  --- red band resisted ankle plantarflexion, dorsiflexion, inversion, and eversion  --- seated heel raises    Assessment     Maame is a 42 y.o. female referred to outpatient Physical Therapy presenting with a medical diagnosis of Behcet's disease with gait disturbance. Patient presents with a gait pattern similar to steppage gait despite R ankle dorsiflexion strength adequate for foot clearance, walking with increased R hip and knee flexion strength. Hyperextension in the R knee noted during stance phase, and due to high score on MMT for knee flexion, she likely has poor eccentric control of this muscle group and would additionally benefit from addressing this functional weakness so as to optimize R LE pushoff into pre-swing phase. She is at risk of falls with poor R SLS balance score, condition 4 on the GST, and 5 x sit<>stand. These deficits have affect the pt's safety with and effectiveness of gait and she would benefit from skilled PT intervention in order to determine need for AFO due to potential for strength improvements in her ankle. Further testing may be completed at future follow-up sessions with PT of record with goals set as indicated. PT is warranted to address these deficits listed above to improve functional mobility, decrease risk  of falls, and progress toward PLOF in order to improve the pt's quality of life.    Pt rehab prognosis is Good. Pt will benefit from skilled outpatient physical therapy to address the deficits listed above in the problem list, provide pt/family education, and to maximize pt's level of independence in the home and community environment.     Plan of care discussed with patient: Yes    Patient's spiritual, cultural and educational needs considered and patient is agreeable to the plan of care and goals as stated below:       Goals:  Short Term Goals: 4 weeks  Pt to be introduced to HEP and report > 50% compliance.  Pt to improve gross B LE strength by > 1/3 muscle grade on MMT.  Pt to improve 5 x sit<>stand score to 12 seconds to show improved functional lower extremity strength and decreased fall risk post-CVA.  Pt to improve condition 4 on the GST to 7 seconds bilaterally for improved static balance and decreased risk of falls.   Pt to improve R SLS balance to 6 seconds in order to decrease risk of falls.      Long Term Goals: 8 weeks   Pt to improve gross B LE strength by > 2/3 muscle grade on MMT.  Pt to improve 5 x sit<>stand score to 10 seconds to show improved functional lower extremity strength.   Pt to improve condition 4 on the GST to 10 seconds bilaterally for improved static balance and decreased risk of falls.   Pt to improve R SLS balance by 10 seconds each in order to decrease risk of falls.     Anticipated Barriers for therapy: chronic nature of condition, comorbidities    Medical Necessity is demonstrated by the following:    History  Co-morbidities and personal factors that may impact the plan of care Co-morbidities:   hx of CVA, NeuroBehcet's syndrome    Personal Factors:   no deficits     moderate   Examination  Body Structures and Functions, activity limitations and participation restrictions that may impact the plan of care Body Regions:   lower extremities    Body Systems:    gross  symmetry  strength  gross coordinated movement  balance  gait    Participation Restrictions:   N/a    Activity limitations:   Learning and applying knowledge  no deficits    General Tasks and Commands  no deficits    Communication  no deficits    Mobility  no deficits    Self care  no deficits    Domestic Life  no deficits    Interactions/Relationships  no deficits    Life Areas  no deficits    Community and Social Life  community life         moderate   Clinical Presentation stable and uncomplicated low   Decision Making/ Complexity Score: low         Plan     Plan of care Certification: 2/7/2023 to 5/2/23.    Outpatient physical therapy 1-2 times weekly to include: Pt Education, Home Exercise Program, Therapeutic Exercises, Neuromuscular Re-education, Therapeutic Activities, Gait Training, Manual Therapy, and modalities(Ultrasound/Phonophoresis, Electrical Stimulation/TENS/Interferential and Moist Heat/Ice) PRN to achieve established goals. Pt may be seen by PTA as part of the rehabilitation team.     Cont PT for 8 weeks.     Bennie Borja, PT  02/07/2023

## 2023-02-09 DIAGNOSIS — Z00.00 ENCOUNTER FOR MEDICARE ANNUAL WELLNESS EXAM: ICD-10-CM

## 2023-02-28 ENCOUNTER — PES CALL (OUTPATIENT)
Dept: ADMINISTRATIVE | Facility: CLINIC | Age: 43
End: 2023-02-28
Payer: MEDICARE

## 2023-03-02 ENCOUNTER — DOCUMENTATION ONLY (OUTPATIENT)
Dept: REHABILITATION | Facility: HOSPITAL | Age: 43
End: 2023-03-02

## 2023-03-02 NOTE — PROGRESS NOTES
PHYSICAL THERAPY DISCHARGE SUMMARY     Total Visits: eval only    Status Towards Goals Met: no progress made toward set goals       Goals Not achieved and why:  pt contacted clinic requesting to be discharge for personal reasons she did not disclose.      Discharge reason: Patient self discharge    PLAN   This patient is discharged from Outpatient Physical Therapy Services.     Bennie Borja, PT  03/02/2023

## 2023-07-31 ENCOUNTER — PES CALL (OUTPATIENT)
Dept: ADMINISTRATIVE | Facility: CLINIC | Age: 43
End: 2023-07-31
Payer: MEDICARE

## 2023-08-07 ENCOUNTER — LAB VISIT (OUTPATIENT)
Dept: LAB | Facility: HOSPITAL | Age: 43
End: 2023-08-07
Attending: PSYCHIATRY & NEUROLOGY
Payer: MEDICARE

## 2023-08-07 DIAGNOSIS — M35.2 BEHCET'S DISEASE: ICD-10-CM

## 2023-08-07 LAB
BASOPHILS # BLD AUTO: 0.04 K/UL (ref 0–0.2)
BASOPHILS NFR BLD: 0.5 % (ref 0–1.9)
DIFFERENTIAL METHOD: ABNORMAL
EOSINOPHIL # BLD AUTO: 0.1 K/UL (ref 0–0.5)
EOSINOPHIL NFR BLD: 1 % (ref 0–8)
ERYTHROCYTE [DISTWIDTH] IN BLOOD BY AUTOMATED COUNT: 13 % (ref 11.5–14.5)
HCT VFR BLD AUTO: 38.7 % (ref 37–48.5)
HGB BLD-MCNC: 12.7 G/DL (ref 12–16)
IMM GRANULOCYTES # BLD AUTO: 0.03 K/UL (ref 0–0.04)
IMM GRANULOCYTES NFR BLD AUTO: 0.4 % (ref 0–0.5)
LYMPHOCYTES # BLD AUTO: 2.6 K/UL (ref 1–4.8)
LYMPHOCYTES NFR BLD: 31.7 % (ref 18–48)
MCH RBC QN AUTO: 32.5 PG (ref 27–31)
MCHC RBC AUTO-ENTMCNC: 32.8 G/DL (ref 32–36)
MCV RBC AUTO: 99 FL (ref 82–98)
MONOCYTES # BLD AUTO: 0.6 K/UL (ref 0.3–1)
MONOCYTES NFR BLD: 6.8 % (ref 4–15)
NEUTROPHILS # BLD AUTO: 5 K/UL (ref 1.8–7.7)
NEUTROPHILS NFR BLD: 59.6 % (ref 38–73)
NRBC BLD-RTO: 0 /100 WBC
PLATELET # BLD AUTO: 296 K/UL (ref 150–450)
PMV BLD AUTO: 9.8 FL (ref 9.2–12.9)
RBC # BLD AUTO: 3.91 M/UL (ref 4–5.4)
WBC # BLD AUTO: 8.29 K/UL (ref 3.9–12.7)

## 2023-08-07 PROCEDURE — 36415 COLL VENOUS BLD VENIPUNCTURE: CPT | Mod: HCNC,PO | Performed by: PSYCHIATRY & NEUROLOGY

## 2023-08-07 PROCEDURE — 85025 COMPLETE CBC W/AUTO DIFF WBC: CPT | Mod: HCNC | Performed by: PSYCHIATRY & NEUROLOGY

## 2023-09-15 ENCOUNTER — PATIENT OUTREACH (OUTPATIENT)
Dept: ADMINISTRATIVE | Facility: HOSPITAL | Age: 43
End: 2023-09-15
Payer: MEDICARE

## 2023-09-15 NOTE — PROGRESS NOTES
Chart reviewed.   Immunizations: Reconciled  Orders placed: N/A  Upcoming appts to satisfy RAVINDRA topics: N/A  (Not seen in 4 yrs Stew Vidal) Outreached to patient. No answer, LM on voicemail.

## 2023-10-31 DIAGNOSIS — F41.8 MIXED ANXIETY AND DEPRESSIVE DISORDER: ICD-10-CM

## 2023-11-01 RX ORDER — CITALOPRAM 20 MG/1
20 TABLET, FILM COATED ORAL DAILY
Qty: 90 TABLET | Refills: 0 | Status: SHIPPED | OUTPATIENT
Start: 2023-11-01 | End: 2024-01-31

## 2024-01-11 ENCOUNTER — TELEPHONE (OUTPATIENT)
Dept: NEUROLOGY | Facility: CLINIC | Age: 44
End: 2024-01-11

## 2024-01-11 ENCOUNTER — TELEPHONE (OUTPATIENT)
Dept: NEUROLOGY | Facility: CLINIC | Age: 44
End: 2024-01-11
Payer: MEDICARE

## 2024-01-11 NOTE — TELEPHONE ENCOUNTER
----- Message from Miranda Laura sent at 1/10/2024  1:09 PM CST -----  Regarding: Inflectra update  plan  Please update the Inflectra therapy plan for this patient.  Next infusion is 24, the plan is currently .    Thank you,  Miranda Laura CPhT  [facility]

## 2024-01-12 RX ORDER — SODIUM CHLORIDE 0.9 % (FLUSH) 0.9 %
10 SYRINGE (ML) INJECTION
Status: CANCELLED | OUTPATIENT
Start: 2024-01-14

## 2024-01-12 RX ORDER — DIPHENHYDRAMINE HYDROCHLORIDE 50 MG/ML
50 INJECTION, SOLUTION INTRAMUSCULAR; INTRAVENOUS ONCE AS NEEDED
Status: CANCELLED | OUTPATIENT
Start: 2024-01-14

## 2024-01-12 RX ORDER — HEPARIN 100 UNIT/ML
500 SYRINGE INTRAVENOUS
OUTPATIENT
Start: 2024-01-14

## 2024-01-12 RX ORDER — EPINEPHRINE 0.3 MG/.3ML
0.3 INJECTION SUBCUTANEOUS ONCE AS NEEDED
Status: CANCELLED | OUTPATIENT
Start: 2024-01-14

## 2024-01-12 RX ORDER — DIPHENHYDRAMINE HYDROCHLORIDE 50 MG/ML
25 INJECTION, SOLUTION INTRAMUSCULAR; INTRAVENOUS
Status: CANCELLED | OUTPATIENT
Start: 2024-01-14

## 2024-01-12 RX ORDER — ACETAMINOPHEN 325 MG/1
650 TABLET ORAL
OUTPATIENT
Start: 2024-01-14

## 2024-01-12 RX ORDER — ACETAMINOPHEN 325 MG/1
650 TABLET ORAL
Status: CANCELLED | OUTPATIENT
Start: 2024-01-14

## 2024-01-12 RX ORDER — IPRATROPIUM BROMIDE AND ALBUTEROL SULFATE 2.5; .5 MG/3ML; MG/3ML
3 SOLUTION RESPIRATORY (INHALATION)
OUTPATIENT
Start: 2024-01-14

## 2024-01-30 DIAGNOSIS — F41.8 MIXED ANXIETY AND DEPRESSIVE DISORDER: ICD-10-CM

## 2024-01-31 RX ORDER — CITALOPRAM 20 MG/1
20 TABLET, FILM COATED ORAL
Qty: 90 TABLET | Refills: 0 | Status: SHIPPED | OUTPATIENT
Start: 2024-01-31 | End: 2024-06-06

## 2024-06-05 DIAGNOSIS — F41.8 MIXED ANXIETY AND DEPRESSIVE DISORDER: ICD-10-CM

## 2024-06-06 RX ORDER — CITALOPRAM 20 MG/1
20 TABLET, FILM COATED ORAL
Qty: 90 TABLET | Refills: 0 | Status: SHIPPED | OUTPATIENT
Start: 2024-06-06

## 2024-06-25 ENCOUNTER — OFFICE VISIT (OUTPATIENT)
Dept: NEUROLOGY | Facility: CLINIC | Age: 44
End: 2024-06-25
Payer: MEDICARE

## 2024-06-25 VITALS
WEIGHT: 137.13 LBS | SYSTOLIC BLOOD PRESSURE: 124 MMHG | DIASTOLIC BLOOD PRESSURE: 86 MMHG | HEIGHT: 64 IN | HEART RATE: 106 BPM | BODY MASS INDEX: 23.41 KG/M2

## 2024-06-25 DIAGNOSIS — I69.398 ABNORMALITY OF GAIT AS LATE EFFECT OF CEREBROVASCULAR ACCIDENT (CVA): ICD-10-CM

## 2024-06-25 DIAGNOSIS — Z71.89 COUNSELING REGARDING GOALS OF CARE: ICD-10-CM

## 2024-06-25 DIAGNOSIS — G81.91 RIGHT HEMIPARESIS: Primary | ICD-10-CM

## 2024-06-25 DIAGNOSIS — M35.2 NEUROLOGIC TYPE BEHCET'S SYNDROME: ICD-10-CM

## 2024-06-25 DIAGNOSIS — R26.9 ABNORMALITY OF GAIT AS LATE EFFECT OF CEREBROVASCULAR ACCIDENT (CVA): ICD-10-CM

## 2024-06-25 DIAGNOSIS — D84.821 IMMUNOSUPPRESSION DUE TO DRUG THERAPY: ICD-10-CM

## 2024-06-25 DIAGNOSIS — Z29.89 IMMUNOTHERAPY: ICD-10-CM

## 2024-06-25 DIAGNOSIS — Z79.899 IMMUNOSUPPRESSION DUE TO DRUG THERAPY: ICD-10-CM

## 2024-06-25 PROCEDURE — 3074F SYST BP LT 130 MM HG: CPT | Mod: HCNC,CPTII,S$GLB, | Performed by: PSYCHIATRY & NEUROLOGY

## 2024-06-25 PROCEDURE — 1159F MED LIST DOCD IN RCRD: CPT | Mod: HCNC,CPTII,S$GLB, | Performed by: PSYCHIATRY & NEUROLOGY

## 2024-06-25 PROCEDURE — 3079F DIAST BP 80-89 MM HG: CPT | Mod: HCNC,CPTII,S$GLB, | Performed by: PSYCHIATRY & NEUROLOGY

## 2024-06-25 PROCEDURE — 3008F BODY MASS INDEX DOCD: CPT | Mod: HCNC,CPTII,S$GLB, | Performed by: PSYCHIATRY & NEUROLOGY

## 2024-06-25 PROCEDURE — 99999 PR PBB SHADOW E&M-EST. PATIENT-LVL III: CPT | Mod: PBBFAC,HCNC,, | Performed by: PSYCHIATRY & NEUROLOGY

## 2024-06-25 PROCEDURE — G2211 COMPLEX E/M VISIT ADD ON: HCPCS | Mod: HCNC,S$GLB,, | Performed by: PSYCHIATRY & NEUROLOGY

## 2024-06-25 PROCEDURE — 1160F RVW MEDS BY RX/DR IN RCRD: CPT | Mod: HCNC,CPTII,S$GLB, | Performed by: PSYCHIATRY & NEUROLOGY

## 2024-06-25 PROCEDURE — 99213 OFFICE O/P EST LOW 20 MIN: CPT | Mod: HCNC,S$GLB,, | Performed by: PSYCHIATRY & NEUROLOGY

## 2024-06-25 NOTE — PROGRESS NOTES
Subjective:       Patient ID: Maame Cortez is a 43 y.o. female who presents today for a routine clinic visit for NeuroBehcet's    HPI:  DMT: Remicade every 8 weeks - outpatient infusion  Side effects from DMT? No --   Feeling neurologically stable; no sense of relapse or decline; tolerating Remicade        SOCIAL HISTORY  Social History     Tobacco Use    Smoking status: Every Day     Current packs/day: 0.00     Average packs/day: 0.5 packs/day for 8.0 years (4.0 ttl pk-yrs)     Types: Cigarettes     Start date: 2007     Last attempt to quit: 2015     Years since quittin.8    Smokeless tobacco: Never    Tobacco comments:     1 ppd   Substance Use Topics    Alcohol use: Yes     Alcohol/week: 0.0 standard drinks of alcohol     Comment: 3 glasses of wine daily    Drug use: No     Living arrangements - the patient lives with their family.  Employment ; SSDI, Medicare;     REVIEW OF SYMPTOMS 2023   Do you feel abnormally tired on most days? No   Do you feel you generally sleep well? Yes   Do you have difficulty controlling your bladder?  No   Do you have difficulty controlling your bowels?  No   Do you have frequent muscle cramps, tightness or spasms in your limbs?  No   Do you have new visual symptoms?  No   Do you have worsening difficulty with your memory or thinking? No   Do you have worsening symptoms of anxiety or depression?  No   For patients who walk, Do you have more difficulty walking?  No   Have you fallen since your last visit?  No   For patients who use wheelchairs: Do you have any skin wounds or breakdown? No   Do you have difficulty using your hands?  No   Do you have shooting or burning pain? No   Do you have difficulty with sexual function?  No   If you are sexually active, are you using birth control? Y/N  N/A Yes   Do you often choke when swallowing liquids or solid food?  No   Do you experience worsening symptoms when overheated? No   Do you need any new equipment such as  a wheelchair, walker or shower chair? No   Do you receive co-pay financial assistance for your principal MS medicine? No   Would you be interested in participating in an MS research trial in the future? No   For patients on Gilenya, Tecfidera, Aubagio, Rituxan, Ocrevus, Tysabri, Lemtrada or Methotrexate, are you aware that you should NOT receive live virus vaccines?  Not Applicable   Do you feel you have adequate family/friend support?  Yes   Do you have health insurance?   Yes   Are you currently employed? No   Do you receive SSDI/SSI?  Yes   Do you use marijuana or cannabis products? No   Have you been diagnosed with a urinary tract infection since your last visit here? No   Have you been diagnosed with a respiratory tract infection since your last visit here? No   Have you been to the emergency room since your last visit here? No   Have you been hospitalized since your last visit here?  No                 Objective:        Neurologic Exam        MS: intact  CN: EOMI, no dysarthria  MOTOR: right spastic hemiparesis (old since stroke 2015)  GAIT: right footdrop, spastic circumduction (old since stroke 2015)    Imaging:       Results for orders placed during the hospital encounter of 04/14/22    MRI Brain Demyelinating W W/O Contrast    Impression  1. No acute intracranial process.  No detrimental change.  2. Remote lacunar magic infarct in the left lateral basal ganglia and adjacent internal capsule and lateral margin of the thalamus.  No acute hemorrhage is detected.  See above comments.  3. Small remote lacunar infarct in the central jensen.  4. Right sphenoid sinus disease.      Electronically signed by: Delvin Jackson  Date:    04/14/2022  Time:    15:51    Results for orders placed during the hospital encounter of 10/24/18    MRI Cervical Spine Demyelinating W W/O Contrast    Impression  1. Persistent edema signal identified at the level of the jensen with ill-defined central enhancement, similar when compared to  "the recent brain MRI.  2. Normal MRI evaluation of the cervical spinal cord specifically without signal abnormality or pathologic enhancement.      Electronically signed by: Armando Oh MD  Date:    10/26/2018  Time:    22:43    Results for orders placed during the hospital encounter of 10/24/18    MRI Thoracic Spine Demyelinating W W/O Contrast    Impression  Normal MRI evaluation of the thoracic spine.      Electronically signed by: Armando Oh MD  Date:    10/26/2018  Time:    22:56      Labs:     Lab Results   Component Value Date    PUOSBXSU92KV 27 (L) 03/31/2017    MRBIZWFD70JS 19 (L) 10/29/2015     No results found for: "JCVINDEX", "JCVANTIBODY"  No results found for: "RS0QZLBP", "ABSOLUTECD3", "ZZ0VGXVL", "ABSOLUTECD8", "VL9EUTSC", "ABSOLUTECD4", "LABCD48"  Lab Results   Component Value Date    WBC 8.29 08/07/2023    RBC 3.91 (L) 08/07/2023    HGB 12.7 08/07/2023    HCT 38.7 08/07/2023    MCV 99 (H) 08/07/2023    MCH 32.5 (H) 08/07/2023    MCHC 32.8 08/07/2023    RDW 13.0 08/07/2023     08/07/2023    MPV 9.8 08/07/2023    GRAN 5.0 08/07/2023    GRAN 59.6 08/07/2023    LYMPH 2.6 08/07/2023    LYMPH 31.7 08/07/2023    MONO 0.6 08/07/2023    MONO 6.8 08/07/2023    EOS 0.1 08/07/2023    BASO 0.04 08/07/2023    EOSINOPHIL 1.0 08/07/2023    BASOPHIL 0.5 08/07/2023     Sodium   Date Value Ref Range Status   02/06/2023 139 136 - 145 mmol/L Final     Potassium   Date Value Ref Range Status   02/06/2023 4.3 3.5 - 5.1 mmol/L Final     Chloride   Date Value Ref Range Status   02/06/2023 105 95 - 110 mmol/L Final     CO2   Date Value Ref Range Status   02/06/2023 24 23 - 29 mmol/L Final     Glucose   Date Value Ref Range Status   02/06/2023 88 70 - 110 mg/dL Final     BUN   Date Value Ref Range Status   02/06/2023 13 6 - 20 mg/dL Final     Creatinine   Date Value Ref Range Status   02/06/2023 0.7 0.5 - 1.4 mg/dL Final     Calcium   Date Value Ref Range Status   02/06/2023 9.4 8.7 - 10.5 mg/dL Final "     Total Protein   Date Value Ref Range Status   02/06/2023 7.4 6.0 - 8.4 g/dL Final     Albumin   Date Value Ref Range Status   02/06/2023 4.1 3.5 - 5.2 g/dL Final     Total Bilirubin   Date Value Ref Range Status   02/06/2023 0.3 0.1 - 1.0 mg/dL Final     Comment:     For infants and newborns, interpretation of results should be based  on gestational age, weight and in agreement with clinical  observations.    Premature Infant recommended reference ranges:  Up to 24 hours.............<8.0 mg/dL  Up to 48 hours............<12.0 mg/dL  3-5 days..................<15.0 mg/dL  6-29 days.................<15.0 mg/dL       Alkaline Phosphatase   Date Value Ref Range Status   02/06/2023 78 55 - 135 U/L Final     AST   Date Value Ref Range Status   02/06/2023 29 10 - 40 U/L Final     ALT   Date Value Ref Range Status   02/06/2023 36 10 - 44 U/L Final     Anion Gap   Date Value Ref Range Status   02/06/2023 10 8 - 16 mmol/L Final     eGFR if    Date Value Ref Range Status   03/05/2021 >60.0 >60 mL/min/1.73 m^2 Final     eGFR if non    Date Value Ref Range Status   03/05/2021 >60.0 >60 mL/min/1.73 m^2 Final     Comment:     Calculation used to obtain the estimated glomerular filtration  rate (eGFR) is the CKD-EPI equation.        Diagnosis/Assessment/Plan:   1.  Neuro-behcet's        Assessment:Pt clinically stable and tolerating Remicade        Disease Modifying Therapies: continue infliximab 5mg/kg IV every 8 weeks.  Labs in August and in 6mo     F/u 1 year    Problem List Items Addressed This Visit          1 - High    Right hemiparesis - Primary    Neurologic type Behcet's syndrome       3     Abnormality of gait as late effect of cerebrovascular accident (CVA)     Other Visit Diagnoses       Immunotherapy        Relevant Orders    CBC Auto Differential    Comprehensive Metabolic Panel    Hepatitis B Core Antibody, Total    Hepatitis B Surface Antigen    Quantiferon Gold TB     Immunosuppression due to drug therapy        Counseling regarding goals of care                  I spent a total of 25 minutes on the day of the visit.This includes face to face time and non-face to face time preparing to see the patient (eg, review of tests), obtaining and/or reviewing separately obtained history, documenting clinical information in the electronic or other health record, independently interpreting results and communicating results to the patient/family/caregiver, or care coordinator.    Visit today included increased complexity associated with the care of the episodic problem : chronic immunotherapy; addressed and managing the longitudinal care of the patient due to the serious and/or complex managed problem(s) NeuroBehcet's.

## 2024-08-05 ENCOUNTER — LAB VISIT (OUTPATIENT)
Dept: LAB | Facility: HOSPITAL | Age: 44
End: 2024-08-05
Attending: PSYCHIATRY & NEUROLOGY
Payer: MEDICARE

## 2024-08-05 DIAGNOSIS — Z29.89 IMMUNOTHERAPY: ICD-10-CM

## 2024-08-05 PROCEDURE — 86480 TB TEST CELL IMMUN MEASURE: CPT | Mod: HCNC | Performed by: PSYCHIATRY & NEUROLOGY

## 2024-08-06 LAB
GAMMA INTERFERON BACKGROUND BLD IA-ACNC: 0.06 IU/ML
M TB IFN-G CD4+ BCKGRND COR BLD-ACNC: 0.07 IU/ML
M TB IFN-G CD4+ BCKGRND COR BLD-ACNC: 0.08 IU/ML
MITOGEN IGNF BCKGRD COR BLD-ACNC: 9.94 IU/ML
TB GOLD PLUS: NEGATIVE

## 2025-01-31 ENCOUNTER — TELEPHONE (OUTPATIENT)
Dept: NEUROLOGY | Facility: CLINIC | Age: 45
End: 2025-01-31
Payer: MEDICARE

## 2025-01-31 NOTE — TELEPHONE ENCOUNTER
----- Message from Pharmacist Monica sent at 1/29/2025  7:57 AM CST -----  Regarding: inflectra therapy plan  Hi Solomon.  When you get a chance, can you see if we can get the inflectra therapy plan renewed? Patient's next infusion is scheduled with us on 2/11/25.    ThanksMonica

## 2025-02-06 ENCOUNTER — TELEPHONE (OUTPATIENT)
Dept: NEUROLOGY | Facility: CLINIC | Age: 45
End: 2025-02-06
Payer: MEDICARE

## 2025-02-06 NOTE — TELEPHONE ENCOUNTER
LVM for pt to contact our office in regards to getting pt scheduled for labs before infusion on the 25th.

## 2025-02-07 ENCOUNTER — TELEPHONE (OUTPATIENT)
Dept: NEUROLOGY | Facility: CLINIC | Age: 45
End: 2025-02-07
Payer: MEDICARE

## 2025-02-07 NOTE — TELEPHONE ENCOUNTER
----- Message from Sienna Ryanhel sent at 2025  4:33 PM CST -----  Regarding: RE: inflectra therapy plan  The therapy plan is still .  Please have the plan updated if needed.     Thank you  Miranda  ----- Message -----  From: Solomon Bui, PharmD  Sent: 2025   2:52 PM CST  To: Miranda Laura; Monica Hurtado PharmD  Subject: RE: inflectra therapy plan                       Thank you.     I sent to Dr. Butt to sign.  ----- Message -----  From: Monica Hurtado, PharmD  Sent: 2025   7:58 AM CST  To: Solomon Bui, PharmKENYATTA; Miranda Laura  Subject: inflectra therapy plan                           Bassam Carbajal.  When you get a chance, can you see if we can get the inflectra therapy plan renewed? Patient's next infusion is scheduled with us on 25.    Thanks, Monica

## 2025-05-26 ENCOUNTER — LAB VISIT (OUTPATIENT)
Dept: LAB | Facility: HOSPITAL | Age: 45
End: 2025-05-26
Payer: MEDICARE

## 2025-05-26 DIAGNOSIS — Z29.89 IMMUNOTHERAPY: ICD-10-CM

## 2025-05-26 LAB
ABSOLUTE EOSINOPHIL (OHS): 0.1 K/UL
ABSOLUTE MONOCYTE (OHS): 0.57 K/UL (ref 0.3–1)
ABSOLUTE NEUTROPHIL COUNT (OHS): 5.77 K/UL (ref 1.8–7.7)
ALBUMIN SERPL BCP-MCNC: 3.4 G/DL (ref 3.5–5.2)
ALP SERPL-CCNC: 74 UNIT/L (ref 40–150)
ALT SERPL W/O P-5'-P-CCNC: 34 UNIT/L (ref 10–44)
ANION GAP (OHS): 9 MMOL/L (ref 8–16)
AST SERPL-CCNC: 22 UNIT/L (ref 11–45)
BASOPHILS # BLD AUTO: 0.03 K/UL
BASOPHILS NFR BLD AUTO: 0.4 %
BILIRUB SERPL-MCNC: 0.3 MG/DL (ref 0.1–1)
BUN SERPL-MCNC: 9 MG/DL (ref 6–20)
CALCIUM SERPL-MCNC: 9.2 MG/DL (ref 8.7–10.5)
CHLORIDE SERPL-SCNC: 104 MMOL/L (ref 95–110)
CO2 SERPL-SCNC: 24 MMOL/L (ref 23–29)
CREAT SERPL-MCNC: 0.6 MG/DL (ref 0.5–1.4)
ERYTHROCYTE [DISTWIDTH] IN BLOOD BY AUTOMATED COUNT: 13.2 % (ref 11.5–14.5)
GFR SERPLBLD CREATININE-BSD FMLA CKD-EPI: >60 ML/MIN/1.73/M2
GLUCOSE SERPL-MCNC: 82 MG/DL (ref 70–110)
HBV CORE AB SERPL QL IA: NORMAL
HBV SURFACE AG SERPL QL IA: NORMAL
HCT VFR BLD AUTO: 37.6 % (ref 37–48.5)
HGB BLD-MCNC: 11.7 GM/DL (ref 12–16)
IMM GRANULOCYTES # BLD AUTO: 0.02 K/UL (ref 0–0.04)
IMM GRANULOCYTES NFR BLD AUTO: 0.2 % (ref 0–0.5)
LYMPHOCYTES # BLD AUTO: 1.61 K/UL (ref 1–4.8)
MCH RBC QN AUTO: 31.2 PG (ref 27–31)
MCHC RBC AUTO-ENTMCNC: 31.1 G/DL (ref 32–36)
MCV RBC AUTO: 100 FL (ref 82–98)
NUCLEATED RBC (/100WBC) (OHS): 0 /100 WBC
PLATELET # BLD AUTO: 443 K/UL (ref 150–450)
PMV BLD AUTO: 9.7 FL (ref 9.2–12.9)
POTASSIUM SERPL-SCNC: 3.7 MMOL/L (ref 3.5–5.1)
PROT SERPL-MCNC: 7.1 GM/DL (ref 6–8.4)
RBC # BLD AUTO: 3.75 M/UL (ref 4–5.4)
RELATIVE EOSINOPHIL (OHS): 1.2 %
RELATIVE LYMPHOCYTE (OHS): 19.9 % (ref 18–48)
RELATIVE MONOCYTE (OHS): 7 % (ref 4–15)
RELATIVE NEUTROPHIL (OHS): 71.3 % (ref 38–73)
SODIUM SERPL-SCNC: 137 MMOL/L (ref 136–145)
WBC # BLD AUTO: 8.1 K/UL (ref 3.9–12.7)

## 2025-05-26 PROCEDURE — 80053 COMPREHEN METABOLIC PANEL: CPT

## 2025-05-26 PROCEDURE — 85025 COMPLETE CBC W/AUTO DIFF WBC: CPT

## 2025-05-26 PROCEDURE — 86704 HEP B CORE ANTIBODY TOTAL: CPT

## 2025-05-26 PROCEDURE — 86480 TB TEST CELL IMMUN MEASURE: CPT

## 2025-05-26 PROCEDURE — 36415 COLL VENOUS BLD VENIPUNCTURE: CPT | Mod: PO

## 2025-05-26 PROCEDURE — 87340 HEPATITIS B SURFACE AG IA: CPT

## 2025-05-27 LAB
MITOGEN MINUS NIL (OHS): 9.85
NIL TB SYNCED (OHS): 0.15
QUANTIFERON GOLD INTERP (OHS): NEGATIVE
TB1 AG MINUS NIL (OHS): 0.33
TB2 AG MINUS NIL (OHS): 0.2

## 2025-05-28 ENCOUNTER — TELEPHONE (OUTPATIENT)
Dept: NEUROLOGY | Facility: CLINIC | Age: 45
End: 2025-05-28
Payer: MEDICARE

## 2025-05-28 NOTE — TELEPHONE ENCOUNTER
Labs   5/26/25  WBC 8.10  ALC 1612  AST 22 , ALT 34  HBsAg - anti-Hbc- , no current or past infection  TB gold -

## 2025-05-28 NOTE — TELEPHONE ENCOUNTER
----- Message from CALVIN Pereira sent at 5/27/2025  4:20 PM CDT -----  Regarding: FW: infusion appt    ----- Message -----  From: Misti Adams  Sent: 5/27/2025   3:58 PM CDT  To: Filomena RUSSELL Staff  Subject: infusion appt                                    Maame Cortez calling regarding Patient Advice (message) for # pt is calling to speak with nurse regarding schedule infusion appt pls advise

## 2025-06-30 ENCOUNTER — OFFICE VISIT (OUTPATIENT)
Dept: NEUROLOGY | Facility: CLINIC | Age: 45
End: 2025-06-30
Payer: MEDICARE

## 2025-06-30 VITALS
SYSTOLIC BLOOD PRESSURE: 118 MMHG | WEIGHT: 131.19 LBS | DIASTOLIC BLOOD PRESSURE: 85 MMHG | BODY MASS INDEX: 22.4 KG/M2 | HEART RATE: 104 BPM | HEIGHT: 64 IN

## 2025-06-30 DIAGNOSIS — Z79.899 IMMUNOSUPPRESSION DUE TO DRUG THERAPY: ICD-10-CM

## 2025-06-30 DIAGNOSIS — Z29.89 IMMUNOTHERAPY: ICD-10-CM

## 2025-06-30 DIAGNOSIS — R26.9 ABNORMALITY OF GAIT AS LATE EFFECT OF CEREBROVASCULAR ACCIDENT (CVA): ICD-10-CM

## 2025-06-30 DIAGNOSIS — M35.2: Primary | ICD-10-CM

## 2025-06-30 DIAGNOSIS — G81.91 RIGHT HEMIPARESIS: ICD-10-CM

## 2025-06-30 DIAGNOSIS — M35.2 NEUROLOGIC TYPE BEHCET'S SYNDROME: ICD-10-CM

## 2025-06-30 DIAGNOSIS — D84.821 IMMUNOSUPPRESSION DUE TO DRUG THERAPY: ICD-10-CM

## 2025-06-30 DIAGNOSIS — I69.398 ABNORMALITY OF GAIT AS LATE EFFECT OF CEREBROVASCULAR ACCIDENT (CVA): ICD-10-CM

## 2025-06-30 DIAGNOSIS — Z71.89 COUNSELING REGARDING GOALS OF CARE: ICD-10-CM

## 2025-06-30 PROCEDURE — 3079F DIAST BP 80-89 MM HG: CPT | Mod: CPTII,HCNC,S$GLB, | Performed by: PSYCHIATRY & NEUROLOGY

## 2025-06-30 PROCEDURE — 1159F MED LIST DOCD IN RCRD: CPT | Mod: CPTII,HCNC,S$GLB, | Performed by: PSYCHIATRY & NEUROLOGY

## 2025-06-30 PROCEDURE — 99999 PR PBB SHADOW E&M-EST. PATIENT-LVL III: CPT | Mod: PBBFAC,HCNC,, | Performed by: PSYCHIATRY & NEUROLOGY

## 2025-06-30 PROCEDURE — 99215 OFFICE O/P EST HI 40 MIN: CPT | Mod: HCNC,S$GLB,, | Performed by: PSYCHIATRY & NEUROLOGY

## 2025-06-30 PROCEDURE — 1160F RVW MEDS BY RX/DR IN RCRD: CPT | Mod: CPTII,HCNC,S$GLB, | Performed by: PSYCHIATRY & NEUROLOGY

## 2025-06-30 PROCEDURE — 3008F BODY MASS INDEX DOCD: CPT | Mod: CPTII,HCNC,S$GLB, | Performed by: PSYCHIATRY & NEUROLOGY

## 2025-06-30 PROCEDURE — 3074F SYST BP LT 130 MM HG: CPT | Mod: CPTII,HCNC,S$GLB, | Performed by: PSYCHIATRY & NEUROLOGY

## 2025-06-30 PROCEDURE — G2211 COMPLEX E/M VISIT ADD ON: HCPCS | Mod: HCNC,S$GLB,, | Performed by: PSYCHIATRY & NEUROLOGY

## 2025-06-30 NOTE — PROGRESS NOTES
Subjective:       Patient ID: Maame Cortez is a 44 y.o. female who presents today for a routine clinic visit for NeuroBehcet's    HPI:  DMT: none - has not had any Inflectra every 8 weeks  Side effects from DMT? No --   Feeling neurologically stable; no sense of relapse or decline;  Wants to resume inflectra; she did not get labs done (finally got them done in May);   Smokes 1/2 PPD  States she's not really sure why she's not had an infusion, but does know that labs needed to be done;         SOCIAL HISTORY  Social History     Tobacco Use    Smoking status: Every Day     Current packs/day: 0.00     Average packs/day: 0.5 packs/day for 8.0 years (4.0 ttl pk-yrs)     Types: Cigarettes     Start date: 2007     Last attempt to quit: 2015     Years since quittin.8    Smokeless tobacco: Never    Tobacco comments:     1 ppd   Substance Use Topics    Alcohol use: Yes     Alcohol/week: 0.0 standard drinks of alcohol     Comment: 3 glasses of wine daily    Drug use: No     Living arrangements - the patient lives with their family.  Employment ; SSDI, Medicare;             Objective:      25 foot timed walk 6.0 seconds without assist  Neurologic Exam      MS: intact  CN: EOMI, no dysarthria  MOTOR: right spastic hemiparesis   GAIT: right footdrop, spastic circumduction     Imaging:       Results for orders placed during the hospital encounter of 22    MRI Brain Demyelinating W W/O Contrast    Impression  1. No acute intracranial process.  No detrimental change.  2. Remote lacunar magic infarct in the left lateral basal ganglia and adjacent internal capsule and lateral margin of the thalamus.  No acute hemorrhage is detected.  See above comments.  3. Small remote lacunar infarct in the central jensen.  4. Right sphenoid sinus disease.      Electronically signed by: Delvin Jackson  Date:    2022  Time:    15:51    Results for orders placed during the hospital encounter of 10/24/18    MRI  "Cervical Spine Demyelinating W W/O Contrast    Impression  1. Persistent edema signal identified at the level of the jensen with ill-defined central enhancement, similar when compared to the recent brain MRI.  2. Normal MRI evaluation of the cervical spinal cord specifically without signal abnormality or pathologic enhancement.      Electronically signed by: Armando Oh MD  Date:    10/26/2018  Time:    22:43    Results for orders placed during the hospital encounter of 10/24/18    MRI Thoracic Spine Demyelinating W W/O Contrast    Impression  Normal MRI evaluation of the thoracic spine.      Electronically signed by: Armando Oh MD  Date:    10/26/2018  Time:    22:56      Labs:     Lab Results   Component Value Date    [id number] 27 (L) 03/31/2017    ILVWVLLP34UJ 19 (L) 10/29/2015     No results found for: "JCVINDEX", "JCVANTIBODY"  No results found for: "UY7JWXAW", "ABSOLUTECD3", "BS1QSETL", "ABSOLUTECD8", "AY9IIRTN", "ABSOLUTECD4", "LABCD48"  Lab Results   Component Value Date    WBC 8.10 05/26/2025    RBC 3.75 (L) 05/26/2025    HGB 11.7 (L) 05/26/2025    HCT 37.6 05/26/2025     (H) 05/26/2025    MCH 31.2 (H) 05/26/2025    MCHC 31.1 (L) 05/26/2025    RDW 13.2 05/26/2025     05/26/2025    MPV 9.7 05/26/2025    GRAN 5.9 08/05/2024    GRAN 65.4 08/05/2024    LYMPH 19.9 05/26/2025    LYMPH 1.61 05/26/2025    MONO 7.0 05/26/2025    MONO 0.57 05/26/2025    EOS 1.2 05/26/2025    EOS 0.10 05/26/2025    BASO 0.05 08/05/2024    EOSINOPHIL 0.7 08/05/2024    BASOPHIL 0.4 05/26/2025    BASOPHIL 0.03 05/26/2025     Sodium   Date Value Ref Range Status   05/26/2025 137 136 - 145 mmol/L Final   08/05/2024 138 136 - 145 mmol/L Final     Potassium   Date Value Ref Range Status   05/26/2025 3.7 3.5 - 5.1 mmol/L Final   08/05/2024 4.0 3.5 - 5.1 mmol/L Final     Chloride   Date Value Ref Range Status   05/26/2025 104 95 - 110 mmol/L Final   08/05/2024 105 95 - 110 mmol/L Final     CO2   Date Value Ref Range " Status   05/26/2025 24 23 - 29 mmol/L Final   08/05/2024 24 23 - 29 mmol/L Final     Glucose   Date Value Ref Range Status   05/26/2025 82 70 - 110 mg/dL Final   08/05/2024 76 70 - 110 mg/dL Final     BUN   Date Value Ref Range Status   05/26/2025 9 6 - 20 mg/dL Final     Creatinine   Date Value Ref Range Status   05/26/2025 0.6 0.5 - 1.4 mg/dL Final     Calcium   Date Value Ref Range Status   05/26/2025 9.2 8.7 - 10.5 mg/dL Final   08/05/2024 9.4 8.7 - 10.5 mg/dL Final     Protein Total   Date Value Ref Range Status   05/26/2025 7.1 6.0 - 8.4 gm/dL Final     Total Protein   Date Value Ref Range Status   08/05/2024 7.4 6.0 - 8.4 g/dL Final     Albumin   Date Value Ref Range Status   05/26/2025 3.4 (L) 3.5 - 5.2 g/dL Final   08/05/2024 4.0 3.5 - 5.2 g/dL Final     Total Bilirubin   Date Value Ref Range Status   08/05/2024 0.3 0.1 - 1.0 mg/dL Final     Comment:     For infants and newborns, interpretation of results should be based  on gestational age, weight and in agreement with clinical  observations.    Premature Infant recommended reference ranges:  Up to 24 hours.............<8.0 mg/dL  Up to 48 hours............<12.0 mg/dL  3-5 days..................<15.0 mg/dL  6-29 days.................<15.0 mg/dL       Bilirubin Total   Date Value Ref Range Status   05/26/2025 0.3 0.1 - 1.0 mg/dL Final     Comment:     For infants and newborns, interpretation of results should be based   on gestational age, weight and in agreement with clinical   observations.    Premature Infant recommended reference ranges:   0-24 hours:  <8.0 mg/dL   24-48 hours: <12.0 mg/dL   3-5 days:    <15.0 mg/dL   6-29 days:   <15.0 mg/dL     Alkaline Phosphatase   Date Value Ref Range Status   08/05/2024 71 55 - 135 U/L Final     ALP   Date Value Ref Range Status   05/26/2025 74 40 - 150 unit/L Final     AST   Date Value Ref Range Status   05/26/2025 22 11 - 45 unit/L Final   08/05/2024 24 10 - 40 U/L Final     ALT   Date Value Ref Range Status    05/26/2025 34 10 - 44 unit/L Final   08/05/2024 27 10 - 44 U/L Final     Anion Gap   Date Value Ref Range Status   05/26/2025 9 8 - 16 mmol/L Final     eGFR if    Date Value Ref Range Status   03/05/2021 >60.0 >60 mL/min/1.73 m^2 Final     eGFR if non    Date Value Ref Range Status   03/05/2021 >60.0 >60 mL/min/1.73 m^2 Final     Comment:     Calculation used to obtain the estimated glomerular filtration  rate (eGFR) is the CKD-EPI equation.        Diagnosis/Assessment/Plan:   1.  Neuro-behcet's        Assessment:Pt clinically stable; will restart infliximab biosimila 5mg/kg IV every 8 weeks.  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)     Other Visit Diagnoses         Neurologic type Behcet syndrome    -  Primary    Relevant Orders    CBC Auto Differential    Comprehensive Metabolic Panel    Quantiferon Gold TB    Hepatitis B Core Antibody, Total    Hepatitis B Surface Antigen      Immunotherapy        Relevant Orders    CBC Auto Differential    Comprehensive Metabolic Panel    Quantiferon Gold TB    Hepatitis B Core Antibody, Total    Hepatitis B Surface Antigen      Counseling regarding goals of care          Immunosuppression due to drug therapy                  I spent a total of 40 minutes on the day of the visit.This includes face to face time and non-face to face time preparing to see the patient (eg, review of tests), obtaining and/or reviewing separately obtained history, documenting clinical information in the electronic or other health record, independently interpreting results and communicating results to the patient/family/caregiver, or care coordinator.    Visit today included increased complexity associated with the care of the episodic problem : chronic immunotherapy;  addressed and managing the longitudinal care of the patient due to the serious and/or complex managed problem(s) NeuroBehcet's.

## 2025-06-30 NOTE — PROGRESS NOTES
"Subjective:          Patient ID: Maame Cortez is a 44 y.o. female who presents today for a {Blank single:23294::"routine","fit-in"}  visit for MS.      SUMMARY     MS HPI:  DMT: {DMT:74635}  Side effects from DMT? {YES **/NO:09535}  Taking vitamin D3 as recommended? {YES **/NO:05432} Dose:   ***    Medications:  Current Outpatient Medications   Medication Sig    infliximab (REMICADE IV) Inject into the vein.     No current facility-administered medications for this visit.       SOCIAL HISTORY  Social History[1]    Living arrangements - the patient {lives with:376174::"lives with their family"}.            6/30/2025    10:45 AM   REVIEW OF SYMPTOMS   Do you feel abnormally tired on most days? No   Do you feel you generally sleep well? No   Do you have difficulty controlling your bladder?  No   Do you have difficulty controlling your bowels?  No   Do you have frequent muscle cramps, tightness or spasms in your limbs?  No   Do you have new visual symptoms?  No   Do you have worsening difficulty with your memory or thinking? No   Do you have worsening symptoms of anxiety or depression?  No   For patients who walk, Do you have more difficulty walking?  No   Have you fallen since your last visit?  No   For patients who use wheelchairs: Do you have any skin wounds or breakdown? Not Applicable   Do you have difficulty using your hands?  No   Do you have shooting or burning pain? No   Do you have difficulty with sexual function?  No   If you are sexually active, are you using birth control? Y/N  N/A No   Do you often choke when swallowing liquids or solid food?  No   Do you experience worsening symptoms when overheated? No   Do you need any new equipment such as a wheelchair, walker or shower chair? No   Do you receive co-pay financial assistance for your principal MS medicine? No   Would you be interested in participating in an MS research trial in the future? No   For patients on Gilenya, Tecfidera, Aubagio, " Rituxan, Ocrevus, Tysabri, Lemtrada or Methotrexate, are you aware that you should NOT receive live virus vaccines?  Not Applicable   Do you feel you have adequate family/friend support?  Yes   Do you have health insurance?   Yes   Are you currently employed? No   Do you receive SSDI/SSI?  Yes   Do you use marijuana or cannabis products? No   Have you been diagnosed with a urinary tract infection since your last visit here? No   Have you been diagnosed with a respiratory tract infection since your last visit here? No   Have you been to the emergency room since your last visit here? No   Have you been hospitalized since your last visit here?  No            No data to display                   No data to display                   No data to display                   No data to display                   No data to display                   No data to display                   No data to display                   No data to display                   No data to display                   No data to display                        Objective:               No data to display                       No data to display                       No data to display                 Neurological Exam      Imaging:     No results found for this or any previous visit.    No results found for this or any previous visit.    No results found for this or any previous visit.    Results for orders placed during the hospital encounter of 04/14/22    MRI Brain Demyelinating W W/O Contrast    Impression  1. No acute intracranial process.  No detrimental change.  2. Remote lacunar magic infarct in the left lateral basal ganglia and adjacent internal capsule and lateral margin of the thalamus.  No acute hemorrhage is detected.  See above comments.  3. Small remote lacunar infarct in the central jensen.  4. Right sphenoid sinus disease.      Electronically signed by: Delvin Jackson  Date:    04/14/2022  Time:    15:51    Results for orders placed  "during the hospital encounter of 10/24/18    MRI Cervical Spine Demyelinating W W/O Contrast    Impression  1. Persistent edema signal identified at the level of the jensen with ill-defined central enhancement, similar when compared to the recent brain MRI.  2. Normal MRI evaluation of the cervical spinal cord specifically without signal abnormality or pathologic enhancement.      Electronically signed by: Armando Oh MD  Date:    10/26/2018  Time:    22:43    Results for orders placed during the hospital encounter of 10/24/18    MRI Thoracic Spine Demyelinating W W/O Contrast    Impression  Normal MRI evaluation of the thoracic spine.      Electronically signed by: Armando Oh MD  Date:    10/26/2018  Time:    22:56        Labs:     Lab Results   Component Value Date    [id number] 27 (L) 03/31/2017    CTGEQGEP26GZ 19 (L) 10/29/2015     No results found for: "JCVINDEX", "JCVANTIBODY"  @resufast(NEUROLGTCHN:3)   No results found for: "WD5VPDNZ", "ABSOLUTECD3", "IV0TMBOL", "ABSOLUTECD8", "IY0WMXJY", "ABSOLUTECD4", "LABCD48"  Lab Results   Component Value Date    WBC 8.10 05/26/2025    RBC 3.75 (L) 05/26/2025    HGB 11.7 (L) 05/26/2025    HCT 37.6 05/26/2025     (H) 05/26/2025    MCH 31.2 (H) 05/26/2025    MCHC 31.1 (L) 05/26/2025    RDW 13.2 05/26/2025     05/26/2025    MPV 9.7 05/26/2025    GRAN 5.9 08/05/2024    GRAN 65.4 08/05/2024    LYMPH 19.9 05/26/2025    LYMPH 1.61 05/26/2025    MONO 7.0 05/26/2025    MONO 0.57 05/26/2025    EOS 1.2 05/26/2025    EOS 0.10 05/26/2025    BASO 0.05 08/05/2024    EOSINOPHIL 0.7 08/05/2024    BASOPHIL 0.4 05/26/2025    BASOPHIL 0.03 05/26/2025     Sodium   Date Value Ref Range Status   05/26/2025 137 136 - 145 mmol/L Final   08/05/2024 138 136 - 145 mmol/L Final     Potassium   Date Value Ref Range Status   05/26/2025 3.7 3.5 - 5.1 mmol/L Final   08/05/2024 4.0 3.5 - 5.1 mmol/L Final     Chloride   Date Value Ref Range Status   05/26/2025 104 95 - 110 mmol/L " Final   08/05/2024 105 95 - 110 mmol/L Final     CO2   Date Value Ref Range Status   05/26/2025 24 23 - 29 mmol/L Final   08/05/2024 24 23 - 29 mmol/L Final     Glucose   Date Value Ref Range Status   05/26/2025 82 70 - 110 mg/dL Final   08/05/2024 76 70 - 110 mg/dL Final     BUN   Date Value Ref Range Status   05/26/2025 9 6 - 20 mg/dL Final     Creatinine   Date Value Ref Range Status   05/26/2025 0.6 0.5 - 1.4 mg/dL Final     Calcium   Date Value Ref Range Status   05/26/2025 9.2 8.7 - 10.5 mg/dL Final   08/05/2024 9.4 8.7 - 10.5 mg/dL Final     Protein Total   Date Value Ref Range Status   05/26/2025 7.1 6.0 - 8.4 gm/dL Final     Total Protein   Date Value Ref Range Status   08/05/2024 7.4 6.0 - 8.4 g/dL Final     Albumin   Date Value Ref Range Status   05/26/2025 3.4 (L) 3.5 - 5.2 g/dL Final   08/05/2024 4.0 3.5 - 5.2 g/dL Final     Total Bilirubin   Date Value Ref Range Status   08/05/2024 0.3 0.1 - 1.0 mg/dL Final     Comment:     For infants and newborns, interpretation of results should be based  on gestational age, weight and in agreement with clinical  observations.    Premature Infant recommended reference ranges:  Up to 24 hours.............<8.0 mg/dL  Up to 48 hours............<12.0 mg/dL  3-5 days..................<15.0 mg/dL  6-29 days.................<15.0 mg/dL       Bilirubin Total   Date Value Ref Range Status   05/26/2025 0.3 0.1 - 1.0 mg/dL Final     Comment:     For infants and newborns, interpretation of results should be based   on gestational age, weight and in agreement with clinical   observations.    Premature Infant recommended reference ranges:   0-24 hours:  <8.0 mg/dL   24-48 hours: <12.0 mg/dL   3-5 days:    <15.0 mg/dL   6-29 days:   <15.0 mg/dL     Alkaline Phosphatase   Date Value Ref Range Status   08/05/2024 71 55 - 135 U/L Final     ALP   Date Value Ref Range Status   05/26/2025 74 40 - 150 unit/L Final     AST   Date Value Ref Range Status   05/26/2025 22 11 - 45 unit/L Final    2024 24 10 - 40 U/L Final     ALT   Date Value Ref Range Status   2025 34 10 - 44 unit/L Final   2024 27 10 - 44 U/L Final     Anion Gap   Date Value Ref Range Status   2025 9 8 - 16 mmol/L Final     eGFR if    Date Value Ref Range Status   2021 >60.0 >60 mL/min/1.73 m^2 Final     eGFR if non    Date Value Ref Range Status   2021 >60.0 >60 mL/min/1.73 m^2 Final     Comment:     Calculation used to obtain the estimated glomerular filtration  rate (eGFR) is the CKD-EPI equation.        Lab Results   Component Value Date    HEPBSAG Non-Reactive 2025    HEPBSAB 795.07 2023    HEPBSAB Reactive 2023    HEPBCAB Non-Reactive 2025           MS Impression and Plan:                Problem List Items Addressed This Visit          1 - High    Right hemiparesis       Esthela Butt MD    I spent a total of 4*** minutes on the day of the visit.This includes face to face time and non-face to face time preparing to see the patient (eg, review of tests), obtaining and/or reviewing separately obtained history, documenting clinical information in the electronic or other health record, independently interpreting results and communicating results to the patient/family/caregiver, or care coordinator.  Visit today included increased complexity associated with the care of the episodic problem : chronic immunotherapy; addressed and managing the longitudinal care of the patient due to the serious and/or complex managed problem(s) MS.         [1]   Social History  Tobacco Use    Smoking status: Every Day     Current packs/day: 0.00     Average packs/day: 0.5 packs/day for 8.0 years (4.0 ttl pk-yrs)     Types: Cigarettes     Start date: 2007     Last attempt to quit: 2015     Years since quittin.8    Smokeless tobacco: Never    Tobacco comments:     1 ppd   Substance Use Topics    Alcohol use: Yes     Alcohol/week: 0.0 standard drinks  of alcohol     Comment: 3 glasses of wine daily    Drug use: No

## 2025-07-01 ENCOUNTER — TELEPHONE (OUTPATIENT)
Dept: NEUROLOGY | Facility: CLINIC | Age: 45
End: 2025-07-01
Payer: MEDICARE

## 2025-07-01 NOTE — TELEPHONE ENCOUNTER
----- Message from Esthela Butt MD sent at 6/30/2025  4:16 PM CDT -----  As you know, ready to go for inflectra; pt will call home infusion to schedule;

## 2025-07-02 ENCOUNTER — TELEPHONE (OUTPATIENT)
Dept: NEUROLOGY | Facility: CLINIC | Age: 45
End: 2025-07-02
Payer: MEDICARE

## 2025-07-02 NOTE — TELEPHONE ENCOUNTER
LVM for pt to contact our office in regards to seeing if lab appt day and time works for her.     Pt is scheduled for labs on 11/5/25.

## 2025-07-02 NOTE — TELEPHONE ENCOUNTER
Copied from CRM #[id number]. Topic: Appointments - Appointment Access  >> Jul 2, 2025 12:47 PM Juan José wrote:  11/5/25 Lab appt confirmed with pt

## 2025-07-30 ENCOUNTER — TELEPHONE (OUTPATIENT)
Dept: NEUROLOGY | Facility: CLINIC | Age: 45
End: 2025-07-30
Payer: MEDICARE